# Patient Record
Sex: FEMALE | Race: WHITE | Employment: OTHER | URBAN - NONMETROPOLITAN AREA
[De-identification: names, ages, dates, MRNs, and addresses within clinical notes are randomized per-mention and may not be internally consistent; named-entity substitution may affect disease eponyms.]

---

## 2017-07-21 ENCOUNTER — TELEPHONE (OUTPATIENT)
Dept: CARDIOLOGY CLINIC | Age: 77
End: 2017-07-21

## 2017-08-03 ENCOUNTER — OFFICE VISIT (OUTPATIENT)
Dept: CARDIOLOGY CLINIC | Age: 77
End: 2017-08-03
Payer: MEDICARE

## 2017-08-03 VITALS
BODY MASS INDEX: 27.63 KG/M2 | SYSTOLIC BLOOD PRESSURE: 128 MMHG | DIASTOLIC BLOOD PRESSURE: 66 MMHG | WEIGHT: 156 LBS | HEART RATE: 64 BPM

## 2017-08-03 DIAGNOSIS — I51.89 DIASTOLIC DYSFUNCTION: ICD-10-CM

## 2017-08-03 DIAGNOSIS — I07.1 MODERATE TRICUSPID REGURGITATION: ICD-10-CM

## 2017-08-03 DIAGNOSIS — R06.09 DOE (DYSPNEA ON EXERTION): ICD-10-CM

## 2017-08-03 DIAGNOSIS — R01.1 MURMUR: ICD-10-CM

## 2017-08-03 DIAGNOSIS — I35.0 NONRHEUMATIC AORTIC VALVE STENOSIS: ICD-10-CM

## 2017-08-03 DIAGNOSIS — Z01.818 PRE-OPERATIVE CLEARANCE: Primary | ICD-10-CM

## 2017-08-03 DIAGNOSIS — I25.10 CORONARY ARTERY DISEASE INVOLVING NATIVE CORONARY ARTERY OF NATIVE HEART WITHOUT ANGINA PECTORIS: ICD-10-CM

## 2017-08-03 DIAGNOSIS — E78.5 DYSLIPIDEMIA, GOAL LDL BELOW 100: ICD-10-CM

## 2017-08-03 DIAGNOSIS — G89.29 CHRONIC PAIN OF RIGHT KNEE: ICD-10-CM

## 2017-08-03 DIAGNOSIS — M25.561 CHRONIC PAIN OF RIGHT KNEE: ICD-10-CM

## 2017-08-03 DIAGNOSIS — I10 ESSENTIAL HYPERTENSION: ICD-10-CM

## 2017-08-03 DIAGNOSIS — I38 VALVULAR REGURGITATION: ICD-10-CM

## 2017-08-03 PROCEDURE — 99214 OFFICE O/P EST MOD 30 MIN: CPT | Performed by: INTERNAL MEDICINE

## 2017-08-03 RX ORDER — LEVOTHYROXINE SODIUM 112 UG/1
112 TABLET ORAL DAILY
COMMUNITY
Start: 2017-06-29

## 2017-11-08 ENCOUNTER — OFFICE VISIT (OUTPATIENT)
Dept: CARDIOLOGY CLINIC | Age: 77
End: 2017-11-08
Payer: MEDICARE

## 2017-11-08 VITALS
HEART RATE: 84 BPM | BODY MASS INDEX: 26.93 KG/M2 | DIASTOLIC BLOOD PRESSURE: 62 MMHG | WEIGHT: 152 LBS | HEIGHT: 63 IN | SYSTOLIC BLOOD PRESSURE: 138 MMHG

## 2017-11-08 DIAGNOSIS — R01.1 MURMUR: ICD-10-CM

## 2017-11-08 DIAGNOSIS — I10 ESSENTIAL HYPERTENSION: ICD-10-CM

## 2017-11-08 DIAGNOSIS — E78.5 DYSLIPIDEMIA, GOAL LDL BELOW 100: ICD-10-CM

## 2017-11-08 DIAGNOSIS — I35.0 NONRHEUMATIC AORTIC VALVE STENOSIS: ICD-10-CM

## 2017-11-08 DIAGNOSIS — I25.10 CORONARY ARTERY DISEASE INVOLVING NATIVE CORONARY ARTERY OF NATIVE HEART WITHOUT ANGINA PECTORIS: Primary | ICD-10-CM

## 2017-11-08 DIAGNOSIS — I51.89 DIASTOLIC DYSFUNCTION: ICD-10-CM

## 2017-11-08 DIAGNOSIS — I38 VALVULAR REGURGITATION: ICD-10-CM

## 2017-11-08 DIAGNOSIS — I07.1 MODERATE TRICUSPID REGURGITATION: ICD-10-CM

## 2017-11-08 PROCEDURE — 99213 OFFICE O/P EST LOW 20 MIN: CPT | Performed by: INTERNAL MEDICINE

## 2017-11-08 NOTE — PROGRESS NOTES
This patient is followed regularly by Dr. Viktoria Stubbs DO. Chief Complaint   Patient presents with    Check-Up     CAD         Current Outpatient Prescriptions   Medication Sig Dispense Refill    Insulin Degludec (TRESIBA FLEXTOUCH SC) Inject 40 Units into the skin nightly      levothyroxine (SYNTHROID) 112 MCG tablet Take 112 mcg by mouth daily      losartan (COZAAR) 25 MG tablet Take 25 mg by mouth daily      Cyanocobalamin (VITAMIN B 12 PO) Place  under the tongue daily.  Insulin Syringe-Needle U-100 (RELI-ON INSULIN SYRINGE) by Does not apply route. 31 G/8MM MIS (SHORT)      metoprolol (TOPROL-XL) 25 MG XL tablet TAKE ONE TABLET BY MOUTH EVERY DAY 30 tablet 2    aspirin 81 MG tablet Take 81 mg by mouth daily.  amLODIPine (NORVASC) 5 MG tablet Take 5 mg by mouth daily.  simvastatin (ZOCOR) 20 MG tablet Take 20 mg by mouth nightly. No current facility-administered medications for this visit. Review of Systems - General ROS: negative  Psychological ROS: negative  Hematological and Lymphatic ROS: No history of blood clots or bleeding disorder.    Respiratory ROS: no cough  Cardiovascular ROS: no chest pain, chronic dyspnea on exertion   Gastrointestinal ROS: negative  Genito-Urinary ROS: no dysuria, trouble voiding, or hematuria  Musculoskeletal ROS: negative  Neurological ROS: no TIA or stroke symptoms  Dermatological ROS: negative      /62   Pulse 84   Ht 5' 3\" (1.6 m)   Wt 152 lb (68.9 kg)   BMI 26.93 kg/m²     Physical Examination: General appearance - alert, well appearing, and in no distress  Mental status - alert, oriented to person, place, and time  Neck - supple, no significant adenopathy, no JVD  Chest - clear to auscultation, no wheezes, rales or rhonchi, symmetric air entry  Heart - normal rate, regular rhythm, normal S1, S2, 2/6 SARANYA at the base murmur  Abdomen - soft, nontender, nondistended, no masses or organomegaly  Neurological - alert, oriented, normal speech, no focal findings   Musculoskeletal - no  joint tenderness  Extremities - peripheral pulses normal, no pedal edema  Skin - normal coloration and turgor, no rashes        EKG - NSR    Echo on 12/5/12  Mild MR/TR  Diastolic dysfunction  Moderate pulm HTN  Low-normal EF  LAE/LANE      Echo on 12/7/15  Doppler parameters were consistent with abnormal left ventricular relaxation (grade 1 diastolic dysfunction). Mitral valve: There was mild to moderate regurgitation. Aortic valve: Transaortic velocity was increased due to valvular stenosis. There was mild to moderate stenosis. The peak valve velocity was 260 cm/s. Valve peak gradient was 27 mmHg. Valve mean gradient was 17 mmHg. Tricuspid valve: There was mild to moderate regurgitation. Pulmonic valve: There was mild to moderate regurgitation. Pulmonary arteries: Systolic pressure was mildly increased. Estimated peak pressure was at least 35 mmHg. Stress test on 12/18/12  No ischemia    Left heart cath on 3/13/13  Left main artery is long and patent. It bifurcates to left anterior descending artery. Left circumflex artery is a nondominant vessel and has a mid diffuse 30-40 percent disease. OM1 branch is a small vessel and mild diffuse disease. Left anterior descending artery has a mid around 60 percent stenosis in the diagonal branch which is a medium caliber vessel. It is coming off from that diseased area. Distal LAD also tapers down to 1   mm vessel. Proximal LAD has around 20-30 percent stenosis. Right coronary artery is a dominant vessel. The distal RCA has around 30 percent stenosis. It bifurcates into PLV and PDA. LVEF is 65 percent. LVEDP was 14 mmHg. FFR to LAD was 0.92.     RIGHT HEART CATHETERIZATION FINDING on 3/13/13   RA pressure 13 mmHg  RV pressure 38/14 mmHg  PA pressure 42/10 mmHg  Pulmonary capillary pressure 18 mmHg.     Leg duplex on 4/16/13  No venous insufficiency    SALMA on 4/11/13  Normal    Exercise SALMA on 12/24/13  Normal      Echo 12/16   Normal left ventricle size and systolic function. Ejection fraction was estimated at 55%. There were no regional left ventricular wall motion   abnormalities and wall thickness was within normal limits.   Doppler parameters were consistent with abnormal left ventricular relaxation (grade 1 diastolic dysfunction).   Mildly dilated left atrium.   Mildly dilated right ventricle.   The aortic valve was trileaflet with increased thickness and reduced cuspal separation.   There was mild aortic regurgitation.   The maximum aortic valve gradient is 23 mmHg, the mean gradient is 13 mmHg, and the peak velocity is 241 cm/s.  OLGA LIDIA - 1.3 cm2   Mild-Moderate AS   There was mild mitral regurgitation.   There was mild tricuspid regurgitation.   Mild to moderate pulmonic regurgitation visualized.   Pulmonary artery systolic pressure calculated from tricuspid regurgitation jet is 30 mmhg. Lab Results   Component Value Date    WBC 9.2 03/12/2013    RBC 4.14 03/12/2013    HGB 11.7 03/12/2013    HCT 34.3 03/12/2013    MCV 82.8 03/12/2013    MCH 28.3 03/12/2013    MCHC 34.2 03/12/2013    RDW 14.0 03/12/2013     03/12/2013    MPV 9.9 03/12/2013       Lab Results   Component Value Date     03/12/2013    K 4.4 03/12/2013     03/12/2013    CO2 25 03/12/2013    BUN 13 03/12/2013    LABALBU 4.7 03/12/2013    CREATININE 0.8 03/12/2013    CALCIUM 10.3 03/12/2013    LABGLOM 71 03/12/2013    GLUCOSE 163 03/12/2013       Lab Results   Component Value Date    ALKPHOS 61 03/12/2013    ALT 19 03/12/2013    AST 18 03/12/2013    PROT 7.6 03/12/2013    BILITOT 0.4 03/12/2013    BILIDIR 0.2 03/12/2013    LABALBU 4.7 03/12/2013       Lab Results   Component Value Date    TRIG 54 03/12/2013    HDL 59 03/12/2013    LDLCALC 79 03/12/2013         Assessment/Plan:      S/P Rt TKR   No cristi op cardiovascular events.       Mild-mod AS  Mild to moderate TR/MR/WI  Serial echo    Coronary artery disease  Seems to be stable. Denies angina or change in breathing pattern. Continue ASA/BB/Statin/norvasc. Dyspnea on exertion - Could be multifactorial  LV diastolic dysfunction  Mild - Moderate TR/WA/AR/MR  Sleep apnea - On BIPAP. Normal SALMA with exercise. No Venous insufficiency. Hypertension, on medical treatment. Patient is compliant with medical treatment. Diabetes mellitus: Followed by family physician. Patient needs very tight control to minimize cardiac risk. Dyslipidemia: On statin, followed periodically. Patient need periodic lipid and liver profile. Patient is a advised to have their lipids and liver panel checked through family doctor. The therapeutic values that need to be met are also presented to the patient and need to achieve them is emphasized and patient agrees and accepts. Pulm HTN/Diastolic dysfunction - Medical management. Will schedule follow up appointment in 1 year.

## 2018-04-03 ENCOUNTER — TELEPHONE (OUTPATIENT)
Dept: CARDIOLOGY CLINIC | Age: 78
End: 2018-04-03

## 2018-04-30 ENCOUNTER — OFFICE VISIT (OUTPATIENT)
Dept: CARDIOLOGY CLINIC | Age: 78
End: 2018-04-30
Payer: MEDICARE

## 2018-04-30 VITALS
HEIGHT: 63 IN | WEIGHT: 168 LBS | DIASTOLIC BLOOD PRESSURE: 64 MMHG | BODY MASS INDEX: 29.77 KG/M2 | HEART RATE: 74 BPM | SYSTOLIC BLOOD PRESSURE: 139 MMHG

## 2018-04-30 DIAGNOSIS — I35.0 AORTIC VALVE STENOSIS, ETIOLOGY OF CARDIAC VALVE DISEASE UNSPECIFIED: ICD-10-CM

## 2018-04-30 DIAGNOSIS — R07.2 PRECORDIAL CHEST PAIN: Primary | ICD-10-CM

## 2018-04-30 DIAGNOSIS — I10 ESSENTIAL HYPERTENSION: ICD-10-CM

## 2018-04-30 DIAGNOSIS — E78.5 DYSLIPIDEMIA: ICD-10-CM

## 2018-04-30 PROCEDURE — 99213 OFFICE O/P EST LOW 20 MIN: CPT | Performed by: PHYSICIAN ASSISTANT

## 2018-05-15 ENCOUNTER — HOSPITAL ENCOUNTER (OUTPATIENT)
Dept: NON INVASIVE DIAGNOSTICS | Age: 78
Discharge: HOME | End: 2018-05-15
Payer: MEDICARE

## 2018-05-15 VITALS — WEIGHT: 168 LBS | BODY MASS INDEX: 29.77 KG/M2 | HEIGHT: 63 IN

## 2018-05-15 DIAGNOSIS — R07.2 PRECORDIAL CHEST PAIN: ICD-10-CM

## 2018-05-15 LAB
LV EF: 55 %
LVEF MODALITY: NORMAL

## 2018-05-15 PROCEDURE — A9500 TC99M SESTAMIBI: HCPCS | Performed by: PHYSICIAN ASSISTANT

## 2018-05-15 PROCEDURE — 6360000002 HC RX W HCPCS

## 2018-05-15 PROCEDURE — 78452 HT MUSCLE IMAGE SPECT MULT: CPT

## 2018-05-15 PROCEDURE — 3430000000 HC RX DIAGNOSTIC RADIOPHARMACEUTICAL: Performed by: PHYSICIAN ASSISTANT

## 2018-05-15 PROCEDURE — 93017 CV STRESS TEST TRACING ONLY: CPT

## 2018-05-15 PROCEDURE — 93306 TTE W/DOPPLER COMPLETE: CPT

## 2018-05-15 RX ADMIN — Medication 29.6 MILLICURIE: at 10:52

## 2018-05-15 RX ADMIN — Medication 8.3 MILLICURIE: at 10:05

## 2018-07-12 ENCOUNTER — OFFICE VISIT (OUTPATIENT)
Dept: CARDIOLOGY CLINIC | Age: 78
End: 2018-07-12
Payer: MEDICARE

## 2018-07-12 VITALS
BODY MASS INDEX: 28.99 KG/M2 | SYSTOLIC BLOOD PRESSURE: 130 MMHG | WEIGHT: 163.6 LBS | HEART RATE: 78 BPM | DIASTOLIC BLOOD PRESSURE: 64 MMHG | HEIGHT: 63 IN

## 2018-07-12 DIAGNOSIS — E78.5 DYSLIPIDEMIA: ICD-10-CM

## 2018-07-12 DIAGNOSIS — I35.0 MODERATE AORTIC STENOSIS: ICD-10-CM

## 2018-07-12 DIAGNOSIS — E78.5 DYSLIPIDEMIA, GOAL LDL BELOW 100: ICD-10-CM

## 2018-07-12 DIAGNOSIS — I25.10 CORONARY ARTERY DISEASE INVOLVING NATIVE CORONARY ARTERY OF NATIVE HEART WITHOUT ANGINA PECTORIS: Primary | ICD-10-CM

## 2018-07-12 DIAGNOSIS — R06.09 DOE (DYSPNEA ON EXERTION): ICD-10-CM

## 2018-07-12 DIAGNOSIS — I10 ESSENTIAL HYPERTENSION: ICD-10-CM

## 2018-07-12 DIAGNOSIS — R01.1 MURMUR: ICD-10-CM

## 2018-07-12 PROCEDURE — 93000 ELECTROCARDIOGRAM COMPLETE: CPT | Performed by: INTERNAL MEDICINE

## 2018-07-12 PROCEDURE — 99213 OFFICE O/P EST LOW 20 MIN: CPT | Performed by: INTERNAL MEDICINE

## 2018-07-12 NOTE — PROGRESS NOTES
person, place, and time  Neck - supple, no significant adenopathy, no JVD  Chest - clear to auscultation, no wheezes, rales or rhonchi, symmetric air entry  Heart - normal rate, regular rhythm, normal S1, S2, 2/6 SARANYA at the base murmur  Abdomen - soft, nontender, nondistended, no masses or organomegaly  Neurological - alert, oriented, normal speech, no focal findings   Musculoskeletal - no  joint tenderness  Extremities - peripheral pulses normal, no pedal edema  Skin - normal coloration and turgor, no rashes        EKG - NSR    Echo on 12/5/12  Mild MR/TR  Diastolic dysfunction  Moderate pulm HTN  Low-normal EF  LAE/LANE      Echo on 12/7/15  Doppler parameters were consistent with abnormal left ventricular relaxation (grade 1 diastolic dysfunction). Mitral valve: There was mild to moderate regurgitation. Aortic valve: Transaortic velocity was increased due to valvular stenosis. There was mild to moderate stenosis. The peak valve velocity was 260 cm/s. Valve peak gradient was 27 mmHg. Valve mean gradient was 17 mmHg. Tricuspid valve: There was mild to moderate regurgitation. Pulmonic valve: There was mild to moderate regurgitation. Pulmonary arteries: Systolic pressure was mildly increased. Estimated peak pressure was at least 35 mmHg. Stress test on 12/18/12  No ischemia    Left heart cath on 3/13/13  Left main artery is long and patent. It bifurcates to left anterior descending artery. Left circumflex artery is a nondominant vessel and has a mid diffuse 30-40 percent disease. OM1 branch is a small vessel and mild diffuse disease. Left anterior descending artery has a mid around 60 percent stenosis in the diagonal branch which is a medium caliber vessel. It is coming off from that diseased area. Distal LAD also tapers down to 1   mm vessel. Proximal LAD has around 20-30 percent stenosis. Right coronary artery is a dominant vessel. The distal RCA has around 30 percent stenosis.  It bifurcates into PLV and PDA. LVEF is 65 percent. LVEDP was 14 mmHg. FFR to LAD was 0.92.     RIGHT HEART CATHETERIZATION FINDING on 3/13/13   RA pressure 13 mmHg  RV pressure 38/14 mmHg  PA pressure 42/10 mmHg  Pulmonary capillary pressure 18 mmHg. Leg duplex on 4/16/13  No venous insufficiency    SALMA on 4/11/13  Normal    Exercise SALMA on 12/24/13  Normal      Echo 12/16   Normal left ventricle size and systolic function. Ejection fraction was estimated at 55%. There were no regional left ventricular wall motion   abnormalities and wall thickness was within normal limits.   Doppler parameters were consistent with abnormal left ventricular relaxation (grade 1 diastolic dysfunction).   Mildly dilated left atrium.   Mildly dilated right ventricle.   The aortic valve was trileaflet with increased thickness and reduced cuspal separation.   There was mild aortic regurgitation.   The maximum aortic valve gradient is 23 mmHg, the mean gradient is 13 mmHg, and the peak velocity is 241 cm/s.  OLGA LIDIA - 1.3 cm2   Mild-Moderate AS   There was mild mitral regurgitation.   There was mild tricuspid regurgitation.   Mild to moderate pulmonic regurgitation visualized.   Pulmonary artery systolic pressure calculated from tricuspid regurgitation jet is 30 mmhg. ECHO 05/18  Normal left ventricle size and systolic function. Ejection fraction was  estimated at 55%. There were no regional left ventricular wall motion   abnormalities and wall thickness was within normal limits.   Doppler parameters were consistent with abnormal left ventricular relaxation (grade 1 diastolic dysfunction).   Mildly dilated left atrium.   Mildly dilated right ventricle.   The aortic valve was trileaflet with increased thickness and reduced  cuspal separation.   There was trivial aortic regurgitation.   The maximum aortic valve gradient is 33 mmHg, the mean gradient is 19  mmHg, and the peak velocity is 289 cm/s.    OLGA LIDIA - 1.2 cm2   Moderate AS   There was mild mitral regurgitation.  [place] was mild tricuspid regurgitation.   Mild pulmonic regurgitation visualized.   Pulmonary artery systolic pressure calculated from tricuspid regurgitation jet is 30 mmhg. Lab Results   Component Value Date    WBC 9.2 03/12/2013    RBC 4.14 03/12/2013    HGB 11.7 03/12/2013    HCT 34.3 03/12/2013    MCV 82.8 03/12/2013    MCH 28.3 03/12/2013    MCHC 34.2 03/12/2013    RDW 14.0 03/12/2013     03/12/2013    MPV 9.9 03/12/2013       Lab Results   Component Value Date     03/12/2013    K 4.4 03/12/2013     03/12/2013    CO2 25 03/12/2013    BUN 13 03/12/2013    LABALBU 4.7 03/12/2013    CREATININE 0.8 03/12/2013    CALCIUM 10.3 03/12/2013    LABGLOM 71 03/12/2013    GLUCOSE 163 03/12/2013       Lab Results   Component Value Date    ALKPHOS 61 03/12/2013    ALT 19 03/12/2013    AST 18 03/12/2013    PROT 7.6 03/12/2013    BILITOT 0.4 03/12/2013    BILIDIR 0.2 03/12/2013    LABALBU 4.7 03/12/2013       Lab Results   Component Value Date    TRIG 54 03/12/2013    HDL 59 03/12/2013    LDLCALC 79 03/12/2013         Assessment/Plan:    Atherosclerotic Heart Disease:  Coronary artery disease  Seems to be stable. Denies angina or change in breathing pattern. · Antiplatelet Agent: Continue ASA 81 mg daily . · Beta Blocker Therapy: Continue toprol xl 25 mg daily. · Statin Therapy: Continue zocor 20 mg nightly. Moderate Aortic Stenosis  Mild TR/MR/VT  Serial echo    Dyspnea on exertion - Could be multifactorial  LV diastolic dysfunction  Mild - Moderate TR/VT/AR/MR  Sleep apnea - On BIPAP. Normal SALMA with exercise. No Venous insufficiency. Hypertension, on medical treatment. Patient is compliant with medical treatment. Diabetes mellitus: Followed by family physician. Patient needs very tight control to minimize cardiac risk. Dyslipidemia: On statin, followed periodically. Patient need periodic lipid and liver profile.   Patient is a advised to have their lipids

## 2018-07-12 NOTE — PROGRESS NOTES
Patient here for an office visit    EKG done today    Patient denies cp and palpitations     Patient complains of sob on exertion, dizziness and MARÍA ELENA

## 2019-07-17 ENCOUNTER — OFFICE VISIT (OUTPATIENT)
Dept: CARDIOLOGY CLINIC | Age: 79
End: 2019-07-17
Payer: MEDICARE

## 2019-07-17 VITALS
HEIGHT: 63 IN | SYSTOLIC BLOOD PRESSURE: 154 MMHG | DIASTOLIC BLOOD PRESSURE: 62 MMHG | HEART RATE: 72 BPM | BODY MASS INDEX: 30.12 KG/M2 | WEIGHT: 170 LBS

## 2019-07-17 DIAGNOSIS — I25.10 CORONARY ARTERY DISEASE INVOLVING NATIVE CORONARY ARTERY OF NATIVE HEART WITHOUT ANGINA PECTORIS: ICD-10-CM

## 2019-07-17 DIAGNOSIS — I35.0 NONRHEUMATIC AORTIC VALVE STENOSIS: ICD-10-CM

## 2019-07-17 DIAGNOSIS — E78.01 FAMILIAL HYPERCHOLESTEROLEMIA: ICD-10-CM

## 2019-07-17 DIAGNOSIS — I10 ESSENTIAL HYPERTENSION: Primary | ICD-10-CM

## 2019-07-17 PROCEDURE — 99214 OFFICE O/P EST MOD 30 MIN: CPT | Performed by: NUCLEAR MEDICINE

## 2019-07-17 NOTE — PROGRESS NOTES
Well developed, well nourished  Lungs:    Clear to auscultation  Heart:    Normal S1 S2, Slight murmur. no rubs, no gallops  Abdomen:   Soft, non tender, no organomegalies, positive bowel sounds  Extremities:   No edema, no cyanosis, good peripheral pulses  Neurological:   Awake, alert, oriented. No obvious focal deficits  Musculoskelatal:  No obvious deformities    Assessment:      Diagnosis Orders   1. Essential hypertension     2. Coronary artery disease involving native coronary artery of native heart without angina pectoris     3. Nonrheumatic aortic valve stenosis     4. Familial hypercholesterolemia     higher risk patient  Concerning risk   Dm for many years  Moderate CAd and AS      Plan:  No follow-ups on file. Will need periodic stress test and echo   Continue risk factor modification and medical management. Thank you for allowing me to participate in the care of your patient. Please don't hesitate to contact me regarding any further issues related to the patient care      Orders Placed:  No orders of the defined types were placed in this encounter. Medications Prescribed:  No orders of the defined types were placed in this encounter. Discussed use, benefit, and side effects of prescribed medications. All patient questions answered. Pt voicedunderstanding. Instructed to continue current medications, diet and exercise. Continue risk factor modification and medical management. Patient agreed with treatment plan. Follow up as directed.     Electronically signedby Galindo Craig MD on 7/17/2019 at 10:12 AM

## 2019-12-26 ENCOUNTER — TELEPHONE (OUTPATIENT)
Dept: CARDIOLOGY CLINIC | Age: 79
End: 2019-12-26

## 2020-02-06 ENCOUNTER — OFFICE VISIT (OUTPATIENT)
Dept: CARDIOLOGY CLINIC | Age: 80
End: 2020-02-06
Payer: MEDICARE

## 2020-02-06 VITALS
BODY MASS INDEX: 28.02 KG/M2 | WEIGHT: 158.2 LBS | SYSTOLIC BLOOD PRESSURE: 150 MMHG | DIASTOLIC BLOOD PRESSURE: 84 MMHG | HEART RATE: 97 BPM

## 2020-02-06 PROCEDURE — 99214 OFFICE O/P EST MOD 30 MIN: CPT | Performed by: NUCLEAR MEDICINE

## 2020-02-06 RX ORDER — FERROUS SULFATE 325(65) MG
325 TABLET ORAL
COMMUNITY

## 2020-02-06 RX ORDER — ATORVASTATIN CALCIUM 80 MG/1
80 TABLET, FILM COATED ORAL DAILY
Status: ON HOLD | COMMUNITY
End: 2020-04-29 | Stop reason: HOSPADM

## 2020-02-06 RX ORDER — CLOPIDOGREL BISULFATE 75 MG/1
75 TABLET ORAL DAILY
COMMUNITY

## 2020-02-06 NOTE — PROGRESS NOTES
[facility],[address]  [address]  [facility] [address]  Dept: [phone]  Dept Fax: [phone]  Loc: [phone]    Visit Date: 2/6/2020    Jonathan Lou is a 70 y.o. female who presents todayfor:  Chief Complaint   Patient presents with    Check-Up    Hypertension    Coronary Artery Disease    Hyperlipidemia    Cardiac Valve Problem     Looks like she had multiple strokes lately   Was at [address] [place] [address] like it was thought to be dye to small vessel disease  Started on DABT  High dose ASA  Recovering from that   Known AS   OLGA LIDIA 1.2  Known moderate CAD  Used to see Kristine  No chest pain   Limited patient  No changes in breathing  BP is stable  No dizziness  No syncope  Some falling issues  On statins       HPI:  HPI  Past Medical History:   Diagnosis Date    Arthritis     Bladder spasm     CAD (coronary artery disease) 3/12/2013    Cancer ([address]     breast    Hyperlipidemia     Hypertension     Hypothyroidism     Obstructive sleep apnea of adult 12/12/2013    SOB (shortness of breath)     Type II or unspecified type diabetes mellitus without mention of complication, not stated as uncontrolled       Past Surgical History:   Procedure Laterality Date    BREAST BIOPSY  1/17/2014    BREAST SURGERY  7/1974    left breast     CARPAL TUNNEL RELEASE Right 1/2013    CHOLECYSTECTOMY  7/1968    COLONOSCOPY  11/2007    FINGER TRIGGER RELEASE      HERNIA REPAIR  6-6-14    Dr. Fabrizio Hanson  11/1996    JOINT REPLACEMENT Left     KNEE ARTHROSCOPY      left    KNEE SURGERY Right 2017    LYMPH NODE DISSECTION  12/23/1998    MASTECTOMY  7/2001     Family History   Problem Relation Age of Onset    Cirrhosis Mother     Heart Disease Father     Diabetes Paternal Uncle     Diabetes Paternal Grandmother      Social History     Tobacco Use    Smoking status: Passive Smoke Exposure - Never Smoker    Smokeless tobacco: Never Used  Tobacco comment: worked in a bar for years   Substance Use Topics    Alcohol use: Yes     Comment: seldom      Current Outpatient Medications   Medication Sig Dispense Refill    atorvastatin (LIPITOR) 80 MG tablet Take 80 mg by mouth daily      clopidogrel (PLAVIX) 75 MG tablet Take 75 mg by mouth daily      ferrous sulfate 325 (65 Fe) MG tablet Take 325 mg by mouth daily (with breakfast)      Insulin Glargine (TOUJEO MAX SOLOSTAR SC) Inject into the skin      Insulin Regular Human (NOVOLIN R IJ) Inject as directed      levothyroxine (SYNTHROID) 112 MCG tablet Take 112 mcg by mouth daily      losartan (COZAAR) 25 MG tablet Take 25 mg by mouth daily      Cyanocobalamin (VITAMIN B 12 PO) Place  under the tongue daily.  Insulin Syringe-Needle U-100 (RELI-ON INSULIN SYRINGE) by Does not apply route. 31 G/8MM MIS (SHORT)      metoprolol (TOPROL-XL) 25 MG XL tablet TAKE ONE TABLET BY MOUTH EVERY DAY 30 tablet 2    aspirin 81 MG tablet Take 325 mg by mouth daily       amLODIPine (NORVASC) 5 MG tablet Take 10 mg by mouth daily        No current facility-administered medications for this visit.       Allergies   Allergen Reactions    Myrbetriq [Mirabegron]      Made patient Blood sugar go up to 300-400     Health Maintenance   Topic Date Due    TSH testing  1940    DTaP/Tdap/Td vaccine (1 - Tdap) 05/16/1953    Hepatitis B vaccine (1 of 3 - Risk 3-dose series) 05/16/1961    Shingles Vaccine (1 of 2) 05/16/1992    DEXA (modify frequency per FRAX score)  05/16/2007    Pneumococcal 65+ years Vaccine (1 of 1 - PPSV23) 05/16/2007    Lipid screen  03/12/2014    Potassium monitoring  03/12/2014    Creatinine monitoring  03/12/2014    Annual Wellness Visit (AWV)  06/23/2019    Flu vaccine (1) 09/01/2019    Hepatitis A vaccine  Aged Out    Hib vaccine  Aged Out    Meningococcal (ACWY) vaccine  Aged Out       Subjective:  Review of Systems  General:   No fever, no chills, some fatigue or weight loss  Pulmonary:    some dyspnea, no wheezing  Cardiac:    Denies recent chest pain,   GI:     No nausea or vomiting, no abdominal pain  Neuro:     some dizziness or light headedness,   Musculoskeletal:  No recent active issues  Extremities:   No edema, no obvious claudication       Objective:  Physical Exam  BP (!) 150/84   Pulse 97   Wt 158 lb 3.2 oz (71.8 kg)   BMI 28.02 kg/m²   General:   Well developed, well nourished  Lungs:    Clear to auscultation  Heart:    Normal S1 S2, Slight murmur. no rubs, no gallops  Abdomen:   Soft, non tender, no organomegalies, positive bowel sounds  Extremities:   No edema, no cyanosis, good peripheral pulses  Neurological:   Awake, alert, oriented. No obvious focal deficits  Musculoskelatal:  No obvious deformities    Assessment:      Diagnosis Orders   1. Coronary artery disease involving native coronary artery of native heart without angina pectoris     2. Nonrheumatic aortic valve stenosis     3. Essential hypertension     4. Familial hypercholesterolemia     higher risk patient  ?/ A fib   Echo done at [facility] with no major changes   Possible PVD and small vessel CAD due to long term DM     Plan:  No follow-ups on file. Discussed  Records from [facility]  Mainly the holter results  Continue risk factor modification and medical management. Thank you for allowing me to participate in the care of your patient. Please don't hesitate to contact me regarding any further issues related to the patient care      Orders Placed:  No orders of the defined types were placed in this encounter. Medications Prescribed:  No orders of the defined types were placed in this encounter. Discussed use, benefit, and side effects of prescribed medications. All patient questions answered. Pt voicedunderstanding. Instructed to continue current medications, diet and exercise. Continue risk factor modification and medical management. Patient agreed with treatment plan.  Follow up as directed.     Electronically signedby Daniel Steele MD on 2/6/2020 at 10:07 AM

## 2020-03-25 PROBLEM — E78.5 HYPERLIPIDEMIA: Status: RESOLVED | Noted: 2020-03-25 | Resolved: 2020-03-24

## 2020-04-01 ENCOUNTER — APPOINTMENT (OUTPATIENT)
Dept: GENERAL RADIOLOGY | Age: 80
DRG: 177 | End: 2020-04-01
Payer: MEDICARE

## 2020-04-01 ENCOUNTER — HOSPITAL ENCOUNTER (INPATIENT)
Age: 80
LOS: 3 days | Discharge: HOME | DRG: 177 | End: 2020-04-04
Attending: EMERGENCY MEDICINE | Admitting: INTERNAL MEDICINE
Payer: MEDICARE

## 2020-04-01 PROBLEM — R05.9 COUGH: Status: ACTIVE | Noted: 2020-04-01

## 2020-04-01 LAB
ALBUMIN SERPL-MCNC: 3.6 G/DL (ref 3.5–5.1)
ALP BLD-CCNC: 117 U/L (ref 38–126)
ALT SERPL-CCNC: 29 U/L (ref 11–66)
ANION GAP SERPL CALCULATED.3IONS-SCNC: 17 MEQ/L (ref 8–16)
AST SERPL-CCNC: 35 U/L (ref 5–40)
BACTERIA: ABNORMAL /HPF
BASOPHILS # BLD: 0.3 %
BASOPHILS ABSOLUTE: 0 THOU/MM3 (ref 0–0.1)
BILIRUB SERPL-MCNC: 0.8 MG/DL (ref 0.3–1.2)
BILIRUBIN URINE: NEGATIVE
BLOOD, URINE: NEGATIVE
BUN BLDV-MCNC: 20 MG/DL (ref 7–22)
C-REACTIVE PROTEIN: 9.94 MG/DL (ref 0–1)
CALCIUM SERPL-MCNC: 9.4 MG/DL (ref 8.5–10.5)
CASTS 2: ABNORMAL /LPF
CASTS UA: ABNORMAL /LPF
CHARACTER, URINE: CLEAR
CHLORIDE BLD-SCNC: 96 MEQ/L (ref 98–111)
CO2: 25 MEQ/L (ref 23–33)
COLOR: YELLOW
CREAT SERPL-MCNC: 0.7 MG/DL (ref 0.4–1.2)
CRYSTALS, UA: ABNORMAL
EOSINOPHIL # BLD: 0.3 %
EOSINOPHILS ABSOLUTE: 0 THOU/MM3 (ref 0–0.4)
EPITHELIAL CELLS, UA: ABNORMAL /HPF
ERYTHROCYTE [DISTWIDTH] IN BLOOD BY AUTOMATED COUNT: 13.8 % (ref 11.5–14.5)
ERYTHROCYTE [DISTWIDTH] IN BLOOD BY AUTOMATED COUNT: 42.2 FL (ref 35–45)
FERRITIN: 631 NG/ML (ref 10–291)
FLU A ANTIGEN: NEGATIVE
FLU B ANTIGEN: NEGATIVE
GFR SERPL CREATININE-BSD FRML MDRD: 81 ML/MIN/1.73M2
GLUCOSE BLD-MCNC: 111 MG/DL (ref 70–108)
GLUCOSE URINE: NEGATIVE MG/DL
HCT VFR BLD CALC: 42.3 % (ref 37–47)
HEMOGLOBIN: 14 GM/DL (ref 12–16)
IMMATURE GRANS (ABS): 0.05 THOU/MM3 (ref 0–0.07)
IMMATURE GRANULOCYTES: 0.5 %
KETONES, URINE: ABNORMAL
LACTIC ACID: 1.3 MMOL/L (ref 0.5–2.2)
LD: 247 U/L (ref 100–190)
LEUKOCYTE ESTERASE, URINE: NEGATIVE
LYMPHOCYTES # BLD: 25.9 %
LYMPHOCYTES ABSOLUTE: 2.6 THOU/MM3 (ref 1–4.8)
MCH RBC QN AUTO: 27.8 PG (ref 26–33)
MCHC RBC AUTO-ENTMCNC: 33.1 GM/DL (ref 32.2–35.5)
MCV RBC AUTO: 84.1 FL (ref 81–99)
MISCELLANEOUS 2: ABNORMAL
MONOCYTES # BLD: 6.4 %
MONOCYTES ABSOLUTE: 0.7 THOU/MM3 (ref 0.4–1.3)
NITRITE, URINE: NEGATIVE
NUCLEATED RED BLOOD CELLS: 0 /100 WBC
OSMOLALITY CALCULATION: 279 MOSMOL/KG (ref 275–300)
PH UA: 5.5 (ref 5–9)
PLATELET # BLD: 274 THOU/MM3 (ref 130–400)
PMV BLD AUTO: 12.4 FL (ref 9.4–12.4)
POTASSIUM SERPL-SCNC: 3.2 MEQ/L (ref 3.5–5.2)
PRO-BNP: 243.6 PG/ML (ref 0–1800)
PROCALCITONIN: 0.35 NG/ML (ref 0.01–0.09)
PROTEIN UA: ABNORMAL
RBC # BLD: 5.03 MILL/MM3 (ref 4.2–5.4)
RBC URINE: ABNORMAL /HPF
RENAL EPITHELIAL, UA: ABNORMAL
SEG NEUTROPHILS: 66.6 %
SEGMENTED NEUTROPHILS ABSOLUTE COUNT: 6.8 THOU/MM3 (ref 1.8–7.7)
SODIUM BLD-SCNC: 138 MEQ/L (ref 135–145)
SPECIFIC GRAVITY, URINE: 1.02 (ref 1–1.03)
TOTAL PROTEIN: 7.5 G/DL (ref 6.1–8)
TROPONIN T: < 0.01 NG/ML
UROBILINOGEN, URINE: 1 EU/DL (ref 0–1)
WBC # BLD: 10.2 THOU/MM3 (ref 4.8–10.8)
WBC UA: ABNORMAL /HPF
YEAST: ABNORMAL

## 2020-04-01 PROCEDURE — 2580000003 HC RX 258: Performed by: INTERNAL MEDICINE

## 2020-04-01 PROCEDURE — 83880 ASSAY OF NATRIURETIC PEPTIDE: CPT

## 2020-04-01 PROCEDURE — 36415 COLL VENOUS BLD VENIPUNCTURE: CPT

## 2020-04-01 PROCEDURE — 71045 X-RAY EXAM CHEST 1 VIEW: CPT

## 2020-04-01 PROCEDURE — 87040 BLOOD CULTURE FOR BACTERIA: CPT

## 2020-04-01 PROCEDURE — 87804 INFLUENZA ASSAY W/OPTIC: CPT

## 2020-04-01 PROCEDURE — 85025 COMPLETE CBC W/AUTO DIFF WBC: CPT

## 2020-04-01 PROCEDURE — 6370000000 HC RX 637 (ALT 250 FOR IP): Performed by: EMERGENCY MEDICINE

## 2020-04-01 PROCEDURE — 93005 ELECTROCARDIOGRAM TRACING: CPT | Performed by: EMERGENCY MEDICINE

## 2020-04-01 PROCEDURE — 82728 ASSAY OF FERRITIN: CPT

## 2020-04-01 PROCEDURE — 84145 PROCALCITONIN (PCT): CPT

## 2020-04-01 PROCEDURE — 86140 C-REACTIVE PROTEIN: CPT

## 2020-04-01 PROCEDURE — 96361 HYDRATE IV INFUSION ADD-ON: CPT

## 2020-04-01 PROCEDURE — 96365 THER/PROPH/DIAG IV INF INIT: CPT

## 2020-04-01 PROCEDURE — 6360000002 HC RX W HCPCS: Performed by: EMERGENCY MEDICINE

## 2020-04-01 PROCEDURE — 83605 ASSAY OF LACTIC ACID: CPT

## 2020-04-01 PROCEDURE — 2060000000 HC ICU INTERMEDIATE R&B

## 2020-04-01 PROCEDURE — 81001 URINALYSIS AUTO W/SCOPE: CPT

## 2020-04-01 PROCEDURE — 2580000003 HC RX 258: Performed by: EMERGENCY MEDICINE

## 2020-04-01 PROCEDURE — 99222 1ST HOSP IP/OBS MODERATE 55: CPT | Performed by: INTERNAL MEDICINE

## 2020-04-01 PROCEDURE — 99284 EMERGENCY DEPT VISIT MOD MDM: CPT

## 2020-04-01 PROCEDURE — 84484 ASSAY OF TROPONIN QUANT: CPT

## 2020-04-01 PROCEDURE — 80053 COMPREHEN METABOLIC PANEL: CPT

## 2020-04-01 PROCEDURE — 83615 LACTATE (LD) (LDH) ENZYME: CPT

## 2020-04-01 RX ORDER — ACETAMINOPHEN 325 MG/1
650 TABLET ORAL EVERY 6 HOURS PRN
Status: DISCONTINUED | OUTPATIENT
Start: 2020-04-01 | End: 2020-04-04 | Stop reason: HOSPADM

## 2020-04-01 RX ORDER — ONDANSETRON 2 MG/ML
4 INJECTION INTRAMUSCULAR; INTRAVENOUS EVERY 6 HOURS PRN
Status: DISCONTINUED | OUTPATIENT
Start: 2020-04-01 | End: 2020-04-04 | Stop reason: HOSPADM

## 2020-04-01 RX ORDER — LOSARTAN POTASSIUM 25 MG/1
25 TABLET ORAL DAILY
Status: DISCONTINUED | OUTPATIENT
Start: 2020-04-02 | End: 2020-04-04 | Stop reason: HOSPADM

## 2020-04-01 RX ORDER — DEXTROSE MONOHYDRATE 50 MG/ML
100 INJECTION, SOLUTION INTRAVENOUS PRN
Status: DISCONTINUED | OUTPATIENT
Start: 2020-04-01 | End: 2020-04-04 | Stop reason: HOSPADM

## 2020-04-01 RX ORDER — POTASSIUM CHLORIDE 7.45 MG/ML
10 INJECTION INTRAVENOUS PRN
Status: DISCONTINUED | OUTPATIENT
Start: 2020-04-01 | End: 2020-04-04 | Stop reason: HOSPADM

## 2020-04-01 RX ORDER — POLYETHYLENE GLYCOL 3350 17 G/17G
17 POWDER, FOR SOLUTION ORAL DAILY PRN
Status: DISCONTINUED | OUTPATIENT
Start: 2020-04-01 | End: 2020-04-04 | Stop reason: HOSPADM

## 2020-04-01 RX ORDER — 0.9 % SODIUM CHLORIDE 0.9 %
1000 INTRAVENOUS SOLUTION INTRAVENOUS ONCE
Status: COMPLETED | OUTPATIENT
Start: 2020-04-01 | End: 2020-04-01

## 2020-04-01 RX ORDER — BENZONATATE 100 MG/1
100 CAPSULE ORAL 3 TIMES DAILY PRN
Status: DISCONTINUED | OUTPATIENT
Start: 2020-04-01 | End: 2020-04-04 | Stop reason: HOSPADM

## 2020-04-01 RX ORDER — METOPROLOL SUCCINATE 25 MG/1
25 TABLET, EXTENDED RELEASE ORAL DAILY
Status: DISCONTINUED | OUTPATIENT
Start: 2020-04-02 | End: 2020-04-04 | Stop reason: HOSPADM

## 2020-04-01 RX ORDER — SODIUM CHLORIDE 0.9 % (FLUSH) 0.9 %
10 SYRINGE (ML) INJECTION EVERY 12 HOURS SCHEDULED
Status: DISCONTINUED | OUTPATIENT
Start: 2020-04-01 | End: 2020-04-04 | Stop reason: HOSPADM

## 2020-04-01 RX ORDER — SODIUM CHLORIDE 0.9 % (FLUSH) 0.9 %
10 SYRINGE (ML) INJECTION PRN
Status: DISCONTINUED | OUTPATIENT
Start: 2020-04-01 | End: 2020-04-04 | Stop reason: HOSPADM

## 2020-04-01 RX ORDER — LEVOTHYROXINE SODIUM 112 UG/1
112 TABLET ORAL
Status: DISCONTINUED | OUTPATIENT
Start: 2020-04-02 | End: 2020-04-04 | Stop reason: HOSPADM

## 2020-04-01 RX ORDER — POTASSIUM CHLORIDE 20 MEQ/1
40 TABLET, EXTENDED RELEASE ORAL ONCE
Status: COMPLETED | OUTPATIENT
Start: 2020-04-01 | End: 2020-04-01

## 2020-04-01 RX ORDER — SODIUM CHLORIDE 9 MG/ML
INJECTION, SOLUTION INTRAVENOUS CONTINUOUS
Status: ACTIVE | OUTPATIENT
Start: 2020-04-01 | End: 2020-04-02

## 2020-04-01 RX ORDER — NICOTINE POLACRILEX 4 MG
15 LOZENGE BUCCAL PRN
Status: DISCONTINUED | OUTPATIENT
Start: 2020-04-01 | End: 2020-04-04 | Stop reason: HOSPADM

## 2020-04-01 RX ORDER — AMLODIPINE BESYLATE 10 MG/1
10 TABLET ORAL DAILY
Status: DISCONTINUED | OUTPATIENT
Start: 2020-04-02 | End: 2020-04-04 | Stop reason: HOSPADM

## 2020-04-01 RX ORDER — FERROUS SULFATE 325(65) MG
325 TABLET ORAL
Status: DISCONTINUED | OUTPATIENT
Start: 2020-04-02 | End: 2020-04-04 | Stop reason: HOSPADM

## 2020-04-01 RX ORDER — ASPIRIN 325 MG
325 TABLET ORAL DAILY
Status: DISCONTINUED | OUTPATIENT
Start: 2020-04-02 | End: 2020-04-04 | Stop reason: HOSPADM

## 2020-04-01 RX ORDER — ACETAMINOPHEN 650 MG/1
650 SUPPOSITORY RECTAL EVERY 6 HOURS PRN
Status: DISCONTINUED | OUTPATIENT
Start: 2020-04-01 | End: 2020-04-04 | Stop reason: HOSPADM

## 2020-04-01 RX ORDER — PROMETHAZINE HYDROCHLORIDE 25 MG/1
12.5 TABLET ORAL EVERY 6 HOURS PRN
Status: DISCONTINUED | OUTPATIENT
Start: 2020-04-01 | End: 2020-04-04 | Stop reason: HOSPADM

## 2020-04-01 RX ORDER — DEXTROSE MONOHYDRATE 25 G/50ML
12.5 INJECTION, SOLUTION INTRAVENOUS PRN
Status: DISCONTINUED | OUTPATIENT
Start: 2020-04-01 | End: 2020-04-04 | Stop reason: HOSPADM

## 2020-04-01 RX ORDER — CLOPIDOGREL BISULFATE 75 MG/1
75 TABLET ORAL DAILY
Status: DISCONTINUED | OUTPATIENT
Start: 2020-04-02 | End: 2020-04-04 | Stop reason: HOSPADM

## 2020-04-01 RX ORDER — POTASSIUM CHLORIDE 20 MEQ/1
40 TABLET, EXTENDED RELEASE ORAL PRN
Status: DISCONTINUED | OUTPATIENT
Start: 2020-04-01 | End: 2020-04-04 | Stop reason: HOSPADM

## 2020-04-01 RX ORDER — ATORVASTATIN CALCIUM 80 MG/1
80 TABLET, FILM COATED ORAL DAILY
Status: DISCONTINUED | OUTPATIENT
Start: 2020-04-02 | End: 2020-04-04 | Stop reason: HOSPADM

## 2020-04-01 RX ORDER — CEFDINIR 300 MG/1
300 CAPSULE ORAL 2 TIMES DAILY
Qty: 20 CAPSULE | Refills: 0 | Status: SHIPPED | OUTPATIENT
Start: 2020-04-01 | End: 2020-04-01 | Stop reason: CLARIF

## 2020-04-01 RX ADMIN — POTASSIUM CHLORIDE 40 MEQ: 1500 TABLET, EXTENDED RELEASE ORAL at 19:49

## 2020-04-01 RX ADMIN — Medication 10 ML: at 23:58

## 2020-04-01 RX ADMIN — CEFTRIAXONE SODIUM 1 G: 1 INJECTION, POWDER, FOR SOLUTION INTRAMUSCULAR; INTRAVENOUS at 19:49

## 2020-04-01 RX ADMIN — SODIUM CHLORIDE: 9 INJECTION, SOLUTION INTRAVENOUS at 23:58

## 2020-04-01 RX ADMIN — SODIUM CHLORIDE 1000 ML: 9 INJECTION, SOLUTION INTRAVENOUS at 18:31

## 2020-04-01 ASSESSMENT — ENCOUNTER SYMPTOMS
STRIDOR: 0
CONSTIPATION: 0
BACK PAIN: 0
EYE PAIN: 0
WHEEZING: 0
PHOTOPHOBIA: 0
COUGH: 1
VOMITING: 0
ABDOMINAL DISTENTION: 0
ABDOMINAL PAIN: 0
EYE DISCHARGE: 0
EYE REDNESS: 0
SORE THROAT: 0
CHEST TIGHTNESS: 0
DIARRHEA: 0
RHINORRHEA: 0
NAUSEA: 0
SHORTNESS OF BREATH: 1
EYE ITCHING: 0

## 2020-04-01 ASSESSMENT — PAIN DESCRIPTION - ONSET: ONSET: ON-GOING

## 2020-04-01 ASSESSMENT — PAIN DESCRIPTION - FREQUENCY: FREQUENCY: CONTINUOUS

## 2020-04-01 ASSESSMENT — PAIN - FUNCTIONAL ASSESSMENT: PAIN_FUNCTIONAL_ASSESSMENT: PREVENTS OR INTERFERES SOME ACTIVE ACTIVITIES AND ADLS

## 2020-04-01 ASSESSMENT — PAIN DESCRIPTION - ORIENTATION: ORIENTATION: LEFT

## 2020-04-01 ASSESSMENT — PAIN DESCRIPTION - PROGRESSION: CLINICAL_PROGRESSION: NOT CHANGED

## 2020-04-01 ASSESSMENT — PAIN SCALES - GENERAL: PAINLEVEL_OUTOF10: 8

## 2020-04-01 ASSESSMENT — PAIN DESCRIPTION - PAIN TYPE: TYPE: ACUTE PAIN

## 2020-04-01 ASSESSMENT — PAIN DESCRIPTION - DESCRIPTORS: DESCRIPTORS: ACHING

## 2020-04-01 ASSESSMENT — PAIN DESCRIPTION - LOCATION: LOCATION: GROIN

## 2020-04-01 NOTE — ED TRIAGE NOTES
Started to feel bad a couple of days ago;  Weak, SOB, and unable to hold self up due to leg weakness. Has non productive cough.

## 2020-04-01 NOTE — ED PROVIDER NOTES
for tremors, syncope and headaches. Psychiatric/Behavioral: Negative for agitation, behavioral problems, confusion, self-injury, sleep disturbance and suicidal ideas. PAST MEDICAL HISTORY    has a past medical history of Arthritis, Bladder spasm, CAD (coronary artery disease), Cancer ([facility] [address] Hyperlipidemia, Hypertension, Hypothyroidism, Obstructive sleep apnea of adult, SOB (shortness of breath), and Type II or unspecified type diabetes mellitus without mention of complication, not stated as uncontrolled. SURGICAL HISTORY      has a past surgical history that includes Cholecystectomy (7/1968); Mastectomy (7/2001); Hysterectomy (11/1996); Knee arthroscopy; Breast surgery (7/1974); Carpal tunnel release (Right, 1/2013); hernia repair (6-6-14); Colonoscopy (11/2007); lymph node dissection (12/23/1998); Breast biopsy (1/17/2014); joint replacement (Left); Finger trigger release; and knee surgery (Right, 2017). CURRENT MEDICATIONS       Current Discharge Medication List      CONTINUE these medications which have NOT CHANGED    Details   atorvastatin (LIPITOR) 80 MG tablet Take 80 mg by mouth daily      clopidogrel (PLAVIX) 75 MG tablet Take 75 mg by mouth daily      ferrous sulfate 325 (65 Fe) MG tablet Take 325 mg by mouth daily (with breakfast)      Insulin Glargine (TOUJEO MAX SOLOSTAR SC) Inject into the skin      Insulin Regular Human (NOVOLIN R IJ) Inject as directed      levothyroxine (SYNTHROID) 112 MCG tablet Take 112 mcg by mouth daily      losartan (COZAAR) 25 MG tablet Take 25 mg by mouth daily      Cyanocobalamin (VITAMIN B 12 PO) Place  under the tongue daily. Insulin Syringe-Needle U-100 (RELI-ON INSULIN SYRINGE) by Does not apply route.  31 G/8MM MIS (SHORT)      metoprolol (TOPROL-XL) 25 MG XL tablet TAKE ONE TABLET BY MOUTH EVERY DAY  Qty: 30 tablet, Refills: 2      aspirin 81 MG tablet Take 325 mg by mouth daily       amLODIPine (NORVASC) 5 MG tablet Take 10 mg by mouth daily ALLERGIES     is allergic to myrbetriq [mirabegron]. FAMILY HISTORY     She indicated that her mother is . She indicated that her father is . She indicated that the status of her paternal grandmother is unknown. She indicated that the status of her paternal uncle is unknown.   family history includes Cirrhosis in her mother; Diabetes in her paternal grandmother and paternal uncle; Heart Disease in her father. SOCIAL HISTORY      reports that she is a non-smoker but has been exposed to tobacco smoke. She has never used smokeless tobacco. She reports current alcohol use. She reports that she does not use drugs. PHYSICAL EXAM     INITIAL VITALS:  height is 5' 3\" (1.6 m) and weight is 158 lb (71.7 kg). Her oral temperature is 98.4 °F (36.9 °C). Her blood pressure is 122/78 and her pulse is 99. Her respiration is 16 and oxygen saturation is 95%. Physical Exam  Vitals signs and nursing note reviewed. Constitutional:       Appearance: She is well-developed. She is not diaphoretic. HENT:      Head: Normocephalic and atraumatic. Nose: Nose normal.   Eyes:      General: No scleral icterus. Right eye: No discharge. Left eye: No discharge. Conjunctiva/sclera: Conjunctivae normal.      Pupils: Pupils are equal, round, and reactive to light. Neck:      Musculoskeletal: Normal range of motion and neck supple. Vascular: No JVD. Trachea: No tracheal deviation. Cardiovascular:      Rate and Rhythm: Normal rate and regular rhythm. Heart sounds: Normal heart sounds. No murmur. No friction rub. No gallop. Pulmonary:      Effort: Pulmonary effort is normal. No respiratory distress. Breath sounds: No stridor. Rhonchi and rales present. No wheezing. Chest:      Chest wall: No tenderness. Abdominal:      General: Bowel sounds are normal. There is no distension. Palpations: Abdomen is soft. There is no mass. Tenderness:  There is no orders placed or performed during the hospital encounter of 04/01/20   Rapid influenza A/B antigens   Result Value Ref Range    Flu A Antigen NEGATIVE NEGATIVE    Flu B Antigen NEGATIVE NEGATIVE   CBC Auto Differential   Result Value Ref Range    WBC 10.2 4.8 - 10.8 thou/mm3    RBC 5.03 4.20 - 5.40 mill/mm3    Hemoglobin 14.0 12.0 - 16.0 gm/dl    Hematocrit 42.3 37.0 - 47.0 %    MCV 84.1 81.0 - 99.0 fL    MCH 27.8 26.0 - 33.0 pg    MCHC 33.1 32.2 - 35.5 gm/dl    RDW-CV 13.8 11.5 - 14.5 %    RDW-SD 42.2 35.0 - 45.0 fL    Platelets 271 850 - 839 thou/mm3    MPV 12.4 9.4 - 12.4 fL    Seg Neutrophils 66.6 %    Lymphocytes 25.9 %    Monocytes 6.4 %    Eosinophils 0.3 %    Basophils 0.3 %    Immature Granulocytes 0.5 %    Segs Absolute 6.8 1.8 - 7.7 thou/mm3    Lymphocytes Absolute 2.6 1.0 - 4.8 thou/mm3    Monocytes Absolute 0.7 0.4 - 1.3 thou/mm3    Eosinophils Absolute 0.0 0.0 - 0.4 thou/mm3    Basophils Absolute 0.0 0.0 - 0.1 thou/mm3    Immature Grans (Abs) 0.05 0.00 - 0.07 thou/mm3    nRBC 0 /100 wbc   Comprehensive Metabolic Panel   Result Value Ref Range    Glucose 111 (H) 70 - 108 mg/dL    CREATININE 0.7 0.4 - 1.2 mg/dL    BUN 20 7 - 22 mg/dL    Sodium 138 135 - 145 meq/L    Potassium 3.2 (L) 3.5 - 5.2 meq/L    Chloride 96 (L) 98 - 111 meq/L    CO2 25 23 - 33 meq/L    Calcium 9.4 8.5 - 10.5 mg/dL    AST 35 5 - 40 U/L    Alkaline Phosphatase 117 38 - 126 U/L    Total Protein 7.5 6.1 - 8.0 g/dL    Alb 3.6 3.5 - 5.1 g/dL    Total Bilirubin 0.8 0.3 - 1.2 mg/dL    ALT 29 11 - 66 U/L   Brain Natriuretic Peptide   Result Value Ref Range    Pro-.6 0.0 - 1800.0 pg/mL   Troponin   Result Value Ref Range    Troponin T < 0.010 ng/ml   C-reactive protein   Result Value Ref Range    CRP 9.94 (H) 0.00 - 1.00 mg/dl   Ferritin   Result Value Ref Range    Ferritin 631 (H) 10 - 291 ng/mL   Procalcitonin   Result Value Ref Range    Procalcitonin 0.35 (H) 0.01 - 0.09 ng/mL   Lactic acid, plasma   Result Value Ref Range

## 2020-04-01 NOTE — ED NOTES
Pt cleaned and changed into fresh depends. Pressure sore noted on pt's left buttocks. Will monitor.      Hill Francis RN  04/01/20 1222

## 2020-04-01 NOTE — ED NOTES
Bed: 040A  Expected date:   Expected time:   Means of arrival: [facility]  Comments:     Chloe Steiner RN  04/01/20 1076

## 2020-04-02 LAB
ANION GAP SERPL CALCULATED.3IONS-SCNC: 14 MEQ/L (ref 8–16)
BUN BLDV-MCNC: 15 MG/DL (ref 7–22)
CALCIUM SERPL-MCNC: 8.3 MG/DL (ref 8.5–10.5)
CHLORIDE BLD-SCNC: 104 MEQ/L (ref 98–111)
CO2: 21 MEQ/L (ref 23–33)
CREAT SERPL-MCNC: 0.7 MG/DL (ref 0.4–1.2)
EKG ATRIAL RATE: 91 BPM
EKG ATRIAL RATE: 95 BPM
EKG P AXIS: 32 DEGREES
EKG P AXIS: 50 DEGREES
EKG P-R INTERVAL: 150 MS
EKG P-R INTERVAL: 152 MS
EKG Q-T INTERVAL: 408 MS
EKG Q-T INTERVAL: 422 MS
EKG QRS DURATION: 110 MS
EKG QRS DURATION: 118 MS
EKG QTC CALCULATION (BAZETT): 512 MS
EKG QTC CALCULATION (BAZETT): 519 MS
EKG R AXIS: -54 DEGREES
EKG R AXIS: -57 DEGREES
EKG T AXIS: 11 DEGREES
EKG T AXIS: 9 DEGREES
EKG VENTRICULAR RATE: 91 BPM
EKG VENTRICULAR RATE: 95 BPM
EMERGENT DISEASE RESULT: ABNORMAL
ERYTHROCYTE [DISTWIDTH] IN BLOOD BY AUTOMATED COUNT: 13.7 % (ref 11.5–14.5)
ERYTHROCYTE [DISTWIDTH] IN BLOOD BY AUTOMATED COUNT: 42.1 FL (ref 35–45)
GFR SERPL CREATININE-BSD FRML MDRD: 81 ML/MIN/1.73M2
GLUCOSE BLD-MCNC: 110 MG/DL (ref 70–108)
GLUCOSE BLD-MCNC: 110 MG/DL (ref 70–108)
GLUCOSE BLD-MCNC: 129 MG/DL (ref 70–108)
GLUCOSE BLD-MCNC: 139 MG/DL (ref 70–108)
GLUCOSE BLD-MCNC: 80 MG/DL (ref 70–108)
GLUCOSE BLD-MCNC: 96 MG/DL (ref 70–108)
HCT VFR BLD CALC: 36.6 % (ref 37–47)
HEMOGLOBIN: 11.8 GM/DL (ref 12–16)
MAGNESIUM: 1.8 MG/DL (ref 1.6–2.4)
MCH RBC QN AUTO: 27.3 PG (ref 26–33)
MCHC RBC AUTO-ENTMCNC: 32.2 GM/DL (ref 32.2–35.5)
MCV RBC AUTO: 84.5 FL (ref 81–99)
PHOSPHORUS: 2.7 MG/DL (ref 2.4–4.7)
PLATELET # BLD: 243 THOU/MM3 (ref 130–400)
PMV BLD AUTO: 12.3 FL (ref 9.4–12.4)
POTASSIUM REFLEX MAGNESIUM: 3.6 MEQ/L (ref 3.5–5.2)
RBC # BLD: 4.33 MILL/MM3 (ref 4.2–5.4)
SARS-COV-2: DETECTED
SODIUM BLD-SCNC: 139 MEQ/L (ref 135–145)
WBC # BLD: 8.3 THOU/MM3 (ref 4.8–10.8)

## 2020-04-02 PROCEDURE — 2580000003 HC RX 258: Performed by: INTERNAL MEDICINE

## 2020-04-02 PROCEDURE — 6370000000 HC RX 637 (ALT 250 FOR IP): Performed by: INTERNAL MEDICINE

## 2020-04-02 PROCEDURE — 84100 ASSAY OF PHOSPHORUS: CPT

## 2020-04-02 PROCEDURE — 93010 ELECTROCARDIOGRAM REPORT: CPT | Performed by: NUCLEAR MEDICINE

## 2020-04-02 PROCEDURE — 2709999900 HC NON-CHARGEABLE SUPPLY

## 2020-04-02 PROCEDURE — 83735 ASSAY OF MAGNESIUM: CPT

## 2020-04-02 PROCEDURE — 80048 BASIC METABOLIC PNL TOTAL CA: CPT

## 2020-04-02 PROCEDURE — U0002 COVID-19 LAB TEST NON-CDC: HCPCS

## 2020-04-02 PROCEDURE — 85027 COMPLETE CBC AUTOMATED: CPT

## 2020-04-02 PROCEDURE — 82948 REAGENT STRIP/BLOOD GLUCOSE: CPT

## 2020-04-02 PROCEDURE — 6370000000 HC RX 637 (ALT 250 FOR IP): Performed by: NURSE PRACTITIONER

## 2020-04-02 PROCEDURE — 6360000002 HC RX W HCPCS: Performed by: INTERNAL MEDICINE

## 2020-04-02 PROCEDURE — 6370000000 HC RX 637 (ALT 250 FOR IP): Performed by: PHYSICIAN ASSISTANT

## 2020-04-02 PROCEDURE — 36415 COLL VENOUS BLD VENIPUNCTURE: CPT

## 2020-04-02 PROCEDURE — 99232 SBSQ HOSP IP/OBS MODERATE 35: CPT | Performed by: NURSE PRACTITIONER

## 2020-04-02 PROCEDURE — 2060000000 HC ICU INTERMEDIATE R&B

## 2020-04-02 RX ORDER — HYDROXYCHLOROQUINE SULFATE 200 MG/1
400 TABLET, FILM COATED ORAL 2 TIMES DAILY
Status: COMPLETED | OUTPATIENT
Start: 2020-04-02 | End: 2020-04-03

## 2020-04-02 RX ORDER — HYDROXYCHLOROQUINE SULFATE 200 MG/1
200 TABLET, FILM COATED ORAL 2 TIMES DAILY
Status: DISCONTINUED | OUTPATIENT
Start: 2020-04-03 | End: 2020-04-04 | Stop reason: HOSPADM

## 2020-04-02 RX ORDER — MULTIVITAMIN WITH FOLIC ACID 400 MCG
1 TABLET ORAL DAILY
Status: DISCONTINUED | OUTPATIENT
Start: 2020-04-02 | End: 2020-04-04 | Stop reason: HOSPADM

## 2020-04-02 RX ADMIN — CLOPIDOGREL BISULFATE 75 MG: 75 TABLET ORAL at 08:23

## 2020-04-02 RX ADMIN — ACETAMINOPHEN 650 MG: 325 TABLET ORAL at 23:08

## 2020-04-02 RX ADMIN — ACETAMINOPHEN 650 MG: 325 TABLET ORAL at 15:15

## 2020-04-02 RX ADMIN — ATORVASTATIN CALCIUM 80 MG: 80 TABLET, FILM COATED ORAL at 08:23

## 2020-04-02 RX ADMIN — AMLODIPINE BESYLATE 10 MG: 10 TABLET ORAL at 08:23

## 2020-04-02 RX ADMIN — LEVOTHYROXINE SODIUM 112 MCG: 112 TABLET ORAL at 06:09

## 2020-04-02 RX ADMIN — ENOXAPARIN SODIUM 40 MG: 40 INJECTION SUBCUTANEOUS at 08:24

## 2020-04-02 RX ADMIN — SODIUM CHLORIDE: 9 INJECTION, SOLUTION INTRAVENOUS at 20:28

## 2020-04-02 RX ADMIN — SODIUM CHLORIDE: 9 INJECTION, SOLUTION INTRAVENOUS at 09:39

## 2020-04-02 RX ADMIN — THERA TABS 1 TABLET: TAB at 14:45

## 2020-04-02 RX ADMIN — FERROUS SULFATE TAB 325 MG (65 MG ELEMENTAL FE) 325 MG: 325 (65 FE) TAB at 08:23

## 2020-04-02 RX ADMIN — METOPROLOL SUCCINATE 25 MG: 25 TABLET, FILM COATED, EXTENDED RELEASE ORAL at 08:23

## 2020-04-02 RX ADMIN — LOSARTAN POTASSIUM 25 MG: 25 TABLET, FILM COATED ORAL at 08:23

## 2020-04-02 RX ADMIN — HYDROXYCHLOROQUINE SULFATE 400 MG: 200 TABLET ORAL at 22:09

## 2020-04-02 RX ADMIN — ASPIRIN 325 MG: 325 TABLET, FILM COATED ORAL at 08:23

## 2020-04-02 ASSESSMENT — PAIN DESCRIPTION - FREQUENCY
FREQUENCY: CONTINUOUS
FREQUENCY: CONTINUOUS

## 2020-04-02 ASSESSMENT — PAIN DESCRIPTION - PROGRESSION
CLINICAL_PROGRESSION: NOT CHANGED

## 2020-04-02 ASSESSMENT — PAIN SCALES - GENERAL
PAINLEVEL_OUTOF10: 0
PAINLEVEL_OUTOF10: 8
PAINLEVEL_OUTOF10: 7
PAINLEVEL_OUTOF10: 0
PAINLEVEL_OUTOF10: 8
PAINLEVEL_OUTOF10: 8
PAINLEVEL_OUTOF10: 0
PAINLEVEL_OUTOF10: 3

## 2020-04-02 ASSESSMENT — PAIN DESCRIPTION - PAIN TYPE
TYPE: ACUTE PAIN

## 2020-04-02 ASSESSMENT — PAIN DESCRIPTION - LOCATION
LOCATION: GROIN
LOCATION: HIP
LOCATION: HIP

## 2020-04-02 ASSESSMENT — PAIN DESCRIPTION - ORIENTATION
ORIENTATION: LEFT

## 2020-04-02 ASSESSMENT — PAIN DESCRIPTION - ONSET
ONSET: ON-GOING
ONSET: ON-GOING

## 2020-04-02 ASSESSMENT — PAIN DESCRIPTION - DESCRIPTORS
DESCRIPTORS: ACHING
DESCRIPTORS: DISCOMFORT

## 2020-04-02 ASSESSMENT — PAIN - FUNCTIONAL ASSESSMENT
PAIN_FUNCTIONAL_ASSESSMENT: PREVENTS OR INTERFERES SOME ACTIVE ACTIVITIES AND ADLS
PAIN_FUNCTIONAL_ASSESSMENT: ACTIVITIES ARE NOT PREVENTED

## 2020-04-02 NOTE — ED NOTES
Pt unable to ambulate for ambulating pulse ox at this time. Pt states that she has been unable to walk for a few weeks.       Isidoro Felipe RN  04/01/20 2037

## 2020-04-02 NOTE — ED NOTES
4K called to check on pt status. 4K nurse unable to take report at this time.      Lauren Karimi, RN  04/01/20 4138

## 2020-04-02 NOTE — PLAN OF CARE
Problem: Nutrition  Goal: Optimal nutrition therapy  Outcome: Ongoing   Nutrition Problem: Increased nutrient needs  Intervention: Food and/or Nutrient Delivery: Continue current diet, Start ONS  Nutritional Goals: Patient will consume 75% or more of meals and ONS during LOS.

## 2020-04-02 NOTE — CARE COORDINATION
20, 7:37 AM EDT  DISCHARGE PLANNING EVALUATION:    Carlean Kanner       Admitted from: ED 2020/ [facility] day: 1   Location: [address] [place] [address]- Reason for admit: Cough [R05] Status: IP  Admit order signed?: yes  PMH:  has a past medical history of Arthritis, Bladder spasm, CAD (coronary artery disease), Cancer ([address] Hyperlipidemia, Hypertension, Hypothyroidism, Obstructive sleep apnea of adult, SOB (shortness of breath), and Type II or unspecified type diabetes mellitus without mention of complication, not stated as uncontrolled. Procedure: none  Pertinent abnormal Imagin/1 CXR: Bilateral patchy opacities in the lung bases concerning for infection atypical infection can be considered. Cardiomegaly  Medications:  Scheduled Meds:   metoprolol succinate  25 mg Oral Daily    losartan  25 mg Oral Daily    levothyroxine  112 mcg Oral QAM AC    ferrous sulfate  325 mg Oral Daily with breakfast    clopidogrel  75 mg Oral Daily    atorvastatin  80 mg Oral Daily    aspirin  325 mg Oral Daily    amLODIPine  10 mg Oral Daily    sodium chloride flush  10 mL Intravenous 2 times per day    enoxaparin  40 mg Subcutaneous Daily    insulin lispro  0-12 Units Subcutaneous TID WC    insulin lispro  0-6 Units Subcutaneous Nightly     Continuous Infusions:   sodium chloride 75 mL/hr at 20 2358    dextrose        Pertinent Info/Orders/Treatment Plan: Presented with c/o nonproductive cough and generalized weakness. Has not ambulated for at least 2 weeks, but not a new problem, was unable to ambulate after 3 CVA's and worked with PT until recently.  cares for her at home, but is sick. Coccyx wound POA. Admitted to COVID unit; ruling out COVID 19, results pending. Tmax 99.1. NSR. On room air. Ox4. Left sided deficit from prior CVA. Telemetry. Up with assist. IVF, norvasc, asa, lipitor, plavix, lovenox, ferrous sulfate, SSI ACHS, synthroid, cozaar, toprol xl, Electrolyte replacement protocols.  Received

## 2020-04-02 NOTE — PROGRESS NOTES
[address], [place], about patient condition. Gave updates and questions were answered. RN informed POA to call with any questions or concerns and gave number.

## 2020-04-02 NOTE — ED NOTES
ED to inpatient nurses report    No chief complaint on file. Present to ED from home  LOC: alert and orientated to name, place, date  Vital signs   Vitals:    04/01/20 1742 04/01/20 1916 04/01/20 2037   BP: 105/83 122/78    Pulse: 90 97 99   Resp: 18 16 16   Temp: 98.4 °F (36.9 °C)     TempSrc: Oral     SpO2: 94% 94% 95%   Weight: 158 lb (71.7 kg)     Height: 5' 3\" (1.6 m)        Oxygen Baseline RA    Current needs required none Bipap/Cpap no  LDAs:   Peripheral IV 04/01/20 Right Antecubital (Active)   Site Assessment Clean;Dry; Intact 4/1/2020  7:17 PM   Line Status Infusing 4/1/2020  7:17 PM   Dressing Status Clean;Dry; Intact 4/1/2020  7:17 PM     Mobility: Fully dependent  Pending ED orders: none  Present condition: stable    Electronically signed by Liseth Chau RN on 4/1/2020 at 8:43 PM       Liseth Chau RN  04/01/20 2044

## 2020-04-02 NOTE — PROGRESS NOTES
Nutrition Assessment    Type and Reason for Visit: Initial, Positive Nutrition Screen(wound and poor oral intake)    Nutrition Recommendations:   Continue current diet. ONS initiated: Glucerna TID, continue. Consider MVI. Nutrition Assessment:   Pt. nutritionally compromised AEB reports of poor appetite. At risk for further nutrition compromise r/t increased nutrient needs to support wound healing,  underlying medical condition (covid-rule out, cough, history of CVA, HTN, DB), and need for nutrition support. Malnutrition Assessment:  · Malnutrition Status: Insufficient data  · Findings of the 6 clinical characteristics of malnutrition (Minimum of 2 out of 6 clinical characteristics is required to make the diagnosis of moderate or severe Protein Calorie Malnutrition based on AND/ASPEN Guidelines):  1. Energy Intake-Unable to assess,      2. Weight Loss-No significant weight loss(per EMR),    3. Fat Loss-Unable to assess,    4. Muscle Loss-Unable to assess,    5. Fluid Accumulation-No significant fluid accumulation,    6.  Strength-Not measured    Nutrition Risk Level: High    Nutrient Needs:  · Estimated Daily Total Kcal: 2504-1091 kcals (20-25 kcals/kg/day based on 72 kg)   · Estimated Daily Protein (g): 62-73 grams (1.2-1.4 grams/kg/day based on 52 kg per IBW) or more to promote wound healing    Nutrition Diagnosis:   · Problem: Increased nutrient needs  · Etiology: related to Increased demand for energy/nutrients, Insufficient energy/nutrient consumption     Signs and symptoms:  as evidenced by Presence of wounds, Diet history of poor intake    Objective Information:  · Nutrition-Focused Physical Findings: Per RN pt did not eat much breakfast.  Attempted to call pt on her room phone, she did not answer. Glucose 110. Humalog coverage.   Covid-19 rule out  · Wound Type: (coccyx-open wound, buttocks, bruising/abrasions)  · Current Nutrition Therapies:  · Oral Diet Orders: General   · Oral Diet

## 2020-04-02 NOTE — ED NOTES
Report called to 4K. Nurse on 4K said to hold off on report because the admission is trying to be appealed.       Octavio Stokes RN  04/01/20 6818

## 2020-04-03 LAB
ANION GAP SERPL CALCULATED.3IONS-SCNC: 15 MEQ/L (ref 8–16)
BUN BLDV-MCNC: 10 MG/DL (ref 7–22)
CALCIUM SERPL-MCNC: 8.1 MG/DL (ref 8.5–10.5)
CHLORIDE BLD-SCNC: 106 MEQ/L (ref 98–111)
CO2: 20 MEQ/L (ref 23–33)
CREAT SERPL-MCNC: 0.5 MG/DL (ref 0.4–1.2)
ERYTHROCYTE [DISTWIDTH] IN BLOOD BY AUTOMATED COUNT: 13.8 % (ref 11.5–14.5)
ERYTHROCYTE [DISTWIDTH] IN BLOOD BY AUTOMATED COUNT: 43 FL (ref 35–45)
GFR SERPL CREATININE-BSD FRML MDRD: > 90 ML/MIN/1.73M2
GLUCOSE BLD-MCNC: 137 MG/DL (ref 70–108)
GLUCOSE BLD-MCNC: 273 MG/DL (ref 70–108)
GLUCOSE BLD-MCNC: 400 MG/DL (ref 70–108)
GLUCOSE BLD-MCNC: 82 MG/DL (ref 70–108)
GLUCOSE BLD-MCNC: 92 MG/DL (ref 70–108)
HCT VFR BLD CALC: 37.3 % (ref 37–47)
HEMOGLOBIN: 12 GM/DL (ref 12–16)
MCH RBC QN AUTO: 27.5 PG (ref 26–33)
MCHC RBC AUTO-ENTMCNC: 32.2 GM/DL (ref 32.2–35.5)
MCV RBC AUTO: 85.6 FL (ref 81–99)
PLATELET # BLD: 276 THOU/MM3 (ref 130–400)
PMV BLD AUTO: 12.5 FL (ref 9.4–12.4)
POTASSIUM SERPL-SCNC: 3.4 MEQ/L (ref 3.5–5.2)
RBC # BLD: 4.36 MILL/MM3 (ref 4.2–5.4)
SODIUM BLD-SCNC: 141 MEQ/L (ref 135–145)
WBC # BLD: 9.7 THOU/MM3 (ref 4.8–10.8)

## 2020-04-03 PROCEDURE — 6370000000 HC RX 637 (ALT 250 FOR IP): Performed by: INTERNAL MEDICINE

## 2020-04-03 PROCEDURE — 6370000000 HC RX 637 (ALT 250 FOR IP): Performed by: PHYSICIAN ASSISTANT

## 2020-04-03 PROCEDURE — 2580000003 HC RX 258: Performed by: INTERNAL MEDICINE

## 2020-04-03 PROCEDURE — 6360000002 HC RX W HCPCS: Performed by: INTERNAL MEDICINE

## 2020-04-03 PROCEDURE — 85027 COMPLETE CBC AUTOMATED: CPT

## 2020-04-03 PROCEDURE — 2060000000 HC ICU INTERMEDIATE R&B

## 2020-04-03 PROCEDURE — 36415 COLL VENOUS BLD VENIPUNCTURE: CPT

## 2020-04-03 PROCEDURE — 99232 SBSQ HOSP IP/OBS MODERATE 35: CPT | Performed by: PHYSICIAN ASSISTANT

## 2020-04-03 PROCEDURE — 80048 BASIC METABOLIC PNL TOTAL CA: CPT

## 2020-04-03 PROCEDURE — 82948 REAGENT STRIP/BLOOD GLUCOSE: CPT

## 2020-04-03 PROCEDURE — 6370000000 HC RX 637 (ALT 250 FOR IP): Performed by: NURSE PRACTITIONER

## 2020-04-03 RX ORDER — INSULIN GLARGINE 100 [IU]/ML
15 INJECTION, SOLUTION SUBCUTANEOUS NIGHTLY
Status: DISCONTINUED | OUTPATIENT
Start: 2020-04-03 | End: 2020-04-04 | Stop reason: HOSPADM

## 2020-04-03 RX ORDER — LACTOBACILLUS RHAMNOSUS GG 10B CELL
1 CAPSULE ORAL
Status: DISCONTINUED | OUTPATIENT
Start: 2020-04-04 | End: 2020-04-04 | Stop reason: HOSPADM

## 2020-04-03 RX ADMIN — PROMETHAZINE HYDROCHLORIDE 12.5 MG: 25 TABLET ORAL at 15:34

## 2020-04-03 RX ADMIN — LOSARTAN POTASSIUM 25 MG: 25 TABLET, FILM COATED ORAL at 08:39

## 2020-04-03 RX ADMIN — THERA TABS 1 TABLET: TAB at 08:39

## 2020-04-03 RX ADMIN — HYDROXYCHLOROQUINE SULFATE 200 MG: 200 TABLET ORAL at 21:48

## 2020-04-03 RX ADMIN — INSULIN LISPRO 5 UNITS: 100 INJECTION, SOLUTION INTRAVENOUS; SUBCUTANEOUS at 21:04

## 2020-04-03 RX ADMIN — ACETAMINOPHEN 650 MG: 325 TABLET ORAL at 22:09

## 2020-04-03 RX ADMIN — INSULIN GLARGINE 15 UNITS: 100 INJECTION, SOLUTION SUBCUTANEOUS at 22:09

## 2020-04-03 RX ADMIN — HYDROXYCHLOROQUINE SULFATE 400 MG: 200 TABLET ORAL at 08:39

## 2020-04-03 RX ADMIN — LEVOTHYROXINE SODIUM 112 MCG: 112 TABLET ORAL at 05:30

## 2020-04-03 RX ADMIN — ENOXAPARIN SODIUM 40 MG: 40 INJECTION SUBCUTANEOUS at 08:40

## 2020-04-03 RX ADMIN — ATORVASTATIN CALCIUM 80 MG: 80 TABLET, FILM COATED ORAL at 08:40

## 2020-04-03 RX ADMIN — ASPIRIN 325 MG: 325 TABLET, FILM COATED ORAL at 08:40

## 2020-04-03 RX ADMIN — ACETAMINOPHEN 650 MG: 325 TABLET ORAL at 11:53

## 2020-04-03 RX ADMIN — ACETAMINOPHEN 650 MG: 325 TABLET ORAL at 05:02

## 2020-04-03 RX ADMIN — AMLODIPINE BESYLATE 10 MG: 10 TABLET ORAL at 08:40

## 2020-04-03 RX ADMIN — FERROUS SULFATE TAB 325 MG (65 MG ELEMENTAL FE) 325 MG: 325 (65 FE) TAB at 08:40

## 2020-04-03 RX ADMIN — METOPROLOL SUCCINATE 25 MG: 25 TABLET, FILM COATED, EXTENDED RELEASE ORAL at 08:39

## 2020-04-03 RX ADMIN — INSULIN LISPRO 6 UNITS: 100 INJECTION, SOLUTION INTRAVENOUS; SUBCUTANEOUS at 17:26

## 2020-04-03 RX ADMIN — CLOPIDOGREL BISULFATE 75 MG: 75 TABLET ORAL at 08:40

## 2020-04-03 RX ADMIN — Medication 10 ML: at 21:04

## 2020-04-03 ASSESSMENT — PAIN DESCRIPTION - DESCRIPTORS
DESCRIPTORS: ACHING
DESCRIPTORS: ACHING

## 2020-04-03 ASSESSMENT — PAIN SCALES - GENERAL
PAINLEVEL_OUTOF10: 4
PAINLEVEL_OUTOF10: 0
PAINLEVEL_OUTOF10: 4
PAINLEVEL_OUTOF10: 5
PAINLEVEL_OUTOF10: 2
PAINLEVEL_OUTOF10: 4
PAINLEVEL_OUTOF10: 8

## 2020-04-03 ASSESSMENT — PAIN DESCRIPTION - ONSET: ONSET: ON-GOING

## 2020-04-03 ASSESSMENT — PAIN DESCRIPTION - LOCATION
LOCATION: HIP
LOCATION: HIP

## 2020-04-03 ASSESSMENT — PAIN DESCRIPTION - ORIENTATION
ORIENTATION: LEFT
ORIENTATION: LEFT

## 2020-04-03 ASSESSMENT — PAIN DESCRIPTION - PROGRESSION
CLINICAL_PROGRESSION: GRADUALLY IMPROVING
CLINICAL_PROGRESSION: GRADUALLY WORSENING

## 2020-04-03 ASSESSMENT — PAIN DESCRIPTION - FREQUENCY: FREQUENCY: CONTINUOUS

## 2020-04-03 ASSESSMENT — PAIN - FUNCTIONAL ASSESSMENT: PAIN_FUNCTIONAL_ASSESSMENT: ACTIVITIES ARE NOT PREVENTED

## 2020-04-03 ASSESSMENT — PAIN DESCRIPTION - PAIN TYPE
TYPE: ACUTE PAIN
TYPE: ACUTE PAIN

## 2020-04-03 NOTE — PROGRESS NOTES
Hospitalist Progress Note      Patient:  Mariann Rockwell    Unit/Bed:4K-01/001-A  YOB: 1940  MRN: [id number]   Acct: [de-identified]   PCP: Niko Holden DO  Date of Admission: 4/1/2020    Assessment/Plan:    1. COVID-19 (+) Disease: Fatigue and non-productive cough for several days PTA. CXR on admission with BL patchy opacities in lung bases. Patient has reported several episodes of diarrhea today, encouraged PO intake. Influenza negative. COVID-19 Emergent panel swab positive 4/2/20. Plaquenil initiated 4/2/20, to complete five day course. Azithromycin initiated 4/3/20.    2. History of CVA with residual left sided deficit: Continue  ASA, Statin. Patient was previously receiving PT/OT. PT consulted but not willing to see due to COVID + status. Recommending home health as patient has difficulty with ADLs. 3. Chronic normocytic anemia: Hgb 12, stable. No signs of active bleeding. 4. History of Hypothyroidism: Continue Synthroid. 5. Benign Essential Hypertension: Stable. Continue Norvasc/Losartan/Metoprolol. 6. Insulin Dependent DM-2: Controlled. Continue Medium dose SSI. Carb control diet. Hypoglycemia protocol in place. Disposition: Patient is COVID positive and plans to go home with [facility], PT unable to see due to positive patient. HH unsure if they will see patient as she is positive. She states she needs assistance with ADLs including getting off beds/couches due to history of CVA. Significant other is currently ill as well. Patient does not want to be discharged home to her son. Denies having other options for discharge. Will need further / evaluation and recommendations. Patient was seen in PPE (PERSONAL PROTECTIVE EQUIPMENT). Patient was interviewed in Strict Airborne and Droplet Precautions. COVID-19 Testing - [x] Emergent Disease Panel positive 4/2/20.     Chief Complaint:Weakness, cough     Initial H and P:-    Dwain Hardy 325 mg Oral Daily    amLODIPine  10 mg Oral Daily    sodium chloride flush  10 mL Intravenous 2 times per day    enoxaparin  40 mg Subcutaneous Daily    insulin lispro  0-12 Units Subcutaneous TID WC    insulin lispro  0-6 Units Subcutaneous Nightly     PRN Meds: sodium chloride flush, potassium chloride **OR** potassium alternative oral replacement **OR** potassium chloride, acetaminophen **OR** acetaminophen, polyethylene glycol, promethazine **OR** ondansetron, benzonatate, glucose, dextrose, glucagon (rDNA), dextrose      Intake/Output Summary (Last 24 hours) at 4/3/2020 1540  Last data filed at 4/3/2020 5069  Gross per 24 hour   Intake 2777.23 ml   Output 400 ml   Net 2377.23 ml       Diet:  DIET GENERAL; Carb Control: 4 carb choices (60 gms)/meal; Low Sodium (2 GM)  Dietary Nutrition Supplements: Diabetic Oral Supplement    Exam:  /64   Pulse 83   Temp 97.7 °F (36.5 °C) (Oral)   Resp 18   Ht 5' 3\" (1.6 m)   Wt 158 lb (71.7 kg)   SpO2 95%   BMI 27.99 kg/m²   General appearance: No apparent distress, appears stated age and cooperative. HEENT: Pupils equal, round, and reactive to light. Conjunctivae/corneas clear. Neck: Supple, with full range of motion. No jugular venous distention. Trachea midline. Respiratory:  Normal respiratory effort. Clear to auscultation, bilaterally without Rales/Wheezes/Rhonchi. Cardiovascular: Regular rate and rhythm with normal S1/S2 without murmurs, rubs or gallops. Abdomen: Soft, non-tender, non-distended with normal bowel sounds. Musculoskeletal: Sensation intact BUE/BLE. Normal active/passive ROM of RUE. Decreased ROM in LUE/LLE. Skin: Skin color, texture, turgor normal.  No rashes or lesions. Neurologic:  Neurovascularly intact without any focal sensory deficits. Motor deficits as above.   Cranial nerves: II-XII intact, grossly non-focal.  Psychiatric: Alert and oriented, thought content appropriate, normal insight  Capillary Refill: Brisk,< 3 seconds Peripheral Pulses: +2 palpable, equal bilaterally     Labs:   Recent Labs     04/01/20  1824 04/02/20  0546 04/03/20  0715   WBC 10.2 8.3 9.7   HGB 14.0 11.8* 12.0   HCT 42.3 36.6* 37.3    243 276     Recent Labs     04/01/20  1824 04/02/20  0546 04/03/20  0715    139 141   K 3.2* 3.6 3.4*   CL 96* 104 106   CO2 25 21* 20*   BUN 20 15 10   CREATININE 0.7 0.7 0.5   CALCIUM 9.4 8.3* 8.1*   PHOS  --  2.7  --      Recent Labs     04/01/20  1824   AST 35   ALT 29   BILITOT 0.8   ALKPHOS 117     No results for input(s): INR in the last 72 hours. No results for input(s): Guille Saran in the last 72 hours. Microbiology:    Blood culture #1:   Lab Results   Component Value Date    BC No growth-preliminary  04/01/2020       Blood culture #2:No results found for: Darren Flores    Organism:No results found for: ORG    No results found for: LABGRAM    MRSA culture only:No results found for: [facility]    Urine culture: No results found for: LABURIN    Respiratory culture: No results found for: CULTRESP    Aerobic and Anaerobic :  No results found for: LABAERO  No results found for: LABANAE    Urinalysis:      Lab Results   Component Value Date    NITRU NEGATIVE 04/01/2020    WBCUA 2-4 04/01/2020    BACTERIA NONE SEEN 04/01/2020    RBCUA 0-2 04/01/2020    BLOODU NEGATIVE 04/01/2020    GLUCOSEU NEGATIVE 04/01/2020       Radiology:  XR CHEST PORTABLE   Final Result   Bilateral patchy opacities in the lung bases concerning for infection atypical infection can be considered. Cardiomegaly. **This report has been created using voice recognition software. It may contain minor errors which are inherent in voice recognition technology. **      Final report electronically signed by Dr. Snehal Cain on 4/1/2020 7:01 PM        Xr Chest Portable    Result Date: 4/1/2020  PROCEDURE: XR CHEST PORTABLE CLINICAL INFORMATION: abdifatah.  COMPARISON: August 7, 2013 TECHNIQUE: Portable FINDINGS: Bilateral patchy opacities

## 2020-04-03 NOTE — PROGRESS NOTES
Rebel Ibrahim NP in to examine and I spoke with him regarding pt's frequent stools and not drinking a lot, also some lt hip pain. Rebel Ibrahim will speak with Physician regarding this matter, still wants to convert IV to INT and push fluids. Spoke with family member and gave them an update. By 1800, pt drank 510 of fluids ( includes her ensure and clear drinks )She will drink if you put the glass up to her and then she will drink on her own.

## 2020-04-03 NOTE — PLAN OF CARE
Problem: Pain:  Goal: Pain level will decrease  Description: Pain level will decrease  4/2/2020 2342 by Jia Whittaker RN  Outcome: Met This Shift  Note: Pain is being managed with Tylenol. Patient reports adequate relief  4/2/2020 1226 by Brice Ragsdale RN  Outcome: Ongoing  Note: Patient denies pain when asked. PRN medications as needed. Pain goal 0/10. Goal: Control of acute pain  Description: Control of acute pain  4/2/2020 2342 by Jia Whittaker RN  Outcome: Met This Shift  4/2/2020 1226 by Brice Ragsdale RN  Outcome: Ongoing  Note: Patient denies acute pain when asked. Goal: Control of chronic pain  Description: Control of chronic pain  4/2/2020 2342 by Jia Whittaker RN  Outcome: Met This Shift  4/2/2020 1226 by Brice Ragsdale RN  Outcome: Ongoing  Note: Patient denies chronic pain when asked. Problem: Falls - Risk of:  Goal: Will remain free from falls  Description: Will remain free from falls  4/2/2020 2342 by Jia Whittaker RN  Outcome: Met This Shift  Note: Bed is in lowest position. Bed alarm activated. Call light in reach. Patient instructed to call for help before getting out of bed  4/2/2020 1226 by Brice Ragsdale RN  Outcome: Ongoing  Note: No falls this shift. Falling star program and alarms in use. Patient uses call light appropriately. Goal: Absence of physical injury  Description: Absence of physical injury  4/2/2020 2342 by Jia Whittaker RN  Outcome: Met This Shift  4/2/2020 1226 by Brice Ragsdale RN  Outcome: Ongoing  Note: Patient free from falls. No injuries noted. Problem: Skin Integrity:  Goal: Will show no infection signs and symptoms  Description: Will show no infection signs and symptoms  4/2/2020 2342 by Jia Whittaker RN  Outcome: Met This Shift  4/2/2020 1226 by Brice Ragsdale RN  Outcome: Ongoing  Note: Afebrile. Patient COVID-19 rule out. Patient remains in isolation.      Goal: Absence of new skin breakdown  Description: Absence of new skin hospitalization  Outcome: Met This Shift  Goal: Wound healing  Outcome: Met This Shift      Care plan reviewed with patient. Mary Ann Mcleod verbalized understanding of the plan of care and contributed to goal setting.

## 2020-04-03 NOTE — CARE COORDINATION
4/3/20, 11:07 AM EDT    DISCHARGE PLANNING EVALUATION    SW attempted to speak with Patient but no answer after attempting two times. SW attempted Patient's cell phone and her cell phone is currently off. SW spoke with [facility] and at this time they do not feel that they will be seeing positive COVID patients.  Patient has had [facility] in the past.

## 2020-04-04 VITALS
WEIGHT: 158 LBS | OXYGEN SATURATION: 94 % | DIASTOLIC BLOOD PRESSURE: 89 MMHG | BODY MASS INDEX: 28 KG/M2 | SYSTOLIC BLOOD PRESSURE: 133 MMHG | HEART RATE: 88 BPM | RESPIRATION RATE: 18 BRPM | HEIGHT: 63 IN | TEMPERATURE: 98.2 F

## 2020-04-04 LAB
GLUCOSE BLD-MCNC: 111 MG/DL (ref 70–108)
GLUCOSE BLD-MCNC: 207 MG/DL (ref 70–108)

## 2020-04-04 PROCEDURE — 82948 REAGENT STRIP/BLOOD GLUCOSE: CPT

## 2020-04-04 PROCEDURE — 6370000000 HC RX 637 (ALT 250 FOR IP): Performed by: PHYSICIAN ASSISTANT

## 2020-04-04 PROCEDURE — 6370000000 HC RX 637 (ALT 250 FOR IP): Performed by: NURSE PRACTITIONER

## 2020-04-04 PROCEDURE — 99238 HOSP IP/OBS DSCHRG MGMT 30/<: CPT | Performed by: FAMILY MEDICINE

## 2020-04-04 PROCEDURE — 2580000003 HC RX 258: Performed by: INTERNAL MEDICINE

## 2020-04-04 PROCEDURE — 6370000000 HC RX 637 (ALT 250 FOR IP): Performed by: INTERNAL MEDICINE

## 2020-04-04 PROCEDURE — 6360000002 HC RX W HCPCS: Performed by: INTERNAL MEDICINE

## 2020-04-04 RX ORDER — HYDROXYCHLOROQUINE SULFATE 200 MG/1
200 TABLET, FILM COATED ORAL 2 TIMES DAILY
Qty: 6 TABLET | Refills: 0 | Status: SHIPPED | OUTPATIENT
Start: 2020-04-04 | End: 2020-04-04

## 2020-04-04 RX ORDER — MULTIVITAMIN WITH FOLIC ACID 400 MCG
1 TABLET ORAL DAILY
Qty: 30 TABLET | Refills: 0 | Status: ON HOLD | OUTPATIENT
Start: 2020-04-05 | End: 2020-12-05

## 2020-04-04 RX ORDER — HYDROXYCHLOROQUINE SULFATE 200 MG/1
200 TABLET, FILM COATED ORAL 2 TIMES DAILY
Qty: 6 TABLET | Refills: 0 | Status: SHIPPED | OUTPATIENT
Start: 2020-04-04 | End: 2020-04-07

## 2020-04-04 RX ADMIN — ENOXAPARIN SODIUM 40 MG: 40 INJECTION SUBCUTANEOUS at 08:27

## 2020-04-04 RX ADMIN — LEVOTHYROXINE SODIUM 112 MCG: 112 TABLET ORAL at 04:14

## 2020-04-04 RX ADMIN — ATORVASTATIN CALCIUM 80 MG: 80 TABLET, FILM COATED ORAL at 08:26

## 2020-04-04 RX ADMIN — Medication 1 CAPSULE: at 08:26

## 2020-04-04 RX ADMIN — AMLODIPINE BESYLATE 10 MG: 10 TABLET ORAL at 08:26

## 2020-04-04 RX ADMIN — Medication 10 ML: at 08:35

## 2020-04-04 RX ADMIN — LOSARTAN POTASSIUM 25 MG: 25 TABLET, FILM COATED ORAL at 08:26

## 2020-04-04 RX ADMIN — FERROUS SULFATE TAB 325 MG (65 MG ELEMENTAL FE) 325 MG: 325 (65 FE) TAB at 08:26

## 2020-04-04 RX ADMIN — THERA TABS 1 TABLET: TAB at 08:26

## 2020-04-04 RX ADMIN — CLOPIDOGREL BISULFATE 75 MG: 75 TABLET ORAL at 08:28

## 2020-04-04 RX ADMIN — HYDROXYCHLOROQUINE SULFATE 200 MG: 200 TABLET ORAL at 08:26

## 2020-04-04 RX ADMIN — ASPIRIN 325 MG: 325 TABLET, FILM COATED ORAL at 08:28

## 2020-04-04 RX ADMIN — METOPROLOL SUCCINATE 25 MG: 25 TABLET, FILM COATED, EXTENDED RELEASE ORAL at 08:26

## 2020-04-04 ASSESSMENT — PAIN SCALES - GENERAL: PAINLEVEL_OUTOF10: 0

## 2020-04-04 NOTE — PLAN OF CARE
Problem: Pain:  Goal: Pain level will decrease  Description: Pain level will decrease  Outcome: Ongoing  Note: Pt continues to receive tylenol for chronic hip pain per MAR     Problem: Falls - Risk of:  Goal: Will remain free from falls  Description: Will remain free from falls  Outcome: Ongoing  Note: No falls this shift. Call light in reach. Bed alarm on. Falling star protocol in place. Pt ambulates with x2 assist/pivot to bedside commode. Problem: Skin Integrity:  Goal: Will show no infection signs and symptoms  Description: Will show no infection signs and symptoms  Outcome: Ongoing  Note: No evidence of new skin breakdown at this time. Pt being turned and repositioned q2h and prn by nursing staff. Bony prominences elevated off bed. [place] bed in use. Barrier cream applied to affected areas. Problem: Discharge Planning:  Goal: Discharged to appropriate level of care  Description: Discharged to appropriate level of care  Outcome: Ongoing  Note: SW consulted. Pt from home with  but pt's  is also currently hospitalized. Pt requiring extensive assistance from nursing for ADL's currently. Problem: Nutrition  Goal: Optimal nutrition therapy  Outcome: Ongoing  Note: Pt on a carb control diet. Will continue to encourage fluids     Problem: Respiratory  Goal: No pulmonary complications  Outcome: Ongoing  Note: Pt's O2 saturation currently >90 on RA   Care plan reviewed with patient and family. Patient and family verbalize understanding of the plan of care and contribute to goal setting.

## 2020-04-04 NOTE — DISCHARGE SUMMARY
Hospital Medicine Discharge Summary      Patient Identification:   Donald Rodrigues   : 1940  MRN: [id number]   Account: [de-identified]      Patient's PCP: Leonel Leonard DO    Admit Date: 2020     Discharge Date:   2020    Admitting Physician: Olman Biggs DO     Discharge Physician: Christopher Lopez MD     Discharge Diagnoses:  1. Viral pneumonia secondary to COVID-19  2. History of CVA with residual left sided deficit  3. Chronic normocytic anemia  4. History of Hypothyroidism  5. Benign Essential Hypertension  6. Insulin Dependent DM-2    The patient was seen and examined on day of discharge and this discharge summary is in conjunction with any daily progress note from day of discharge. Hospital Course:   Donald Rodrigues is a 70 y.o. female admitted to [address] [place] [address] on 2020 for fatigue and nonproductive cough. She is a non-smoker with hx of CAD, CVA with left sided residual deficit, DM2. Pt hasn't been ambulating for at least two weeks now, though this is not a new problem - she previously was unable to ambulate after 3 CVAs and worked with PT until recently. Pts  takes care of her at home but is also COVID positive. She cites a non-productive cough, denies fever or chills when seen. Patient is COVID positive. Chest x-ray did show Bilateral patchy opacities in the lung bases concerning for infection atypical infection can be considered. She was initially started on Rocephin, already received 1 dose. She subsequently received hydroxychloroquine 400 mg twice daily, switched to 200 mg twice daily thereafter. She has been doing well since admission, has been afebrile and was saturating well on room air. Still has decreased appetite, however improving. Discussed with patient and her , about discharge plans today.  requested that she be discharge to stay under the care of her son for now. Discharge the patient today.  If feeling unwell can non-focal.  Psychiatric:  Alert and oriented, thought content appropriate, normal insight  Capillary Refill: Brisk,< 3 seconds   Peripheral Pulses: +2 palpable, equal bilaterally       Labs: For convenience and continuity at follow-up the following most recent labs are provided:      CBC:    Lab Results   Component Value Date    WBC 9.7 04/03/2020    HGB 12.0 04/03/2020    HCT 37.3 04/03/2020     04/03/2020       Renal:    Lab Results   Component Value Date     04/03/2020    K 3.4 04/03/2020    K 3.6 04/02/2020     04/03/2020    CO2 20 04/03/2020    BUN 10 04/03/2020    CREATININE 0.5 04/03/2020    CALCIUM 8.1 04/03/2020    PHOS 2.7 04/02/2020         Significant Diagnostic Studies    Radiology:   XR CHEST PORTABLE   Final Result   Bilateral patchy opacities in the lung bases concerning for infection atypical infection can be considered. Cardiomegaly. **This report has been created using voice recognition software. It may contain minor errors which are inherent in voice recognition technology. **      Final report electronically signed by Dr. Mj Kwok on 4/1/2020 7:01 PM             Consults:     IP CONSULT TO SOCIAL WORK    Disposition:    [x] Home       [] TCU       [] Rehab       [] Psych       [] SNF       [] [place]       [] Other-    Condition at Discharge: Stable    Code Status:  Full Code     Patient Instructions:    Discharge lab work:    Activity: activity as tolerated  Diet: DIET GENERAL; Carb Control: 4 carb choices (60 gms)/meal; Low Sodium (2 GM)  Dietary Nutrition Supplements: Diabetic Oral Supplement      Follow-up visits:   DO Cierra Davila Kee [employer] [address] [place] [address] [facility] [address]  [phone]    In 3 days  ED visit follow up         Discharge Medications:      Hayley Arzate, [facility] Medication Instructions [facility]:[id number]    Printed on:04/04/20 7941   Medication Information                      amLODIPine (NORVASC) 5 MG tablet  Take 10 mg by mouth

## 2020-04-04 NOTE — PROGRESS NOTES
Discharge instructions given to patient at this time. All questions answered. All belongings packed and ready for transport. Transport called to assist to private car via wheelchair.

## 2020-04-05 ENCOUNTER — HOSPITAL ENCOUNTER (EMERGENCY)
Age: 80
Discharge: HOME | End: 2020-04-05
Payer: MEDICARE

## 2020-04-05 VITALS
TEMPERATURE: 98.1 F | DIASTOLIC BLOOD PRESSURE: 65 MMHG | SYSTOLIC BLOOD PRESSURE: 131 MMHG | RESPIRATION RATE: 17 BRPM | BODY MASS INDEX: 28.35 KG/M2 | WEIGHT: 160 LBS | HEIGHT: 63 IN | OXYGEN SATURATION: 95 % | HEART RATE: 90 BPM

## 2020-04-05 LAB
ALBUMIN SERPL-MCNC: 2.8 G/DL (ref 3.5–5.1)
ALP BLD-CCNC: 125 U/L (ref 38–126)
ALT SERPL-CCNC: 29 U/L (ref 11–66)
ANION GAP SERPL CALCULATED.3IONS-SCNC: 13 MEQ/L (ref 8–16)
AST SERPL-CCNC: 41 U/L (ref 5–40)
BASOPHILS # BLD: 0.5 %
BASOPHILS ABSOLUTE: 0.1 THOU/MM3 (ref 0–0.1)
BILIRUB SERPL-MCNC: 0.7 MG/DL (ref 0.3–1.2)
BUN BLDV-MCNC: 11 MG/DL (ref 7–22)
CALCIUM SERPL-MCNC: 8.8 MG/DL (ref 8.5–10.5)
CHLORIDE BLD-SCNC: 102 MEQ/L (ref 98–111)
CO2: 23 MEQ/L (ref 23–33)
CREAT SERPL-MCNC: 0.5 MG/DL (ref 0.4–1.2)
EOSINOPHIL # BLD: 0.6 %
EOSINOPHILS ABSOLUTE: 0.1 THOU/MM3 (ref 0–0.4)
ERYTHROCYTE [DISTWIDTH] IN BLOOD BY AUTOMATED COUNT: 13.7 % (ref 11.5–14.5)
ERYTHROCYTE [DISTWIDTH] IN BLOOD BY AUTOMATED COUNT: 41 FL (ref 35–45)
GFR SERPL CREATININE-BSD FRML MDRD: > 90 ML/MIN/1.73M2
GLUCOSE BLD-MCNC: 160 MG/DL (ref 70–108)
HCT VFR BLD CALC: 35.3 % (ref 37–47)
HEMOGLOBIN: 11.6 GM/DL (ref 12–16)
IMMATURE GRANS (ABS): 0.06 THOU/MM3 (ref 0–0.07)
IMMATURE GRANULOCYTES: 0.5 %
LYMPHOCYTES # BLD: 26.7 %
LYMPHOCYTES ABSOLUTE: 3.3 THOU/MM3 (ref 1–4.8)
MCH RBC QN AUTO: 27.4 PG (ref 26–33)
MCHC RBC AUTO-ENTMCNC: 32.9 GM/DL (ref 32.2–35.5)
MCV RBC AUTO: 83.3 FL (ref 81–99)
MONOCYTES # BLD: 7.2 %
MONOCYTES ABSOLUTE: 0.9 THOU/MM3 (ref 0.4–1.3)
NUCLEATED RED BLOOD CELLS: 0 /100 WBC
OSMOLALITY CALCULATION: 278.5 MOSMOL/KG (ref 275–300)
PLATELET # BLD: 353 THOU/MM3 (ref 130–400)
PMV BLD AUTO: 12 FL (ref 9.4–12.4)
POTASSIUM SERPL-SCNC: 3.7 MEQ/L (ref 3.5–5.2)
RBC # BLD: 4.24 MILL/MM3 (ref 4.2–5.4)
SEG NEUTROPHILS: 64.5 %
SEGMENTED NEUTROPHILS ABSOLUTE COUNT: 7.9 THOU/MM3 (ref 1.8–7.7)
SODIUM BLD-SCNC: 138 MEQ/L (ref 135–145)
TOTAL CK: 194 U/L (ref 30–135)
TOTAL PROTEIN: 6.2 G/DL (ref 6.1–8)
WBC # BLD: 12.2 THOU/MM3 (ref 4.8–10.8)

## 2020-04-05 PROCEDURE — 85025 COMPLETE CBC W/AUTO DIFF WBC: CPT

## 2020-04-05 PROCEDURE — 80053 COMPREHEN METABOLIC PANEL: CPT

## 2020-04-05 PROCEDURE — 96360 HYDRATION IV INFUSION INIT: CPT

## 2020-04-05 PROCEDURE — 82550 ASSAY OF CK (CPK): CPT

## 2020-04-05 PROCEDURE — 99285 EMERGENCY DEPT VISIT HI MDM: CPT

## 2020-04-05 PROCEDURE — 2580000003 HC RX 258: Performed by: PHYSICIAN ASSISTANT

## 2020-04-05 PROCEDURE — 96361 HYDRATE IV INFUSION ADD-ON: CPT

## 2020-04-05 PROCEDURE — 36415 COLL VENOUS BLD VENIPUNCTURE: CPT

## 2020-04-05 PROCEDURE — 6370000000 HC RX 637 (ALT 250 FOR IP): Performed by: PHYSICIAN ASSISTANT

## 2020-04-05 RX ORDER — ACETAMINOPHEN 325 MG/1
650 TABLET ORAL ONCE
Status: COMPLETED | OUTPATIENT
Start: 2020-04-05 | End: 2020-04-05

## 2020-04-05 RX ORDER — 0.9 % SODIUM CHLORIDE 0.9 %
500 INTRAVENOUS SOLUTION INTRAVENOUS ONCE
Status: COMPLETED | OUTPATIENT
Start: 2020-04-05 | End: 2020-04-05

## 2020-04-05 RX ADMIN — ACETAMINOPHEN 650 MG: 325 TABLET ORAL at 14:11

## 2020-04-05 RX ADMIN — SODIUM CHLORIDE 500 ML: 9 INJECTION, SOLUTION INTRAVENOUS at 14:10

## 2020-04-05 ASSESSMENT — ENCOUNTER SYMPTOMS
SHORTNESS OF BREATH: 1
DIARRHEA: 0
NAUSEA: 0
ABDOMINAL PAIN: 0
TROUBLE SWALLOWING: 0
COLOR CHANGE: 0
VOMITING: 0

## 2020-04-05 ASSESSMENT — PAIN SCALES - GENERAL: PAINLEVEL_OUTOF10: 8

## 2020-04-05 NOTE — ED NOTES
Bed: 042A  Expected date: 4/5/20  Expected time:   Means of arrival:   Comments:      Allyssa Morillo RN  04/05/20 7181

## 2020-04-05 NOTE — ED NOTES
Pt resting on cot.  Pt given blankets and lights turned off per request.      Janessa Bass RN  04/05/20 9128

## 2020-04-05 NOTE — ED PROVIDER NOTES
Palpations: Abdomen is soft. Tenderness: There is no abdominal tenderness. There is no guarding or rebound. Musculoskeletal:      Left hip: She exhibits tenderness (Mild tenderness anterior of left hip). Skin:     General: Skin is warm and dry. Neurological:      General: No focal deficit present. Mental Status: She is alert and oriented to person, place, and time. Psychiatric:         Mood and Affect: Mood normal.         Behavior: Behavior normal.          DIFFERENTIAL DIAGNOSIS:   Differential diagnoses discussed    DIAGNOSTIC RESULTS     EKG: All EKG's are interpreted by the Emergency Department Physician who either signs or Co-signs this chart in the absence of a cardiologist.    None    RADIOLOGY: non-plainfilm images(s) such as CT, Ultrasound and MRI are read by the radiologist.    No orders to display       LABS:     Labs Reviewed   CBC WITH AUTO DIFFERENTIAL - Abnormal; Notable for the following components:       Result Value    WBC 12.2 (*)     Hemoglobin 11.6 (*)     Hematocrit 35.3 (*)     Segs Absolute 7.9 (*)     All other components within normal limits   COMPREHENSIVE METABOLIC PANEL - Abnormal; Notable for the following components:    Glucose 160 (*)     AST 41 (*)     Alb 2.8 (*)     All other components within normal limits   CK - Abnormal; Notable for the following components: Total  (*)     All other components within normal limits   ANION GAP   GLOMERULAR FILTRATION RATE, ESTIMATED   OSMOLALITY   URINE RT REFLEX TO CULTURE       EMERGENCY DEPARTMENT COURSE:   Vitals:    Vitals:    04/05/20 1341 04/05/20 1603 04/05/20 1725   BP: 123/64 (!) 118/55 131/65   Pulse: 101 92 90   Resp: 18 18 17   Temp: 98.1 °F (36.7 °C)     TempSrc: Oral     SpO2: 97% 93% 95%   Weight: 160 lb (72.6 kg)     Height: 5' 3\" (1.6 m)         2:02 PM EDT: The patient was seen and evaluated. Patient presents for evaluation after fall that occurred at home.   She states that she was attempting to

## 2020-04-06 ENCOUNTER — CARE COORDINATION (OUTPATIENT)
Dept: CASE MANAGEMENT | Age: 80
End: 2020-04-06

## 2020-04-06 NOTE — CARE COORDINATION
Updated [facility] that pt was discharged home on Saturday 4/4/2020.      Electronically signed by Vaibhav Chapa RN on 4/6/2020 at 9:00 AM

## 2020-04-07 ENCOUNTER — CARE COORDINATION (OUTPATIENT)
Dept: CASE MANAGEMENT | Age: 80
End: 2020-04-07

## 2020-04-07 LAB
BLOOD CULTURE, ROUTINE: NORMAL
BLOOD CULTURE, ROUTINE: NORMAL

## 2020-04-09 ENCOUNTER — HOSPITAL ENCOUNTER (INPATIENT)
Age: 80
LOS: 21 days | Discharge: HOME HEALTH | DRG: 179 | End: 2020-04-30
Attending: FAMILY MEDICINE | Admitting: FAMILY MEDICINE
Payer: MEDICARE

## 2020-04-09 PROBLEM — U07.1 COVID-19: Status: ACTIVE | Noted: 2020-04-09

## 2020-04-09 LAB
GLUCOSE BLD-MCNC: 176 MG/DL (ref 70–108)
GLUCOSE BLD-MCNC: 373 MG/DL (ref 70–108)

## 2020-04-09 PROCEDURE — 97110 THERAPEUTIC EXERCISES: CPT

## 2020-04-09 PROCEDURE — 97116 GAIT TRAINING THERAPY: CPT

## 2020-04-09 PROCEDURE — 6370000000 HC RX 637 (ALT 250 FOR IP): Performed by: FAMILY MEDICINE

## 2020-04-09 PROCEDURE — 97530 THERAPEUTIC ACTIVITIES: CPT

## 2020-04-09 PROCEDURE — 97162 PT EVAL MOD COMPLEX 30 MIN: CPT

## 2020-04-09 PROCEDURE — 0220000000 HC SKILLED NURSING FACILITY

## 2020-04-09 PROCEDURE — 82948 REAGENT STRIP/BLOOD GLUCOSE: CPT

## 2020-04-09 PROCEDURE — 1290000000 HC SEMI PRIVATE OTHER R&B

## 2020-04-09 RX ORDER — LANOLIN ALCOHOL/MO/W.PET/CERES
1000 CREAM (GRAM) TOPICAL DAILY
Status: DISCONTINUED | OUTPATIENT
Start: 2020-04-10 | End: 2020-04-30 | Stop reason: HOSPADM

## 2020-04-09 RX ORDER — LOSARTAN POTASSIUM 25 MG/1
25 TABLET ORAL DAILY
Status: DISCONTINUED | OUTPATIENT
Start: 2020-04-10 | End: 2020-04-30 | Stop reason: HOSPADM

## 2020-04-09 RX ORDER — MULTIVITAMIN WITH FOLIC ACID 400 MCG
1 TABLET ORAL DAILY
Status: DISCONTINUED | OUTPATIENT
Start: 2020-04-10 | End: 2020-04-30 | Stop reason: HOSPADM

## 2020-04-09 RX ORDER — FAMOTIDINE 20 MG/1
20 TABLET, FILM COATED ORAL DAILY
Status: DISCONTINUED | OUTPATIENT
Start: 2020-04-09 | End: 2020-04-30 | Stop reason: HOSPADM

## 2020-04-09 RX ORDER — ATORVASTATIN CALCIUM 40 MG/1
40 TABLET, FILM COATED ORAL NIGHTLY
Status: DISCONTINUED | OUTPATIENT
Start: 2020-04-09 | End: 2020-04-30 | Stop reason: HOSPADM

## 2020-04-09 RX ORDER — METOPROLOL SUCCINATE 25 MG/1
25 TABLET, EXTENDED RELEASE ORAL DAILY
Status: DISCONTINUED | OUTPATIENT
Start: 2020-04-10 | End: 2020-04-30 | Stop reason: HOSPADM

## 2020-04-09 RX ORDER — SENNA PLUS 8.6 MG/1
1 TABLET ORAL NIGHTLY PRN
Status: DISCONTINUED | OUTPATIENT
Start: 2020-04-09 | End: 2020-04-30 | Stop reason: HOSPADM

## 2020-04-09 RX ORDER — DEXTROSE MONOHYDRATE 25 G/50ML
12.5 INJECTION, SOLUTION INTRAVENOUS PRN
Status: DISCONTINUED | OUTPATIENT
Start: 2020-04-09 | End: 2020-04-30 | Stop reason: HOSPADM

## 2020-04-09 RX ORDER — ACETAMINOPHEN 500 MG
500 TABLET ORAL EVERY 6 HOURS PRN
Status: DISCONTINUED | OUTPATIENT
Start: 2020-04-09 | End: 2020-04-30 | Stop reason: HOSPADM

## 2020-04-09 RX ORDER — LEVOTHYROXINE SODIUM 112 UG/1
112 TABLET ORAL DAILY
Status: DISCONTINUED | OUTPATIENT
Start: 2020-04-10 | End: 2020-04-30 | Stop reason: HOSPADM

## 2020-04-09 RX ORDER — ACETAMINOPHEN 500 MG
1000 TABLET ORAL EVERY 6 HOURS PRN
Status: DISCONTINUED | OUTPATIENT
Start: 2020-04-09 | End: 2020-04-30 | Stop reason: HOSPADM

## 2020-04-09 RX ORDER — FERROUS SULFATE 325(65) MG
325 TABLET ORAL
Status: DISCONTINUED | OUTPATIENT
Start: 2020-04-10 | End: 2020-04-30 | Stop reason: HOSPADM

## 2020-04-09 RX ORDER — POLYETHYLENE GLYCOL 3350 17 G/17G
17 POWDER, FOR SOLUTION ORAL DAILY PRN
Status: DISCONTINUED | OUTPATIENT
Start: 2020-04-09 | End: 2020-04-30 | Stop reason: HOSPADM

## 2020-04-09 RX ORDER — CLOPIDOGREL BISULFATE 75 MG/1
75 TABLET ORAL DAILY
Status: DISCONTINUED | OUTPATIENT
Start: 2020-04-10 | End: 2020-04-30 | Stop reason: HOSPADM

## 2020-04-09 RX ORDER — NICOTINE POLACRILEX 4 MG
15 LOZENGE BUCCAL PRN
Status: DISCONTINUED | OUTPATIENT
Start: 2020-04-09 | End: 2020-04-30 | Stop reason: HOSPADM

## 2020-04-09 RX ORDER — AMLODIPINE BESYLATE 5 MG/1
5 TABLET ORAL DAILY
Status: DISCONTINUED | OUTPATIENT
Start: 2020-04-10 | End: 2020-04-30 | Stop reason: HOSPADM

## 2020-04-09 RX ORDER — DEXTROSE MONOHYDRATE 50 MG/ML
100 INJECTION, SOLUTION INTRAVENOUS PRN
Status: DISCONTINUED | OUTPATIENT
Start: 2020-04-09 | End: 2020-04-30 | Stop reason: HOSPADM

## 2020-04-09 RX ADMIN — ACETAMINOPHEN 1000 MG: 500 TABLET ORAL at 21:42

## 2020-04-09 RX ADMIN — ATORVASTATIN CALCIUM 40 MG: 40 TABLET, FILM COATED ORAL at 20:08

## 2020-04-09 RX ADMIN — FAMOTIDINE 20 MG: 20 TABLET ORAL at 20:08

## 2020-04-09 ASSESSMENT — PAIN DESCRIPTION - LOCATION
LOCATION: HAND;GROIN
LOCATION: HIP

## 2020-04-09 ASSESSMENT — PAIN DESCRIPTION - PROGRESSION
CLINICAL_PROGRESSION: GRADUALLY WORSENING

## 2020-04-09 ASSESSMENT — PAIN DESCRIPTION - ORIENTATION
ORIENTATION: LEFT
ORIENTATION: LEFT

## 2020-04-09 ASSESSMENT — PAIN DESCRIPTION - DESCRIPTORS
DESCRIPTORS: ACHING
DESCRIPTORS: ACHING

## 2020-04-09 ASSESSMENT — PAIN DESCRIPTION - FREQUENCY: FREQUENCY: CONTINUOUS

## 2020-04-09 ASSESSMENT — PAIN SCALES - GENERAL
PAINLEVEL_OUTOF10: 7
PAINLEVEL_OUTOF10: 5
PAINLEVEL_OUTOF10: 7
PAINLEVEL_OUTOF10: 0

## 2020-04-09 ASSESSMENT — PAIN DESCRIPTION - ONSET: ONSET: ON-GOING

## 2020-04-09 ASSESSMENT — PAIN - FUNCTIONAL ASSESSMENT: PAIN_FUNCTIONAL_ASSESSMENT: ACTIVITIES ARE NOT PREVENTED

## 2020-04-09 ASSESSMENT — PAIN DESCRIPTION - PAIN TYPE: TYPE: ACUTE PAIN

## 2020-04-09 NOTE — PROGRESS NOTES
Shower/Tub: Walk-in shower  Bathroom Toilet: Bedside commode(has raised toilet seat and bed side commode)  Bathroom Equipment: Grab bars in shower, Shower chair(grab bar suction)       ADL Assistance: Needs assistance     Ambulation Assistance: Independent(with [id number], notes long distances are challenging)    Active : Yes          OBJECTIVE:  Range of Motion:  Right Lower Extremity: WFL  Left Lower Extremity: Impaired - AROM limited by weakness, history of CVA with L sided weakness    Strength:  Right Lower Extremity: Impaired - hip flexion 4-/5, knee 4-/5, ankle 4-/5  Left Lower Extremity: Impaired- hip flexion 3-/5, knee 3-/5, ankle dorsiflexion 2/5, plantarflexion 3-/5    Balance:  Static Sitting Balance:  Contact Guard Assistance  Static Standing Balance: Contact Guard Assistance, Minimal Assistance  Dynamic Standing Balance: Minimal Assistance, Moderate Assistance    Bed Mobility:  Rolling to Left: Stand By Assistance   Rolling to Right: Stand By Assistance   Supine to Sit: Moderate Assistance, to assist lower extremities and trunk  Sit to Supine: to assist lower extremities and trunk     Transfers:  Sit to Stand: Moderate Assistance, Moderate Assistance plus CGA of another  Stand to Sit:Moderate Assistance, verbal cues for hand placement to reach back for chair, verbal cues for retro step to chair, posterior lean with stand to sit. Stand Pivot: Moderate Assistance plus CGA of another    Ambulation:  Moderate Assistance plus CGA of another/ min assist of 2 plus wheelchair follow  Distance: 2', 10', 9'  Surface: Level Tile  Device:Rolling Walker  Gait Deviations:   Forward Flexed Posture, Slow Namita, Decreased Step Length on Right, Decreased Heel Strike Bilaterally, Narrow Base of Support, Mild Path Deviations and Decreased Terminal Knee Extension Left LE  Verbal cues to avoid lifting RW and to just push RW.  Verbal cues to increase EFREM  Pt with mild shortness of breath after ambulation, O2 95% on room air, training    Goals:  Patient goals : go home, get stronger  Short term goals  Time Frame for Short term goals: 2 weeks  Short term goal 1: Patient will complete supine  <> sit with min assist to transfer in/out of bed with decreased difficulty. Short term goal 2: Patient will complete sit < > stand with min assist to stand to ambulate with decreased difficulty. Short term goal 3: Patient will complete stand pivot transfer with min assist to transfer surface to surface with decreased difficulty. Short term goal 4: Patient will ambulate 48' with a RW and min assist to progress towards ambulating household distances safely. Short term goal 5: Patient will propel wheelchair 48' with CGA to navigate living environment and increase mobility. Short term goal 6: Patient will ascend/descend 1, 4\" step in parallel bars with min assist to progress towards safe home entry  Long term goals  Time Frame for Long term goals : 4 weeks  Long term goal 1: Patient will complete supine < > sit with modified independence to transfer in/out of bed safely. Long term goal 2: Patient will complete sit  <> stand and stand pivot transfers with SBA to transfer surface to surface safely. Long term goal 3: Patient will ambulate 76' with a RW and CGA to navigate home environment. Long term goal 4: Patient will propel wheelchair 100' with SBA to navigate living environment and to/from appointments. Long term goal 5: Patient will improve tinetti score to greater than or equal to 17/28 to reduce her risk for falls. Long term goal 6: Patient will ascend/descend 4 steps with 1 hand rail with CGA for home entry. Following session, patient left in safe position with all fall risk precautions in place.

## 2020-04-09 NOTE — PROGRESS NOTES
[address] [address]  Transitional Care Unit Preadmission Assessment    Patient Name: Bree Baum        MRN: [id number]    [place]: [de-identified]    : 1940  (81 y.o.)  Gender: female     Admitted From:  [x][facility] []Outside Admission - Location:  Date of Admission to the hospital:2020  Date patient eligible for admission: 20   Primary Diagnosis COVID 19    Did patient have surgery? [] Yes- Explain:   [x] No    Physicians: Dr. Kira Jett for clinical complications/co-morbidities:    Past Medical History:   Diagnosis Date    Arthritis     Bladder spasm     CAD (coronary artery disease) 3/12/2013    Cancer ([facility] [address]     breast    Hyperlipidemia     Hypertension     Hypothyroidism     Obstructive sleep apnea of adult 2013    SOB (shortness of breath)     Type II or unspecified type diabetes mellitus without mention of complication, not stated as uncontrolled      Financial Information  Primary insurance:  []Medicare [x] Medicare HMO  []Commercial insurance    []Medicaid   []Workers Compensation      Secondary Insurance:  []Medicare [] Medicare HMO  []Commercial insurance    []Medicaid   []Workers Compensation [x]None    Precautions:   []Cardiac Precautions  []Total hip precautions    []Weight Bearing status:  [x]Safety Precautions/Concerns fall precautions[]Visually impaired   []Hard of Hearing     Isolation Precautions:         []Yes  [x]No  If Yes:  [] Droplet  []Contact []Airborne                   []VRE          []MRSA               []C-diff         [] TB  [] Other:       Skills:   [x]Physical therapy     [x]Occupational Therapy   []IV Antibiotics   [] IV meds   [] TPN     []PEG tube Feedings      []New Colostomy   [] Ileostomy care/teaching    [] Speech Therapy   []Wound Vac   []Complex Dressing Changes    []Terminal care    []Other       Has patient had Prior Skilled care in the Last 60 days:  []Yes  [x]NO   If Yes:   Skilled Facility:    How many skilled days were used?

## 2020-04-09 NOTE — H&P
TCU History and Physical      Chief Complaint and Reason for TCU Admission:   The need to have more concentrated time with therapies following her falls at home    History of Present Illness:  Rodney Pratt  is a 70 y.o. female admitted to the transitional care unit on 4/9/2020. She was admitted in the acute area of [facility] earlier this month for pneumonia and was found to be covid positive. She became afebrile and with the improving of her symptoms she was discharged home to the care of her family. She was unable to have the degree of supervision needed to be safe at home. She states she fell the 2 past nights and was on the floor for several hours each time. She now presents to the TCU for the time with therapies prior to the anticipated return home.     Past Medical History:      Diagnosis Date    Arthritis     Bladder spasm     CAD (coronary artery disease) 3/12/2013    Cancer ([facility] [address]     breast    Hyperlipidemia     Hypertension     Hypothyroidism     Obstructive sleep apnea of adult 12/12/2013    SOB (shortness of breath)     Type II or unspecified type diabetes mellitus without mention of complication, not stated as uncontrolled        Primary care provider: Ally Mccullough DO     Past Surgical History:      Procedure Laterality Date    BREAST BIOPSY  1/17/2014    BREAST SURGERY  7/1974    left breast     CARPAL TUNNEL RELEASE Right 1/2013    CHOLECYSTECTOMY  7/1968    COLONOSCOPY  11/2007    FINGER TRIGGER RELEASE      HERNIA REPAIR  6-6-14    Dr. Stan Lopez  11/1996    JOINT REPLACEMENT Left     KNEE ARTHROSCOPY      left    KNEE SURGERY Right 2017    LYMPH NODE DISSECTION  12/23/1998    MASTECTOMY  7/2001       Allergies:    Myrbetriq [mirabegron]    Current Medications:    Current Facility-Administered Medications: [START ON 4/10/2020] tuberculin injection 5 Units, 5 Units, Intradermal, Weekly  [START ON 4/12/2020] ppd (tuberculin skin test) read, , Does not apply, Weekly  [START ON 4/10/2020] multivitamin 1 tablet, 1 tablet, Oral, Daily  [START ON 4/10/2020] amLODIPine (NORVASC) tablet 5 mg, 5 mg, Oral, Daily  atorvastatin (LIPITOR) tablet 40 mg, 40 mg, Oral, Nightly  [START ON 4/10/2020] clopidogrel (PLAVIX) tablet 75 mg, 75 mg, Oral, Daily  [START ON 4/10/2020] losartan (COZAAR) tablet 25 mg, 25 mg, Oral, Daily  [START ON 4/10/2020] ferrous sulfate (IRON 325) tablet 325 mg, 325 mg, Oral, Daily with breakfast  [START ON 4/10/2020] levothyroxine (SYNTHROID) tablet 112 mcg, 112 mcg, Oral, Daily  [START ON 4/10/2020] metoprolol succinate (TOPROL XL) extended release tablet 25 mg, 25 mg, Oral, Daily  [START ON 4/10/2020] vitamin B-12 (CYANOCOBALAMIN) tablet 1,000 mcg, 1,000 mcg, Oral, Daily  [START ON 4/10/2020] aspirin EC tablet 325 mg, 325 mg, Oral, Daily  [START ON 4/10/2020] enoxaparin (LOVENOX) injection 40 mg, 40 mg, Subcutaneous, Daily  famotidine (PEPCID) tablet 20 mg, 20 mg, Oral, Daily  polyethylene glycol (GLYCOLAX) packet 17 g, 17 g, Oral, Daily PRN  senna (SENOKOT) tablet 8.6 mg, 1 tablet, Oral, Nightly PRN  glucose (GLUTOSE) 40 % oral gel 15 g, 15 g, Oral, PRN  dextrose 50 % IV solution, 12.5 g, Intravenous, PRN  glucagon (rDNA) injection 1 mg, 1 mg, Intramuscular, PRN  dextrose 5 % solution, 100 mL/hr, Intravenous, PRN     Resident is taking an antipsychotic medication? No     If yes, was Gradual Dose Reduction (GDR) attempted? NA     No- GDR is clinically contraindicated due to the fact that tapering the medication  would not achieve the desired therapeutic effects and the current dose is  necessary to maintain or improve the resident's function, well-being, safety, and  quality of life. Social History:  Social History     Socioeconomic History    Marital status:       Spouse name: Not on file    Number of children: Not on file    Years of education: Not on file    Highest education level: Not on file   Occupational History    Not on file   Social Needs    Financial resource strain: Not on file    Food insecurity     Worry: Not on file     Inability: Not on file    Transportation needs     Medical: Not on file     Non-medical: Not on file   Tobacco Use    Smoking status: Passive Smoke Exposure - Never Smoker    Smokeless tobacco: Never Used    Tobacco comment: worked in a bar for years   Substance and Sexual Activity    Alcohol use: Yes     Comment: seldom    Drug use: No    Sexual activity: Never   Lifestyle    Physical activity     Days per week: Not on file     Minutes per session: Not on file    Stress: Not on file   Relationships    Social connections     Talks on phone: Not on file     Gets together: Not on file     Attends Restorationism service: Not on file     Active member of club or organization: Not on file     Attends meetings of clubs or organizations: Not on file     Relationship status: Not on file    Intimate partner violence     Fear of current or ex partner: Not on file     Emotionally abused: Not on file     Physically abused: Not on file     Forced sexual activity: Not on file   Other Topics Concern    Not on file   Social History Narrative    Not on file           Family History:       Problem Relation Age of Onset    Cirrhosis Mother     Heart Disease Father     Diabetes Paternal Uncle     Diabetes Paternal Grandmother        Review of Systems:  CONSTITUTIONAL:  positive for  fatigue  EYES:  positive for  glasses  HEENT:  negative for  sore throat  RESPIRATORY:  positive for  dry cough  CARDIOVASCULAR:  negative for  chest pain  GASTROINTESTINAL:  negative for constipation  GENITOURINARY:  positive for urinary incontinence  SKIN:  negative  HEMATOLOGIC/LYMPHATIC:  negative for easy bruising  MUSCULOSKELETAL:  positive for  myalgias, arthralgias and muscle weakness  NEUROLOGICAL:  positive for gait problems  BEHAVIOR/PSYCH:  negative for depressed mood  10 point system review otherwise negative    Physical Exam:  /60

## 2020-04-10 PROBLEM — S93.602A SPRAIN OF LEFT FOOT: Status: ACTIVE | Noted: 2020-04-10

## 2020-04-10 PROBLEM — S39.013A STRAIN OF LEFT INGUINAL MUSCLE: Status: ACTIVE | Noted: 2020-04-10

## 2020-04-10 PROBLEM — S66.912A SPRAIN AND STRAIN OF LEFT WRIST: Status: ACTIVE | Noted: 2020-04-10

## 2020-04-10 PROBLEM — S63.502A SPRAIN AND STRAIN OF LEFT WRIST: Status: ACTIVE | Noted: 2020-04-10

## 2020-04-10 LAB
GLUCOSE BLD-MCNC: 246 MG/DL (ref 70–108)
GLUCOSE BLD-MCNC: 288 MG/DL (ref 70–108)
GLUCOSE BLD-MCNC: 371 MG/DL (ref 70–108)
GLUCOSE BLD-MCNC: 395 MG/DL (ref 70–108)

## 2020-04-10 PROCEDURE — 2500000003 HC RX 250 WO HCPCS: Performed by: FAMILY MEDICINE

## 2020-04-10 PROCEDURE — 97530 THERAPEUTIC ACTIVITIES: CPT

## 2020-04-10 PROCEDURE — 97167 OT EVAL HIGH COMPLEX 60 MIN: CPT

## 2020-04-10 PROCEDURE — 97116 GAIT TRAINING THERAPY: CPT

## 2020-04-10 PROCEDURE — 97535 SELF CARE MNGMENT TRAINING: CPT

## 2020-04-10 PROCEDURE — 6370000000 HC RX 637 (ALT 250 FOR IP): Performed by: FAMILY MEDICINE

## 2020-04-10 PROCEDURE — 97110 THERAPEUTIC EXERCISES: CPT

## 2020-04-10 PROCEDURE — 82948 REAGENT STRIP/BLOOD GLUCOSE: CPT

## 2020-04-10 PROCEDURE — 1290000000 HC SEMI PRIVATE OTHER R&B

## 2020-04-10 PROCEDURE — 6360000002 HC RX W HCPCS: Performed by: FAMILY MEDICINE

## 2020-04-10 RX ORDER — INSULIN GLARGINE 100 [IU]/ML
18 INJECTION, SOLUTION SUBCUTANEOUS NIGHTLY
Status: DISCONTINUED | OUTPATIENT
Start: 2020-04-10 | End: 2020-04-12

## 2020-04-10 RX ADMIN — LEVOTHYROXINE SODIUM 112 MCG: 112 TABLET ORAL at 05:56

## 2020-04-10 RX ADMIN — Medication 5 UNITS: at 07:59

## 2020-04-10 RX ADMIN — INSULIN GLARGINE 18 UNITS: 100 INJECTION, SOLUTION SUBCUTANEOUS at 20:40

## 2020-04-10 RX ADMIN — ACETAMINOPHEN 1000 MG: 500 TABLET ORAL at 08:04

## 2020-04-10 RX ADMIN — ENOXAPARIN SODIUM 40 MG: 40 INJECTION SUBCUTANEOUS at 08:04

## 2020-04-10 RX ADMIN — Medication 325 MG: at 08:03

## 2020-04-10 RX ADMIN — Medication 1000 MCG: at 07:56

## 2020-04-10 RX ADMIN — CLOPIDOGREL BISULFATE 75 MG: 75 TABLET ORAL at 08:04

## 2020-04-10 RX ADMIN — INSULIN LISPRO 5 UNITS: 100 INJECTION, SOLUTION INTRAVENOUS; SUBCUTANEOUS at 17:08

## 2020-04-10 RX ADMIN — FERROUS SULFATE TAB 325 MG (65 MG ELEMENTAL FE) 325 MG: 325 (65 FE) TAB at 07:57

## 2020-04-10 RX ADMIN — ATORVASTATIN CALCIUM 40 MG: 40 TABLET, FILM COATED ORAL at 20:33

## 2020-04-10 RX ADMIN — LOSARTAN POTASSIUM 25 MG: 25 TABLET, FILM COATED ORAL at 07:57

## 2020-04-10 RX ADMIN — INSULIN LISPRO 5 UNITS: 100 INJECTION, SOLUTION INTRAVENOUS; SUBCUTANEOUS at 12:43

## 2020-04-10 RX ADMIN — ACETAMINOPHEN 1000 MG: 500 TABLET ORAL at 17:27

## 2020-04-10 RX ADMIN — ACETAMINOPHEN 1000 MG: 500 TABLET ORAL at 23:25

## 2020-04-10 RX ADMIN — AMLODIPINE BESYLATE 5 MG: 5 TABLET ORAL at 07:57

## 2020-04-10 RX ADMIN — METOPROLOL SUCCINATE 25 MG: 25 TABLET, FILM COATED, EXTENDED RELEASE ORAL at 07:56

## 2020-04-10 RX ADMIN — FAMOTIDINE 20 MG: 20 TABLET ORAL at 08:04

## 2020-04-10 RX ADMIN — ACETAMINOPHEN 1000 MG: 500 TABLET ORAL at 03:49

## 2020-04-10 RX ADMIN — THERA TABS 1 TABLET: TAB at 07:56

## 2020-04-10 ASSESSMENT — PAIN SCALES - GENERAL
PAINLEVEL_OUTOF10: 10
PAINLEVEL_OUTOF10: 6
PAINLEVEL_OUTOF10: 4
PAINLEVEL_OUTOF10: 0
PAINLEVEL_OUTOF10: 10
PAINLEVEL_OUTOF10: 0
PAINLEVEL_OUTOF10: 6

## 2020-04-10 ASSESSMENT — PAIN DESCRIPTION - ORIENTATION: ORIENTATION: LEFT

## 2020-04-10 NOTE — PROGRESS NOTES
Oral Daily    enoxaparin  40 mg Subcutaneous Daily    famotidine  20 mg Oral Daily     Continuous Infusions:   dextrose       PRN Meds:polyethylene glycol, senna, glucose, dextrose, glucagon (rDNA), dextrose, acetaminophen **OR** acetaminophen    Review of Systems  Pertinent items are noted in HPI. Objective:     No data found. I/O last 3 completed shifts: In: 855 [P.O.:855]  Out: -   No intake/output data recorded. /78   Pulse 80   Temp 98.8 °F (37.1 °C) (Axillary)   Resp 20   Ht 5' 3\" (1.6 m)   Wt 146 lb 14.4 oz (66.6 kg)   SpO2 94%   BMI 26.02 kg/m²     /78   Pulse 80   Temp 98.8 °F (37.1 °C) (Axillary)   Resp 20   Ht 5' 3\" (1.6 m)   Wt 146 lb 14.4 oz (66.6 kg)   SpO2 94%   BMI 26.02 kg/m²   General appearance: alert, appears stated age and cooperative  Head: Normocephalic, without obvious abnormality, atraumatic  Lungs: normal work of breathing  Abdomen: soft, no obvious hernia buldge for skin changes, sore to the middle third of the left inguinal ligament  Extremities: the left hand has some slight swelling and increased warmth to the dorsum  area no evidence of trauma, the left foot has some swelling and increased warmth to the superior mid area, no soreness to the tuberosity of the 5th metatarsal  Skin: Skin color, texture, turgor normal. No rashes or lesions    Data Review:   Results for Consuelo Perez (MRN [id number]) as of 4/10/2020 19:17   Ref.  Range 4/9/2020 15:44 4/9/2020 20:10 4/10/2020 07:50 4/10/2020 11:25 4/10/2020 16:53   POC Glucose Latest Ref Range: 70 - 108 mg/dl 176 (H) 373 (H) 288 (H) 395 (H) 371 (H)       Electronically signed by Andra Decker MD on 4/10/2020 at 6:58 PM

## 2020-04-10 NOTE — PROGRESS NOTES
[facility]  INPATIENT PHYSICAL THERAPY  DAILY NOTE  [facility] SKILLED NURSING UNIT - [id number]/068-A  Time In: 9299  Time Out: 1510  Timed Code Treatment Minutes: 61 Minutes  Minutes: 61          Date: 4/10/2020  Patient Name: Jan Mendieta,  Gender:  female        MRN: [id number]  : 1940  (70 y.o.)  Referral Date : 20  Referring Practitioner: Jordan Castellon MD  Diagnosis: COVID 19  Additional Pertinent Hx:   Orlena Hashimoto a 70 y. o. female admitted to the transitional care unit on 2020. She was admitted in the acute area of [facility] earlier this month for pneumonia and was found to be covid positive. She became afebrile and with the improving of her symptoms she was discharged home to the care of her family. Pt unable to safely care for self at home, needing additional assistance, experiencing falls and ED visit. She now presents to the TCU due to decline in mobility     Prior Level of Function:  Lives With: Significant other  Type of Home: Mobile home  Home Layout: One level  Home Access: Stairs to enter with rails  Entrance Stairs - Number of Steps: 4  Entrance Stairs - Rails: Left  Home Equipment: 4 wheeled walker, Quad cane, Wheelchair-manual   Bathroom Shower/Tub: Walk-in shower  Bathroom Toilet: Bedside commode(has raised toilet seat and bed side commode)  Bathroom Equipment: Grab bars in shower, Shower chair(grab bar suction)    Receives Help From: Family  ADL Assistance: Needs assistance(pt reports daughter-in-law assists with distal BLE bathing, sig. other assists with threading depends, and assists with toileting)  Homemaking Assistance: (sig other had been completing however he is now hospitalized)  Ambulation Assistance: Independent(pt reports within home without AD)  Transfer Assistance: Independent  Active :  Yes  Additional Comments: Pt reports history of CVA with L sided deficts in  in which she then received [facility] services complete supine  <> sit with min assist to transfer in/out of bed with decreased difficulty. Short term goal 2: Patient will complete sit < > stand with min assist to stand to ambulate with decreased difficulty. Short term goal 3: Patient will complete stand pivot transfer with min assist to transfer surface to surface with decreased difficulty. Short term goal 4: Patient will ambulate 48' with a RW and min assist to progress towards ambulating household distances safely. Short term goal 5: Patient will propel wheelchair 48' with CGA to navigate living environment and increase mobility. Short term goal 6: Patient will ascend/descend 1, 4\" step in parallel bars with min assist to progress towards safe home entry  Long term goals  Time Frame for Long term goals : 4 weeks  Long term goal 1: Patient will complete supine < > sit with modified independence to transfer in/out of bed safely. Long term goal 2: Patient will complete sit  <> stand and stand pivot transfers with SBA to transfer surface to surface safely. Long term goal 3: Patient will ambulate 76' with a RW and CGA to navigate home environment. Long term goal 4: Patient will propel wheelchair 100' with SBA to navigate living environment and to/from appointments. Long term goal 5: Patient will improve tinetti score to greater than or equal to 17/28 to reduce her risk for falls. Long term goal 6: Patient will ascend/descend 4 steps with 1 hand rail with CGA for home entry. Following session, patient left in safe position with all fall risk precautions in place.

## 2020-04-10 NOTE — PLAN OF CARE
Problem: Pain:  Goal: Control of chronic pain  Description: Control of chronic pain  Outcome: Ongoing  Note: Patient needs assistance with repositioning in chair and requests extra strength tylenol for left shoulder pain with stated intermittent relief. Patient sometimes needs reminded not to let left arm hang down as she often does with transfers. Does not pick arm up and move it prior to transfers and arm seems to \"flop\". Problem: Falls - Risk of:  Goal: Will remain free from falls  Description: Will remain free from falls  Outcome: Ongoing  Note: Patient remains on :stay with me\" program and trnasfers with extensive assist of one to bedside commode as she fatigues easily and left foot steps on right and causes her to lose footing and trip over her feet when she transfers. States that after her strokes she had mastered this technique but   Since her most recent illness has become weakened. Problem: Bleeding:  Goal: Will show no signs and symptoms of excessive bleeding  Description: Will show no signs and symptoms of excessive bleeding  Outcome: Ongoing  Note: Patient remains on Lovenox, aspirin, and plavix for DVT prophylaxis. No s/s of active bleeding at present. Problem: Mobility - Impaired:  Goal: Mobility will improve  Description: Mobility will improve  Outcome: Ongoing  Note: Patient requires to assist of staff and walker, gait belt for ambulation. Left foot steps on right and feet become entangled with ambulation. Patient needs reminded to raise head when walking and not look at feet. Left arm often \"hangs\" as patient does not physically lift it prior to starting motion. Requires staff assist with repositioning in chair as left side is weakened in strength and endurance.        Problem: Metabolic:  Goal: Ability to maintain appropriate glucose levels will improve  Description: Ability to maintain appropriate glucose levels will improve  Outcome: Ongoing  Note: Patients blood sugars

## 2020-04-10 NOTE — PROGRESS NOTES
Equipment: 4 wheeled walker, Quad cane, [employer]   Bathroom Shower/Tub: Walk-in shower  Bathroom Toilet: Bedside commode(has raised toilet seat and bed side commode)  Bathroom Equipment: Grab bars in shower, Shower chair(grab bar suction)    Receives Help From: Family  ADL Assistance: Needs assistance(pt reports daughter-in-law assists with distal BLE bathing, sig. 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 Pt reports that she had transitioned to Ind mobility within home without person or AD (noted much of her AD does not fit through doorways of mobile home per pt) however did continue to require some assist with ADLs at times. Since her stroke, pt's significant other had been completing all IADLs    VISION:WFL    HEARING:  [place]    COGNITION: Decreased Insight, Decreased Problem Solving and Decreased Safety Awareness    RANGE OF MOTION:  Right Upper Extremity: WFL  Left Upper Extremity:  gross impairments from previous CVA exacerbated by recent illness, limited in all joints    STRENGTH:  R  4-/5  L  3+/5    ADL:   Feeding: with set-up. opening items  Grooming: Stand By Assistance and with set-up. in sitting d/t gross weakness and unable to perfrom in standing for oral care  Bathing: Maximum Assistance and Dependent. A of two for standing to wash bottom/cristi-area;  A for distal BLE, A for thoroughnes of UB and A

## 2020-04-10 NOTE — FLOWSHEET NOTE
04/10/20 1500   Provider Notification   Reason for Communication Review case   Provider Name Dr. Bar Spearing   Provider Notification Physician   Method of Communication Secure Message   Response Waiting for response   Notification Time [phone]   Shift Event   (returned call and will be in to see patient)   Patient has been c/o left groin pain. Now offers c/o right groin pain. Had 2 falls this week at home from bed. Blood clot?

## 2020-04-10 NOTE — DISCHARGE INSTR - PHARMACY
· Be safe with medicines. If your doctor prescribed medicine, take it exactly as prescribed. Call your doctor if you think you are having a problem with your medicine. You will get more details on the specific medicines your doctor prescribes. Unused medications, especially pain medications, should be disposed to ensure medications do not end up being misused in the future, as well as helping to ensure drugs are not impacting the environment. [facility] and many local police agencies have medication take-back bins to properly destroy these medications. Please see the site https://apps. deadiversion. [employer].gov/pubdispsearch/spring/main?execution=e2s1 for additional take-back bins located in your area.

## 2020-04-10 NOTE — PLAN OF CARE
[facility]  Physical Medicine Case Management Assessment    [] Inpatient Rehabilitation Unit  [x] Transitional Care Unit    Patient Name: Ja Tidwell        MRN: [id number]    : 1940  (70 y.o.)  Gender: female   Date of Admission: 2020  1:02 PM    Family/Social/Home Environment: Social/Functional History: Patient is a 70year old  female who was admitted to [address] [place] [address] on 20. Patient had been on in the acute area of [facility] earlier this month for pneumonia and was found to be covid positive. Per medical record, she became afebrile and with the improving of her symptoms she was discharged home to her private residence with the support of her son and daughter in law. She was unable to have the degree of supervision and assistance needed to be safe at home, patient ended up falling out of bed the last 2 nights that she was home, she was not able to get up due to extreme weakness and laid on the floor both times for several hours until her son came to check on her. Son decided to call the EMS the second time he found her on the floor from falling out of bed. Patient lives with her significant other in her private residence. Patient has two adult children and son and a daughter who lives two hours away ( [facility]). Patient has a strong support system in place with family and extended family. Patients goal is to return home with her significant other once they both have recovered from their illnesses. Patient will continue to have support and assistance from son and daughter in law.   Lives With: Significant other  Type of Home: House  Home Layout: One level  Home Access: Stairs to enter with rails  Entrance Stairs - Number of Steps: 4  Entrance Stairs - Rails: Left  Bathroom Shower/Tub: Walk-in shower  Bathroom Toilet: Bedside commode(has raised toilet seat and bed side commode)  Bathroom Equipment: Grab bars in shower, Shower chair(grab bar suction)  Home Equipment: 4 wheeled

## 2020-04-11 LAB
GLUCOSE BLD-MCNC: 182 MG/DL (ref 70–108)
GLUCOSE BLD-MCNC: 204 MG/DL (ref 70–108)
GLUCOSE BLD-MCNC: 290 MG/DL (ref 70–108)
GLUCOSE BLD-MCNC: 333 MG/DL (ref 70–108)

## 2020-04-11 PROCEDURE — 97110 THERAPEUTIC EXERCISES: CPT

## 2020-04-11 PROCEDURE — 82948 REAGENT STRIP/BLOOD GLUCOSE: CPT

## 2020-04-11 PROCEDURE — 6360000002 HC RX W HCPCS: Performed by: FAMILY MEDICINE

## 2020-04-11 PROCEDURE — 6370000000 HC RX 637 (ALT 250 FOR IP): Performed by: FAMILY MEDICINE

## 2020-04-11 PROCEDURE — 97535 SELF CARE MNGMENT TRAINING: CPT

## 2020-04-11 PROCEDURE — 1290000000 HC SEMI PRIVATE OTHER R&B

## 2020-04-11 PROCEDURE — 97116 GAIT TRAINING THERAPY: CPT

## 2020-04-11 RX ADMIN — INSULIN LISPRO 3 UNITS: 100 INJECTION, SOLUTION INTRAVENOUS; SUBCUTANEOUS at 12:12

## 2020-04-11 RX ADMIN — CLOPIDOGREL BISULFATE 75 MG: 75 TABLET ORAL at 08:35

## 2020-04-11 RX ADMIN — METOPROLOL SUCCINATE 25 MG: 25 TABLET, FILM COATED, EXTENDED RELEASE ORAL at 08:35

## 2020-04-11 RX ADMIN — THERA TABS 1 TABLET: TAB at 08:35

## 2020-04-11 RX ADMIN — INSULIN LISPRO 1 UNITS: 100 INJECTION, SOLUTION INTRAVENOUS; SUBCUTANEOUS at 08:37

## 2020-04-11 RX ADMIN — AMLODIPINE BESYLATE 5 MG: 5 TABLET ORAL at 08:35

## 2020-04-11 RX ADMIN — LOSARTAN POTASSIUM 25 MG: 25 TABLET, FILM COATED ORAL at 08:35

## 2020-04-11 RX ADMIN — ATORVASTATIN CALCIUM 40 MG: 40 TABLET, FILM COATED ORAL at 19:57

## 2020-04-11 RX ADMIN — ACETAMINOPHEN 1000 MG: 500 TABLET ORAL at 06:18

## 2020-04-11 RX ADMIN — INSULIN LISPRO 2 UNITS: 100 INJECTION, SOLUTION INTRAVENOUS; SUBCUTANEOUS at 17:11

## 2020-04-11 RX ADMIN — INSULIN GLARGINE 18 UNITS: 100 INJECTION, SOLUTION SUBCUTANEOUS at 19:53

## 2020-04-11 RX ADMIN — FERROUS SULFATE TAB 325 MG (65 MG ELEMENTAL FE) 325 MG: 325 (65 FE) TAB at 08:35

## 2020-04-11 RX ADMIN — ENOXAPARIN SODIUM 40 MG: 40 INJECTION SUBCUTANEOUS at 08:37

## 2020-04-11 RX ADMIN — Medication 1000 MCG: at 08:35

## 2020-04-11 RX ADMIN — Medication 325 MG: at 08:35

## 2020-04-11 RX ADMIN — LEVOTHYROXINE SODIUM 112 MCG: 112 TABLET ORAL at 05:45

## 2020-04-11 RX ADMIN — ACETAMINOPHEN 1000 MG: 500 TABLET ORAL at 19:55

## 2020-04-11 RX ADMIN — FAMOTIDINE 20 MG: 20 TABLET ORAL at 08:35

## 2020-04-11 ASSESSMENT — PAIN SCALES - GENERAL
PAINLEVEL_OUTOF10: 1
PAINLEVEL_OUTOF10: 8
PAINLEVEL_OUTOF10: 1
PAINLEVEL_OUTOF10: 6
PAINLEVEL_OUTOF10: 5

## 2020-04-11 ASSESSMENT — PAIN DESCRIPTION - ONSET: ONSET: ON-GOING

## 2020-04-11 ASSESSMENT — PAIN DESCRIPTION - ORIENTATION
ORIENTATION: LEFT
ORIENTATION: LEFT

## 2020-04-11 ASSESSMENT — PAIN DESCRIPTION - PAIN TYPE: TYPE: ACUTE PAIN

## 2020-04-11 ASSESSMENT — PAIN DESCRIPTION - LOCATION: LOCATION: GROIN

## 2020-04-11 ASSESSMENT — PAIN DESCRIPTION - PROGRESSION: CLINICAL_PROGRESSION: GRADUALLY WORSENING

## 2020-04-11 ASSESSMENT — PAIN DESCRIPTION - DESCRIPTORS
DESCRIPTORS: ACHING
DESCRIPTORS: ACHING

## 2020-04-11 NOTE — PROGRESS NOTES
[address] [address]  [address] NUR[facility] UN[facility]  Occupational Therapy  Daily Note  Time:   Time In: 9073  Time Out: 8721  Timed Code Treatment Minutes: 47 Minutes  Minutes: 54          Date: 2020  Patient Name: Ernie Avina,   Gender: female      Room: [facility]/068-A  MRN: [id number]  : 1940  (70 y.o.)  Referring Practitioner: Tarik Justice OT  Diagnosis: COVID-19  Additional Pertinent Hx: 70 y.o. female admitted to the transitional care unit on 2020. She was admitted in the acute area of [facility] earlier this month for pneumonia and was found to be covid positive. She became afebrile and with the improving of her symptoms she was discharged home to the care of her family. She was unable to have the degree of supervision needed to be safe at home. She states she fell the 2 past nights and was on the floor for several hours each time. She now presents to the TCU for the time with therapies prior to the anticipated return home. Restrictions/Precautions:  Restrictions/Precautions: Isolation, Contact Precautions, General Precautions, Fall Risk  Position Activity Restriction  Other position/activity restrictions: COVID 19, contact and droplet precautions    SUBJECTIVE: Received in chair needing to go to the bathroom, cooperative with some encouragement required    PAIN: Groin pain, not rated    COGNITION: Slow Processing, Decreased Insight, Inattention, Decreased Problem Solving and Decreased Safety Awareness    ADL:   Lower Extremity Dressing: Minimal Assistance, with set-up, with verbal cues  and with increased time for completion. donning socks and shoes  Toileting: Maximum Assistance. total A for clothing down, completed hygiene in sitting, total A for clothing up as well as standing balance  Toilet Transfer: Maximum Assistance, with verbal cues  and with increased time for completion.  max cues for hand placement, safety and technique; mod A to control

## 2020-04-11 NOTE — PROGRESS NOTES
Up with one assist stand pivot to transfer. Requires two staff if ambulating. Complains of pain in right side groin. Ice applied to this area. Up in chair at this hour with call light within reach and voiced understanding to call if needing assistance.

## 2020-04-11 NOTE — PLAN OF CARE
Problem: Pain:  Goal: Pain level will decrease  Description: Pain level will decrease  4/11/2020 0937 by Curlee Opitz, LPN  Outcome: Ongoing  Note: Tylenol given as needed  4/10/2020 2130 by Lorena Worthy RN  Outcome: Ongoing     Problem: Falls - Risk of:  Goal: Will remain free from falls  Description: Will remain free from falls  4/11/2020 0937 by Curlee Opitz, LPN  Outcome: Ongoing  Note: Bed and chair alarm on .  4/10/2020 2130 by Lorena Worthy RN  Outcome: Ongoing     Problem: Falls - Risk of:  Goal: Absence of physical injury  Description: Absence of physical injury  Outcome: Ongoing  Note: Call light within reach      Problem: Bleeding:  Goal: Will show no signs and symptoms of excessive bleeding  Description: Will show no signs and symptoms of excessive bleeding  Outcome: Ongoing  Note: Monitor labs      Problem: Mobility - Impaired:  Goal: Mobility will improve  Description: Mobility will improve  Outcome: Ongoing  Note: Ambulate to restroom      Problem: Metabolic:  Goal: Ability to maintain appropriate glucose levels will improve  Description: Ability to maintain appropriate glucose levels will improve  4/11/2020 0937 by Curlee Opitz, LPN  Outcome: Ongoing  Note: Accu checks before each meal and at bedtime.    4/10/2020 2130 by Lorena Worthy RN  Outcome: Ongoing     Problem: Respiratory  Goal: No pulmonary complications  Outcome: Ongoing  Note: Encouraged to cough and deep breath every 2 hours      Problem: Urinary Elimination:  Goal: Ability to recognize the urge to urinate will improve  Description: Ability to recognize the urge to urinate will improve  Outcome: Ongoing  Note: Time void every 2 hours      Problem: Discharge Planning:  Goal: Patients continuum of care needs are met  Description: Patients continuum of care needs are met  Outcome: Ongoing  Note: Monitor until discharge      Problem: Nutrition  Goal: Optimal nutrition therapy  Outcome: Ongoing  Note: Encouraged to eat at least 50%

## 2020-04-12 LAB
GLUCOSE BLD-MCNC: 196 MG/DL (ref 70–108)
GLUCOSE BLD-MCNC: 254 MG/DL (ref 70–108)
GLUCOSE BLD-MCNC: 297 MG/DL (ref 70–108)
GLUCOSE BLD-MCNC: 304 MG/DL (ref 70–108)

## 2020-04-12 PROCEDURE — 82948 REAGENT STRIP/BLOOD GLUCOSE: CPT

## 2020-04-12 PROCEDURE — 1290000000 HC SEMI PRIVATE OTHER R&B

## 2020-04-12 PROCEDURE — 6370000000 HC RX 637 (ALT 250 FOR IP): Performed by: FAMILY MEDICINE

## 2020-04-12 PROCEDURE — 6360000002 HC RX W HCPCS: Performed by: FAMILY MEDICINE

## 2020-04-12 PROCEDURE — 2500000003 HC RX 250 WO HCPCS: Performed by: FAMILY MEDICINE

## 2020-04-12 RX ORDER — INSULIN GLARGINE 100 [IU]/ML
20 INJECTION, SOLUTION SUBCUTANEOUS NIGHTLY
Status: DISCONTINUED | OUTPATIENT
Start: 2020-04-12 | End: 2020-04-14

## 2020-04-12 RX ADMIN — LOSARTAN POTASSIUM 25 MG: 25 TABLET, FILM COATED ORAL at 08:21

## 2020-04-12 RX ADMIN — Medication 325 MG: at 08:20

## 2020-04-12 RX ADMIN — INSULIN LISPRO 1 UNITS: 100 INJECTION, SOLUTION INTRAVENOUS; SUBCUTANEOUS at 08:22

## 2020-04-12 RX ADMIN — METOPROLOL SUCCINATE 25 MG: 25 TABLET, FILM COATED, EXTENDED RELEASE ORAL at 08:21

## 2020-04-12 RX ADMIN — ACETAMINOPHEN 1000 MG: 500 TABLET ORAL at 10:27

## 2020-04-12 RX ADMIN — FERROUS SULFATE TAB 325 MG (65 MG ELEMENTAL FE) 325 MG: 325 (65 FE) TAB at 08:20

## 2020-04-12 RX ADMIN — ACETAMINOPHEN 1000 MG: 500 TABLET ORAL at 03:56

## 2020-04-12 RX ADMIN — CLOPIDOGREL BISULFATE 75 MG: 75 TABLET ORAL at 08:20

## 2020-04-12 RX ADMIN — ACETAMINOPHEN 1000 MG: 500 TABLET ORAL at 21:01

## 2020-04-12 RX ADMIN — INSULIN GLARGINE 20 UNITS: 100 INJECTION, SOLUTION SUBCUTANEOUS at 20:46

## 2020-04-12 RX ADMIN — INSULIN LISPRO 3 UNITS: 100 INJECTION, SOLUTION INTRAVENOUS; SUBCUTANEOUS at 17:08

## 2020-04-12 RX ADMIN — FAMOTIDINE 20 MG: 20 TABLET ORAL at 08:20

## 2020-04-12 RX ADMIN — INSULIN LISPRO 3 UNITS: 100 INJECTION, SOLUTION INTRAVENOUS; SUBCUTANEOUS at 11:56

## 2020-04-12 RX ADMIN — Medication 1000 MCG: at 08:20

## 2020-04-12 RX ADMIN — THERA TABS 1 TABLET: TAB at 08:20

## 2020-04-12 RX ADMIN — Medication: at 08:25

## 2020-04-12 RX ADMIN — ATORVASTATIN CALCIUM 40 MG: 40 TABLET, FILM COATED ORAL at 20:46

## 2020-04-12 RX ADMIN — ENOXAPARIN SODIUM 40 MG: 40 INJECTION SUBCUTANEOUS at 08:21

## 2020-04-12 RX ADMIN — MICONAZOLE NITRATE: 20 POWDER TOPICAL at 08:21

## 2020-04-12 RX ADMIN — AMLODIPINE BESYLATE 5 MG: 5 TABLET ORAL at 08:21

## 2020-04-12 RX ADMIN — LEVOTHYROXINE SODIUM 112 MCG: 112 TABLET ORAL at 04:58

## 2020-04-12 RX ADMIN — MICONAZOLE NITRATE: 20 POWDER TOPICAL at 21:02

## 2020-04-12 ASSESSMENT — PAIN SCALES - GENERAL
PAINLEVEL_OUTOF10: 9
PAINLEVEL_OUTOF10: 8
PAINLEVEL_OUTOF10: 4
PAINLEVEL_OUTOF10: 3
PAINLEVEL_OUTOF10: 8

## 2020-04-12 NOTE — PLAN OF CARE
care needs are met  4/12/2020 1736 by Luma Schumacher RN  Outcome: Ongoing  Note: Team conference planned for Tuesday to discuss patients goal setting and continued treatment. Problem: Nutrition  Goal: Optimal nutrition therapy  4/12/2020 1736 by Luma Schumacher RN  Outcome: Ongoing  Note: Patient on glucerna with breakfast and supper. Patient encouraged to drink supplement as she states that the meals they have been serving doesn't appeal to her very much. Patient encouraged to ask if theres anything alternative that we can get her to help promote her to eat more at meal times. Problem: Role Relationship:  Goal: Ability to verbalize awareness of feelings that lead to decreased social interaction will improve  Description: Ability to verbalize awareness of feelings that lead to decreased social interaction will improve  4/12/2020 1736 by Luma Schumacher RN  Outcome: Ongoing  Note: Calm and cooperative with staff. Patients spirit seems to be improving as her significant other was taken off the vent yesterday and has been able to call and talk to her. Patient utilizes her cell phone for communication to family/friends. Problem: Safety:  Goal: Ability to chew and swallow food without choking will improve  Description: Ability to chew and swallow food without choking will improve  Outcome: Ongoing  Note: Patient stated that when she eats, her food tends to get stuck on the left side of her mouth. Patient educated on \"pocketing\" and to use her tongue to sweep the food over. SLP eval order placed.

## 2020-04-12 NOTE — PLAN OF CARE
Problem: Pain:  Goal: Pain level will decrease  Description: Pain level will decrease  4/11/2020 2245 by Nicolas Hammer RN  Outcome: Ongoing   Patient comments pain in lt hand is getting better. Lt groin pain still present. PRN tylenol given   Problem: Respiratory  Goal: O2 Sat > 90%  4/11/2020 2245 by Nicolas Hammer RN  Outcome: Ongoing   02 Sat  97% dyspnea with exertion.   Problem: Respiratory  Goal: No pulmonary complications  8/21/4803 2245 by Nicolas Hammer RN  Outcome: Ongoing  Lung sounds clear and dimimshed

## 2020-04-13 LAB
GLUCOSE BLD-MCNC: 183 MG/DL (ref 70–108)
GLUCOSE BLD-MCNC: 253 MG/DL (ref 70–108)
GLUCOSE BLD-MCNC: 287 MG/DL (ref 70–108)
GLUCOSE BLD-MCNC: 290 MG/DL (ref 70–108)
GLUCOSE BLD-MCNC: 367 MG/DL (ref 70–108)

## 2020-04-13 PROCEDURE — 6370000000 HC RX 637 (ALT 250 FOR IP): Performed by: FAMILY MEDICINE

## 2020-04-13 PROCEDURE — 92526 ORAL FUNCTION THERAPY: CPT

## 2020-04-13 PROCEDURE — 97535 SELF CARE MNGMENT TRAINING: CPT

## 2020-04-13 PROCEDURE — 97530 THERAPEUTIC ACTIVITIES: CPT

## 2020-04-13 PROCEDURE — 1290000000 HC SEMI PRIVATE OTHER R&B

## 2020-04-13 PROCEDURE — 97110 THERAPEUTIC EXERCISES: CPT

## 2020-04-13 PROCEDURE — 82948 REAGENT STRIP/BLOOD GLUCOSE: CPT

## 2020-04-13 PROCEDURE — 97116 GAIT TRAINING THERAPY: CPT

## 2020-04-13 PROCEDURE — 92610 EVALUATE SWALLOWING FUNCTION: CPT

## 2020-04-13 PROCEDURE — 6360000002 HC RX W HCPCS: Performed by: FAMILY MEDICINE

## 2020-04-13 RX ADMIN — ACETAMINOPHEN 1000 MG: 500 TABLET ORAL at 06:11

## 2020-04-13 RX ADMIN — ENOXAPARIN SODIUM 40 MG: 40 INJECTION SUBCUTANEOUS at 09:56

## 2020-04-13 RX ADMIN — FERROUS SULFATE TAB 325 MG (65 MG ELEMENTAL FE) 325 MG: 325 (65 FE) TAB at 09:56

## 2020-04-13 RX ADMIN — ACETAMINOPHEN 1000 MG: 500 TABLET ORAL at 12:17

## 2020-04-13 RX ADMIN — INSULIN GLARGINE 20 UNITS: 100 INJECTION, SOLUTION SUBCUTANEOUS at 21:08

## 2020-04-13 RX ADMIN — INSULIN LISPRO 1 UNITS: 100 INJECTION, SOLUTION INTRAVENOUS; SUBCUTANEOUS at 09:52

## 2020-04-13 RX ADMIN — CLOPIDOGREL BISULFATE 75 MG: 75 TABLET ORAL at 09:56

## 2020-04-13 RX ADMIN — THERA TABS 1 TABLET: TAB at 09:56

## 2020-04-13 RX ADMIN — METOPROLOL SUCCINATE 25 MG: 25 TABLET, FILM COATED, EXTENDED RELEASE ORAL at 09:56

## 2020-04-13 RX ADMIN — INSULIN LISPRO 3 UNITS: 100 INJECTION, SOLUTION INTRAVENOUS; SUBCUTANEOUS at 12:08

## 2020-04-13 RX ADMIN — MICONAZOLE NITRATE: 20 POWDER TOPICAL at 20:28

## 2020-04-13 RX ADMIN — Medication 325 MG: at 09:56

## 2020-04-13 RX ADMIN — LOSARTAN POTASSIUM 25 MG: 25 TABLET, FILM COATED ORAL at 09:56

## 2020-04-13 RX ADMIN — ATORVASTATIN CALCIUM 40 MG: 40 TABLET, FILM COATED ORAL at 20:27

## 2020-04-13 RX ADMIN — LEVOTHYROXINE SODIUM 112 MCG: 112 TABLET ORAL at 06:11

## 2020-04-13 RX ADMIN — Medication: at 10:02

## 2020-04-13 RX ADMIN — Medication 1000 MCG: at 09:56

## 2020-04-13 RX ADMIN — MICONAZOLE NITRATE: 20 POWDER TOPICAL at 10:02

## 2020-04-13 RX ADMIN — ACETAMINOPHEN 500 MG: 500 TABLET ORAL at 20:28

## 2020-04-13 RX ADMIN — INSULIN LISPRO 3 UNITS: 100 INJECTION, SOLUTION INTRAVENOUS; SUBCUTANEOUS at 17:29

## 2020-04-13 RX ADMIN — FAMOTIDINE 20 MG: 20 TABLET ORAL at 09:56

## 2020-04-13 RX ADMIN — AMLODIPINE BESYLATE 5 MG: 5 TABLET ORAL at 09:56

## 2020-04-13 ASSESSMENT — PAIN SCALES - GENERAL
PAINLEVEL_OUTOF10: 0
PAINLEVEL_OUTOF10: 8
PAINLEVEL_OUTOF10: 0
PAINLEVEL_OUTOF10: 4
PAINLEVEL_OUTOF10: 3

## 2020-04-13 NOTE — PROGRESS NOTES
[address] [address]  SPEECH THERAPY  [facility] SKILLED NURSING UNIT  Bedside Swallowing Evaluation & Dysphagia tx       SLP Individual Minutes  Time In: 0900  Time Out: 0945  Minutes: 45  Timed Code Treatment Minutes: 0 Minutes   Bedside swallow evaluation: 25 minutes    Dysphagia tx: 20 minutes       Date: 2020  Patient Name: Caitlin Alonzo      CSN: [id number]   : 1940  (70 y.o.)  Gender: female   Referring Physician:  Dr. Dc Hodges   Diagnosis: COVID-19   Secondary Diagnosis: Dysphagia   History of Present Illness/Injury: Patient admitted to [facility] with above dx. Per chart review, \"73 y.o. female admitted to the transitional care unit on 2020. She was admitted in the acute area of [facility] earlier this month for pneumonia and was found to be covid positive. She became afebrile and with the improving of her symptoms she was discharged home to the care of her family. She was unable to have the degree of supervision needed to be safe at home. She states she fell the 2 past nights and was on the floor for several hours each time. \"  Per RN Colette Siddiqui and patient report, \"Patient with hx of recent CVA in 2019 resulting in left side weakness. \" Patient reports, \"I went to [facility] for my stroke care and was discharged home with [facility]. I had PT, OT and ST; I worked with [employer] and we worked on my speech then I got discharged from speech therapy. \"   ST consulted this hospital admission d/t \"pocketing of food. \"  ST to complete BSE and determine safety of PO diet and further POC.    Past Medical History:   Diagnosis Date    Arthritis     Bladder spasm     CAD (coronary artery disease) 3/12/2013    Cancer ([facility] [address]     breast    Hyperlipidemia     Hypertension     Hypothyroidism     Obstructive sleep apnea of adult 2013    SOB (shortness of breath)     Type II or unspecified type diabetes mellitus without mention of speech was a little slurred after the stroke in December but since then all speech issues have resolved and she is speaking just fine now. She is back to normal in that regard. It is the physical therapy she needs the most is for her left side to get her stronger to walk. \"   ST reviewed potential areas that could be affected following a CVA such as \"thinking, memory, swallowing, voice and speech. \"  Patient's significant other Courtney Has continued to state \"No none of that has changed and like I said her speech is back to normal now. \"  ST expressed gratitude to phone conversation and stated \"Speech therapy will sign off at this time but if anything changes, please say something to the physician/nursing and we will certainly come back. \"  Patient and significant other Courtney Has verbalized agreement and understanding. Patient denies further need for speech therapy treatment d/t \"speech returning to normal and really wanting to focus on the physical part of therapy to get home. \"  ST provided very brief written HEP including labial HEP + compensatory speech strategies (S-speak up, O-open mouth, S-slow down) should speech be compromise within additional phone conversations. Patient verbalized agreement and understating and continued to deny need for further speech therapy services. Speech therapy to sign off at this time. CERTAINLY re-consult if change in POC. RN Lianna verbalized agreement and understanding. RECOMMENDATIONS/ASSESSMENT:   Modified Barium Swallow:  MBS is not indicated at this time.   Will recommend as appropriate  Diet Recommendations:  Regular diet with thin liquids   Strategies:  Full Upright Position, Small Bite/Sip and Tongue/lingual sweep + approval of straw use   Rehabilitation Potential: excellent    EDUCATION:  Learner: Patient + Significant other via phone   Education:  Reviewed results and recommendations of this evaluation, Reviewed diet and strategies, Reviewed signs, symptoms and risks of aspiration, Reviewed ST goals and Plan of Care and Reviewed recommendations for follow-up  Evaluation of Education: Verbalizes understanding and Demonstrates with assistance    PLAN:  No further speech therapy services indicated. PATIENT GOAL:    \"Get my left leg going where I want it to go, so I can go home. \"         LENNIE Romano 25

## 2020-04-13 NOTE — PROGRESS NOTES
[address] [address]  [address]  Occupational Therapy  Daily Note  Time:   Time In: 07  Time Out: 7752  Timed Code Treatment Minutes: 47 Minutes  Minutes: 54          Date: 2020  Patient Name: Sabina Ochoa,   Gender: female      Room: [facility]68/068-A  MRN: [id number]  : 1940  (70 y.o.)  Referring Practitioner: Uzair Yu OT  Diagnosis: COVID-19  Additional Pertinent Hx: 70 y.o. female admitted to the transitional care unit on 2020. She was admitted in the acute area of [facility] earlier this month for pneumonia and was found to be covid positive. She became afebrile and with the improving of her symptoms she was discharged home to the care of her family. She was unable to have the degree of supervision needed to be safe at home. She states she fell the 2 past nights and was on the floor for several hours each time. She now presents to the TCU for the time with therapies prior to the anticipated return home. Restrictions/Precautions:  Restrictions/Precautions: Isolation, Contact Precautions, General Precautions, Fall Risk  Position Activity Restriction  Other position/activity restrictions: PWVTZ 27, contact and droplet precautions    SUBJECTIVE: Received in chair, motivated and cooperative, reporting completing BUE ROM activities on     PAIN: C/o generalized pain to groing    COGNITION: Decreased Problem Solving and Decreased Safety Awareness    ADL:   Grooming: Moderate Assistance, with set-up, with verbal cues  and with increased time for completion. mod A standing balance at mirror for combing hair, VCs for hand placement, safety VCs for L sided awareness, pt also completed oral care, seated in chair with max Inc time and VCs for technique to include B hands for denture management, max difficulty noted  Lower Extremity Dressing: Minimal Assistance, with set-up and with increased time for completion. for socks and shoes.     BALANCE:  Sitting

## 2020-04-13 NOTE — PROGRESS NOTES
reps right with 5\" hold, 8 reps left; seated march x 10 alternating, ankle pumps x 10, hip adduction 5\" x 10, ankle eversion with towel x 10, isometric ankle eversion 3\" x 10. Standing: side step tap left LE x 6 to increase hip strength and base of support. Exercises were completed for increased independence with functional mobility. Functional Outcome Measures: Not Completed       ASSESSMENT:  Assessment: Patient progressing toward established goals. Pt demonstrating improvement with transfers this date, completing sit to stand throughout treatment with CGA consistently but occasional min assist with stand to sit for safe decent. Pt continues to demonstrate narrow EFREM with decreased strength and coordination of left LE impacting gait, resulting in the need for CGA to min assist with a wheelchair follow. Pt also continues to require frequent rest breaks due to fatigue. Patient is motivated and cooperative. Patient would benefit from continued skilled PT services to improve her ability to complete functional mobility, reduce her risk for falls and allow patient to return to [facility]. Activity Tolerance:  Patient tolerance of  treatment: good.       Equipment Recommendations:Equipment Needed: No  Discharge Recommendations:  Continue to assess pending progress, Home with Home health PT    Plan: Times per week: 6x/wk  Times per day: Daily  Plan weeks: 4  Current Treatment Recommendations: Strengthening, Balance Training, Transfer Training, Endurance Training, Gait Training, Neuromuscular Re-education, Home Exercise Program, Patient/Caregiver Education & Training, Functional Mobility Training, Wheelchair Mobility Training, Safety Education & Training, Stair training    Patient Education  Patient Education: Transfers, Gait, Verbal Exercise Instruction,  - Patient Verbalized Understanding, - Patient Requires Continued Education    Goals:  Patient goals : go home, get stronger  Short term goals  Time Frame for Short

## 2020-04-14 LAB
GLUCOSE BLD-MCNC: 194 MG/DL (ref 70–108)
GLUCOSE BLD-MCNC: 299 MG/DL (ref 70–108)
GLUCOSE BLD-MCNC: 312 MG/DL (ref 70–108)
GLUCOSE BLD-MCNC: 330 MG/DL (ref 70–108)

## 2020-04-14 PROCEDURE — 6360000002 HC RX W HCPCS: Performed by: FAMILY MEDICINE

## 2020-04-14 PROCEDURE — 2500000003 HC RX 250 WO HCPCS: Performed by: FAMILY MEDICINE

## 2020-04-14 PROCEDURE — 82948 REAGENT STRIP/BLOOD GLUCOSE: CPT

## 2020-04-14 PROCEDURE — 97110 THERAPEUTIC EXERCISES: CPT

## 2020-04-14 PROCEDURE — 97535 SELF CARE MNGMENT TRAINING: CPT

## 2020-04-14 PROCEDURE — 1290000000 HC SEMI PRIVATE OTHER R&B

## 2020-04-14 PROCEDURE — 97530 THERAPEUTIC ACTIVITIES: CPT

## 2020-04-14 PROCEDURE — 97116 GAIT TRAINING THERAPY: CPT

## 2020-04-14 PROCEDURE — 6370000000 HC RX 637 (ALT 250 FOR IP): Performed by: FAMILY MEDICINE

## 2020-04-14 RX ORDER — INSULIN GLARGINE 100 [IU]/ML
24 INJECTION, SOLUTION SUBCUTANEOUS NIGHTLY
Status: DISCONTINUED | OUTPATIENT
Start: 2020-04-14 | End: 2020-04-15

## 2020-04-14 RX ADMIN — AMLODIPINE BESYLATE 5 MG: 5 TABLET ORAL at 08:19

## 2020-04-14 RX ADMIN — MICONAZOLE NITRATE: 20 POWDER TOPICAL at 12:47

## 2020-04-14 RX ADMIN — INSULIN LISPRO 4 UNITS: 100 INJECTION, SOLUTION INTRAVENOUS; SUBCUTANEOUS at 17:05

## 2020-04-14 RX ADMIN — FAMOTIDINE 20 MG: 20 TABLET ORAL at 08:18

## 2020-04-14 RX ADMIN — Medication 325 MG: at 08:18

## 2020-04-14 RX ADMIN — FERROUS SULFATE TAB 325 MG (65 MG ELEMENTAL FE) 325 MG: 325 (65 FE) TAB at 08:18

## 2020-04-14 RX ADMIN — CLOPIDOGREL BISULFATE 75 MG: 75 TABLET ORAL at 08:18

## 2020-04-14 RX ADMIN — ATORVASTATIN CALCIUM 40 MG: 40 TABLET, FILM COATED ORAL at 20:50

## 2020-04-14 RX ADMIN — Medication 1000 MCG: at 08:19

## 2020-04-14 RX ADMIN — LEVOTHYROXINE SODIUM 112 MCG: 112 TABLET ORAL at 05:58

## 2020-04-14 RX ADMIN — METOPROLOL SUCCINATE 25 MG: 25 TABLET, FILM COATED, EXTENDED RELEASE ORAL at 08:19

## 2020-04-14 RX ADMIN — LOSARTAN POTASSIUM 25 MG: 25 TABLET, FILM COATED ORAL at 08:19

## 2020-04-14 RX ADMIN — ENOXAPARIN SODIUM 40 MG: 40 INJECTION SUBCUTANEOUS at 08:27

## 2020-04-14 RX ADMIN — INSULIN LISPRO 1 UNITS: 100 INJECTION, SOLUTION INTRAVENOUS; SUBCUTANEOUS at 08:16

## 2020-04-14 RX ADMIN — INSULIN LISPRO 3 UNITS: 100 INJECTION, SOLUTION INTRAVENOUS; SUBCUTANEOUS at 12:36

## 2020-04-14 RX ADMIN — THERA TABS 1 TABLET: TAB at 08:19

## 2020-04-14 RX ADMIN — INSULIN GLARGINE 24 UNITS: 100 INJECTION, SOLUTION SUBCUTANEOUS at 20:39

## 2020-04-14 RX ADMIN — MICONAZOLE NITRATE: 20 POWDER TOPICAL at 20:50

## 2020-04-14 ASSESSMENT — PAIN SCALES - GENERAL
PAINLEVEL_OUTOF10: 0

## 2020-04-14 NOTE — PROGRESS NOTES
Patient Name: Carmelo Phan        MRN: [id number]    : 1940  (70 y.o.)  Gender: female   Principal Problem: TVLND-74    Section D - Mood     Should Resident Mood Interview be Conducted? - Attempt to conduct interview with all residents   0. No (resident is rarely/never understood) à Skip to and complete -, Staff Assessment of Mood (PHQ 9-OV)  1. Yes à Continue to , Resident Mood Interview (PHQ-9) Enter Code    1   . Resident Mood Interview (PHQ-9)   Say to resident: Rickey Aguero the last 2 weeks, have you been bothered by any of the following problems?    If symptom is present, enter 1(yes) in column 1, Symptom Presence. Then move to column 2, Symptom Frequency, and indicate symptom frequency. 1. Symptom Presence                   2. Symptom                                                                  Frequency  0. No (enter 0 in column 2)          0. Never or 1 day          1. Yes (enter 0-3 in column 2)      1. 2-6 days           9. No Response                               2.  7-11 days                                                 3.  12-14 days   1. Symptom Presence 2. Symptom  Frequency        Enter Scores in boxes below   A. Little interest or pleasure in doing things 0 0   B. Feeling down, depressed, or hopeless 1 1   C. Trouble falling or staying asleep, or sleeping too much 1 2   D. Feeling tired or having little energy 1 2   E. Poor appetite or overeating 1 2   F. Feeling bad about yourself - or that you are a failure, or have let your family down 1 1   G. Trouble concentrating on things, such as reading the newspaper or watching television 0 0   H. Moving or speaking so slowly that other people have noticed. Or the opposite-being so fidgety or restless that s/he has been moving around a lot more than usual   0   0   I. Thoughts that you would be better off dead, or of hurting yourself in some way.  0 0              Patient Name: Carmelo Phan

## 2020-04-14 NOTE — PROGRESS NOTES
Patient states that the end of her great left toe is sensitive when bumped or touched. The toe is not bruised at the end. She has an abrasion on the front part of her left great toe.

## 2020-04-14 NOTE — PLAN OF CARE
Problem: Nutrition  Goal: Optimal nutrition therapy  4/14/2020 1600 by Guille Andre RD, LD  Outcome: Ongoing   Nutrition Problem: Unintended weight loss  Intervention: Food and/or Nutrient Delivery: Continue current ONS(Diet per MD)  Nutritional Goals: Pt. will consume 75% or more of meals and experience no significant weight loss during LOS.

## 2020-04-14 NOTE — PLAN OF CARE
RN  Outcome: Ongoing  Note: Patient drinking Glucerna TID as ordered. Problem: Metabolic:  Goal: Ability to maintain appropriate glucose levels will improve  Description: Ability to maintain appropriate glucose levels will improve  4/13/2020 2217 by Jaycee Henderson RN  Outcome: Ongoing  Note: Chems ACHS. Humalog sliding scale and Lantus 20 units administered as ordered. Reviewed care plan with patient. Patient states understanding of care plan at this time.

## 2020-04-14 NOTE — PROGRESS NOTES
Nutrition Assessment    Type and Reason for Visit: Reassess    Nutrition Recommendations:   MD to advise if sodium restriction could be liberalized per pt. request.   Continue Glucerna TID. MD to advise re: elevated blood sugars. Consider MVI. Consider Vitamin D level. Nutrition Assessment:    Pt. with no improvement from a nutritional standpoint AEB intake varied 1-100%. Remains at risk for further nutritional compromise r/t COVID 19 +, unplanned wt. loss, elevated blood sugars and underlying medical condition (Hx breast cancer, DM). Nutrition recommendations/interventions as per above. Malnutrition Assessment:  · Malnutrition Status: Insufficient data  · Context: Acute illness or injury  · Findings of the 6 clinical characteristics of malnutrition (Minimum of 2 out of 6 clinical characteristics is required to make the diagnosis of moderate or severe Protein Calorie Malnutrition based on AND/ASPEN Guidelines):  1. Energy Intake-(varied  1-100%),      2. Weight Loss-10% loss or greater, (9 months)  3. Fat Loss-Unable to assess,    4. Muscle Loss-Unable to assess,    5. Fluid Accumulation-Unable to assess,    6.  Strength-Not measured    Nutrition Risk Level: High    Nutrient Needs:  · Estimated Daily Total Kcal: 8783-9350 kcals (20-25 kcals/kgm wt of 67 kgm)  · Estimated Daily Protein (g): 68+ grams/day (1.3+ grams/kgm IBW)    Nutrition Diagnosis:   · Problem: Unintended weight loss  · Etiology: related to Catabolic illness     Signs and symptoms:  as evidenced by Weight loss    Objective Information:  · Nutrition-Focused Physical Findings: Pt COVID 19 positive. Called pt & she reports poor appetite & unhappy with standard trays being sent. Left manager a message re: pt concern. Pt. mentions she likes Glucerna & wants it 3x/day especially d/t poor po overall. Labs: (4/5) Hemoglobin 11.6. Pt requests Na restriction be liberalized- MD to advise.  Pt. mentions had a BM today & denies constipation/diarrhea. · Wound Type: (traumatic wound toe, buttocks wound, rt breast wound)  · Current Nutrition Therapies:  · Oral Diet Orders: Carb Control 4 Carbs/Meal, 2gm Sodium   · Oral Diet intake: (1-100%)  · Oral Nutrition Supplement (ONS) Orders: Diabetic Oral Supplement(TID)  · ONS intake: %(per pt)  · Anthropometric Measures:  · Ht: 5' 3\" (160 cm)   · Current Body Wt: 146 lb 2.6 oz (66.3 kg)(no edema)  · Admission Body Wt: 146 lb 14.4 oz (66.6 kg)(4/9/20, no edema)  · Usual Body Wt: (per EMR: 7/19: 170#, 1/27/20: 164# 1.6 oz, 2/6/20: 158# 3.2 oz)  · % Weight Change:  ,  14.1% (24#)wt. loss in ~ 9 months per EMR( COVID 19 +, poor appetite)  · Ideal Body Wt: 115 lb (52.2 kg), BMI Classification: BMI 25.0 - 29.9 Overweight    Nutrition Interventions:   Continue current ONS(Diet per MD)  Continued Inpatient Monitoring, Education not appropriate at this time, Coordination of Care    Nutrition Evaluation:   · Evaluation: No progress toward goals   · Goals: Pt. will consume 75% or more of meals and experience no significant weight loss during LOS.     · Monitoring: Meal Intake, Supplement Intake, Skin Integrity, Wound Healing, Weight, Pertinent Labs, Monitor Bowel Function, Patient/Family Education      Electronically signed by Brittany Guevara RD, LD on 4/14/20 at 4:01 PM EDT    Contact Number: [phone]

## 2020-04-14 NOTE — PLAN OF CARE
Problem: Discharge Planning:  Goal: Patients continuum of care needs are met    Note: Team Conference held this afternoon. Recommendations of the team were explained to the patient by all team members. Team is recommending that patient continue on TCU for several more days, with discharge date undetermined at this time. Patient will be a RE team conference on 4/21, providing insurance approval of additional days. SW sent update to insurance this date requesting additional time on TCU. Following discharge team is recommending home with home health, PT,OT,RN and aide services. Patient had been active with [facility] prior to hospital stay for CVA rehabilitation. SW will continue to monitor progress and maintain contact with patient and patient's family regarding length of stay and recommendations.  SW will continue to assist with ongoing discharge planning

## 2020-04-14 NOTE — PROGRESS NOTES
Patient tolerance of  treatment: fair. Pt with increased fatigue this date, requiring increased rest breaks. Equipment Recommendations:Equipment Needed: No  Discharge Recommendations:  Continue to assess pending progress, Home with Home health PT    Plan: Times per week: 6x/wk  Times per day: Daily  Plan weeks: 4  Current Treatment Recommendations: Strengthening, Balance Training, Transfer Training, Endurance Training, Gait Training, Neuromuscular Re-education, Home Exercise Program, Patient/Caregiver Education & Training, Functional Mobility Training, Wheelchair Mobility Training, Safety Education & Training, Stair training    Patient Education  Patient Education: Transfers, Gait, Verbal Exercise Instruction,  - Patient Verbalized Understanding, - Patient Requires Continued Education   Education on increasing mobility with staff outside of therapy. Pt has been ambulating to/from bathroom with staff, however PT encouraged pt to attempt to ambulate again later this date with staff beyond restroom use to increase mobility and activity tolerance. Goals:  Patient goals : go home, get stronger  Short term goals  Time Frame for Short term goals: 2 weeks  Short term goal 1: Patient will complete supine  <> sit with min assist to transfer in/out of bed with decreased difficulty. Short term goal 2: Patient will complete sit < > stand with min assist to stand to ambulate with decreased difficulty. Short term goal 3: Patient will complete stand pivot transfer with min assist to transfer surface to surface with decreased difficulty. Short term goal 4: Patient will ambulate 48' with a RW and min assist to progress towards ambulating household distances safely. Short term goal 5: Patient will propel wheelchair 48' with CGA to navigate living environment and increase mobility.   Short term goal 6: Patient will ascend/descend 1, 4\" step in parallel bars with min assist to progress towards safe home entry  Long term goals  Time Frame for Long term goals : 4 weeks  Long term goal 1: Patient will complete supine < > sit with modified independence to transfer in/out of bed safely. Long term goal 2: Patient will complete sit  <> stand and stand pivot transfers with SBA to transfer surface to surface safely. Long term goal 3: Patient will ambulate 76' with a RW and CGA to navigate home environment. Long term goal 4: Patient will propel wheelchair 100' with SBA to navigate living environment and to/from appointments. Long term goal 5: Patient will improve tinetti score to greater than or equal to 17/28 to reduce her risk for falls. Long term goal 6: Patient will ascend/descend 4 steps with 1 hand rail with CGA for home entry. Following session, patient left in safe position with all fall risk precautions in place.

## 2020-04-14 NOTE — PROGRESS NOTES
[place]  [address]  Occupational Therapy  Daily Note  Time:   Time In: 1400  Time Out: 9429  Timed Code Treatment Minutes: 57 Minutes  Minutes: 57          Date: 2020  Patient Name: Bisi Lopez,   Gender: female      Room: [facility]/068-A  MRN: [id number]  : 1940  (70 y.o.)  Referring Practitioner: Argenis Hightower OT  Diagnosis: COVID-19  Additional Pertinent Hx: 70 y.o. female admitted to the transitional care unit on 2020. She was admitted in the acute area of [facility] this month for pneumonia and was found to be covid positive. She became afebrile and with the improving of her symptoms she was discharged home to the care of her family. She was unable to have the degree of supervision needed to be safe at home. She states she fell the 2 past nights and was on the floor for several hours each time. She now presents to the TCU for the time with therapies prior to the anticipated return home. Restrictions/Precautions:  Restrictions/Precautions: Isolation, Contact Precautions, General Precautions, Fall Risk  Position Activity Restriction  Other position/activity restrictions: COVID 19, contact and droplet precautions    SUBJECTIVE: Pt received in chair, cooperative, endorsing fatigue, requiring encouragement to use bathroom vs. BSC    PAIN: Not rated c/o L foot pain    COGNITION: Decreased Problem Solving and Decreased Safety Awareness    ADL:   Grooming: Moderate Assistance. for donning deoderant for use of LUE  Upper Extremity Dressing: Moderate Assistance. donning shirt, required heavy assist for threading BUE and over head, pulling shirt down, extended time required with cues for LUE awareness  Lower Extremity Dressing: Moderate Assistance. assist for successful threading of BLE, 3 attempts for pulling up over hips, on third attempt, pt requiring OT assust for over hips  Toileting: Minimal Assistance.   A for clothing up; CGA for occasional 1UE release with min A for increased participation with sinkside grooming  Short term goal 3: Pt will participate in [facility] and  strength assessment for increased ease with clothing management and grooming tasks  Short term goal 4: Pt will complete UB ADLs with min A and cues for adaptive techniques for increased independence with self care  Long term goals  Time Frame for Long term goals : 4 weeks  Long term goal 1: Pt will complete toileting tasks with SBA for increased independence with self care  Long term goal 2: Pt will complete dressing tasks, using any DME necessary, and SBA for increased independence with BADL tasks  Long term goal 3: Pt will safely navigate around environmental barriers with no cues and SBA for increased independence accessing environment    Following session, patient left in safe position with all fall risk precautions in place. Dilation and curettage  06/30/2017    Active  [id number]

## 2020-04-14 NOTE — PROGRESS NOTES
Patient was with physical therapy. She said that we she had sat down after walking, that her left foot felt numb. She didn't have any pain. The therapist had her stand and she could feel it when she stood on it. Took her vital signs and they were 121/57 78 .

## 2020-04-15 LAB
GLUCOSE BLD-MCNC: 207 MG/DL (ref 70–108)
GLUCOSE BLD-MCNC: 278 MG/DL (ref 70–108)
GLUCOSE BLD-MCNC: 312 MG/DL (ref 70–108)
GLUCOSE BLD-MCNC: 395 MG/DL (ref 70–108)

## 2020-04-15 PROCEDURE — 97110 THERAPEUTIC EXERCISES: CPT

## 2020-04-15 PROCEDURE — 6360000002 HC RX W HCPCS: Performed by: FAMILY MEDICINE

## 2020-04-15 PROCEDURE — 97535 SELF CARE MNGMENT TRAINING: CPT

## 2020-04-15 PROCEDURE — 6370000000 HC RX 637 (ALT 250 FOR IP): Performed by: FAMILY MEDICINE

## 2020-04-15 PROCEDURE — 82948 REAGENT STRIP/BLOOD GLUCOSE: CPT

## 2020-04-15 PROCEDURE — 97530 THERAPEUTIC ACTIVITIES: CPT

## 2020-04-15 PROCEDURE — 97116 GAIT TRAINING THERAPY: CPT

## 2020-04-15 PROCEDURE — 1290000000 HC SEMI PRIVATE OTHER R&B

## 2020-04-15 RX ORDER — INSULIN GLARGINE 100 [IU]/ML
4 INJECTION, SOLUTION SUBCUTANEOUS ONCE
Status: COMPLETED | OUTPATIENT
Start: 2020-04-15 | End: 2020-04-15

## 2020-04-15 RX ORDER — INSULIN GLARGINE 100 [IU]/ML
28 INJECTION, SOLUTION SUBCUTANEOUS NIGHTLY
Status: DISCONTINUED | OUTPATIENT
Start: 2020-04-15 | End: 2020-04-16

## 2020-04-15 RX ADMIN — INSULIN GLARGINE 4 UNITS: 100 INJECTION, SOLUTION SUBCUTANEOUS at 23:22

## 2020-04-15 RX ADMIN — THERA TABS 1 TABLET: TAB at 08:48

## 2020-04-15 RX ADMIN — METOPROLOL SUCCINATE 25 MG: 25 TABLET, FILM COATED, EXTENDED RELEASE ORAL at 08:48

## 2020-04-15 RX ADMIN — ATORVASTATIN CALCIUM 40 MG: 40 TABLET, FILM COATED ORAL at 20:51

## 2020-04-15 RX ADMIN — Medication 325 MG: at 08:48

## 2020-04-15 RX ADMIN — POLYETHYLENE GLYCOL (3350) 17 G: 17 POWDER, FOR SOLUTION ORAL at 08:52

## 2020-04-15 RX ADMIN — Medication 1000 MCG: at 08:48

## 2020-04-15 RX ADMIN — FERROUS SULFATE TAB 325 MG (65 MG ELEMENTAL FE) 325 MG: 325 (65 FE) TAB at 08:48

## 2020-04-15 RX ADMIN — ENOXAPARIN SODIUM 40 MG: 40 INJECTION SUBCUTANEOUS at 08:48

## 2020-04-15 RX ADMIN — FAMOTIDINE 20 MG: 20 TABLET ORAL at 08:48

## 2020-04-15 RX ADMIN — CLOPIDOGREL BISULFATE 75 MG: 75 TABLET ORAL at 08:48

## 2020-04-15 RX ADMIN — INSULIN LISPRO 10 UNITS: 100 INJECTION, SOLUTION INTRAVENOUS; SUBCUTANEOUS at 12:07

## 2020-04-15 RX ADMIN — MICONAZOLE NITRATE: 20 POWDER TOPICAL at 21:11

## 2020-04-15 RX ADMIN — INSULIN GLARGINE 24 UNITS: 100 INJECTION, SOLUTION SUBCUTANEOUS at 20:51

## 2020-04-15 RX ADMIN — MICONAZOLE NITRATE: 20 POWDER TOPICAL at 08:49

## 2020-04-15 RX ADMIN — INSULIN LISPRO 6 UNITS: 100 INJECTION, SOLUTION INTRAVENOUS; SUBCUTANEOUS at 16:49

## 2020-04-15 RX ADMIN — INSULIN LISPRO 4 UNITS: 100 INJECTION, SOLUTION INTRAVENOUS; SUBCUTANEOUS at 08:50

## 2020-04-15 RX ADMIN — LOSARTAN POTASSIUM 25 MG: 25 TABLET, FILM COATED ORAL at 08:48

## 2020-04-15 RX ADMIN — AMLODIPINE BESYLATE 5 MG: 5 TABLET ORAL at 08:48

## 2020-04-15 RX ADMIN — LEVOTHYROXINE SODIUM 112 MCG: 112 TABLET ORAL at 06:26

## 2020-04-15 ASSESSMENT — PAIN SCALES - GENERAL: PAINLEVEL_OUTOF10: 0

## 2020-04-15 ASSESSMENT — PAIN - FUNCTIONAL ASSESSMENT: PAIN_FUNCTIONAL_ASSESSMENT: ACTIVITIES ARE NOT PREVENTED

## 2020-04-15 NOTE — PROGRESS NOTES
and transitioned to Outpt services for PT. Pt reports that she had transitioned to Ind mobility within home without person or AD (noted much of her AD does not fit through doorways of mobile home per pt) however did continue to require some assist with ADLs at times. Since her stroke, pt's significant other had been completing all IADLs    Restrictions/Precautions:  Restrictions/Precautions: Isolation, Contact Precautions, General Precautions, Fall Risk  Position Activity Restriction  Other position/activity restrictions: COVID 19, contact and droplet precautions    SUBJECTIVE: Patient in recliner upon arrival and agreeable to therapy. Patient reported feeling very fatigued today and easily fatigued with any activity. Patient requested to use restroom. PAIN: denies    OBJECTIVE:  Bed Mobility:  Not Tested    Transfers:  Sit to Stand: Contact Guard Assistance, Minimal Assistance, X 2, with increased time for completion, cues for hand placement, with verbal cues  Stand to Sit:Contact Guard Assistance, Minimal Assistance, X 1, Min to low toilet seat, CGA to Min to chair  **Min x1-2 from chair, cues for proper foot placement with LLE, hand over hand to place L hand    Ambulation:  Contact Guard Assistance, Minimal Assistance, X 2, with cues for safety, with verbal cues , with increased time for completion  Distance: 8ft x2  Surface: Level Tile  Device:Rolling Walker  Gait Deviations:   Forward Flexed Posture, Slow Namita, Decreased Step Length Bilaterally, Decreased Weight Shift Left, Decreased Gait Speed, Decreased Heel Strike Bilaterally, Narrow Base of Support, Moderate Path Deviations, Unsteady Gait and inversion of L foot with advancement, cues for proper foot placement, occasional narrow to scissoring with LLE noted, assit to hold L hand in place on walker, fatigues easily    Balance:  Dynamic Standing Balance:     - Contact Guard Assistance, Minimal Assistance, X 1, pericare and clothing management     - CGA to Min A x1 with dynamic standing with weight shifting, BUE support on RW, patient to step onto varied spots on floor using LLE and weight shift forward, ~2min, patient having difficulty understanding commands, difficulty controlling LLE, fatigues easily    Exercise:  Patient was guided in 1 set(s) 10 reps of exercise to both lower extremities. Ankle pumps, Glut sets, Quad sets, Heelslides, Long arc quads, Hip abduction/adduction, Straight leg raises and Seated hip flexion. Exercises were completed for increased independence with functional mobility. Functional Outcome Measures: Not completed       ASSESSMENT:  Assessment: Limited by fatigue  Activity Tolerance:  Patient tolerance of  treatment: fair. Equipment Recommendations:Equipment Needed: No  Discharge Recommendations:  Continue to assess pending progress, Home with Home health PT    Plan: Times per week: 6x/wk  Times per day: Daily  Plan weeks: 4  Current Treatment Recommendations: Strengthening, Balance Training, Transfer Training, Endurance Training, Gait Training, Neuromuscular Re-education, Home Exercise Program, Patient/Caregiver Education & Training, Functional Mobility Training, Wheelchair Mobility Training, Safety Education & Training, Stair training    Patient Education  Patient Education: Plan of Care, Transfers, Gait, Verbal Exercise Instruction    Goals:  Patient goals : go home, get stronger  Short term goals  Time Frame for Short term goals: 2 weeks  Short term goal 1: Patient will complete supine  <> sit with min assist to transfer in/out of bed with decreased difficulty. Short term goal 2: Patient will complete sit < > stand with min assist to stand to ambulate with decreased difficulty. Short term goal 3: Patient will complete stand pivot transfer with min assist to transfer surface to surface with decreased difficulty.   Short term goal 4: Patient will ambulate 48' with a RW and min assist to progress towards ambulating household distances safely. Short term goal 5: Patient will propel wheelchair 48' with CGA to navigate living environment and increase mobility. Short term goal 6: Patient will ascend/descend 1, 4\" step in parallel bars with min assist to progress towards safe home entry  Long term goals  Time Frame for Long term goals : 4 weeks  Long term goal 1: Patient will complete supine < > sit with modified independence to transfer in/out of bed safely. Long term goal 2: Patient will complete sit  <> stand and stand pivot transfers with SBA to transfer surface to surface safely. Long term goal 3: Patient will ambulate 76' with a RW and CGA to navigate home environment. Long term goal 4: Patient will propel wheelchair 100' with SBA to navigate living environment and to/from appointments. Long term goal 5: Patient will improve tinetti score to greater than or equal to 17/28 to reduce her risk for falls. Long term goal 6: Patient will ascend/descend 4 steps with 1 hand rail with CGA for home entry. Following session, patient left in safe position with all fall risk precautions in place.

## 2020-04-15 NOTE — PROGRESS NOTES
completed     ASSESSMENT:  Assessment: Limited by fatigue  Activity Tolerance:  Patient tolerance of  treatment: fair. Equipment Recommendations:Equipment Needed: No  Discharge Recommendations:  Continue to assess pending progress, Home with Home health PT     Plan: Times per week: 6x/wk  Times per day: Daily  Plan weeks: 4  Current Treatment Recommendations: Strengthening, Balance Training, Transfer Training, Endurance Training, Gait Training, Neuromuscular Re-education, Home Exercise Program, Patient/Caregiver Education & Training, Functional Mobility Training, Wheelchair Mobility Training, Safety Education & Training, Stair training     Patient Education  Patient Education: Plan of Care, Transfers, Gait, Verbal Exercise Instruction     Goals:  Patient goals : go home, get stronger  Short term goals  Time Frame for Short term goals: 2 weeks  Short term goal 1: Patient will complete supine  <> sit with min assist to transfer in/out of bed with decreased difficulty. Short term goal 2: Patient will complete sit < > stand with min assist to stand to ambulate with decreased difficulty. Short term goal 3: Patient will complete stand pivot transfer with min assist to transfer surface to surface with decreased difficulty. Short term goal 4: Patient will ambulate 48' with a RW and min assist to progress towards ambulating household distances safely. Short term goal 5: Patient will propel wheelchair 48' with CGA to navigate living environment and increase mobility. Short term goal 6: Patient will ascend/descend 1, 4\" step in parallel bars with min assist to progress towards safe home entry  Long term goals  Time Frame for Long term goals : 4 weeks  Long term goal 1: Patient will complete supine < > sit with modified independence to transfer in/out of bed safely. Long term goal 2: Patient will complete sit  <> stand and stand pivot transfers with SBA to transfer surface to surface safely.   Long term goal 3: Patient will ambulate 76' with a RW and CGA to navigate home environment. Long term goal 4: Patient will propel wheelchair 100' with SBA to navigate living environment and to/from appointments. Long term goal 5: Patient will improve tinetti score to greater than or equal to 17/28 to reduce her risk for falls.    Long term goal 6: Patient will ascend/descend 4 steps with 1 hand rail with CGA for home entry.     Following session, patient left in safe position with all fall risk precautions in place.

## 2020-04-15 NOTE — PROGRESS NOTES
[address] [facility] [address]  [facility] SKILLED NURSING UNIT  Occupational Therapy  Daily Note  Time:  Time In: 730  Time Out: 7  Timed Code Treatment Minutes: 48 Minutes  Minutes: 53    Date: 4/15/2020  Patient Name: Ernie Avina,   Gender: female      Room: [facility]68/068-A  MRN: [id number]  : 1940  (70 y.o.)  Referring Practitioner: Tarik Justice OT  Diagnosis: COVID-19  Additional Pertinent Hx: 70 y.o. female admitted to the transitional care unit on 2020. She was admitted in the acute area of [facility]'s earlier this month for pneumonia and was found to be covid positive. She became afebrile and with the improving of her symptoms she was discharged home to the care of her family. She was unable to have the degree of supervision needed to be safe at home. She states she fell the 2 past nights and was on the floor for several hours each time. She now presents to the TCU for the time with therapies prior to the anticipated return home. Restrictions/Precautions:  Restrictions/Precautions: Isolation, Contact Precautions, General Precautions, Fall Risk  Position Activity Restriction  Other position/activity restrictions: COVID 19, contact and droplet precautions    SUBJECTIVE: Upon arrival patient was sleeping in bed. Pt was agreeable to OT session. PAIN: Pt did not c/o pain throughout session. COGNITION: Slow Processing, Decreased Problem Solving and Decreased Safety Awareness    ADL:   Grooming: Minimal Assistance. Pt fluctuates from CGA to Min A while standing at sink to complete hand hygiene. Lower Extremity Dressing: Maximum Assistance, with verbal cues  and with increased time for completion. Assist to thread B feet into pants, over B knees and over B hips. Pt required mod A to don B socks while sitting EOB. Increased difficulty to don L sock d/t weakness. Toileting: Minimal Assistance. Assist to complete clothing management. Toilet Transfer: Contact Guard Assistance.  From toilet with mod vcs for LUE awareness and safety. Sohan Gómezr BALANCE:  Sitting Balance:  Contact Guard Assistance. Sitting EOB and on BSC with 1 UE supported. Standing Balance: Minimal Assistance. Standing at sinkside x ~1 minute with 2UE release. BED MOBILITY:  Supine to Sit: Minimal Assistance HOB flat and bedrail use. Scooting: Contact Guard Assistance, with increased time for completion To scoot hips forward to EOB. TRANSFERS:  Sit to Stand:  Minimal Assistance. From EOB, toilet, and w/c-mod vcs for left UE awareness and safe technique. Stand to Sit: Minimal Assistance, with increased time for completion, cues for hand placement. Onto toilet, w/c, and recliner. FUNCTIONAL MOBILITY:  Assistive Device: Rolling Walker  Assist Level:  Contact Guard Assistance and with increased time for completion. Distance: To bathroom  Slow pace, no L knee flexion, unsteady. ADDITIONAL ACTIVITIES:  Pt complete LUE AROM/AAROM exercises, shoulder flexion, horizontal abduction, elbow flex/ext, wrist flex/ext, digit opposition x 10 reps x 1 set with min vcs for technique while sitting in recliner, increased time to complete d/t fatigue and pt requiring 3 rest breaks. Exercises completed to increase LUE awareness and indep with self cares routine. Pt complete RUE AROM exercises, shoulder flexion, horizontal abduction x 10 reps x 1 set with min vcs for technique while sitting in recliner. Exercises completed to increase indep and safety with ADL routine. ASSESSMENT:   Activity Tolerance:  Patient tolerance of  treatment: fair. Pt is limited by endurance and h/o CVA. Discharge Recommendations: Home with Home health OT  Equipment Recommendations:  Other: monitor for walker splint and LHAE  Plan: Times per week: 6x  Current Treatment Recommendations: Strengthening, ROM, Balance Training, Endurance Training, Functional Mobility Training, Neuromuscular Re-education, Self-Care / ADL, Safety Education & Training, Equipment Evaluation, Education, & procurement, Patient/Caregiver Education & Training, Home Management Training    Patient Education  Patient Education: Role of OT, Plan of Care, ADL's, Home Exercise Program and Importance of Increasing Activity, pursed lip breathing. Goals  Short term goals  Time Frame for Short term goals: 2 weeks  Short term goal 1: Pt will perform various functional t/fs with no > min A x1 for increased ease and independence with toileting tasks  Short term goal 2: Pt will demonstrate standing endurance >2 mins with occasional 1UE release with min A for increased participation with sinkside grooming  Short term goal 3: Pt will participate in [facility] and  strength assessment for increased ease with clothing management and grooming tasks  Short term goal 4: Pt will complete UB ADLs with min A and cues for adaptive techniques for increased independence with self care  Long term goals  Time Frame for Long term goals : 4 weeks  Long term goal 1: Pt will complete toileting tasks with SBA for increased independence with self care  Long term goal 2: Pt will complete dressing tasks, using any DME necessary, and SBA for increased independence with BADL tasks  Long term goal 3: Pt will safely navigate around environmental barriers with no cues and SBA for increased independence accessing environment    Following session, patient left in safe position with all fall risk precautions in place.

## 2020-04-16 LAB
GLUCOSE BLD-MCNC: 198 MG/DL (ref 70–108)
GLUCOSE BLD-MCNC: 278 MG/DL (ref 70–108)
GLUCOSE BLD-MCNC: 325 MG/DL (ref 70–108)
GLUCOSE BLD-MCNC: 340 MG/DL (ref 70–108)

## 2020-04-16 PROCEDURE — 82948 REAGENT STRIP/BLOOD GLUCOSE: CPT

## 2020-04-16 PROCEDURE — 97535 SELF CARE MNGMENT TRAINING: CPT

## 2020-04-16 PROCEDURE — 6370000000 HC RX 637 (ALT 250 FOR IP): Performed by: FAMILY MEDICINE

## 2020-04-16 PROCEDURE — 6360000002 HC RX W HCPCS: Performed by: FAMILY MEDICINE

## 2020-04-16 PROCEDURE — 97116 GAIT TRAINING THERAPY: CPT

## 2020-04-16 PROCEDURE — 97110 THERAPEUTIC EXERCISES: CPT

## 2020-04-16 PROCEDURE — 0220000000 HC SKILLED NURSING FACILITY

## 2020-04-16 PROCEDURE — 97112 NEUROMUSCULAR REEDUCATION: CPT

## 2020-04-16 PROCEDURE — 97530 THERAPEUTIC ACTIVITIES: CPT

## 2020-04-16 PROCEDURE — 1290000000 HC SEMI PRIVATE OTHER R&B

## 2020-04-16 RX ORDER — INSULIN GLARGINE 100 [IU]/ML
32 INJECTION, SOLUTION SUBCUTANEOUS NIGHTLY
Status: DISCONTINUED | OUTPATIENT
Start: 2020-04-16 | End: 2020-04-19

## 2020-04-16 RX ADMIN — METOPROLOL SUCCINATE 25 MG: 25 TABLET, FILM COATED, EXTENDED RELEASE ORAL at 08:23

## 2020-04-16 RX ADMIN — INSULIN LISPRO 2 UNITS: 100 INJECTION, SOLUTION INTRAVENOUS; SUBCUTANEOUS at 08:22

## 2020-04-16 RX ADMIN — AMLODIPINE BESYLATE 5 MG: 5 TABLET ORAL at 08:24

## 2020-04-16 RX ADMIN — LEVOTHYROXINE SODIUM 112 MCG: 112 TABLET ORAL at 05:42

## 2020-04-16 RX ADMIN — MICONAZOLE NITRATE: 20 POWDER TOPICAL at 21:02

## 2020-04-16 RX ADMIN — FERROUS SULFATE TAB 325 MG (65 MG ELEMENTAL FE) 325 MG: 325 (65 FE) TAB at 08:24

## 2020-04-16 RX ADMIN — LOSARTAN POTASSIUM 25 MG: 25 TABLET, FILM COATED ORAL at 08:24

## 2020-04-16 RX ADMIN — ENOXAPARIN SODIUM 40 MG: 40 INJECTION SUBCUTANEOUS at 08:24

## 2020-04-16 RX ADMIN — CLOPIDOGREL BISULFATE 75 MG: 75 TABLET ORAL at 08:23

## 2020-04-16 RX ADMIN — MICONAZOLE NITRATE: 20 POWDER TOPICAL at 08:23

## 2020-04-16 RX ADMIN — INSULIN LISPRO 8 UNITS: 100 INJECTION, SOLUTION INTRAVENOUS; SUBCUTANEOUS at 12:17

## 2020-04-16 RX ADMIN — ATORVASTATIN CALCIUM 40 MG: 40 TABLET, FILM COATED ORAL at 21:00

## 2020-04-16 RX ADMIN — Medication 1000 MCG: at 08:23

## 2020-04-16 RX ADMIN — THERA TABS 1 TABLET: TAB at 08:23

## 2020-04-16 RX ADMIN — INSULIN LISPRO 7 UNITS: 100 INJECTION, SOLUTION INTRAVENOUS; SUBCUTANEOUS at 17:06

## 2020-04-16 RX ADMIN — FAMOTIDINE 20 MG: 20 TABLET ORAL at 08:23

## 2020-04-16 RX ADMIN — INSULIN GLARGINE 32 UNITS: 100 INJECTION, SOLUTION SUBCUTANEOUS at 20:56

## 2020-04-16 RX ADMIN — Medication 325 MG: at 08:23

## 2020-04-16 ASSESSMENT — PAIN SCALES - GENERAL
PAINLEVEL_OUTOF10: 0
PAINLEVEL_OUTOF10: 0

## 2020-04-16 NOTE — PLAN OF CARE
Camilo Riojas  [id number]    This document includes baseline careplan information as well as current active orders for this admission to Transitional Care Unit (8E). A copy was given to the patient and/or patient representative today, 4/16/2020. Current Active Orders:  Orders Placed This Encounter   Procedures    DIET CARB CONTROL; Carb Control: 4 carb choices (60 gms)/meal; Low Sodium (2 GM)    Place PPD    Vital signs    Up with assistance    Elevate heels off of bed at all times if patient is not able to move lower extremities    Turn or assist with turn every 2 hours if patient is unable to turn self. Remind patient to turn if necessary.     Inspect skin per unit guidelines    Maintain HOB at the lowest elevation consistent with medical plan of care    Use cushion while in chair    Limit chair activity to 2 hour time intervals if patient unable to reposition self    Cleanse skin at time of soiling using a mild cleansing agent    Apply moisture barrier ointment to clean, dry skin    HYPOGLYCEMIA TREATMENT: blood glucose less than 50 mg/dL and patient  ALERT and TOLERATING PO    HYPOGLYCEMIA TREATMENT: blood glucose less than 70 mg/dL and patient ALERT and TOLERATING PO    HYPOGLYCEMIA TREATMENT: blood glucose less than 70 mg/dL and patient NOT ALERT or NPO    Apply ice to affected area    Weigh patient weekly    Full code    Inpatient consult to Dietitian    Consult to Recreation Therapy    Consult to Social Work    Contact and droplet isolation    Dietary Nutrition Supplements: Diabetic Oral Supplement    OT eval and treat    PT eval and treat    SLP eval and treat    POCT glucose       Current Medications:  Current Facility-Administered Medications   Medication Dose Route Frequency Provider Last Rate Last Dose    insulin glargine (LANTUS) injection vial 28 Units  28 Units Subcutaneous Nightly Daniel Bro MD        insulin lispro (HUMALOG) injection vial 0-6 Units  0-6

## 2020-04-16 NOTE — PLAN OF CARE
Problem: Pain:  Goal: Pain level will decrease  Description: Pain level will decrease  4/15/2020 2159 by Iliana Hartman RN  Outcome: Ongoing  Note: Patient denies pain. PRN pain medication available     Problem: Pain:  Goal: Control of acute pain  Description: Control of acute pain  4/15/2020 2159 by Iliana Hartman RN  Outcome: Ongoing     Problem: Pain:  Goal: Control of chronic pain  Description: Control of chronic pain  4/15/2020 2159 by Iliana Hartman RN  Outcome: Ongoing     Problem: Falls - Risk of:  Goal: Will remain free from falls  Description: Will remain free from falls  4/15/2020 2159 by Iliana Hartman RN  Outcome: Ongoing  Note: No falls noted. Patient uses call light appropriately to alert staff to needs. Patient wears non slip slippers and gait belt when ambulating. Verbalizes understanding of safety precautions. Problem: Falls - Risk of:  Goal: Absence of physical injury  Description: Absence of physical injury  4/15/2020 2159 by Iliana Hartman RN  Outcome: Ongoing     Problem: Bleeding:  Goal: Will show no signs and symptoms of excessive bleeding  Description: Will show no signs and symptoms of excessive bleeding  4/15/2020 2159 by Iliana Hartman RN  Outcome: Ongoing  Note: No s/s of bleeding. Patient on aspirin and plavix for DVT prophylaxis      Problem: Mobility - Impaired:  Goal: Mobility will improve  Description: Mobility will improve  4/15/2020 2159 by Iliana Hartman RN  Outcome: Ongoing  Note: Patient needs cues to move left leg. Uses walker and 2 assist.       Problem: Metabolic:  Goal: Ability to maintain appropriate glucose levels will improve  Description: Ability to maintain appropriate glucose levels will improve  4/15/2020 2159 by Iliana Hartman RN  Outcome: Ongoing  Note: Patient HS blood sugar 312. MD called to notify of new orders for lantus. Increased from 24 to 28 units.       Problem: Respiratory  Goal: No

## 2020-04-16 NOTE — PLAN OF CARE
Problem: Pain:  Goal: Pain level will decrease  Description: Pain level will decrease  4/16/2020 1055 by Dereje Hebert RN  Outcome: Ongoing  Note: Pt denies having any pain     Problem: Falls - Risk of:  Goal: Will remain free from falls  Description: Will remain free from falls  4/16/2020 1055 by Dereje Hebert RN  Outcome: Ongoing  Note: Pt fell at home after being DC from the hospital. She has remains free from falls while here at the hospital. She is 2 assist with the walker. She has a weakened L side due to a hx of CVA. Unsteady on her L side     Problem: Bleeding:  Goal: Will show no signs and symptoms of excessive bleeding  Description: Will show no signs and symptoms of excessive bleeding  4/16/2020 1055 by Dereje Hebert RN  Outcome: Ongoing  Note: Pt is on Lovenox. No active bleeding or bruising noted. Problem: Mobility - Impaired:  Goal: Mobility will improve  Description: Mobility will improve  4/16/2020 1055 by Dereje Hebert RN  Outcome: Ongoing  Note: She is 2 assist with the walker. She has a weakened L side due to a hx of CVA. Unsteady on her L side     Problem: Metabolic:  Goal: Ability to maintain appropriate glucose levels will improve  Description: Ability to maintain appropriate glucose levels will improve  4/16/2020 1055 by Dereje Hebert RN  Outcome: Ongoing  Note: Pt is ACHS chems. Insulin per order     Problem: Nutrition  Goal: Optimal nutrition therapy  4/16/2020 1055 by Dereje Hebert RN  Outcome: Ongoing  Note: Pt does not like the hospital food. She eats very little of her tray. Offering glucerna throughout the day     Problem: Urinary Elimination:  Goal: Ability to recognize the urge to urinate will improve  Description: Ability to recognize the urge to urinate will improve  4/16/2020 1055 by Dereje Hebert RN  Outcome: Ongoing  Note: Pt tends to be incontinent of urine. Scheduled voids offered.       Problem: Skin

## 2020-04-16 NOTE — PROGRESS NOTES
Nutrition Assessment    Type and Reason for Visit: Reassess(follow-up)    Nutrition Recommendations:   Continue carb restriction (POC: 340)  Consider liberalize sodium restriction (patient with poor po and had requested change due to dislike of hospital food)  Will only send chocolate glucerna (TID)    Nutrition Assessment: Pt. declining from a nutritional standpoint AEB poor intake of meals due to dislike of standard trays/hospital food. Remains at risk for further nutritional compromise r/t COVID+, unintentional weight loss, elevated blood sugars, and underlying medical condition (CAD, DM). Nutrition recommendations/interventions as per above. Malnutrition Assessment:  · Malnutrition Status: Insufficient data(COVID+)  · Context: Acute illness or injury  · Findings of the 6 clinical characteristics of malnutrition (Minimum of 2 out of 6 clinical characteristics is required to make the diagnosis of moderate or severe Protein Calorie Malnutrition based on AND/ASPEN Guidelines):  1. Energy Intake- ~25%,      2. Weight Loss-10% loss or greater, (9 months)  3. Fat Loss-Unable to assess,    4. Muscle Loss-Unable to assess,    5. Fluid Accumulation-Unable to assess,    6.  Strength-Not measured    Nutrition Risk Level: High    Nutrient Needs:  · Estimated Daily Total Kcal: 2619-1114 kcals (20-25 kcals/kgm wt of 67 kgm)  · Estimated Daily Protein (g): 68+ grams/day (1.3+ grams/kgm IBW)  · Estimated Daily Total Fluid (ml/day): per physician    Nutrition Diagnosis:   · Problem: Unintended weight loss  · Etiology: related to Catabolic illness, Insufficient energy/nutrient consumption     Signs and symptoms:  as evidenced by Intake 0-25%, Intake 25-50%, Weight loss    Objective Information:  · Nutrition-Focused Physical Findings: COVID+. Spoke to [facility] who reports that patient is not eating much due to not liking the hospital food, unfortunately patient is non-select due to being on COVID unit.   [facility] does [facility] that patient like chocolate glucnera (dislikes vanilla and strawberry). POC: 340. Meds: Lantus, Humalog, Multiviamin, B-12, Iron. · Wound Type: (traumatic wound toe, buttocks wound, rt breast wound)  · Current Nutrition Therapies:  · Oral Diet Orders: Carb Control 4 Carbs/Meal, 2gm Sodium   · Oral Diet intake: 1-25%, 26-50%(25% for breakfast and lunch today per Karime, RN)  · Oral Nutrition Supplement (ONS) Orders: (chocolate glucerna TID)  · ONS intake: %(likes the chocolate glucerna)  · Anthropometric Measures:  · Ht: 5' 3\" (160 cm)   · Current Body Wt: 146 lb (66.2 kg)(4/14/20, bedscale, no edema)  · Admission Body Wt: 146 lb 14.4 oz (66.6 kg)(4/9/20, no edema)  · Usual Body Wt: 158 lb (71.7 kg)(EMR on 4/1/20 and 2/6/20.  170# on 7/7/19.  )  · % Weight Change:  ,  14.1% (24#)wt. loss in ~ 9 months per EMR( COVID 19 +, poor appetite)  · Ideal Body Wt: 115 lb (52.2 kg)  · BMI Classification: BMI 25.0 - 29.9 Overweight(25.9)    Nutrition Interventions:   Continue current diet, Modify current ONS, Vitamin Supplement  Continued Inpatient Monitoring, Education not appropriate at this time, Coordination of Care    Nutrition Evaluation:   · Evaluation: No progress toward goals   · Goals: Pt. will consume 75% or more of meals and experience no significant weight loss during LOS.     · Monitoring: Nutrition Progression, Meal Intake, Supplement Intake, Diet Tolerance, Skin Integrity, Wound Healing, I&O, Weight, Pertinent Labs, Monitor Bowel Function      Electronically signed by Lachelle Subramanian RD, LD on 4/16/20 at 2:48 PM EDT    Contact Number: [phone]

## 2020-04-16 NOTE — PROGRESS NOTES
week: 6x  Current Treatment Recommendations: Strengthening, ROM, Balance Training, Endurance Training, Functional Mobility Training, Neuromuscular Re-education, Self-Care / ADL, Safety Education & Training, Equipment Evaluation, Education, & procurement, Patient/Caregiver Education & Training, Home Management Training    Patient Education  Patient Education: ADL's, Home Exercise Program, Importance of Increasing Activity, Assistive Device Safety and L sided awareness    Goals  Short term goals  Time Frame for Short term goals: 2 weeks  Short term goal 1: Pt will perform various functional t/fs with no > min A x1 for increased ease and independence with toileting tasks  Short term goal 2: Pt will demonstrate standing endurance >2 mins with occasional 1UE release with min A for increased participation with sinkside grooming  Short term goal 3: Pt will participate in [facility] and  strength assessment for increased ease with clothing management and grooming tasks  Short term goal 4: Pt will complete UB ADLs with min A and cues for adaptive techniques for increased independence with self care  Long term goals  Time Frame for Long term goals : 4 weeks  Long term goal 1: Pt will complete toileting tasks with SBA for increased independence with self care  Long term goal 2: Pt will complete dressing tasks, using any DME necessary, and SBA for increased independence with BADL tasks  Long term goal 3: Pt will safely navigate around environmental barriers with no cues and SBA for increased independence accessing environment    Following session, patient left in safe position with all fall risk precautions in place.

## 2020-04-17 LAB
GLUCOSE BLD-MCNC: 226 MG/DL (ref 70–108)
GLUCOSE BLD-MCNC: 257 MG/DL (ref 70–108)
GLUCOSE BLD-MCNC: 303 MG/DL (ref 70–108)
GLUCOSE BLD-MCNC: 375 MG/DL (ref 70–108)

## 2020-04-17 PROCEDURE — 97116 GAIT TRAINING THERAPY: CPT

## 2020-04-17 PROCEDURE — 1290000000 HC SEMI PRIVATE OTHER R&B

## 2020-04-17 PROCEDURE — 2500000003 HC RX 250 WO HCPCS: Performed by: FAMILY MEDICINE

## 2020-04-17 PROCEDURE — 6370000000 HC RX 637 (ALT 250 FOR IP): Performed by: FAMILY MEDICINE

## 2020-04-17 PROCEDURE — 97535 SELF CARE MNGMENT TRAINING: CPT

## 2020-04-17 PROCEDURE — 97110 THERAPEUTIC EXERCISES: CPT

## 2020-04-17 PROCEDURE — 82948 REAGENT STRIP/BLOOD GLUCOSE: CPT

## 2020-04-17 PROCEDURE — 6360000002 HC RX W HCPCS: Performed by: FAMILY MEDICINE

## 2020-04-17 PROCEDURE — 97530 THERAPEUTIC ACTIVITIES: CPT

## 2020-04-17 RX ADMIN — ENOXAPARIN SODIUM 40 MG: 40 INJECTION SUBCUTANEOUS at 08:54

## 2020-04-17 RX ADMIN — LOSARTAN POTASSIUM 25 MG: 25 TABLET, FILM COATED ORAL at 08:55

## 2020-04-17 RX ADMIN — INSULIN LISPRO 6 UNITS: 100 INJECTION, SOLUTION INTRAVENOUS; SUBCUTANEOUS at 08:41

## 2020-04-17 RX ADMIN — FAMOTIDINE 20 MG: 20 TABLET ORAL at 08:54

## 2020-04-17 RX ADMIN — THERA TABS 1 TABLET: TAB at 08:54

## 2020-04-17 RX ADMIN — METOPROLOL SUCCINATE 25 MG: 25 TABLET, FILM COATED, EXTENDED RELEASE ORAL at 08:55

## 2020-04-17 RX ADMIN — INSULIN GLARGINE 32 UNITS: 100 INJECTION, SOLUTION SUBCUTANEOUS at 20:38

## 2020-04-17 RX ADMIN — Medication 325 MG: at 08:54

## 2020-04-17 RX ADMIN — MICONAZOLE NITRATE: 20 POWDER TOPICAL at 09:04

## 2020-04-17 RX ADMIN — INSULIN LISPRO 8 UNITS: 100 INJECTION, SOLUTION INTRAVENOUS; SUBCUTANEOUS at 11:49

## 2020-04-17 RX ADMIN — FERROUS SULFATE TAB 325 MG (65 MG ELEMENTAL FE) 325 MG: 325 (65 FE) TAB at 08:55

## 2020-04-17 RX ADMIN — MICONAZOLE NITRATE: 20 POWDER TOPICAL at 20:35

## 2020-04-17 RX ADMIN — Medication 5 UNITS: at 08:55

## 2020-04-17 RX ADMIN — LEVOTHYROXINE SODIUM 112 MCG: 112 TABLET ORAL at 05:15

## 2020-04-17 RX ADMIN — INSULIN LISPRO 4 UNITS: 100 INJECTION, SOLUTION INTRAVENOUS; SUBCUTANEOUS at 17:13

## 2020-04-17 RX ADMIN — AMLODIPINE BESYLATE 5 MG: 5 TABLET ORAL at 08:55

## 2020-04-17 RX ADMIN — ATORVASTATIN CALCIUM 40 MG: 40 TABLET, FILM COATED ORAL at 20:35

## 2020-04-17 RX ADMIN — CLOPIDOGREL BISULFATE 75 MG: 75 TABLET ORAL at 08:54

## 2020-04-17 RX ADMIN — Medication 1000 MCG: at 08:54

## 2020-04-17 ASSESSMENT — PAIN SCALES - GENERAL: PAINLEVEL_OUTOF10: 0

## 2020-04-17 NOTE — PLAN OF CARE
Problem: Pain:  Goal: Pain level will decrease  Description: Pain level will decrease  Outcome: Met This Shift  Note: Pt denies having any pain     Problem: Falls - Risk of:  Goal: Will remain free from falls  Description: Will remain free from falls  Outcome: Ongoing  Note: Pt fell at home. She has remained free from falls here at the hospital. She is 1 assist with the rolling walker. L sided weakness from a Hx of a CVA. Pt is unsteady when on her feet. Looses her balance easily. Problem: Bleeding:  Goal: Will show no signs and symptoms of excessive bleeding  Description: Will show no signs and symptoms of excessive bleeding  Outcome: Ongoing  Note: Pt is on lovenox. No Excessive bleeding or bruising noted     Problem: Mobility - Impaired:  Goal: Mobility will improve  Description: Mobility will improve  Outcome: Ongoing  Note: Pt continues to work with Pt and OT. She is 1 assist with the rolling walker. L sided weakness from a Hx of a CVA. Pt is unsteady when on her feet. Looses her balance easily. Problem: Metabolic:  Goal: Ability to maintain appropriate glucose levels will improve  Description: Ability to maintain appropriate glucose levels will improve  Outcome: Ongoing  Note: Pt is ACHS chems. Dr Katarzyna Gonzalez adjusting insulin. Insulin given per order     Problem: Urinary Elimination:  Goal: Ability to recognize the urge to urinate will improve  Description: Ability to recognize the urge to urinate will improve  Outcome: Ongoing  Note: Pt is incontinent throughout the night. She is occasionally incontinent throughout the day. Timed voids offered every 2 hrs. Problem: Skin Integrity:  Goal: Will show no infection signs and symptoms  Description: Will show no infection signs and symptoms  Outcome: Ongoing  Note: Pt buttocks has redness and some excoriation from moisture.  She has 1 small purple non blanchable dot on her inner R buttock     Problem: IP MOBILITY  Goal: LTG - patient will ambulate

## 2020-04-17 NOTE — PROGRESS NOTES
Patient Requires Continued Education    Goals:  Patient goals : go home, get stronger  Short term goals  Time Frame for Short term goals: 2 weeks  Short term goal 1: Patient will complete supine  <> sit with min assist to transfer in/out of bed with decreased difficulty. GOAL MET, DISCONTINUE, SEE LTG  Short term goal 2: Patient will complete sit < > stand with min assist to stand to ambulate with decreased difficulty. GOAL MET, DISCONTINUE, SEE LTG  Short term goal 3: Patient will complete stand pivot transfer with min assist to transfer surface to surface with decreased difficulty. GOAL MET, DISCONTINUE, SEE LTG  Short term goal 4: Patient will ambulate 48' with a RW and min assist to progress towards ambulating household distances safely. GOAL MET, DISCONTINUE, SEE LTG  Short term goal 5: Patient will propel wheelchair 48' with CGA to navigate living environment and increase mobility. GOAL NOT MET, CONTINUE  Short term goal 6: Patient will ascend/descend 1, 4\" step in parallel bars with min assist to progress towards safe home entry GOAL NOT MET, CONTINUE  Long term goals  Time Frame for Long term goals : 4 weeks  Long term goal 1: Patient will complete supine < > sit with modified independence to transfer in/out of bed safely. GOAL NOT MET, CONTINUE  Long term goal 2: Patient will complete sit  <> stand and stand pivot transfers with SBA to transfer surface to surface safely. GOAL NOT MET, CONTINUE  Long term goal 3: Patient will ambulate 76' with a RW and CGA to navigate home environment. GOAL NOT MET, CONTINUE  Long term goal 4: Patient will propel wheelchair 100' with SBA to navigate living environment and to/from appointments. GOAL NOT MET, CONTINUE  Long term goal 5: Patient will improve tinetti score to greater than or equal to 17/28 to reduce her risk for falls. GOAL NOT MET, CONTINUE  Long term goal 6: Patient will ascend/descend 4 steps with 1 hand rail with CGA for home entry.  GOAL NOT MET, CONTINUE    Pt in room in chair at end of treatment, call light in reach and needs met.

## 2020-04-18 LAB
GLUCOSE BLD-MCNC: 191 MG/DL (ref 70–108)
GLUCOSE BLD-MCNC: 308 MG/DL (ref 70–108)
GLUCOSE BLD-MCNC: 326 MG/DL (ref 70–108)
GLUCOSE BLD-MCNC: 338 MG/DL (ref 70–108)

## 2020-04-18 PROCEDURE — 82948 REAGENT STRIP/BLOOD GLUCOSE: CPT

## 2020-04-18 PROCEDURE — 6360000002 HC RX W HCPCS: Performed by: FAMILY MEDICINE

## 2020-04-18 PROCEDURE — 6370000000 HC RX 637 (ALT 250 FOR IP): Performed by: FAMILY MEDICINE

## 2020-04-18 PROCEDURE — 1290000000 HC SEMI PRIVATE OTHER R&B

## 2020-04-18 RX ADMIN — MICONAZOLE NITRATE: 20 POWDER TOPICAL at 10:05

## 2020-04-18 RX ADMIN — LOSARTAN POTASSIUM 25 MG: 25 TABLET, FILM COATED ORAL at 08:56

## 2020-04-18 RX ADMIN — Medication 1000 MCG: at 08:56

## 2020-04-18 RX ADMIN — LEVOTHYROXINE SODIUM 112 MCG: 112 TABLET ORAL at 06:00

## 2020-04-18 RX ADMIN — MICONAZOLE NITRATE: 20 POWDER TOPICAL at 20:11

## 2020-04-18 RX ADMIN — INSULIN LISPRO 8 UNITS: 100 INJECTION, SOLUTION INTRAVENOUS; SUBCUTANEOUS at 17:06

## 2020-04-18 RX ADMIN — CLOPIDOGREL BISULFATE 75 MG: 75 TABLET ORAL at 08:56

## 2020-04-18 RX ADMIN — ATORVASTATIN CALCIUM 40 MG: 40 TABLET, FILM COATED ORAL at 20:11

## 2020-04-18 RX ADMIN — Medication 325 MG: at 08:56

## 2020-04-18 RX ADMIN — AMLODIPINE BESYLATE 5 MG: 5 TABLET ORAL at 08:56

## 2020-04-18 RX ADMIN — FERROUS SULFATE TAB 325 MG (65 MG ELEMENTAL FE) 325 MG: 325 (65 FE) TAB at 08:56

## 2020-04-18 RX ADMIN — FAMOTIDINE 20 MG: 20 TABLET ORAL at 08:56

## 2020-04-18 RX ADMIN — ENOXAPARIN SODIUM 40 MG: 40 INJECTION SUBCUTANEOUS at 08:55

## 2020-04-18 RX ADMIN — METOPROLOL SUCCINATE 25 MG: 25 TABLET, FILM COATED, EXTENDED RELEASE ORAL at 08:56

## 2020-04-18 RX ADMIN — INSULIN LISPRO 8 UNITS: 100 INJECTION, SOLUTION INTRAVENOUS; SUBCUTANEOUS at 12:38

## 2020-04-18 RX ADMIN — THERA TABS 1 TABLET: TAB at 08:56

## 2020-04-18 RX ADMIN — INSULIN GLARGINE 32 UNITS: 100 INJECTION, SOLUTION SUBCUTANEOUS at 20:13

## 2020-04-18 RX ADMIN — INSULIN LISPRO 2 UNITS: 100 INJECTION, SOLUTION INTRAVENOUS; SUBCUTANEOUS at 08:55

## 2020-04-19 LAB
GLUCOSE BLD-MCNC: 171 MG/DL (ref 70–108)
GLUCOSE BLD-MCNC: 302 MG/DL (ref 70–108)
GLUCOSE BLD-MCNC: 303 MG/DL (ref 70–108)
GLUCOSE BLD-MCNC: 321 MG/DL (ref 70–108)

## 2020-04-19 PROCEDURE — 97535 SELF CARE MNGMENT TRAINING: CPT

## 2020-04-19 PROCEDURE — 6370000000 HC RX 637 (ALT 250 FOR IP): Performed by: FAMILY MEDICINE

## 2020-04-19 PROCEDURE — 1290000000 HC SEMI PRIVATE OTHER R&B

## 2020-04-19 PROCEDURE — 97112 NEUROMUSCULAR REEDUCATION: CPT

## 2020-04-19 PROCEDURE — 97110 THERAPEUTIC EXERCISES: CPT

## 2020-04-19 PROCEDURE — 82948 REAGENT STRIP/BLOOD GLUCOSE: CPT

## 2020-04-19 PROCEDURE — 6360000002 HC RX W HCPCS: Performed by: FAMILY MEDICINE

## 2020-04-19 PROCEDURE — 97530 THERAPEUTIC ACTIVITIES: CPT

## 2020-04-19 RX ORDER — INSULIN GLARGINE 100 [IU]/ML
38 INJECTION, SOLUTION SUBCUTANEOUS NIGHTLY
Status: DISCONTINUED | OUTPATIENT
Start: 2020-04-19 | End: 2020-04-21

## 2020-04-19 RX ADMIN — FAMOTIDINE 20 MG: 20 TABLET ORAL at 08:20

## 2020-04-19 RX ADMIN — INSULIN LISPRO 8 UNITS: 100 INJECTION, SOLUTION INTRAVENOUS; SUBCUTANEOUS at 12:16

## 2020-04-19 RX ADMIN — LOSARTAN POTASSIUM 25 MG: 25 TABLET, FILM COATED ORAL at 08:08

## 2020-04-19 RX ADMIN — LEVOTHYROXINE SODIUM 112 MCG: 112 TABLET ORAL at 03:56

## 2020-04-19 RX ADMIN — SENNOSIDES 8.6 MG: 8.6 TABLET, FILM COATED ORAL at 08:08

## 2020-04-19 RX ADMIN — THERA TABS 1 TABLET: TAB at 08:08

## 2020-04-19 RX ADMIN — ACETAMINOPHEN 1000 MG: 500 TABLET ORAL at 21:30

## 2020-04-19 RX ADMIN — INSULIN LISPRO 8 UNITS: 100 INJECTION, SOLUTION INTRAVENOUS; SUBCUTANEOUS at 17:04

## 2020-04-19 RX ADMIN — MICONAZOLE NITRATE: 20 POWDER TOPICAL at 20:01

## 2020-04-19 RX ADMIN — METOPROLOL SUCCINATE 25 MG: 25 TABLET, FILM COATED, EXTENDED RELEASE ORAL at 08:08

## 2020-04-19 RX ADMIN — INSULIN LISPRO 2 UNITS: 100 INJECTION, SOLUTION INTRAVENOUS; SUBCUTANEOUS at 08:10

## 2020-04-19 RX ADMIN — AMLODIPINE BESYLATE 5 MG: 5 TABLET ORAL at 08:08

## 2020-04-19 RX ADMIN — Medication 325 MG: at 08:08

## 2020-04-19 RX ADMIN — ATORVASTATIN CALCIUM 40 MG: 40 TABLET, FILM COATED ORAL at 20:01

## 2020-04-19 RX ADMIN — ENOXAPARIN SODIUM 40 MG: 40 INJECTION SUBCUTANEOUS at 08:07

## 2020-04-19 RX ADMIN — ACETAMINOPHEN 1000 MG: 500 TABLET ORAL at 06:12

## 2020-04-19 RX ADMIN — CLOPIDOGREL BISULFATE 75 MG: 75 TABLET ORAL at 08:20

## 2020-04-19 RX ADMIN — Medication 1000 MCG: at 08:08

## 2020-04-19 RX ADMIN — MICONAZOLE NITRATE: 20 POWDER TOPICAL at 08:21

## 2020-04-19 RX ADMIN — FERROUS SULFATE TAB 325 MG (65 MG ELEMENTAL FE) 325 MG: 325 (65 FE) TAB at 08:08

## 2020-04-19 RX ADMIN — INSULIN GLARGINE 38 UNITS: 100 INJECTION, SOLUTION SUBCUTANEOUS at 20:23

## 2020-04-19 ASSESSMENT — PAIN DESCRIPTION - LOCATION: LOCATION: HAND

## 2020-04-19 ASSESSMENT — PAIN DESCRIPTION - PROGRESSION: CLINICAL_PROGRESSION: GRADUALLY WORSENING

## 2020-04-19 ASSESSMENT — PAIN - FUNCTIONAL ASSESSMENT: PAIN_FUNCTIONAL_ASSESSMENT: ACTIVITIES ARE NOT PREVENTED

## 2020-04-19 ASSESSMENT — PAIN SCALES - GENERAL
PAINLEVEL_OUTOF10: 9
PAINLEVEL_OUTOF10: 0
PAINLEVEL_OUTOF10: 8

## 2020-04-19 ASSESSMENT — PAIN DESCRIPTION - PAIN TYPE: TYPE: CHRONIC PAIN

## 2020-04-19 ASSESSMENT — PAIN DESCRIPTION - ONSET: ONSET: GRADUAL

## 2020-04-19 ASSESSMENT — PAIN DESCRIPTION - DESCRIPTORS: DESCRIPTORS: ACHING

## 2020-04-19 ASSESSMENT — PAIN DESCRIPTION - ORIENTATION: ORIENTATION: LEFT

## 2020-04-19 ASSESSMENT — PAIN DESCRIPTION - FREQUENCY: FREQUENCY: INTERMITTENT

## 2020-04-19 NOTE — PROGRESS NOTES
Patient made comment to significant other about being in a low mood. His response was \"you can't feel like that, this will soon be over\".

## 2020-04-19 NOTE — PROGRESS NOTES
[facility]  INPATIENT PHYSICAL THERAPY  DAILY NOTE  [facility] SKILLED NURSING UNIT - 8E-68/068-A    Time In: 1329  Time Out: 1400  Timed Code Treatment Minutes: 32 Minutes  Minutes: 31          Date: 2020  Patient Name: Tracie Acuña,  Gender:  female        MRN: [id number]  : 1940  (70 y.o.)  Referral Date : 20  Referring Practitioner: Jeramie Ley MD  Diagnosis: COVID 19  Additional Pertinent Hx:  Tracie Aucña  is a 70 y.o. female admitted to the transitional care unit on 2020. She was admitted in the acute area of [facility] earlier this month for pneumonia and was found to be covid positive. She became afebrile and with the improving of her symptoms she was discharged home to the care of her family. Pt unable to safely care for self at home, needing additional assistance, experiencing falls and ED visit. She now presents to the TCU due to decline in mobility     Prior Level of Function:  Lives With: Significant other  Type of Home: Mobile home  Home Layout: One level  Home Access: Stairs to enter with rails  Entrance Stairs - Number of Steps: 4  Entrance Stairs - Rails: Left  Home Equipment: 4 wheeled walker, Quad cane, Wheelchair-manual   Bathroom Shower/Tub: Walk-in shower  Bathroom Toilet: Bedside commode(has raised toilet seat and bed side commode)  Bathroom Equipment: Grab bars in shower, Shower chair(grab bar suction)    Receives Help From: Family  ADL Assistance: Needs assistance(pt reports daughter-in-law assists with distal BLE bathing, sig. other assists with threading depends, and assists with toileting)  Homemaking Assistance: (sig other had been completing however he is now hospitalized)  Ambulation Assistance: Independent(pt reports within home without AD)  Transfer Assistance: Independent  Active :  Yes  Additional Comments: Pt reports history of CVA with L sided deficts in  in which [facility] with R foot on step to increase L LE WB'ing and quad control, pt unable to stand for more than 5 seconds due to significant quad weakness/fatigue. Exercises were completed for increased independence with functional mobility. Functional Outcome Measures: Not completed       ASSESSMENT:  Assessment: Patient progressing toward established goals. Activity Tolerance:  Patient tolerance of  treatment: good. Pt fatigues quickly, significant L hemiparesis. Equipment Recommendations:Equipment Needed: No  Discharge Recommendations:  Continue to assess pending progress, Home with Home health PT    Plan: Times per week: 6x/wk  Times per day: Daily  Plan weeks: 4  Current Treatment Recommendations: Strengthening, Balance Training, Transfer Training, Endurance Training, Gait Training, Neuromuscular Re-education, Home Exercise Program, Patient/Caregiver Education & Training, Functional Mobility Training, Wheelchair Mobility Training, Safety Education & Training, Stair training    Patient Education  Patient Education: Gait, Stairs    Goals:  Patient goals : go home, get stronger  Short term goals  Time Frame for Short term goals: 2 weeks  Short term goal 1: Patient will complete supine  <> sit with min assist to transfer in/out of bed with decreased difficulty. Short term goal 2: Patient will complete sit < > stand with min assist to stand to ambulate with decreased difficulty. Short term goal 3: Patient will complete stand pivot transfer with min assist to transfer surface to surface with decreased difficulty. Short term goal 4: Patient will ambulate 48' with a RW and min assist to progress towards ambulating household distances safely. Short term goal 5: Patient will propel wheelchair 48' with CGA to navigate living environment and increase mobility.   Short term goal 6: Patient will ascend/descend 1, 4\" step in parallel bars with min assist to progress towards safe home entry  Long term goals  Time Frame for Long

## 2020-04-19 NOTE — PROGRESS NOTES
Pulse Resp SpO2   04/19/20 0800 128/66 97 °F (36.1 °C) Oral 82 16 92 %     I/O last 3 completed shifts: In: 780 [P.O.:780]  Out: -   No intake/output data recorded. /66   Pulse 82   Temp 97 °F (36.1 °C) (Oral)   Resp 16   Ht 5' 3\" (1.6 m)   Wt 146 lb 1.6 oz (66.3 kg)   SpO2 92%   BMI 25.88 kg/m²     /66   Pulse 82   Temp 97 °F (36.1 °C) (Oral)   Resp 16   Ht 5' 3\" (1.6 m)   Wt 146 lb 1.6 oz (66.3 kg)   SpO2 92%   BMI 25.88 kg/m²   General appearance: alert, appears stated age and cooperative  Head: Normocephalic, without obvious abnormality, atraumatic  Lungs: clear to auscultation bilaterally  Heart: regular rate and rhythm, S1, S2 normal, no murmur, click, rub or gallop  Abdomen: soft, non-tender; bowel sounds normal; no masses,  no organomegaly  Extremities: extremities normal, atraumatic, no cyanosis or edema  Skin: Skin color, texture, turgor normal. No rashes or lesions  Neurologic: Grossly normal      Data Review:   Results for Kassandra Guillen (MRN [id number]) as of 4/19/2020 15:36   Ref.  Range 4/18/2020 12:00 4/18/2020 16:12 4/18/2020 20:07 4/19/2020 08:06 4/19/2020 12:12   POC Glucose Latest Ref Range: 70 - 108 mg/dl 308 (H) 338 (H) 326 (H) 171 (H) 321 (H)       Electronically signed by Chandu Aaron MD on 4/19/2020 at 3:32 PM

## 2020-04-19 NOTE — PLAN OF CARE
Problem: Role Relationship:  Goal: Ability to verbalize awareness of feelings that lead to decreased social interaction will improve  Description: Ability to verbalize awareness of feelings that lead to decreased social interaction will improve  Outcome: Ongoing    Patient is actively in touch with family and friends in the community via cell phone. Patient is engaged in socialization with significant other regularly with meals. Problem: Mood - Altered:  Goal: Mood stable  Description: Mood stable  Outcome: Met This Shift    Patient has not verbalized any feelings of low mood this shift. Patient expresses self well in talking to staff and significant other about concerns related to being in the hospital at this time.

## 2020-04-19 NOTE — PROGRESS NOTES
Patient made no comments this shift related to low mood. Was in good spirits all day, many phone calls to her and from her. Her family did retrieve dirty closes and delivered clean ones. Patient acknowledged her appreciation of this.

## 2020-04-19 NOTE — PROGRESS NOTES
[facility]  [address]  Occupational Therapy  Daily Note  Time:   Time In: 0  Time Out: 0800  Timed Code Treatment Minutes: 40 Minutes  Minutes: 40    Date: 2020  Patient Name: Ronnie Childress,   Gender: female      Room: [facility]/068-A  MRN: [id number]  : 1940  (70 y.o.)  Referring Practitioner: Grover Woo MD  Diagnosis: COVID-19  Additional Pertinent Hx: 70 y.o. female admitted to the transitional care unit on 2020. She was admitted in the acute area of [facility] earlier this month for pneumonia and was found to be covid positive. She became afebrile and with the improving of her symptoms she was discharged home to the care of her family. She was unable to have the degree of supervision needed to be safe at home. She states she fell the 2 past nights and was on the floor for several hours each time. She now presents to the TCU for the time with therapies prior to the anticipated return home. Restrictions/Precautions:  Restrictions/Precautions: Isolation, Contact Precautions, General Precautions, Fall Risk  Position Activity Restriction  Other position/activity restrictions: COVID 19, contact and droplet precautions    SUBJECTIVE: Upon arrival patient was lying in bed. Pt was agreeable to OT session. PAIN: Pt reports pain in left hand. COGNITION: Slow Processing, Decreased Insight, Decreased Problem Solving and Decreased Safety Awareness    ADL:   Feeding: with set-up and with increased time for completion. Needs assist to open containers. Grooming: Moderate Assistance. to wash/dry face while sitting EOB. Upper Extremity Dressing: Moderate Assistance. To don/doff shirt while sitting EOB. Pt required assist to thread shirt over LUE. lift over head, and pull down in back. Pt required mod vcs for LUE awareness. Lower Extremity Dressing: Moderate Assistance.   To don pants Assist thie thread over B feet and pull up over hips on left side. Aleida Mcdaniel BALANCE:  Sitting Balance:  Stand By Assistance. Sitting EOB,   Standing Balance: Contact Guard Assistance. with 1 UE release from walker. BED MOBILITY:  Rolling to Right: Contact Guard Assistance, with increased time for completion    Supine to Sit: Minimal Assistance    Scooting: Contact Guard Assistance, with increased time for completion      TRANSFERS:  Sit to Stand:  [address], with increased time for completion, cues for hand placement. from EOB. Stand to Sit: [address], with increased time for completion, cues for hand placement. recliner    FUNCTIONAL MOBILITY:  Assistive Device: 4 Wheeled Walker  Assist Level:  Contact Guard Assistance. Distance: in hallway  Slow pace, unsteady, decreased endurance noted, min vcs for walker safety. ADDITIONAL ACTIVITIES:  Pt completed B UE AAROM/AROM exs to increase strength required to complete ADL routine, x 1 set, x 10-15 reps, no resistance: shoulder flexion, chest press, bicep curls, horizontal AB/ADduction, wrist flex/ext, digit flex/ext, digit opposition slow pace, exhibits mod fatigue, requires 3 RBs during exs. Exercises completed to increase indep and safety with all self cares and transfers. ASSESSMENT:   Activity Tolerance:  Patient tolerance of  treatment: fair. Discharge Recommendations: Home with Home health OT  Equipment Recommendations:  Other: monitor for walker splint and LHAE  Plan: Times per week: 6x  Current Treatment Recommendations: Strengthening, ROM, Balance Training, Endurance Training, Functional Mobility Training, Neuromuscular Re-education, Self-Care / ADL, Safety Education & Training, Equipment Evaluation, Education, & procurement, Patient/Caregiver Education & Training, Home Management Training    Patient Education  Patient Education: Role of OT, Plan of Care, ADL's, Home Exercise Program, Importance of Increasing Activity and Assistive Device Safety    Goals  Short term

## 2020-04-20 LAB
GLUCOSE BLD-MCNC: 117 MG/DL (ref 70–108)
GLUCOSE BLD-MCNC: 260 MG/DL (ref 70–108)
GLUCOSE BLD-MCNC: 293 MG/DL (ref 70–108)
GLUCOSE BLD-MCNC: 294 MG/DL (ref 70–108)

## 2020-04-20 PROCEDURE — 97110 THERAPEUTIC EXERCISES: CPT

## 2020-04-20 PROCEDURE — 1290000000 HC SEMI PRIVATE OTHER R&B

## 2020-04-20 PROCEDURE — 97530 THERAPEUTIC ACTIVITIES: CPT

## 2020-04-20 PROCEDURE — 6370000000 HC RX 637 (ALT 250 FOR IP): Performed by: FAMILY MEDICINE

## 2020-04-20 PROCEDURE — 97116 GAIT TRAINING THERAPY: CPT

## 2020-04-20 PROCEDURE — 6360000002 HC RX W HCPCS: Performed by: FAMILY MEDICINE

## 2020-04-20 PROCEDURE — 97535 SELF CARE MNGMENT TRAINING: CPT

## 2020-04-20 PROCEDURE — 82948 REAGENT STRIP/BLOOD GLUCOSE: CPT

## 2020-04-20 RX ORDER — ALPRAZOLAM 0.25 MG/1
0.25 TABLET ORAL 3 TIMES DAILY PRN
Status: DISCONTINUED | OUTPATIENT
Start: 2020-04-20 | End: 2020-04-30 | Stop reason: HOSPADM

## 2020-04-20 RX ADMIN — ACETAMINOPHEN 1000 MG: 500 TABLET ORAL at 08:15

## 2020-04-20 RX ADMIN — LEVOTHYROXINE SODIUM 112 MCG: 112 TABLET ORAL at 06:14

## 2020-04-20 RX ADMIN — LOSARTAN POTASSIUM 25 MG: 25 TABLET, FILM COATED ORAL at 08:15

## 2020-04-20 RX ADMIN — INSULIN LISPRO 6 UNITS: 100 INJECTION, SOLUTION INTRAVENOUS; SUBCUTANEOUS at 12:16

## 2020-04-20 RX ADMIN — AMLODIPINE BESYLATE 5 MG: 5 TABLET ORAL at 08:15

## 2020-04-20 RX ADMIN — METOPROLOL SUCCINATE 25 MG: 25 TABLET, FILM COATED, EXTENDED RELEASE ORAL at 08:15

## 2020-04-20 RX ADMIN — ALPRAZOLAM 0.25 MG: 0.25 TABLET ORAL at 22:14

## 2020-04-20 RX ADMIN — Medication 325 MG: at 08:16

## 2020-04-20 RX ADMIN — Medication 1000 MCG: at 08:15

## 2020-04-20 RX ADMIN — INSULIN GLARGINE 38 UNITS: 100 INJECTION, SOLUTION SUBCUTANEOUS at 21:23

## 2020-04-20 RX ADMIN — MICONAZOLE NITRATE: 20 POWDER TOPICAL at 08:16

## 2020-04-20 RX ADMIN — CLOPIDOGREL BISULFATE 75 MG: 75 TABLET ORAL at 08:15

## 2020-04-20 RX ADMIN — FAMOTIDINE 20 MG: 20 TABLET ORAL at 08:15

## 2020-04-20 RX ADMIN — ATORVASTATIN CALCIUM 40 MG: 40 TABLET, FILM COATED ORAL at 21:23

## 2020-04-20 RX ADMIN — THERA TABS 1 TABLET: TAB at 08:15

## 2020-04-20 RX ADMIN — MICONAZOLE NITRATE: 20 POWDER TOPICAL at 21:50

## 2020-04-20 RX ADMIN — ENOXAPARIN SODIUM 40 MG: 40 INJECTION SUBCUTANEOUS at 08:16

## 2020-04-20 RX ADMIN — FERROUS SULFATE TAB 325 MG (65 MG ELEMENTAL FE) 325 MG: 325 (65 FE) TAB at 08:15

## 2020-04-20 RX ADMIN — INSULIN LISPRO 6 UNITS: 100 INJECTION, SOLUTION INTRAVENOUS; SUBCUTANEOUS at 18:08

## 2020-04-20 ASSESSMENT — PAIN SCALES - GENERAL
PAINLEVEL_OUTOF10: 4
PAINLEVEL_OUTOF10: 0
PAINLEVEL_OUTOF10: 0

## 2020-04-20 NOTE — PATIENT CARE CONFERENCE
continent  Bladder:incontinent     Pain: Patient's pain is currently controlled with tylenol    Education Provided:  Diabetic:  Yes insulin  Peg Tube:No    Farley Care:  Education:NA  Removal Necessary:  NA  Supplies Needed: NA     Anticoagulant Use:  Patient on lovenox for anticoagulation, which is being managed by Primary Care Physician    Antibiotic Use:  Patient not on antibiotics    Psychotropic Medication Use:  Patient on xaxax     Oxygen Use: No    Home Medications Reconciled: N/A    Physical Therapy:   Ms. Adore Barragan continues to make good progress towards goals set for her. Pt demonstrating good improvement with mobility this date, requiring less rest breaks than last week. Patient continues to require min assist at times with ambulation, however progressing towards CGA more consistently. Pt also progressing with stair training, progressing to 6 inch step this date, but CGA/min assist to ascend and minimal/moderate assistance to descend. Improvement overall with bilateral LE strength as evidenced by improvement in gait, stairs and standing exercises, however weakness continues to be noted bilaterally, greater on the left. Pt would benefit from continued skilled PT services to improve her ability to complete functional mobility, reduce her risk for falls and allow patient to return to [facility]. PT Equipment Recommendations  Equipment Needed: No    Occupational  Therapy:  Pt is making good progress towards goals. Factors facilitating goal achievement include: motivated, excellent family support, pleasant and cooperative, engaged and compliant. Barriers to goal achievement include: decreased endurance, weakness and PMH. Family education has been addressed on the following topics: NA. Pt continues to require skilled Occupational Therapy to address the following performance deficits dynamic standing and sitting balance, ADLS, LUE weakness, functional endurance, safety awareness, and activity tolerance.  Without skilled OT intervention pt is at risk for functional decline, increased falls, and readmission to hospital.     Equipment: none         Nutrition:    Weight: 142 lb 8 oz (64.6 kg) / Body mass index is 25.24 kg/m². Please see nutrition note for details. Family Education: No family available for education  Fall Risk:  Falling star program initiated    Goal Achievement:   Patient Continues to progress toward goal achievement    Discharge Plan   Estimated Length of Stay: pending insurance  Destination: nursing home placement  Services at Discharge: [facility], Occupational Therapy, Nursing and aide 3x week  Equipment at Discharge: No equipment needs  Factors facilitating achievement of predicted outcomes: Family support, Motivated, Cooperative and Pleasant  Barriers to the achievement of predicted outcomes: Decreased endurance and Upper extremity weakness    Readmission Risk              Risk of Unplanned Readmission:        15         High (20-31)     Moderate (10-19)     Low (0-9)    Pt's team conference attended (see above.)  Pt seen on rounds with LSW, to review the results with patient and family. Discussed length of stay as above.     Physical Exam:  General:  Alert and oriented  Vitals:   Vitals:    04/20/20 1931   BP: 125/67   Pulse: 76   Resp: 18   Temp: 97.9 °F (36.6 °C)   SpO2: 98%     Heart:  Regular rate and rhythm  Lungs:  Clear to auscultation  Abdomen:  soft with positive bowel sounds     Assessment:  Progressing in full rehabilitation program (see above)    Plan:  Continue Rehab            Continue current medications            Spent 37 minutes today in patient management and care    Electronically signed by Katerine Damon MD on 4/21/2020 at 8:10 AM    Team Members Present at Conference:  Physician: Dr. Jyoti Mayorga MD  Nurse: Anibal Pacheco RN, Galindo Yin RN  :  Toni Hurst  Occupational Therapist:   Kayla Spencer OT  Physical Therapist: Mariana Sutton, PT  Speech Therapist: Speech Therapist: N/A. All team members have reviewed and updated as necessary and appropriate the established interdisciplinary plan of care within the medical record of Caitlinmarleni Alonzo. Pt's team conference attended (see above). Pt seen on rounds with LSW, to review the results with patient and family. Discussed length of stay as above.

## 2020-04-20 NOTE — PLAN OF CARE
Problem: Nutrition  Goal: Optimal nutrition therapy  Outcome: Ongoing   Nutrition Problem: Unintended weight loss  Intervention: Food and/or Nutrient Delivery: Continue current diet, Vitamin Supplement, Continue current ONS  Nutritional Goals: Pt. will consume 75% or more of meals and experience no significant weight loss during LOS.

## 2020-04-20 NOTE — PLAN OF CARE
Problem: Pain:  Goal: Control of chronic pain  Description: Control of chronic pain  Outcome: Ongoing   Pt requested PRN tylenol for muscle spasms. Problem: Falls - Risk of:  Goal: Will remain free from falls  Description: Will remain free from falls  Outcome: Ongoing   Walks to bathroom 1Assist gait belt and walker. Lt foot drags and needs reminded to concentrate on walking. Uses call light appropriately  Problem: Metabolic:  Goal: Ability to maintain appropriate glucose levels will improve  Description: Ability to maintain appropriate glucose levels will improve  Outcome: Ongoing   Bld sug 303. lantus and sliding scale given.  Pt refused bedtime snack and reports no s/s of hypoglycemia

## 2020-04-20 NOTE — PROGRESS NOTES
26-50%, % however only allowed 2 carb choices/meal per MD orders. Meds: Ferrous Sulfate, Lantus, Humalog, MVI, Vitamin B12. Glycolax, Senokot. Bm x 1 (4/18), chemsticks 117-338 last 24 hrs. Pt has been receiving house diet entire admit & have tried before to get nutrition manager to approve having ambassador from nutrition to call for pt's meal selections d/t pt in [facility].  isolation. Wound Type: (traumatic wound toe, buttocks wound, rt breast wound)  · Current Nutrition Therapies:  · Oral Diet Orders: (carb controlled ( 2 carb choices/meal per orders))   · Oral Diet intake: 1-25%, 26-50%, %  · Oral Nutrition Supplement (ONS) Orders: Diabetic Oral Supplement  · ONS intake: %  · Anthropometric Measures:  · Ht: 5' 3\" (160 cm)   · Current Body Wt: 142 lb 6.7 oz (64.6 kg)  · Admission Body Wt: 146 lb 14.4 oz (66.6 kg)(4/9/20, no edema)  · Usual Body Wt: 158 lb (71.7 kg)(EMR on 4/1/20 and 2/6/20.  170# on 7/7/19.  )  · % Weight Change:  ,  28# wt loss (16.5%) in 9 months per EMR (COVID 19, poor po intake)  · Ideal Body Wt: 115 lb (52.2 kg),   · BMI Classification: BMI 25.0 - 29.9 Overweight(25.9)    Nutrition Interventions:   Continue current diet, Vitamin Supplement, Continue current ONS  Continued Inpatient Monitoring, Education not appropriate at this time, Coordination of Care    Nutrition Evaluation:   · Evaluation: No progress toward goals   · Goals: Pt. will consume 75% or more of meals and experience no significant weight loss during LOS.     · Monitoring: Nutrition Progression, Meal Intake, Supplement Intake, Diet Tolerance, Skin Integrity, Wound Healing, I&O, Weight, Pertinent Labs, Monitor Bowel Function      Electronically signed by Liv Larios RD, LD on 4/20/20 at 4:27 PM EDT    Contact Number: [phone]

## 2020-04-20 NOTE — PROGRESS NOTES
functional mobility, reduce her risk for falls and allow patient to return to PLOF. Activity Tolerance:  Patient tolerance of  treatment: good. Equipment Recommendations:Equipment Needed: No  Discharge Recommendations:  Continue to assess pending progress, Home with Home health PT    Plan: Times per week: 6x/wk  Times per day: Daily  Plan weeks: 4  Current Treatment Recommendations: Strengthening, Balance Training, Transfer Training, Endurance Training, Gait Training, Neuromuscular Re-education, Home Exercise Program, Patient/Caregiver Education & Training, Functional Mobility Training, Wheelchair Mobility Training, Safety Education & Training, Stair training    Patient Education  Patient Education: Transfers, Gait, Stairs, Verbal Exercise Instruction,  - Patient Verbalized Understanding, - Patient Requires Continued Education    Goals:  Patient goals : go home, get stronger  Short term goals  Time Frame for Short term goals: 2 weeks  Short term goal 1: Patient will complete supine  <> sit with min assist to transfer in/out of bed with decreased difficulty. Short term goal 2: Patient will complete sit < > stand with min assist to stand to ambulate with decreased difficulty. Short term goal 3: Patient will complete stand pivot transfer with min assist to transfer surface to surface with decreased difficulty. Short term goal 4: Patient will ambulate 48' with a RW and min assist to progress towards ambulating household distances safely. Short term goal 5: Patient will propel wheelchair 48' with CGA to navigate living environment and increase mobility. Short term goal 6: Patient will ascend/descend 1, 4\" step in parallel bars with min assist to progress towards safe home entry  Long term goals  Time Frame for Long term goals : 4 weeks  Long term goal 1: Patient will complete supine < > sit with modified independence to transfer in/out of bed safely.   Long term goal 2: Patient will complete sit  <> stand and stand pivot transfers with SBA to transfer surface to surface safely. Long term goal 3: Patient will ambulate 76' with a RW and CGA to navigate home environment. Long term goal 4: Patient will propel wheelchair 100' with SBA to navigate living environment and to/from appointments. Long term goal 5: Patient will improve tinetti score to greater than or equal to 17/28 to reduce her risk for falls. Long term goal 6: Patient will ascend/descend 4 steps with 1 hand rail with CGA for home entry. Following session, patient left in safe position with all fall risk precautions in place.

## 2020-04-20 NOTE — PROGRESS NOTES
[address] [address]  [address]  Occupational Therapy  Daily Note  Time:   Time In: 0848  Time Out: 6271  Timed Code Treatment Minutes: 62 Minutes  Minutes: 62          Date: 2020  Patient Name: Tracie Acuña,   Gender: female      Room: [facility]68/068-A  MRN: [id number]  : 1940  (70 y.o.)  Referring Practitioner: Jeramie Ley MD  Diagnosis: COVID-19  Additional Pertinent Hx: 70 y.o. female admitted to the transitional care unit on 2020. She was admitted in the acute area of [facility] earlier this month for pneumonia and was found to be covid positive. She became afebrile and with the improving of her symptoms she was discharged home to the care of her family. She was unable to have the degree of supervision needed to be safe at home. She states she fell the 2 past nights and was on the floor for several hours each time. She now presents to the TCU for the time with therapies prior to the anticipated return home. Restrictions/Precautions:  Restrictions/Precautions: Isolation, Contact Precautions, General Precautions, Fall Risk  Position Activity Restriction  Other position/activity restrictions: COVID 19, contact and droplet precautions    SUBJECTIVE: Received in chair, cooperative and motivated    PAIN: Pt did not complain of pain during OT session    COGNITION: Decreased Problem Solving and Decreased Safety Awareness    ADL:   Feeding: Modified Independent. breakfast tray  Grooming: Minimal Assistance, with set-up, with verbal cues  and with increased time for completion. Min A for standing balance with VCs for safety standing sinkside for combing hair  Bathing: Minimal Assistance. min A for thoroughness to RUE and bilat feet, otherwise completed with CGA and inc time with setup, seated  Upper Extremity Dressing: Contact Guard Assistance, with set-up and with increased time for completion.   max inc time, cues for adaptive techniques  Lower Extremity Dressing: Minimal Assistance, with set-up and with increased time for completion. A for pulling pants up over hips on L side, otherwise completed socks, shoes and threading of BLE into pants with CGA and inc time with  min cues. BALANCE:  Sitting Balance:  Contact Guard Assistance. for dynamic reaching   Standing Balance: Minimal Assistance. moments of CGA however demo R posterior lean this date    BED MOBILITY:  Not Tested    TRANSFERS:  Sit to Stand:  Minimal Assistance, with increased time for completion, with verbal cues. pt trialed x3, requiring min A on 3rd trial for successful stand; VCs for 4WW safety  Stand to Sit: [address] VCs for approach and engaging 4WW breaks    FUNCTIONAL MOBILITY:  Assistive Device: 4 Wheeled Walker  Assist Level:  Minimal Assistance. Distance: To and from bathroom and moderately long [facility] distances up and down hallway of nursing unit with slow pace  Cues for LLE, pt with decreased foot clearance, min scissoring noted, VCs for 4WW safety and requiring constant hands on assist this date with longer distances, moments of CGA within bathroom     ADDITIONAL ACTIVITIES:  Pt completed 1 set of 15 reps of scapular stabilization/BUE ROM exercises for increasing posture and functional reach for decreasing pain and increasing ease with UB ADLs. Pt tolerated well with slow pace, max fatigue demonstrated, mod cues for technique and mod rest breaks. Pt completed scap retraction, posterior shoulder rolls with tactile cues, scapular elevation/depression with visual feedback and tactile cues and scap punches LUE AAROM. Pt completed B AROM/AAROM UE exs to increase strength required to complete ADL routine, x 1 set, x 10 reps: shoulder flexion, bicep curls. slow pace, exhibits max fatigue, requires mod RBs during exs. ASSESSMENT:    Pt is making good progress towards goals.  Factors facilitating goal achievement include: motivated, excellent family support, pleasant and

## 2020-04-20 NOTE — DISCHARGE INSTR - COC
Continuity of Care Form    Patient Name: Dickson Montague   :  1940  MRN:  [id number]    Admit date:  2020  Discharge date:  2020    Code Status Order: Full Code   Advance Directives:   Advance Care Flowsheet Documentation     Date/Time Healthcare Directive Type of Healthcare Directive Copy in [address] Agent's Name Healthcare Agent's Phone Number    [phone]  Yes, patient has an advance directive for healthcare treatment  Durable power of  for health care  Yes, copy in chart  Healthcare power of   [facility] Omar  [phone]          Admitting Physician:  Jocelyn Souaz MD  PCP: Darrel Rogers DO    Discharging Nurse: [id number] Unit/Room#: 8E-68/068-A  Discharging Unit Phone Number: [phone]    Emergency Contact:   Extended Emergency Contact Information  Primary Emergency Contact: [address] [address] [address]  Phone: [phone]  Relation: Child    Past Surgical History:  Past Surgical History:   Procedure Laterality Date    BREAST BIOPSY  2014    BREAST SURGERY  1974    left breast     CARPAL TUNNEL RELEASE Right 2013    CHOLECYSTECTOMY  1968    COLONOSCOPY  2007    FINGER TRIGGER RELEASE      HERNIA REPAIR  14    Dr. Agata Velásquez  1996    JOINT REPLACEMENT Left     KNEE ARTHROSCOPY      left    KNEE SURGERY Right 2017    LYMPH NODE DISSECTION  1998    MASTECTOMY  2001       Immunization History:   Immunization History   Administered Date(s) Administered    Influenza Virus Vaccine 2013    PPD Test 04/10/2020, 2020       Active Problems:  Patient Active Problem List   Diagnosis Code    Type 2 diabetes mellitus with circulatory disorder, with long-term current use of insulin (HCC) E11.59, Z79.4    Hypertension I10    Hypothyroidism E03.9    Bladder spasm N32.89    SOB (shortness of breath) Y12.91    Diastolic dysfunction U25.54   

## 2020-04-21 PROBLEM — F41.9 ANXIETY: Status: ACTIVE | Noted: 2020-04-21

## 2020-04-21 LAB
GLUCOSE BLD-MCNC: 113 MG/DL (ref 70–108)
GLUCOSE BLD-MCNC: 219 MG/DL (ref 70–108)
GLUCOSE BLD-MCNC: 320 MG/DL (ref 70–108)
GLUCOSE BLD-MCNC: 393 MG/DL (ref 70–108)

## 2020-04-21 PROCEDURE — 82948 REAGENT STRIP/BLOOD GLUCOSE: CPT

## 2020-04-21 PROCEDURE — 6360000002 HC RX W HCPCS: Performed by: FAMILY MEDICINE

## 2020-04-21 PROCEDURE — 1290000000 HC SEMI PRIVATE OTHER R&B

## 2020-04-21 PROCEDURE — 97116 GAIT TRAINING THERAPY: CPT

## 2020-04-21 PROCEDURE — 97530 THERAPEUTIC ACTIVITIES: CPT

## 2020-04-21 PROCEDURE — 97110 THERAPEUTIC EXERCISES: CPT

## 2020-04-21 PROCEDURE — 6370000000 HC RX 637 (ALT 250 FOR IP): Performed by: FAMILY MEDICINE

## 2020-04-21 RX ORDER — INSULIN GLARGINE 100 [IU]/ML
42 INJECTION, SOLUTION SUBCUTANEOUS NIGHTLY
Status: DISCONTINUED | OUTPATIENT
Start: 2020-04-21 | End: 2020-04-21

## 2020-04-21 RX ORDER — INSULIN GLARGINE 100 [IU]/ML
44 INJECTION, SOLUTION SUBCUTANEOUS NIGHTLY
Status: DISCONTINUED | OUTPATIENT
Start: 2020-04-21 | End: 2020-04-23

## 2020-04-21 RX ADMIN — MICONAZOLE NITRATE: 20 POWDER TOPICAL at 08:27

## 2020-04-21 RX ADMIN — ATORVASTATIN CALCIUM 40 MG: 40 TABLET, FILM COATED ORAL at 21:18

## 2020-04-21 RX ADMIN — THERA TABS 1 TABLET: TAB at 08:20

## 2020-04-21 RX ADMIN — FERROUS SULFATE TAB 325 MG (65 MG ELEMENTAL FE) 325 MG: 325 (65 FE) TAB at 08:20

## 2020-04-21 RX ADMIN — INSULIN GLARGINE 44 UNITS: 100 INJECTION, SOLUTION SUBCUTANEOUS at 20:53

## 2020-04-21 RX ADMIN — MICONAZOLE NITRATE: 20 POWDER TOPICAL at 21:19

## 2020-04-21 RX ADMIN — ALPRAZOLAM 0.25 MG: 0.25 TABLET ORAL at 21:17

## 2020-04-21 RX ADMIN — CLOPIDOGREL BISULFATE 75 MG: 75 TABLET ORAL at 08:21

## 2020-04-21 RX ADMIN — LOSARTAN POTASSIUM 25 MG: 25 TABLET, FILM COATED ORAL at 08:20

## 2020-04-21 RX ADMIN — Medication 325 MG: at 08:21

## 2020-04-21 RX ADMIN — AMLODIPINE BESYLATE 5 MG: 5 TABLET ORAL at 08:20

## 2020-04-21 RX ADMIN — INSULIN LISPRO 4 UNITS: 100 INJECTION, SOLUTION INTRAVENOUS; SUBCUTANEOUS at 17:12

## 2020-04-21 RX ADMIN — METOPROLOL SUCCINATE 25 MG: 25 TABLET, FILM COATED, EXTENDED RELEASE ORAL at 08:20

## 2020-04-21 RX ADMIN — INSULIN LISPRO 8 UNITS: 100 INJECTION, SOLUTION INTRAVENOUS; SUBCUTANEOUS at 12:05

## 2020-04-21 RX ADMIN — Medication 1000 MCG: at 08:20

## 2020-04-21 RX ADMIN — ENOXAPARIN SODIUM 40 MG: 40 INJECTION SUBCUTANEOUS at 08:20

## 2020-04-21 RX ADMIN — FAMOTIDINE 20 MG: 20 TABLET ORAL at 08:21

## 2020-04-21 RX ADMIN — LEVOTHYROXINE SODIUM 112 MCG: 112 TABLET ORAL at 05:41

## 2020-04-21 ASSESSMENT — PAIN SCALES - GENERAL
PAINLEVEL_OUTOF10: 0
PAINLEVEL_OUTOF10: 0

## 2020-04-21 ASSESSMENT — PAIN - FUNCTIONAL ASSESSMENT: PAIN_FUNCTIONAL_ASSESSMENT: ACTIVITIES ARE NOT PREVENTED

## 2020-04-21 NOTE — PLAN OF CARE
Problem: Pain:  Goal: Pain level will decrease  Description: Pain level will decrease  4/21/2020 0929 by Tim Ferguson RN  Outcome: Ongoing  Note: Pt denies having any pain     Problem: Falls - Risk of:  Goal: Will remain free from falls  Description: Will remain free from falls  4/21/2020 0929 by Tim Ferguson RN  Outcome: Ongoing  Note: Pt fell at home. She has remained free from falls here at the hospital. She is a 1 assist with the walker. She needs reminders to remember her left hand. Gets tired easily     Problem: Bleeding:  Goal: Will show no signs and symptoms of excessive bleeding  Description: Will show no signs and symptoms of excessive bleeding  4/21/2020 3310 by Tim Ferguson RN  Outcome: Ongoing  Note: Pt is on lovenox. No excessive bleeding or bruising noted     Problem: Mobility - Impaired:  Goal: Mobility will improve  Description: Mobility will improve  4/21/2020 0929 by Tim Ferguson RN  Outcome: Ongoing  Note: Pt continues to work with PT and OT. Her endurance and strength still need improved on. She is 1 assist with the walker. She becomes tired easily     Problem: Metabolic:  Goal: Ability to maintain appropriate glucose levels will improve  Description: Ability to maintain appropriate glucose levels will improve  4/21/2020 0929 by Tim Ferguson RN  Outcome: Ongoing  Note: Blood sugars are ACHS. Insulin given per order. Still adjusting her Lantus     Problem: Respiratory  Goal: No pulmonary complications  8/[id number]/3746 2406 by Tim Ferguson RN  Outcome: Ongoing  Note: Pt is stating 96% on RA     Problem: Nutrition  Goal: Optimal nutrition therapy  4/21/2020 0929 by Tim Ferguson RN  Outcome: Ongoing  Note: Pt continues to have a poor appetite. She states she hates the hospital food. She has been drinking all of her glucerna.      Problem: Urinary Elimination:  Goal: Ability to recognize the urge to urinate will

## 2020-04-21 NOTE — PLAN OF CARE
Problem: Pain:  Goal: Pain level will decrease  Description: Pain level will decrease  Outcome: Ongoing  Note: Patient denies pain this shift. PRN tylenol available. Problem: Pain:  Goal: Control of acute pain  Description: Control of acute pain  Outcome: Ongoing     Problem: Pain:  Goal: Control of chronic pain  Description: Control of chronic pain  Outcome: Ongoing     Problem: Falls - Risk of:  Goal: Will remain free from falls  Description: Will remain free from falls  Outcome: Ongoing  Note: Patient uses call light appropriately most of the time. No falls noted. Wears non slip slippers and gait belt when ambulating. Problem: Falls - Risk of:  Goal: Absence of physical injury  Description: Absence of physical injury  Outcome: Ongoing     Problem: Bleeding:  Goal: Will show no signs and symptoms of excessive bleeding  Description: Will show no signs and symptoms of excessive bleeding  Outcome: Ongoing  Note: No s/s of bleeding. Patient on lovenox for DVT prophylaxis. Problem: Mobility - Impaired:  Goal: Mobility will improve  Description: Mobility will improve  Outcome: Ongoing  Note: Patient has hx of CVA and left sided weakness. Is unsteady with gait. Uses walker and 1 assist.  Needs reminders to use left foot. Looses balance easily. Problem: Metabolic:  Goal: Ability to maintain appropriate glucose levels will improve  Description: Ability to maintain appropriate glucose levels will improve  Outcome: Ongoing  Note: Patient is ACHS chem. Lantus has been increased to 38 units HS. BS at hs was 260. Problem: Metabolic:  Goal: Ability to maintain appropriate glucose levels will improve  Description: Ability to maintain appropriate glucose levels will improve  Outcome: Ongoing  Note: Patient is ACHS chem. Lantus has been increased to 38 units HS. BS at hs was 260. Problem: Respiratory  Goal: No pulmonary complications  Outcome: Ongoing  Note: Patient on room air.   Lungs awareness of feelings that lead to decreased social interaction will improve  Description: Ability to verbalize awareness of feelings that lead to decreased social interaction will improve  Outcome: Ongoing     Problem: Safety:  Goal: Ability to chew and swallow food without choking will improve  Description: Ability to chew and swallow food without choking will improve  Outcome: Ongoing     Problem: Mood - Altered:  Goal: Mood stable  Description: Mood stable  Outcome: Ongoing  Note: Patient verbalized feelings of extreme anxiety and stated that she had panic attack last night. PRN xanex ordered. Patient was able to sleep and said that she did not feel any anxiety tonight. .     Reviewed care plan with patient.   Patient verbalized understanding of care plan and treatment goals

## 2020-04-21 NOTE — PROGRESS NOTES
wheelchair 100' with SBA to navigate living environment and to/from appointments. Long term goal 5: Patient will improve tinetti score to greater than or equal to 17/28 to reduce her risk for falls. Long term goal 6: Patient will ascend/descend 4 steps with 1 hand rail with CGA for home entry. Following session, patient left in safe position with all fall risk precautions in place.

## 2020-04-22 LAB
GLUCOSE BLD-MCNC: 174 MG/DL (ref 70–108)
GLUCOSE BLD-MCNC: 288 MG/DL (ref 70–108)
GLUCOSE BLD-MCNC: 321 MG/DL (ref 70–108)
GLUCOSE BLD-MCNC: 324 MG/DL (ref 70–108)

## 2020-04-22 PROCEDURE — 97530 THERAPEUTIC ACTIVITIES: CPT

## 2020-04-22 PROCEDURE — 97535 SELF CARE MNGMENT TRAINING: CPT

## 2020-04-22 PROCEDURE — 97110 THERAPEUTIC EXERCISES: CPT

## 2020-04-22 PROCEDURE — 1290000000 HC SEMI PRIVATE OTHER R&B

## 2020-04-22 PROCEDURE — 6360000002 HC RX W HCPCS: Performed by: FAMILY MEDICINE

## 2020-04-22 PROCEDURE — 97116 GAIT TRAINING THERAPY: CPT

## 2020-04-22 PROCEDURE — 82948 REAGENT STRIP/BLOOD GLUCOSE: CPT

## 2020-04-22 PROCEDURE — 6370000000 HC RX 637 (ALT 250 FOR IP): Performed by: FAMILY MEDICINE

## 2020-04-22 RX ORDER — DOCUSATE SODIUM 100 MG/1
100 CAPSULE, LIQUID FILLED ORAL 2 TIMES DAILY
Status: DISCONTINUED | OUTPATIENT
Start: 2020-04-22 | End: 2020-04-30 | Stop reason: HOSPADM

## 2020-04-22 RX ADMIN — MICONAZOLE NITRATE: 20 POWDER TOPICAL at 08:43

## 2020-04-22 RX ADMIN — LOSARTAN POTASSIUM 25 MG: 25 TABLET, FILM COATED ORAL at 08:42

## 2020-04-22 RX ADMIN — INSULIN LISPRO 8 UNITS: 100 INJECTION, SOLUTION INTRAVENOUS; SUBCUTANEOUS at 12:22

## 2020-04-22 RX ADMIN — AMLODIPINE BESYLATE 5 MG: 5 TABLET ORAL at 08:41

## 2020-04-22 RX ADMIN — FERROUS SULFATE TAB 325 MG (65 MG ELEMENTAL FE) 325 MG: 325 (65 FE) TAB at 08:42

## 2020-04-22 RX ADMIN — Medication 1000 MCG: at 08:42

## 2020-04-22 RX ADMIN — DOCUSATE SODIUM 100 MG: 100 CAPSULE, LIQUID FILLED ORAL at 08:37

## 2020-04-22 RX ADMIN — ATORVASTATIN CALCIUM 40 MG: 40 TABLET, FILM COATED ORAL at 20:19

## 2020-04-22 RX ADMIN — MICONAZOLE NITRATE: 20 POWDER TOPICAL at 20:20

## 2020-04-22 RX ADMIN — FAMOTIDINE 20 MG: 20 TABLET ORAL at 08:37

## 2020-04-22 RX ADMIN — ALPRAZOLAM 0.25 MG: 0.25 TABLET ORAL at 21:41

## 2020-04-22 RX ADMIN — INSULIN LISPRO 8 UNITS: 100 INJECTION, SOLUTION INTRAVENOUS; SUBCUTANEOUS at 17:58

## 2020-04-22 RX ADMIN — INSULIN LISPRO 2 UNITS: 100 INJECTION, SOLUTION INTRAVENOUS; SUBCUTANEOUS at 08:32

## 2020-04-22 RX ADMIN — Medication 325 MG: at 08:37

## 2020-04-22 RX ADMIN — METOPROLOL SUCCINATE 25 MG: 25 TABLET, FILM COATED, EXTENDED RELEASE ORAL at 08:41

## 2020-04-22 RX ADMIN — CLOPIDOGREL BISULFATE 75 MG: 75 TABLET ORAL at 08:37

## 2020-04-22 RX ADMIN — ENOXAPARIN SODIUM 40 MG: 40 INJECTION SUBCUTANEOUS at 08:37

## 2020-04-22 RX ADMIN — THERA TABS 1 TABLET: TAB at 08:41

## 2020-04-22 RX ADMIN — DOCUSATE SODIUM 100 MG: 100 CAPSULE, LIQUID FILLED ORAL at 20:19

## 2020-04-22 RX ADMIN — POLYETHYLENE GLYCOL (3350) 17 G: 17 POWDER, FOR SOLUTION ORAL at 08:41

## 2020-04-22 RX ADMIN — ACETAMINOPHEN 1000 MG: 500 TABLET ORAL at 20:19

## 2020-04-22 RX ADMIN — INSULIN GLARGINE 44 UNITS: 100 INJECTION, SOLUTION SUBCUTANEOUS at 21:23

## 2020-04-22 RX ADMIN — LEVOTHYROXINE SODIUM 112 MCG: 112 TABLET ORAL at 05:11

## 2020-04-22 ASSESSMENT — PAIN SCALES - GENERAL
PAINLEVEL_OUTOF10: 0
PAINLEVEL_OUTOF10: 0
PAINLEVEL_OUTOF10: 1

## 2020-04-22 NOTE — PROGRESS NOTES
and transitioned to Outpt services for PT. Pt reports that she had transitioned to Ind mobility within home without person or AD (noted much of her AD does not fit through doorways of mobile home per pt) however did continue to require some assist with ADLs at times. Since her stroke, pt's significant other had been completing all IADLs    Restrictions/Precautions:  Restrictions/Precautions: Isolation, Contact Precautions, General Precautions, Fall Risk  Position Activity Restriction  Other position/activity restrictions: COVID 19, contact and droplet precautions    SUBJECTIVE: Patient in bedside chair upon arrival. Patient agreeable to therapy. Patient requested to use restroom during session. PAIN: denies    OBJECTIVE:  Bed Mobility:  Not Tested    Transfers:  Sit to Stand: Contact Guard Assistance, Minimal Assistance, X 1, with increased time for completion, cues for hand placement, with verbal cues, cues for even weight shift for transfer, cues for proper foot placement  Stand to [address] [address], cues for proper foot placement before sitting, patient tends to drag or leave LLE in place and not advancing it to a safe placement before sitting  Stand Pivot:Contact Guard Assistance, same cues to sit as above  **performed x5 reps of sit<>stands from bedside chair, focus on midline weight shift, did not use UEs to stand    Ambulation:  Contact Guard Assistance, w/c follow  Distance: ~80ft x1  Surface: Level Tile  Device:4 Wheeled Walker  Gait Deviations:   Forward Flexed Posture, Slow Namita, Decreased Step Length on Right, Decreased Step Length Bilaterally, Decreased Weight Shift Left, Decreased Gait Speed, Decreased Heel Strike Bilaterally, Narrow Base of Support, Mild Path Deviations and cues for proper/safe foot placement of LLE especially with turns    Stairs:  Contact Guard Assistance, Minimal Assistance, X 1, with increased time for completion  Number of Steps: 4  Height: 6\" step with One Handrail , right handrail, non-reciprocal, cues for proper LLE foot placement for safety  Stairs:  Contact Guard Assistance  Number of Steps: 1 (forward step ups x2 reps, lateral step ups x 5 reps, leading L only    Height: 6\" step with One Handrail    Exercise:  Patient was guided in 1 set(s) 10-12 reps of exercise to both lower extremities. Ankle pumps, Heelslides, Long arc quads, Hip abduction/adduction, Seated hip flexion, Step ups and L ankle only: dorsi flexion with light manual resistance, eversion x12 reps each. Exercises were completed for increased independence with functional mobility. Functional Outcome Measures: Not completed       ASSESSMENT:  Assessment: Patient progressing toward established goals. and Patient demoed improved ankle strength this date. Activity Tolerance:  Patient tolerance of  treatment: good. Equipment Recommendations:Equipment Needed: No  Discharge Recommendations:  Continue to assess pending progress, Home with Home health PT    Plan: Times per week: 6x/wk  Times per day: Daily  Plan weeks: 4  Current Treatment Recommendations: Strengthening, Balance Training, Transfer Training, Endurance Training, Gait Training, Neuromuscular Re-education, Home Exercise Program, Patient/Caregiver Education & Training, Functional Mobility Training, Wheelchair Mobility Training, Safety Education & Training, Stair training    Patient Education  Patient Education: Plan of Care, Transfers, Gait, Stairs, Verbal Exercise Instruction    Goals:  Patient goals : go home, get stronger  Short term goals  Time Frame for Short term goals: 2 weeks  Short term goal 1: Patient will complete supine  <> sit with min assist to transfer in/out of bed with decreased difficulty. Short term goal 2: Patient will complete sit < > stand with min assist to stand to ambulate with decreased difficulty.   Short term goal 3: Patient will complete stand pivot transfer with min assist to transfer surface to surface with decreased difficulty. Short term goal 4: Patient will ambulate 48' with a RW and min assist to progress towards ambulating household distances safely. Short term goal 5: Patient will propel wheelchair 48' with CGA to navigate living environment and increase mobility. Short term goal 6: Patient will ascend/descend 1, 4\" step in parallel bars with min assist to progress towards safe home entry  Long term goals  Time Frame for Long term goals : 4 weeks  Long term goal 1: Patient will complete supine < > sit with modified independence to transfer in/out of bed safely. Long term goal 2: Patient will complete sit  <> stand and stand pivot transfers with SBA to transfer surface to surface safely. Long term goal 3: Patient will ambulate 76' with a RW and CGA to navigate home environment. Long term goal 4: Patient will propel wheelchair 100' with SBA to navigate living environment and to/from appointments. Long term goal 5: Patient will improve tinetti score to greater than or equal to 17/28 to reduce her risk for falls. Long term goal 6: Patient will ascend/descend 4 steps with 1 hand rail with CGA for home entry. Following session, patient left in safe position with all fall risk precautions in place.

## 2020-04-22 NOTE — PLAN OF CARE
infection signs and symptoms  Outcome: Ongoing  Note: Patient bottom excoriated. EPC applied. Miconozole applied under right breast.  Heels floated on pillow when in bed. Problem: Skin Integrity:  Goal: Absence of new skin breakdown  Description: Absence of new skin breakdown  Outcome: Ongoing     Problem: IP BALANCE  Goal: LTG - Patient will maintain balance to allow for safe/functional mobility  Outcome: Ongoing     Problem: IP MOBILITY  Goal: LTG - patient will ambulate household distance  Outcome: Ongoing     Problem: IP MOBILITY  Goal: LTG - Patient will navigate 4-6 steps with rails/device  Outcome: Ongoing     Problem: Discharge Planning:  Goal: Patients continuum of care needs are met  Description: Patients continuum of care needs are met  4/22/2020 0315 by Maribel Sweeney RN  Outcome: Ongoing  Note: No p lans for discharge at this time.   Team conference on 4/28     Problem: IP DRESSINGS LOWER EXTREMITIES  Goal: LTG - patient will dress lower body with or without assistive device  Outcome: Ongoing     Problem: SAFETY  Goal: LTG - patient will utilize safety techniques  Outcome: Ongoing     Problem: Role Relationship:  Goal: Ability to verbalize awareness of feelings that lead to decreased social interaction will improve  Description: Ability to verbalize awareness of feelings that lead to decreased social interaction will improve  Outcome: Ongoing     Problem: Safety:  Goal: Ability to chew and swallow food without choking will improve  Description: Ability to chew and swallow food without choking will improve  Outcome: Ongoing     Problem: Mood - Altered:  Goal: Mood stable  Description: Mood stable  Outcome: Ongoing     Problem: Physical Regulation:  Goal: Prevent transmission of infection  Outcome: Ongoing     Problem: Urinary Elimination:  Goal: Skin integrity will improve  Description: Skin integrity will improve  Outcome: Ongoing     Problem: Urinary Elimination:  Goal: Ability to recognize the need to void and respond appropriately will improve  Description: Ability to recognize the need to void and respond appropriately will improve  Outcome: Ongoing  Note: Patient was had one episode of bladder incontinence last night. Is improving in alerting staff in time to make it to the bathroom. Problem: Urinary Elimination:  Goal: Will remain free from infection  Description: Will remain free from infection  Outcome: Ongoing     Problem: Urinary Elimination:  Goal: Incidence of incontinence will decrease  Description: Incidence of incontinence will decrease  Outcome: Ongoing     Problem: Anxiety:  Goal: Level of anxiety will decrease  Description: Level of anxiety will decrease  Outcome: Ongoing  Note: Patient was able to sleep with administration of PRN xanex. Reports decreased anxiety and ability to sleep      Problem: Pressure Ulcer  Goal: Promote pressure ulcer healing  Outcome: Ongoing     Problem: Pressure Ulcer  Goal: Absence of pressure ulcer  Outcome: Ongoing   Reviewed care plan with patient. Patient needs reinforcement on understanding care plan and treatment goals.

## 2020-04-23 LAB
GLUCOSE BLD-MCNC: 158 MG/DL (ref 70–108)
GLUCOSE BLD-MCNC: 217 MG/DL (ref 70–108)
GLUCOSE BLD-MCNC: 302 MG/DL (ref 70–108)
GLUCOSE BLD-MCNC: 349 MG/DL (ref 70–108)

## 2020-04-23 PROCEDURE — 97110 THERAPEUTIC EXERCISES: CPT

## 2020-04-23 PROCEDURE — 6370000000 HC RX 637 (ALT 250 FOR IP): Performed by: FAMILY MEDICINE

## 2020-04-23 PROCEDURE — 97535 SELF CARE MNGMENT TRAINING: CPT

## 2020-04-23 PROCEDURE — 82948 REAGENT STRIP/BLOOD GLUCOSE: CPT

## 2020-04-23 PROCEDURE — 6360000002 HC RX W HCPCS: Performed by: FAMILY MEDICINE

## 2020-04-23 PROCEDURE — 97116 GAIT TRAINING THERAPY: CPT

## 2020-04-23 PROCEDURE — 1290000000 HC SEMI PRIVATE OTHER R&B

## 2020-04-23 PROCEDURE — 97530 THERAPEUTIC ACTIVITIES: CPT

## 2020-04-23 RX ORDER — INSULIN GLARGINE 100 [IU]/ML
50 INJECTION, SOLUTION SUBCUTANEOUS NIGHTLY
Status: DISCONTINUED | OUTPATIENT
Start: 2020-04-23 | End: 2020-04-25

## 2020-04-23 RX ADMIN — LEVOTHYROXINE SODIUM 112 MCG: 112 TABLET ORAL at 05:53

## 2020-04-23 RX ADMIN — MICONAZOLE NITRATE: 20 POWDER TOPICAL at 21:45

## 2020-04-23 RX ADMIN — AMLODIPINE BESYLATE 5 MG: 5 TABLET ORAL at 08:32

## 2020-04-23 RX ADMIN — CLOPIDOGREL BISULFATE 75 MG: 75 TABLET ORAL at 08:32

## 2020-04-23 RX ADMIN — Medication 325 MG: at 08:32

## 2020-04-23 RX ADMIN — DOCUSATE SODIUM 100 MG: 100 CAPSULE, LIQUID FILLED ORAL at 20:33

## 2020-04-23 RX ADMIN — LOSARTAN POTASSIUM 25 MG: 25 TABLET, FILM COATED ORAL at 08:32

## 2020-04-23 RX ADMIN — FAMOTIDINE 20 MG: 20 TABLET ORAL at 08:32

## 2020-04-23 RX ADMIN — THERA TABS 1 TABLET: TAB at 08:32

## 2020-04-23 RX ADMIN — INSULIN GLARGINE 50 UNITS: 100 INJECTION, SOLUTION SUBCUTANEOUS at 21:44

## 2020-04-23 RX ADMIN — INSULIN LISPRO 8 UNITS: 100 INJECTION, SOLUTION INTRAVENOUS; SUBCUTANEOUS at 14:06

## 2020-04-23 RX ADMIN — FERROUS SULFATE TAB 325 MG (65 MG ELEMENTAL FE) 325 MG: 325 (65 FE) TAB at 08:32

## 2020-04-23 RX ADMIN — MICONAZOLE NITRATE: 20 POWDER TOPICAL at 08:34

## 2020-04-23 RX ADMIN — METOPROLOL SUCCINATE 25 MG: 25 TABLET, FILM COATED, EXTENDED RELEASE ORAL at 08:33

## 2020-04-23 RX ADMIN — INSULIN LISPRO 8 UNITS: 100 INJECTION, SOLUTION INTRAVENOUS; SUBCUTANEOUS at 17:42

## 2020-04-23 RX ADMIN — ACETAMINOPHEN 1000 MG: 500 TABLET ORAL at 20:34

## 2020-04-23 RX ADMIN — Medication 1000 MCG: at 08:33

## 2020-04-23 RX ADMIN — ALPRAZOLAM 0.25 MG: 0.25 TABLET ORAL at 20:35

## 2020-04-23 RX ADMIN — ENOXAPARIN SODIUM 40 MG: 40 INJECTION SUBCUTANEOUS at 08:33

## 2020-04-23 RX ADMIN — INSULIN LISPRO 2 UNITS: 100 INJECTION, SOLUTION INTRAVENOUS; SUBCUTANEOUS at 08:36

## 2020-04-23 RX ADMIN — ATORVASTATIN CALCIUM 40 MG: 40 TABLET, FILM COATED ORAL at 20:33

## 2020-04-23 ASSESSMENT — PAIN SCALES - GENERAL
PAINLEVEL_OUTOF10: 0
PAINLEVEL_OUTOF10: 3
PAINLEVEL_OUTOF10: 0

## 2020-04-23 ASSESSMENT — ENCOUNTER SYMPTOMS
COUGH: 0
SHORTNESS OF BREATH: 1
ABDOMINAL PAIN: 0

## 2020-04-23 NOTE — PROGRESS NOTES
Activity    Goals  Short term goals  Time Frame for Short term goals: 2 weeks  Short term goal 1: Pt will perform various functional t/fs with no > CGA for increased ease and independence with toileting tasks  Short term goal 2: Pt will demonstrate standing endurance >2 mins with occasional 1UE release with min A for increased participation with sinkside grooming  Short term goal 3: Pt will participate in [address] and  strength assessment for increased ease with clothing management and grooming tasks  Short term goal 4: Pt will complete UB ADLs with min A and cues for adaptive techniques for increased independence with self care  Long term goals  Time Frame for Long term goals : 4 weeks  Long term goal 1: Pt will complete toileting tasks with SBA for increased independence with self care  Long term goal 2: Pt will complete dressing tasks, using any DME necessary, and SBA for increased independence with BADL tasks  Long term goal 3: Pt will safely navigate around environmental barriers with no cues and SBA for increased independence accessing environment    Following session, patient left in safe position with all fall risk precautions in place.

## 2020-04-23 NOTE — PROGRESS NOTES
[facility]  INPATIENT PHYSICAL THERAPY  DAILY NOTE  [address] [place] SKILLED NURSING UNIT - 8E-68/068-A  Time In: 0188  Time Out: 3518  Timed Code Treatment Minutes: 54 Minutes  Minutes: 55          Date: 2020  Patient Name: Tracie Acuña,  Gender:  female        MRN: [id number]  : 1940  (70 y.o.)  Referral Date : 20  Referring Practitioner: Jeramie Ley MD  Diagnosis: COVID 19  Additional Pertinent Hx:  Tracie Acuña  is a 70 y.o. female admitted to the transitional care unit on 2020. She was admitted in the acute area of [facility]'s earlier this month for pneumonia and was found to be covid positive. She became afebrile and with the improving of her symptoms she was discharged home to the care of her family. Pt unable to safely care for self at home, needing additional assistance, experiencing falls and ED visit. She now presents to the TCU due to decline in mobility     Prior Level of Function:  Lives With: Significant other  Type of Home: Mobile home  Home Layout: One level  Home Access: Stairs to enter with rails  Entrance Stairs - Number of Steps: 4  Entrance Stairs - Rails: Left  Home Equipment: 4 wheeled walker, Quad cane, Wheelchair-manual   Bathroom Shower/Tub: Walk-in shower  Bathroom Toilet: Bedside commode(has raised toilet seat and bed side commode)  Bathroom Equipment: Grab bars in shower, Shower chair(grab bar suction)    Receives Help From: Family  ADL Assistance: Needs assistance(pt reports daughter-in-law assists with distal BLE bathing, sig. other assists with threading depends, and assists with toileting)  Homemaking Assistance: (sig other had been completing however he is now hospitalized)  Ambulation Assistance: Independent(pt reports within home without AD)  Transfer Assistance: Independent  Active :  Yes  Additional Comments: Pt reports history of CVA with L sided deficts in  in which [facility] and transitioned to Outpt services for PT. Pt reports that she had transitioned to Ind mobility within home without person or AD (noted much of her AD does not fit through doorways of mobile home per pt) however did continue to require some assist with ADLs at times. Since her stroke, pt's significant other had been completing all IADLs    Restrictions/Precautions:  Restrictions/Precautions: Isolation, Contact Precautions, General Precautions, Fall Risk  Position Activity Restriction  Other position/activity restrictions: COVID 19, contact and droplet precautions    SUBJECTIVE: Patient in room, agreeable to PT. RN present. Pt reports eating a little better. PAIN: left shoulder with flexion to reach for hand rail, not rated    OBJECTIVE:  Bed Mobility:  Not Tested    Transfers:  Sit to Stand: [employer], verbal cues for left hand placement  Stand to Sit:Contact Guard Assistance, Minimal Assistance, verbal cues to reach back with left UE, verbal cues to retro step closer to chair prior to attempting to sit down. Stand Pivot:Contact Guard Assistance, Minimal Assistance, difficulty advancing left LE with change in direction this date  -Pt ambulating between 2 chairs x 4 times with rollator this date and CGA to stand, CGA/min assist for safe descent, only requiring min assist one time for controlled descent. Completed to increase safety with transfers. Verbal cues for left hand placement. Ambulation:  Contact Guard Assistance  Distance: 120', 100'  Surface: Level Tile  Device:4 Wheeled Walker  Gait Deviations: Forward Flexed Posture, Slow Namita, Decreased Step Length Bilaterally, Decreased Weight Shift Left and Decreased Heel Strike on Left  Verbal cues for left foot clearance and heel strike. Pt demonstrating difficulty clearing floor with left foot initially, but improved throughout treatment.     Stairs:  Contact Guard Assistance to ascend, Minimal Assistance to descend  Number of Steps: 4  Height: 6\" step with One Handrail    Balance:  Static Sitting Balance:  Modified Independent  Static Standing Balance: Contact Guard Assistance  Dynamic Standing Balance: Contact Guard Assistance, Minimal Assistance    Exercise:  Patient was guided in 1 set(s) 10 reps of exercise to both lower extremities. Standing: anterior step taps x 8 alternating rest break after, march x10, hip Flexon x 10, heel raises x 10, hip abduction x 10, hamstring curls x 10. Seated rest breaks every 2 exercises due to fatigue. Exercises were completed for increased independence with functional mobility. Functional Outcome Measures: Not completed       ASSESSMENT:  Assessment: Patient progressing toward established goals. Pt demonstrating gradual improvements with ambulation and stairs, progressing to CGA/min assist on stairs. Patient continues to require verbal cues for hand placement with transfers, although left UE awareness is improving. Patient would benefit from continued skilled PT services to improve her ability to complete functional mobility, reduce her risk for falls and allow patient to return to OF. Activity Tolerance:  Patient tolerance of  treatment: good.      Equipment Recommendations:Equipment Needed: No  Discharge Recommendations:  Continue to assess pending progress, Home with Home health PT    Plan: Times per week: 6x/wk  Times per day: Daily  Plan weeks: 4  Current Treatment Recommendations: Strengthening, Balance Training, Transfer Training, Endurance Training, Gait Training, Neuromuscular Re-education, Home Exercise Program, Patient/Caregiver Education & Training, Functional Mobility Training, Wheelchair Mobility Training, Safety Education & Training, Stair training    Patient Education  Patient Education: Transfers, Gait, Stairs, Verbal Exercise Instruction,  - Patient Verbalized Understanding, - Patient Requires Continued Education    Goals:  Patient goals : go home, get stronger  Short term

## 2020-04-23 NOTE — FLOWSHEET NOTE
Pt was reached at via phone conversation. She said that she was getting better and did not discussed her worries. She wanted prayer to heal and I prayed asking God to heal her.      04/22/20 2300   Encounter Summary   Services provided to: Patient   Referral/Consult From: [place]   Continue Visiting Yes  (4/22 P)   Complexity of Encounter Low   Length of Encounter 15 minutes   Routine   Type Follow up   Assessment Approachable;Calm   Intervention [place];Prayer;Nurtured hope;Empowerment;Sustaining presence/ Ministry of presence   Outcome Acceptance;Expressed gratitude;Encouraged; Hopeful

## 2020-04-23 NOTE — PLAN OF CARE
of incontinence will decrease  Description: Incidence of incontinence will decrease  Outcome: Ongoing  Remains incontinent of bladder at times    Problem: Anxiety:  Goal: Level of anxiety will decrease  Description: Level of anxiety will decrease  Outcome: Ongoing  Level of anxiety decreases with relaxation, providing quiet, calm environment, and prn medication as ordered. Problem: Pressure Ulcer  Goal: Promote pressure ulcer healing  Outcome: Ongoing  No pressure ulcers noted with skin check. SPR overlay mattress in use. Float heels when in bed. Repositioning every 2 hours.

## 2020-04-23 NOTE — PLAN OF CARE
Team Conference held on 4/14. Recommendations of the team were explained to the patient by MELINA and Dr. Yevette Harada. Team is recommending that patient continue on TCU for several more days, with discharge date undetermined at this time. Patient has Managed Medicare through [address] [place] [address], an update was sent on Monday 4/13. Insurance approved through 4/25 with the next update due on 4/24. Discharge date will depend on insurance approval of additional days. Patient will be a RE team conference on 4/28 pending insurance approval.. Following discharge team is recommending home with home health, PT,OT,RN and aide services. SW will continue to monitor progress and maintain contact with patient and patient's family regarding length of stay and recommendations.  SW will continue to assist with ongoing discharge planning

## 2020-04-24 LAB
GLUCOSE BLD-MCNC: 118 MG/DL (ref 70–108)
GLUCOSE BLD-MCNC: 252 MG/DL (ref 70–108)
GLUCOSE BLD-MCNC: 272 MG/DL (ref 70–108)
GLUCOSE BLD-MCNC: 310 MG/DL (ref 70–108)

## 2020-04-24 PROCEDURE — 1290000000 HC SEMI PRIVATE OTHER R&B

## 2020-04-24 PROCEDURE — 6370000000 HC RX 637 (ALT 250 FOR IP): Performed by: FAMILY MEDICINE

## 2020-04-24 PROCEDURE — 97530 THERAPEUTIC ACTIVITIES: CPT

## 2020-04-24 PROCEDURE — 97116 GAIT TRAINING THERAPY: CPT

## 2020-04-24 PROCEDURE — 97110 THERAPEUTIC EXERCISES: CPT

## 2020-04-24 PROCEDURE — 82948 REAGENT STRIP/BLOOD GLUCOSE: CPT

## 2020-04-24 PROCEDURE — 97535 SELF CARE MNGMENT TRAINING: CPT

## 2020-04-24 PROCEDURE — 6360000002 HC RX W HCPCS: Performed by: FAMILY MEDICINE

## 2020-04-24 RX ADMIN — AMLODIPINE BESYLATE 5 MG: 5 TABLET ORAL at 08:12

## 2020-04-24 RX ADMIN — CLOPIDOGREL BISULFATE 75 MG: 75 TABLET ORAL at 08:12

## 2020-04-24 RX ADMIN — LEVOTHYROXINE SODIUM 112 MCG: 112 TABLET ORAL at 05:15

## 2020-04-24 RX ADMIN — MICONAZOLE NITRATE: 20 POWDER TOPICAL at 08:13

## 2020-04-24 RX ADMIN — SENNOSIDES 8.6 MG: 8.6 TABLET, FILM COATED ORAL at 05:15

## 2020-04-24 RX ADMIN — INSULIN LISPRO 6 UNITS: 100 INJECTION, SOLUTION INTRAVENOUS; SUBCUTANEOUS at 17:47

## 2020-04-24 RX ADMIN — LOSARTAN POTASSIUM 25 MG: 25 TABLET, FILM COATED ORAL at 08:12

## 2020-04-24 RX ADMIN — DOCUSATE SODIUM 100 MG: 100 CAPSULE, LIQUID FILLED ORAL at 08:12

## 2020-04-24 RX ADMIN — METOPROLOL SUCCINATE 25 MG: 25 TABLET, FILM COATED, EXTENDED RELEASE ORAL at 08:12

## 2020-04-24 RX ADMIN — FERROUS SULFATE TAB 325 MG (65 MG ELEMENTAL FE) 325 MG: 325 (65 FE) TAB at 08:12

## 2020-04-24 RX ADMIN — Medication 1000 MCG: at 08:12

## 2020-04-24 RX ADMIN — INSULIN GLARGINE 50 UNITS: 100 INJECTION, SOLUTION SUBCUTANEOUS at 21:46

## 2020-04-24 RX ADMIN — ACETAMINOPHEN 1000 MG: 500 TABLET ORAL at 21:48

## 2020-04-24 RX ADMIN — ALPRAZOLAM 0.25 MG: 0.25 TABLET ORAL at 21:48

## 2020-04-24 RX ADMIN — Medication 325 MG: at 08:12

## 2020-04-24 RX ADMIN — INSULIN LISPRO 8 UNITS: 100 INJECTION, SOLUTION INTRAVENOUS; SUBCUTANEOUS at 12:13

## 2020-04-24 RX ADMIN — FAMOTIDINE 20 MG: 20 TABLET ORAL at 08:12

## 2020-04-24 RX ADMIN — ATORVASTATIN CALCIUM 40 MG: 40 TABLET, FILM COATED ORAL at 21:48

## 2020-04-24 RX ADMIN — ENOXAPARIN SODIUM 40 MG: 40 INJECTION SUBCUTANEOUS at 08:12

## 2020-04-24 RX ADMIN — THERA TABS 1 TABLET: TAB at 08:12

## 2020-04-24 ASSESSMENT — PAIN SCALES - GENERAL
PAINLEVEL_OUTOF10: 0
PAINLEVEL_OUTOF10: 0

## 2020-04-24 NOTE — PLAN OF CARE
Problem: Falls - Risk of:  Goal: Will remain free from falls  Description: Will remain free from falls  Outcome: Ongoing  Falling star prevention in place. Bed and chair alarms in use. Call light in reach. Purposeful hourly rounding. Problem: Bleeding:  Goal: Will show no signs and symptoms of excessive bleeding  Description: Will show no signs and symptoms of excessive bleeding  Outcome: Ongoing  No signs of excessive bleeding noted this shift. Problem: Mobility - Impaired:  Goal: Mobility will improve  Description: Mobility will improve  Outcome: Ongoing  Patient continues with therapies and is working toward discharge goals. Problem: Respiratory  Goal: No pulmonary complications  Outcome: Ongoing  None noted    Problem: Respiratory  Goal: O2 Sat > 90%  Outcome: Ongoing  O2 Sat > 90% this shift    Problem: GI  Goal: Bowel movement at least every other day  Outcome: Ongoing  Bowels moved today    Problem: Mood - Altered:  Goal: Mood stable  Description: Mood stable  Outcome: Ongoing  Patient pleasant, calm, and cooperative with staff this shift. Problem: Physical Regulation:  Goal: Prevent transmission of infection  Outcome: Ongoing  Patient remains in contact isolation for Covid 19 . Educated on Covid 19 and ways to prevent transmission to others. Educated on contact isolation procedures for visitors and staff. Problem: Urinary Elimination:  Goal: Incidence of incontinence will decrease  Description: Incidence of incontinence will decrease  Outcome: Ongoing  Incontinent and frequency at times     Problem: Pressure Ulcer  Goal: Absence of pressure ulcer  Outcome: Ongoing  No pressure ulcers noted with skin check. Float heels when in bed. Repositioning every 2 hours.

## 2020-04-24 NOTE — PROGRESS NOTES
such as [address] [place] [address] on regular basis. Mentions she did not eat any lunch \" I don't like turkey and  your potato soup\" I obtained some food preferences/dislikes. pt mentions she did drink all of the Glucerna. It appears she drinks % Glucerna ONS (26 grams CHO) with meal intake varied 1-25%, 26-50%, % however allowed 2 carb choices/meal per MD orders. ( She receives additional 26 grams CHO/meal from Glucerna) Meds: Ferrous Sulfate, lantus, humalog, MVI, Vitmain B12.  chemsticks 118-349 last 24 hrs. · Wound Type: (traumatic wound toe, buttocks wound, rt breast wound)  · Current Nutrition Therapies:  · Oral Diet Orders: (carb controlled ( 2 carb choices/meal per orders))   · Oral Diet intake: 0%, 26-50%, %  · Oral Nutrition Supplement (ONS) Orders: Diabetic Oral Supplement  · ONS intake: %  · Anthropometric Measures:  · Ht: 5' 3\" (160 cm)   · Current Body Wt: 142 lb 6.7 oz (64.6 kg)  · Admission Body Wt: 146 lb 14.4 oz (66.6 kg)(4/9/20, no edema)  · Usual Body Wt: 158 lb (71.7 kg)(EMR on 4/1/20 and 2/6/20.  170# on 7/7/19.  )  · % Weight Change:  ,  28# wt loss (16.5%) in 9 months per EMR (COVID 19, poor po intake)  · Ideal Body Wt: 115 lb (52.2 kg),   · BMI Classification: BMI 25.0 - 29.9 Overweight(25.9)    Nutrition Interventions:   (MD to advise if higher dose insulin algorithm/ med adjustment as appropriate to aid with blood sugar control. Pt COVID 19+.   )  Continued Inpatient Monitoring, Education not appropriate at this time, Coordination of Care    Nutrition Evaluation:   · Evaluation: No progress toward goals   · Goals: Pt. will consume 75% or more of meals and experience no significant weight loss during LOS.     · Monitoring: Nutrition Progression, Meal Intake, Supplement Intake, Diet Tolerance, Skin Integrity, Wound Healing, I&O, Weight, Pertinent Labs, Monitor Bowel Function      Electronically signed by Quaina Stark RD, LD on 4/24/20 at 3:02 PM EDT    Contact Number: [phone]

## 2020-04-24 NOTE — PROGRESS NOTES
placement.  -Pt completed multiple sit < > stand transfers between various surfaces ~20 sit < > stand transfers. Focusing on controlled retro step and descent. Ambulation:  Contact Guard Assistance, min assist one time at end of session due to left foot catching with fatigue  Distance: 150', 80', 15' x 6  Surface: Level Tile  Device:4 Wheeled Walker  Gait Deviations: Forward Flexed Posture and Slow Gustavo, Decreased gustavo, decreased heel strike, decreased step height left lower extremity, mild gait path deviations  Verbal cues for increased left step height and heel strike. Trialed AFO however difficult fitting AFO in shoe and no improvement noted ~20'    Stairs:  [employer] first trial to ascend and CGA to min assist to descend, Minimal Assistance to CGA to ascend due to pt ascending with weaker leg one time, CGA to min assist to descend  Number of Steps: 4 x2  Height: 6\" step with One Handrail   Pt able to recall LE sequencing this date, however ascended with incorrect foot one time    Exercise: not completed    Functional Outcome Measures: Completed  Balance Score: 6  Gait Score: 7  Tinetti Total Score: 13/28  Risk Indicators:  Less than/equal to 18 = high risk  19-23 Moderate risk  Greater than/equal to 24 = low risk    ASSESSMENT:  Assessment:   . Ms. Dulce Maria Frye has met 6/6 STG's and 0/6 LTG's. Patient met new STG for stairs, progressing stairs from min/moderate assistance to CGA/min assist and met STG for wheelchair mobility, completing 48' with CGA. Pt continues to make progress towards goals set for her, progressing towards CGA with ambulation, only requiring min assist occasionally with fatigue due to decreased left step height. Patient progressing sit to stand to SBA, however requiring CGA to min assist for stand to sit.   Pt showing much improvement with stairs this week, progressing from 2 hand rails to 1 hand rail on 4 steps, however continues to requiring min assist at times due to household distances safely. GOAL MET, DISCONTINUE, SEE LTG  Short term goal 5: Patient will propel wheelchair 48' with CGA to navigate living environment and increase mobility. GOAL MET, DISCONTINUE, SEE LTG  Short term goal 6: Patient will ascend/descend 1, 4\" step in parallel bars with min assist to progress towards safe home entry GOAL MET, DISCONTINUE, SEE LTG  Long term goals  Time Frame for Long term goals : 4 weeks  Long term goal 1: Patient will complete supine < > sit with modified independence to transfer in/out of bed safely. GOAL NOT MET, CONTINUE  Long term goal 2: Patient will complete sit  <> stand and stand pivot transfers with SBA to transfer surface to surface safely. GOAL NOT MET, CONTINUE  Long term goal 3: Patient will ambulate 76' with a RW and CGA to navigate home environment. GOAL NOT MET, CONTINUE  Long term goal 4: Patient will propel wheelchair 100' with SBA to navigate living environment and to/from appointments. GOAL NOT MET, CONTINUE  Long term goal 5: Patient will improve tinetti score to greater than or equal to 17/28 to reduce her risk for falls. GOAL NOT MET, CONTINUE  Long term goal 6: Patient will ascend/descend 4 steps with 1 hand rail with CGA for home entry.  GOAL NOT MET, CONTINUE

## 2020-04-25 LAB
GLUCOSE BLD-MCNC: 145 MG/DL (ref 70–108)
GLUCOSE BLD-MCNC: 252 MG/DL (ref 70–108)
GLUCOSE BLD-MCNC: 273 MG/DL (ref 70–108)
GLUCOSE BLD-MCNC: 349 MG/DL (ref 70–108)

## 2020-04-25 PROCEDURE — 6360000002 HC RX W HCPCS: Performed by: FAMILY MEDICINE

## 2020-04-25 PROCEDURE — 82948 REAGENT STRIP/BLOOD GLUCOSE: CPT

## 2020-04-25 PROCEDURE — 6370000000 HC RX 637 (ALT 250 FOR IP): Performed by: FAMILY MEDICINE

## 2020-04-25 PROCEDURE — 1290000000 HC SEMI PRIVATE OTHER R&B

## 2020-04-25 RX ORDER — INSULIN GLARGINE 100 [IU]/ML
56 INJECTION, SOLUTION SUBCUTANEOUS NIGHTLY
Status: DISCONTINUED | OUTPATIENT
Start: 2020-04-25 | End: 2020-04-30 | Stop reason: HOSPADM

## 2020-04-25 RX ADMIN — INSULIN GLARGINE 56 UNITS: 100 INJECTION, SOLUTION SUBCUTANEOUS at 21:19

## 2020-04-25 RX ADMIN — METOPROLOL SUCCINATE 25 MG: 25 TABLET, FILM COATED, EXTENDED RELEASE ORAL at 08:18

## 2020-04-25 RX ADMIN — MICONAZOLE NITRATE: 20 POWDER TOPICAL at 21:20

## 2020-04-25 RX ADMIN — Medication 1000 MCG: at 08:18

## 2020-04-25 RX ADMIN — LOSARTAN POTASSIUM 25 MG: 25 TABLET, FILM COATED ORAL at 08:18

## 2020-04-25 RX ADMIN — THERA TABS 1 TABLET: TAB at 08:18

## 2020-04-25 RX ADMIN — INSULIN LISPRO 2 UNITS: 100 INJECTION, SOLUTION INTRAVENOUS; SUBCUTANEOUS at 08:26

## 2020-04-25 RX ADMIN — ALPRAZOLAM 0.25 MG: 0.25 TABLET ORAL at 21:20

## 2020-04-25 RX ADMIN — Medication 325 MG: at 08:19

## 2020-04-25 RX ADMIN — ENOXAPARIN SODIUM 40 MG: 40 INJECTION SUBCUTANEOUS at 08:19

## 2020-04-25 RX ADMIN — DOCUSATE SODIUM 100 MG: 100 CAPSULE, LIQUID FILLED ORAL at 08:19

## 2020-04-25 RX ADMIN — FERROUS SULFATE TAB 325 MG (65 MG ELEMENTAL FE) 325 MG: 325 (65 FE) TAB at 08:18

## 2020-04-25 RX ADMIN — INSULIN LISPRO 8 UNITS: 100 INJECTION, SOLUTION INTRAVENOUS; SUBCUTANEOUS at 12:06

## 2020-04-25 RX ADMIN — INSULIN LISPRO 6 UNITS: 100 INJECTION, SOLUTION INTRAVENOUS; SUBCUTANEOUS at 17:11

## 2020-04-25 RX ADMIN — ATORVASTATIN CALCIUM 40 MG: 40 TABLET, FILM COATED ORAL at 21:20

## 2020-04-25 RX ADMIN — LEVOTHYROXINE SODIUM 112 MCG: 112 TABLET ORAL at 06:30

## 2020-04-25 RX ADMIN — AMLODIPINE BESYLATE 5 MG: 5 TABLET ORAL at 08:18

## 2020-04-25 RX ADMIN — MICONAZOLE NITRATE: 20 POWDER TOPICAL at 08:20

## 2020-04-25 RX ADMIN — CLOPIDOGREL BISULFATE 75 MG: 75 TABLET ORAL at 08:19

## 2020-04-25 RX ADMIN — FAMOTIDINE 20 MG: 20 TABLET ORAL at 08:19

## 2020-04-25 ASSESSMENT — ENCOUNTER SYMPTOMS
CONSTIPATION: 0
COUGH: 1
SHORTNESS OF BREATH: 1
SINUS PRESSURE: 0

## 2020-04-25 NOTE — PROGRESS NOTES
Dominick Antonio MD   aspirin 81 MG tablet Take 325 mg by mouth daily  Yes Historical Provider, MD   amLODIPine (NORVASC) 5 MG tablet Take 10 mg by mouth daily  Yes Historical Provider, MD   Insulin Syringe-Needle U-100 (RELI-ON INSULIN SYRINGE) by Does not apply route. 31 G/8MM MIS (SHORT)  Historical Provider, MD       Social History     Tobacco Use    Smoking status: Passive Smoke Exposure - Never Smoker    Smokeless tobacco: Never Used    Tobacco comment: worked in a bar for years   Substance Use Topics    Alcohol use: Yes     Comment: seldom    Drug use: No        PHYSICAL EXAMINATION:  [ INSTRUCTIONS:  \"[x]\" Indicates a positive item  \"[]\" Indicates a negative item  -- DELETE ALL ITEMS NOT EXAMINED]  Vital Signs: (As obtained by patient/caregiver or practitioner observation)    Blood pressure 135/66, pulse 75, temperature 96.8 °F (36 °C), temperature source Oral, resp. rate 16, height 5' 3\" (1.6 m), weight 142 lb 8 oz (64.6 kg), SpO2 99 %. Constitutional: [x] Appears well-developed and well-nourished [x] No apparent distress      [] Abnormal-   Mental status  [x] Alert and awake  [] Oriented to person/place/time []Able to follow commands      Eyes:  EOM    []  Normal  [] Abnormal-  Sclera  [x]  Normal  [] Abnormal -         Discharge []  None visible  [] Abnormal -    HENT:   [x] Normocephalic, atraumatic.   [] Abnormal   [] Mouth/Throat: Mucous membranes are moist.     External Ears [x] Normal  [] Abnormal-     Neck: [x] No visualized mass     Pulmonary/Chest: [x] Respiratory effort normal.  [x] No visualized signs of difficulty breathing or respiratory distress        [] Abnormal-      Musculoskeletal:   [] Normal gait with no signs of ataxia         [x] Normal range of motion of neck        [] Abnormal-       Neurological:        [x] No Facial Asymmetry (Cranial nerve 7 motor function) (limited exam to video visit)          [] No gaze palsy        [] Abnormal-         Skin:        [x] No significant exanthematous lesions or discoloration noted on facial skin         [] Abnormal-            Psychiatric:       [x] Normal Affect [] No Hallucinations        [] Abnormal-     Other pertinent observable physical exam findings-     ASSESSMENT/PLAN:  Active Hospital Problems    Diagnosis Date Noted    Obstructive sleep apnea on CPAP [G47.33, Z99.89] 03/26/2014     Priority: High    Anxiety [F41.9] 04/21/2020    Sprain and strain of left wrist [S63.502A, S66.912A] 04/10/2020    Sprain of left foot [S93.602A] 04/10/2020    Strain of left inguinal muscle [S39.013A] 04/10/2020    COVID-19 [U07.1, J98.8] 04/09/2020    Essential hypertension [I10] 12/13/2015    Coronary artery disease involving native coronary artery of native heart without angina pectoris [I25.10] 12/13/2015    Type 2 diabetes mellitus with circulatory disorder, with long-term current use of insulin (HCC) [E11.59, Z79.4]     Hypothyroidism [E03.9]      Chidi Clifton is a 70 y.o. female being evaluated by a Virtual Visit (video visit) encounter to address concerns as mentioned above. A caregiver was present when appropriate. Due to this being a TeleHealth encounter (During [place] [address] public health emergency), evaluation of the following organ systems was limited: Vitals/Constitutional/EENT/Resp/CV/GI//MS/Neuro/Skin/Heme-Lymph-Imm. Pursuant to the emergency declaration under the [address] [place] [address] and the [employer] and [employer] Act, this Virtual Visit was conducted with patient's (and/or legal guardian's) consent, to reduce the patient's risk of exposure to COVID-19 and provide necessary medical care. Patient identification was verified at the start of the visit: Yes    Total time spent on this encounter: 11 minutes    Services were provided through a video synchronous discussion virtually to substitute for in-person clinic visit.  --Rosio Prince

## 2020-04-26 LAB
GLUCOSE BLD-MCNC: 104 MG/DL (ref 70–108)
GLUCOSE BLD-MCNC: 197 MG/DL (ref 70–108)
GLUCOSE BLD-MCNC: 257 MG/DL (ref 70–108)
GLUCOSE BLD-MCNC: 308 MG/DL (ref 70–108)

## 2020-04-26 PROCEDURE — 97535 SELF CARE MNGMENT TRAINING: CPT

## 2020-04-26 PROCEDURE — 97116 GAIT TRAINING THERAPY: CPT

## 2020-04-26 PROCEDURE — 97110 THERAPEUTIC EXERCISES: CPT

## 2020-04-26 PROCEDURE — 6370000000 HC RX 637 (ALT 250 FOR IP): Performed by: FAMILY MEDICINE

## 2020-04-26 PROCEDURE — 82948 REAGENT STRIP/BLOOD GLUCOSE: CPT

## 2020-04-26 PROCEDURE — 6360000002 HC RX W HCPCS: Performed by: FAMILY MEDICINE

## 2020-04-26 PROCEDURE — 1290000000 HC SEMI PRIVATE OTHER R&B

## 2020-04-26 RX ADMIN — Medication 1000 MCG: at 08:28

## 2020-04-26 RX ADMIN — DOCUSATE SODIUM 100 MG: 100 CAPSULE, LIQUID FILLED ORAL at 08:29

## 2020-04-26 RX ADMIN — THERA TABS 1 TABLET: TAB at 08:28

## 2020-04-26 RX ADMIN — ENOXAPARIN SODIUM 40 MG: 40 INJECTION SUBCUTANEOUS at 08:29

## 2020-04-26 RX ADMIN — ALPRAZOLAM 0.25 MG: 0.25 TABLET ORAL at 21:03

## 2020-04-26 RX ADMIN — LEVOTHYROXINE SODIUM 112 MCG: 112 TABLET ORAL at 04:27

## 2020-04-26 RX ADMIN — LOSARTAN POTASSIUM 25 MG: 25 TABLET, FILM COATED ORAL at 08:28

## 2020-04-26 RX ADMIN — DOCUSATE SODIUM 100 MG: 100 CAPSULE, LIQUID FILLED ORAL at 21:06

## 2020-04-26 RX ADMIN — FERROUS SULFATE TAB 325 MG (65 MG ELEMENTAL FE) 325 MG: 325 (65 FE) TAB at 08:28

## 2020-04-26 RX ADMIN — METOPROLOL SUCCINATE 25 MG: 25 TABLET, FILM COATED, EXTENDED RELEASE ORAL at 08:28

## 2020-04-26 RX ADMIN — ACETAMINOPHEN 1000 MG: 500 TABLET ORAL at 08:36

## 2020-04-26 RX ADMIN — INSULIN LISPRO 2 UNITS: 100 INJECTION, SOLUTION INTRAVENOUS; SUBCUTANEOUS at 17:22

## 2020-04-26 RX ADMIN — MICONAZOLE NITRATE: 20 POWDER TOPICAL at 21:06

## 2020-04-26 RX ADMIN — ACETAMINOPHEN 1000 MG: 500 TABLET ORAL at 21:03

## 2020-04-26 RX ADMIN — MICONAZOLE NITRATE: 20 POWDER TOPICAL at 08:38

## 2020-04-26 RX ADMIN — CLOPIDOGREL BISULFATE 75 MG: 75 TABLET ORAL at 08:28

## 2020-04-26 RX ADMIN — AMLODIPINE BESYLATE 5 MG: 5 TABLET ORAL at 08:28

## 2020-04-26 RX ADMIN — FAMOTIDINE 20 MG: 20 TABLET ORAL at 08:29

## 2020-04-26 RX ADMIN — Medication 325 MG: at 08:28

## 2020-04-26 RX ADMIN — INSULIN LISPRO 8 UNITS: 100 INJECTION, SOLUTION INTRAVENOUS; SUBCUTANEOUS at 11:59

## 2020-04-26 RX ADMIN — INSULIN GLARGINE 56 UNITS: 100 INJECTION, SOLUTION SUBCUTANEOUS at 21:00

## 2020-04-26 RX ADMIN — ATORVASTATIN CALCIUM 40 MG: 40 TABLET, FILM COATED ORAL at 21:03

## 2020-04-26 ASSESSMENT — PAIN SCALES - GENERAL
PAINLEVEL_OUTOF10: 4
PAINLEVEL_OUTOF10: 8
PAINLEVEL_OUTOF10: 0

## 2020-04-26 NOTE — PROGRESS NOTES
[address] [address]  [address]  Occupational Therapy  Daily Note  Time:    Time In: 0730  Time Out: 0820  Timed Code Treatment Minutes: 48 Minutes  Minutes: 50          Date: 2020  Patient Name: Cornelia Albert,   Gender: female      Room: [facility]/068-A  MRN: [id number]  : 1940  (70 y.o.)  Referring Practitioner: Coy Rosen MD  Diagnosis: COVID-19  Additional Pertinent Hx: 70 y.o. female admitted to the transitional care unit on 2020. She was admitted in the acute area of [facility] earlier this month for pneumonia and was found to be covid positive. She became afebrile and with the improving of her symptoms she was discharged home to the care of her family. She was unable to have the degree of supervision needed to be safe at home. She states she fell the 2 past nights and was on the floor for several hours each time. She now presents to the TCU for the time with therapies prior to the anticipated return home. Restrictions/Precautions:  Restrictions/Precautions: Isolation, Contact Precautions, General Precautions, Fall Risk  Position Activity Restriction  Other position/activity restrictions: COVID 19, contact and droplet precautions    SUBJECTIVE: Pt up in chair upon arrival of therapist. Pt frustrated & often talking about wanting to go home \"17 days is too long! \"     PAIN: c/o \"arthritis\" pain in LUE/10:  No # given    COGNITION: Decreased Insight, Decreased Problem Solving and Decreased Safety Awareness. Impulsive & distracted on this date    ADL:   Feeding: with set-up. A for opening containers  Grooming: Minimal Assistance. for completeness of combing hair while seated: therapist assisted with using shower cap   Bathing: Minimal Assistance. A for thoroughness of washing L foot, CGA-min in standing while washing periarea  Upper Extremity Dressing: Minimal Assistance. vcs for dk strategies  Lower Extremity Dressing: Moderate Assistance.   A for ease with clothing management and grooming tasks  Short term goal 4: Pt will complete UB ADLs with min A and cues for adaptive techniques for increased independence with self care  Long term goals  Time Frame for Long term goals : 4 weeks  Long term goal 1: Pt will complete toileting tasks with SBA for increased independence with self care  Long term goal 2: Pt will complete dressing tasks, using any DME necessary, and SBA for increased independence with BADL tasks  Long term goal 3: Pt will safely navigate around environmental barriers with no cues and SBA for increased independence accessing environment    Following session, patient left in safe position with all fall risk precautions in place.

## 2020-04-27 LAB
GLUCOSE BLD-MCNC: 109 MG/DL (ref 70–108)
GLUCOSE BLD-MCNC: 218 MG/DL (ref 70–108)
GLUCOSE BLD-MCNC: 287 MG/DL (ref 70–108)
GLUCOSE BLD-MCNC: 313 MG/DL (ref 70–108)

## 2020-04-27 PROCEDURE — 97110 THERAPEUTIC EXERCISES: CPT

## 2020-04-27 PROCEDURE — 97116 GAIT TRAINING THERAPY: CPT

## 2020-04-27 PROCEDURE — 6370000000 HC RX 637 (ALT 250 FOR IP): Performed by: FAMILY MEDICINE

## 2020-04-27 PROCEDURE — 6360000002 HC RX W HCPCS: Performed by: FAMILY MEDICINE

## 2020-04-27 PROCEDURE — 82948 REAGENT STRIP/BLOOD GLUCOSE: CPT

## 2020-04-27 PROCEDURE — 97530 THERAPEUTIC ACTIVITIES: CPT

## 2020-04-27 PROCEDURE — 97535 SELF CARE MNGMENT TRAINING: CPT

## 2020-04-27 PROCEDURE — 1290000000 HC SEMI PRIVATE OTHER R&B

## 2020-04-27 RX ADMIN — INSULIN LISPRO 8 UNITS: 100 INJECTION, SOLUTION INTRAVENOUS; SUBCUTANEOUS at 12:36

## 2020-04-27 RX ADMIN — METOPROLOL SUCCINATE 25 MG: 25 TABLET, FILM COATED, EXTENDED RELEASE ORAL at 08:32

## 2020-04-27 RX ADMIN — ALPRAZOLAM 0.25 MG: 0.25 TABLET ORAL at 20:38

## 2020-04-27 RX ADMIN — Medication 1000 MCG: at 08:33

## 2020-04-27 RX ADMIN — MICONAZOLE NITRATE: 20 POWDER TOPICAL at 08:35

## 2020-04-27 RX ADMIN — DOCUSATE SODIUM 100 MG: 100 CAPSULE, LIQUID FILLED ORAL at 20:38

## 2020-04-27 RX ADMIN — Medication 325 MG: at 08:33

## 2020-04-27 RX ADMIN — THERA TABS 1 TABLET: TAB at 08:32

## 2020-04-27 RX ADMIN — FERROUS SULFATE TAB 325 MG (65 MG ELEMENTAL FE) 325 MG: 325 (65 FE) TAB at 08:33

## 2020-04-27 RX ADMIN — INSULIN LISPRO 4 UNITS: 100 INJECTION, SOLUTION INTRAVENOUS; SUBCUTANEOUS at 16:57

## 2020-04-27 RX ADMIN — DOCUSATE SODIUM 100 MG: 100 CAPSULE, LIQUID FILLED ORAL at 08:33

## 2020-04-27 RX ADMIN — ENOXAPARIN SODIUM 40 MG: 40 INJECTION SUBCUTANEOUS at 08:33

## 2020-04-27 RX ADMIN — ATORVASTATIN CALCIUM 40 MG: 40 TABLET, FILM COATED ORAL at 20:38

## 2020-04-27 RX ADMIN — AMLODIPINE BESYLATE 5 MG: 5 TABLET ORAL at 08:32

## 2020-04-27 RX ADMIN — LEVOTHYROXINE SODIUM 112 MCG: 112 TABLET ORAL at 05:49

## 2020-04-27 RX ADMIN — CLOPIDOGREL BISULFATE 75 MG: 75 TABLET ORAL at 08:33

## 2020-04-27 RX ADMIN — INSULIN GLARGINE 56 UNITS: 100 INJECTION, SOLUTION SUBCUTANEOUS at 20:41

## 2020-04-27 RX ADMIN — MICONAZOLE NITRATE: 20 POWDER TOPICAL at 20:38

## 2020-04-27 RX ADMIN — LOSARTAN POTASSIUM 25 MG: 25 TABLET, FILM COATED ORAL at 08:33

## 2020-04-27 RX ADMIN — FAMOTIDINE 20 MG: 20 TABLET ORAL at 08:33

## 2020-04-27 ASSESSMENT — PAIN SCALES - GENERAL
PAINLEVEL_OUTOF10: 0
PAINLEVEL_OUTOF10: 0

## 2020-04-27 NOTE — PROGRESS NOTES
Pt denies wanting to have any family called and updated on her progress.  She states that she has talked to them all day and they know what is going on

## 2020-04-27 NOTE — PATIENT CARE CONFERENCE
[address] [address]  Transitional Care Unit  Team Conference Note    Patient Name: Garrett Hinton   MRN: [id number]    : 1940  (70 y.o.)  Gender: female   Referring Practitioner: Andra Decker MD  Diagnosis: COVID 19    Team Conference Date: 2020   Admission Date:     3:54 PM  Re-Certification Date: 2390    :  Tentative Discharge Plan and current psychosocial issues: Patient plans to return home with her significant other once she is able to discharge from [address] [place] [address]. Patient and her significant other were managing their care independently piror to hospitalization. Both patient and her significant other became ill around the same time, both had to be admitted to the hospital due to symptoms and medical status. Patient has a son and daughter in law that live close and provide assist and support daily. 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 SW anticipates that patient will benefit from home health services and ongoing skilled therapies once she returns back to her private residence. Patient may need additional medical equipment, therapy staff to assess and evaluate need for all medical equipment.     Nursing:     Weight:  Weight: has been stable     Wounds/Incisions/Ulcers:  No skin/wound issues identified  Bowel: continent  Bladder:incontinent     Pain: Patient's pain is currently controlled with tylenol    Education Provided:  Diabetic:  Yes on insulin  Peg Tube:No    Farley Care:  Education:NA  Removal Necessary:  NA  Supplies Needed: NA     Anticoagulant Use:  Patient on lovenox for anticoagulation, which is being managed by Primary Care Physician    Antibiotic Use:  Patient not on antibiotics    Psychotropic Medication Use:  Patient on xanax     Oxygen Use: No    Home Medications Reconciled: N/A    Physical Therapy:   Ms Jodi Kaminski continues to make gradual progress towards goals set for Risk              Risk of Unplanned Readmission:        17         High (20-31)     Moderate (10-19)     Low (0-9)    Team Members Present at Conference:  Physician: Dr. Jenna Cifuentes MD  Nurse: Kaiden Packer RN, Ari Ross RN  :  Eva Bermudez, [facility] [place]  Occupational Therapist:  Gordy Crews, OT  Physical Therapist:   Sofía Polanco, PT  Speech Therapist: Speech Therapist: N/A. All team members have reviewed and updated as necessary and appropriate the established interdisciplinary plan of care within the medical record of Dominick Chisholm. Pt's team conference attended (see above.)  Pt seen on rounds with LSW, to review the results with patient and family. Discussed length of stay as above. Physical Exam:  General:  Alert and oriented  Vitals:   Vitals:    04/28/20 0850   BP: 131/60   Pulse: 88   Resp: 18   Temp: 97.3 °F (36.3 °C)   SpO2: 98%     Heart:  Regular rate and rhythm  Lungs:  Clear to auscultation  Abdomen:  soft with positive bowel sounds   No peripheral edema    Assessment:  Progressing in full rehabilitation program (see above)    Plan:  Continue Rehab            Continue current medications            Spent 37 minutes today in patient management and care    Electronically signed by Susie Acevedo MD on 4/28/2020 at 9:41 AM    Pt's team conference attended (see above). Pt seen on rounds with LSW, to review the results with patient and family. Discussed length of stay as above.

## 2020-04-27 NOTE — PROGRESS NOTES
[facility]  INPATIENT PHYSICAL THERAPY  DAILY NOTE  - [place] SKILLED NURSING UNIT - 8E-68/068-A    Time In: 9973  Time Out: 1445  Timed Code Treatment Minutes: 60 Minutes  Minutes: 60          Date: 2020  Patient Name: Chidi Clifton,  Gender:  female        MRN: [id number]  : 1940  (70 y.o.)  Referral Date : 20  Referring Practitioner: Rosio Prince MD  Diagnosis: COVID 19  Additional Pertinent Hx:  Chidi Clifton  is a 70 y.o. female admitted to the transitional care unit on 2020. She was admitted in the acute area of [facility]'s earlier this month for pneumonia and was found to be covid positive. She became afebrile and with the improving of her symptoms she was discharged home to the care of her family. Pt unable to safely care for self at home, needing additional assistance, experiencing falls and ED visit. She now presents to the TCU due to decline in mobility     Prior Level of Function:  Lives With: Significant other  Type of Home: Mobile home  Home Layout: One level  Home Access: Stairs to enter with rails  Entrance Stairs - Number of Steps: 4  Entrance Stairs - Rails: Left  Home Equipment: 4 wheeled walker, Quad cane, Wheelchair-manual   Bathroom Shower/Tub: Walk-in shower  Bathroom Toilet: Bedside commode(has raised toilet seat and bed side commode)  Bathroom Equipment: Grab bars in shower, Shower chair(grab bar suction)    Receives Help From: Family  ADL Assistance: Needs assistance(pt reports daughter-in-law assists with distal BLE bathing, sig. 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 GOAL MET, DISCONTINUE, SEE LTG  Short term goal 4: Patient will ambulate 48' with a RW and min assist to progress towards ambulating household distances safely. GOAL MET, DISCONTINUE, SEE LTG  Short term goal 5: Patient will propel wheelchair 48' with CGA to navigate living environment and increase mobility. GOAL MET, DISCONTINUE, SEE LTG  Short term goal 6: Patient will ascend/descend 1, 4\" step in parallel bars with min assist to progress towards safe home entry. GOAL MET, DISCONTINUE, SEE LTG  Long term goals  Time Frame for Long term goals : 4 weeks  Long term goal 1: Patient will complete supine < > sit with modified independence to transfer in/out of bed safely. Long term goal 2: Patient will complete sit  <> stand and stand pivot transfers with SBA to transfer surface to surface safely. Long term goal 3: Patient will ambulate 76' with a RW and CGA to navigate home environment. Long term goal 4: Patient will propel wheelchair 100' with SBA to navigate living environment and to/from appointments. Long term goal 5: Patient will improve tinetti score to greater than or equal to 17/28 to reduce her risk for falls. Long term goal 6: Patient will ascend/descend 4 steps with 1 hand rail with CGA for home entry. Following session, patient left in safe position with all fall risk precautions in place.

## 2020-04-27 NOTE — PROGRESS NOTES
[address] [facility]  [facility] SKILLED NURSING UNIT  Occupational Therapy  Progress Note  Time:   Time In: 3396  Time Out: 1000  Timed Code Treatment Minutes: 54 Minutes  Minutes: 55    Date: 2020  Patient Name: Donald Rodrigues,   Gender: female      Room: [facility]68/068-A  MRN: [id number]  : 1940  (70 y.o.)  Referring Practitioner: Vani Montana MD  Diagnosis: COVID-19  Additional Pertinent Hx: 70 y.o. female admitted to the transitional care unit on 2020. She was admitted in the acute area of [facility] earlier this month for pneumonia and was found to be covid positive. She became afebrile and with the improving of her symptoms she was discharged home to the care of her family. She was unable to have the degree of supervision needed to be safe at home. She states she fell the 2 past nights and was on the floor for several hours each time. She now presents to the TCU for the time with therapies prior to the anticipated return home. Restrictions/Precautions:  Restrictions/Precautions: Isolation, Contact Precautions, General Precautions, Fall Risk  Position Activity Restriction  Other position/activity restrictions: COVID 19, contact and droplet precautions    SUBJECTIVE: Upon arrival patient was sitting up in recliner. Pt was agreeable to OT session. Pt is fixated on wanting to go home. PAIN: 10: Left hand pain     COGNITION: Decreased Insight, Decreased Problem Solving, Decreased Safety Awareness and Impulsive    ADL:   Grooming: Minimal Assistance. For thoroughness to comb hair while standing at sink. Bathing: Minimal Assistance. Assist for thoroughness of washing RUE and cristi area  Upper Extremity Dressing: Minimal Assistance and with verbal cues . vcs to technique. to don shirt and sweater. Lower Extremity Dressing: Moderate Assistance, with verbal cues  and with increased time for completion. Assist to thread B feet and pull up over L hip.  .    *Increased time to

## 2020-04-28 LAB
GLUCOSE BLD-MCNC: 191 MG/DL (ref 70–108)
GLUCOSE BLD-MCNC: 247 MG/DL (ref 70–108)
GLUCOSE BLD-MCNC: 259 MG/DL (ref 70–108)
GLUCOSE BLD-MCNC: 76 MG/DL (ref 70–108)

## 2020-04-28 PROCEDURE — 82948 REAGENT STRIP/BLOOD GLUCOSE: CPT

## 2020-04-28 PROCEDURE — 97110 THERAPEUTIC EXERCISES: CPT

## 2020-04-28 PROCEDURE — 6360000002 HC RX W HCPCS: Performed by: FAMILY MEDICINE

## 2020-04-28 PROCEDURE — 97530 THERAPEUTIC ACTIVITIES: CPT

## 2020-04-28 PROCEDURE — 1290000000 HC SEMI PRIVATE OTHER R&B

## 2020-04-28 PROCEDURE — 6370000000 HC RX 637 (ALT 250 FOR IP): Performed by: FAMILY MEDICINE

## 2020-04-28 PROCEDURE — 97116 GAIT TRAINING THERAPY: CPT

## 2020-04-28 PROCEDURE — 97112 NEUROMUSCULAR REEDUCATION: CPT

## 2020-04-28 RX ADMIN — AMLODIPINE BESYLATE 5 MG: 5 TABLET ORAL at 08:58

## 2020-04-28 RX ADMIN — METOPROLOL SUCCINATE 25 MG: 25 TABLET, FILM COATED, EXTENDED RELEASE ORAL at 08:58

## 2020-04-28 RX ADMIN — MICONAZOLE NITRATE: 20 POWDER TOPICAL at 09:00

## 2020-04-28 RX ADMIN — INSULIN GLARGINE 56 UNITS: 100 INJECTION, SOLUTION SUBCUTANEOUS at 20:50

## 2020-04-28 RX ADMIN — FERROUS SULFATE TAB 325 MG (65 MG ELEMENTAL FE) 325 MG: 325 (65 FE) TAB at 08:57

## 2020-04-28 RX ADMIN — Medication 325 MG: at 08:58

## 2020-04-28 RX ADMIN — LEVOTHYROXINE SODIUM 112 MCG: 112 TABLET ORAL at 05:58

## 2020-04-28 RX ADMIN — THERA TABS 1 TABLET: TAB at 08:59

## 2020-04-28 RX ADMIN — LOSARTAN POTASSIUM 25 MG: 25 TABLET, FILM COATED ORAL at 08:58

## 2020-04-28 RX ADMIN — MICONAZOLE NITRATE: 20 POWDER TOPICAL at 20:12

## 2020-04-28 RX ADMIN — INSULIN LISPRO 2 UNITS: 100 INJECTION, SOLUTION INTRAVENOUS; SUBCUTANEOUS at 17:33

## 2020-04-28 RX ADMIN — Medication 1000 MCG: at 08:58

## 2020-04-28 RX ADMIN — ATORVASTATIN CALCIUM 40 MG: 40 TABLET, FILM COATED ORAL at 20:10

## 2020-04-28 RX ADMIN — DOCUSATE SODIUM 100 MG: 100 CAPSULE, LIQUID FILLED ORAL at 20:10

## 2020-04-28 RX ADMIN — FAMOTIDINE 20 MG: 20 TABLET ORAL at 08:58

## 2020-04-28 RX ADMIN — ALPRAZOLAM 0.25 MG: 0.25 TABLET ORAL at 20:10

## 2020-04-28 RX ADMIN — INSULIN LISPRO 6 UNITS: 100 INJECTION, SOLUTION INTRAVENOUS; SUBCUTANEOUS at 13:53

## 2020-04-28 RX ADMIN — ENOXAPARIN SODIUM 40 MG: 40 INJECTION SUBCUTANEOUS at 08:59

## 2020-04-28 RX ADMIN — CLOPIDOGREL BISULFATE 75 MG: 75 TABLET ORAL at 08:58

## 2020-04-28 RX ADMIN — ACETAMINOPHEN 1000 MG: 500 TABLET ORAL at 22:18

## 2020-04-28 ASSESSMENT — 9 HOLE PEG TEST
TESTTIME_SECONDS: 183
TESTTIME_SECONDS: 23.5

## 2020-04-28 ASSESSMENT — PAIN SCALES - GENERAL
PAINLEVEL_OUTOF10: 8
PAINLEVEL_OUTOF10: 0
PAINLEVEL_OUTOF10: 0

## 2020-04-28 NOTE — PLAN OF CARE
Problem: Nutrition  Goal: Optimal nutrition therapy  4/28/2020 1443 by Daron Hawthorne RD, LD  Outcome: Ongoing   Nutrition Problem: Unintended weight loss  Intervention: Food and/or Nutrient Delivery: Continue current diet, Continue current ONS  Nutritional Goals: Pt. will consume 75% or more of meals and experience no significant weight loss during LOS.

## 2020-04-28 NOTE — PROGRESS NOTES
[address] [address]  INPATIENT PHYSICAL THERAPY  DAILY NOTE  - [place] SKILLED NURSING UNIT - 8E-68/068-A  Time In: 5499  Time Out: 1049  Timed Code Treatment Minutes: 60 Minutes  Minutes: 60          Date: 2020  Patient Name: Ja Tidwell,  Gender:  female        MRN: [id number]  : 1940  (70 y.o.)  Referral Date : 20  Referring Practitioner: Kyle Cantu MD  Diagnosis: COVID 19  Additional Pertinent Hx:  Ja Tidwell  is a 70 y.o. female admitted to the transitional care unit on 2020. She was admitted in the acute area of [facility] earlier this month for pneumonia and was found to be covid positive. She became afebrile and with the improving of her symptoms she was discharged home to the care of her family. Pt unable to safely care for self at home, needing additional assistance, experiencing falls and ED visit. She now presents to the TCU due to decline in mobility     Prior Level of Function:  Lives With: Significant other  Type of Home: Mobile home  Home Layout: One level  Home Access: Stairs to enter with rails  Entrance Stairs - Number of Steps: 4  Entrance Stairs - Rails: Left  Home Equipment: 4 wheeled walker, Quad cane, Wheelchair-manual   Bathroom Shower/Tub: Walk-in shower  Bathroom Toilet: Bedside commode(has raised toilet seat and bed side commode)  Bathroom Equipment: Grab bars in shower, Shower chair(grab bar suction)    Receives Help From: Family  ADL Assistance: Needs assistance(pt reports daughter-in-law assists with distal BLE bathing, sig. other assists with threading depends, and assists with toileting)  Homemaking Assistance: (sig other had been completing however he is now hospitalized)  Ambulation Assistance: Independent(pt reports within home without AD)  Transfer Assistance: Independent  Active :  Yes  Additional Comments: Pt reports history of CVA with L sided deficts in  in which she then received [facility] services requiring rest breaks between activities due to fatigue of lower extremities. Pt presenting with minimal shortness of breath at times. Equipment Recommendations:Equipment Needed: No  Discharge Recommendations:  Continue to assess pending progress, Home with Home health PT    Plan: Times per week: 6x/wk  Times per day: Daily  Plan weeks: 4  Current Treatment Recommendations: Strengthening, Balance Training, Transfer Training, Endurance Training, Gait Training, Neuromuscular Re-education, Home Exercise Program, Patient/Caregiver Education & Training, Functional Mobility Training, Wheelchair Mobility Training, Safety Education & Training, Stair training    Patient Education  Patient Education: Transfers, Gait, Verbal Exercise Instruction,  - Patient Verbalized Understanding, - Patient Requires Continued Education   Education that PT recommending assistance with all mobility at this time when she returns to home. Goals:  Patient goals : go home, get stronger  Short term goals  Time Frame for Short term goals: 2 weeks  Short term goal 1: Patient will complete supine  <> sit with min assist to transfer in/out of bed with decreased difficulty. GOAL MET, DISCONTINUE, SEE LTG  Short term goal 2: Patient will complete sit < > stand with min assist to stand to ambulate with decreased difficulty. GOAL MET, DISCONTINUE, SEE LTG  Short term goal 3: Patient will complete stand pivot transfer with min assist to transfer surface to surface with decreased difficulty. GOAL MET, DISCONTINUE, SEE LTG  Short term goal 4: Patient will ambulate 48' with a RW and min assist to progress towards ambulating household distances safely. GOAL MET, DISCONTINUE, SEE LTG  Short term goal 5: Patient will propel wheelchair 48' with CGA to navigate living environment and increase mobility.  GOAL MET, DISCONTINUE, SEE LTG  Short term goal 6: Patient will ascend/descend 1, 4\" step in parallel bars with min assist to progress towards safe home

## 2020-04-28 NOTE — PROGRESS NOTES
therapy gym  No LOB, pt continues to drag LLE     ADDITIONAL ACTIVITIES:  Left Hand Strength -  (lbs)  Handle Setting 2: 4, 5, 3 (avg. 4)  Right Hand Strength -  (lbs)  Handle Setting 2: 39, 39, 39 (avg. 39)  Fine Motor Skills  Left 9 Hole Peg Test Time (secs): 183  Right 9 Hole Peg Test Time (secs): 23.5    Pt also completed [facility] tasks of pinch and targeting for item palcement and retrieval and various planes. Pt demonstrated improved tolerance to task with increased ease and decreased compensatory strategies. Pt also completed in hand manipulation, pouring and stacking of small items for increased ease with opening packages in prep for denture care and self feeding. Pt completed place and holds for composite fist and digit extension for increased ease with clothing managemetn. Pt completed 2 sets of 5 reps with fatigue noted. Pt completed isolated digit extension with hand supported on table top and digit opposition to all digits +slide to MCP joint with good tolerance to task with significant fatiguing and difficulty noted. Pt completed for increased ease of in hand manipulation and  strength required for necessary ADL tasks. ASSESSMENT:     Activity Tolerance:  Patient tolerance of  treatment: good. Discharge Recommendations: Home with Home health OT  Equipment Recommendations:  Other: none  Plan: Times per week: 6x  Current Treatment Recommendations: Strengthening, ROM, Balance Training, Endurance Training, Functional Mobility Training, Neuromuscular Re-education, Self-Care / ADL, Safety Education & Training, Equipment Evaluation, Education, & procurement, Patient/Caregiver Education & Training, Home Management Training    Patient Education  Patient Education: Role of OT, Plan of Care, ADL's, Home Safety, Importance of Increasing Activity and Assistive Device Safety    Goals  Short term goals  Time Frame for Short term goals: 2 weeks  Short term goal 1: Pt will perform various functional t/fs with no > CGA for increased ease and independence with toileting tasks  Short term goal 2: Pt will demonstrate standing endurance >3 mins with occasional 1UE release with min A for increased participation with sinkside grooming  Short term goal 3: Pt will participate in [facility] and  strength assessment for increased ease with clothing management and grooming tasks  Short term goal 4: Pt will complete UB ADLs with min A and min cues for adaptive techniques for increased independence with self care  Long term goals  Time Frame for Long term goals : 4 weeks  Long term goal 1: Pt will complete toileting tasks with SBA for increased independence with self care  Long term goal 2: Pt will complete dressing tasks, using any DME necessary, and SBA for increased independence with BADL tasks  Long term goal 3: Pt will safely navigate around environmental barriers with no cues and SBA for increased independence accessing environment    Following session, patient left in safe position with all fall risk precautions in place.

## 2020-04-28 NOTE — PLAN OF CARE
prevent transmission to others. Educated on contact isolation procedures for visitors and staff. Problem: Urinary Elimination:  Goal: Incidence of incontinence will decrease  Description: Incidence of incontinence will decrease  Outcome: Ongoing  Incontinence has decreased since admission    Problem: Pressure Ulcer  Goal: Absence of pressure ulcer  Outcome: Ongoing  No pressure ulcers noted with skin check. SPR overlay mattress in use. Float heels when in bed. Repositioning every 2 hours.

## 2020-04-29 LAB
GLUCOSE BLD-MCNC: 196 MG/DL (ref 70–108)
GLUCOSE BLD-MCNC: 241 MG/DL (ref 70–108)
GLUCOSE BLD-MCNC: 367 MG/DL (ref 70–108)
GLUCOSE BLD-MCNC: 71 MG/DL (ref 70–108)

## 2020-04-29 PROCEDURE — 1290000000 HC SEMI PRIVATE OTHER R&B

## 2020-04-29 PROCEDURE — 82948 REAGENT STRIP/BLOOD GLUCOSE: CPT

## 2020-04-29 PROCEDURE — 97110 THERAPEUTIC EXERCISES: CPT

## 2020-04-29 PROCEDURE — 6370000000 HC RX 637 (ALT 250 FOR IP): Performed by: FAMILY MEDICINE

## 2020-04-29 PROCEDURE — 97530 THERAPEUTIC ACTIVITIES: CPT

## 2020-04-29 PROCEDURE — 97116 GAIT TRAINING THERAPY: CPT

## 2020-04-29 PROCEDURE — 97535 SELF CARE MNGMENT TRAINING: CPT

## 2020-04-29 PROCEDURE — 6360000002 HC RX W HCPCS: Performed by: FAMILY MEDICINE

## 2020-04-29 RX ORDER — ATORVASTATIN CALCIUM 80 MG/1
40 TABLET, FILM COATED ORAL NIGHTLY
COMMUNITY
Start: 2020-04-29

## 2020-04-29 RX ADMIN — Medication 325 MG: at 09:08

## 2020-04-29 RX ADMIN — ENOXAPARIN SODIUM 40 MG: 40 INJECTION SUBCUTANEOUS at 09:09

## 2020-04-29 RX ADMIN — ALPRAZOLAM 0.25 MG: 0.25 TABLET ORAL at 20:52

## 2020-04-29 RX ADMIN — CLOPIDOGREL BISULFATE 75 MG: 75 TABLET ORAL at 09:08

## 2020-04-29 RX ADMIN — ATORVASTATIN CALCIUM 40 MG: 40 TABLET, FILM COATED ORAL at 20:52

## 2020-04-29 RX ADMIN — INSULIN LISPRO 4 UNITS: 100 INJECTION, SOLUTION INTRAVENOUS; SUBCUTANEOUS at 12:33

## 2020-04-29 RX ADMIN — Medication 1000 MCG: at 09:09

## 2020-04-29 RX ADMIN — THERA TABS 1 TABLET: TAB at 09:09

## 2020-04-29 RX ADMIN — INSULIN LISPRO 2 UNITS: 100 INJECTION, SOLUTION INTRAVENOUS; SUBCUTANEOUS at 17:42

## 2020-04-29 RX ADMIN — LEVOTHYROXINE SODIUM 112 MCG: 112 TABLET ORAL at 06:04

## 2020-04-29 RX ADMIN — ACETAMINOPHEN 1000 MG: 500 TABLET ORAL at 06:04

## 2020-04-29 RX ADMIN — FAMOTIDINE 20 MG: 20 TABLET ORAL at 09:08

## 2020-04-29 RX ADMIN — AMLODIPINE BESYLATE 5 MG: 5 TABLET ORAL at 09:08

## 2020-04-29 RX ADMIN — LOSARTAN POTASSIUM 25 MG: 25 TABLET, FILM COATED ORAL at 09:08

## 2020-04-29 RX ADMIN — MICONAZOLE NITRATE: 20 POWDER TOPICAL at 20:52

## 2020-04-29 RX ADMIN — METOPROLOL SUCCINATE 25 MG: 25 TABLET, FILM COATED, EXTENDED RELEASE ORAL at 09:08

## 2020-04-29 RX ADMIN — INSULIN GLARGINE 56 UNITS: 100 INJECTION, SOLUTION SUBCUTANEOUS at 20:48

## 2020-04-29 RX ADMIN — FERROUS SULFATE TAB 325 MG (65 MG ELEMENTAL FE) 325 MG: 325 (65 FE) TAB at 09:08

## 2020-04-29 RX ADMIN — DOCUSATE SODIUM 100 MG: 100 CAPSULE, LIQUID FILLED ORAL at 20:52

## 2020-04-29 ASSESSMENT — PAIN DESCRIPTION - PROGRESSION

## 2020-04-29 ASSESSMENT — PAIN SCALES - GENERAL
PAINLEVEL_OUTOF10: 0
PAINLEVEL_OUTOF10: 7
PAINLEVEL_OUTOF10: 10

## 2020-04-29 ASSESSMENT — PAIN DESCRIPTION - FREQUENCY: FREQUENCY: INTERMITTENT

## 2020-04-29 ASSESSMENT — PAIN DESCRIPTION - DESCRIPTORS: DESCRIPTORS: ACHING

## 2020-04-29 ASSESSMENT — PAIN DESCRIPTION - ORIENTATION: ORIENTATION: LEFT

## 2020-04-29 ASSESSMENT — PAIN DESCRIPTION - ONSET: ONSET: GRADUAL

## 2020-04-29 ASSESSMENT — PAIN - FUNCTIONAL ASSESSMENT: PAIN_FUNCTIONAL_ASSESSMENT: ACTIVITIES ARE NOT PREVENTED

## 2020-04-29 ASSESSMENT — PAIN DESCRIPTION - LOCATION: LOCATION: HAND

## 2020-04-29 ASSESSMENT — PAIN DESCRIPTION - PAIN TYPE: TYPE: CHRONIC PAIN

## 2020-04-29 NOTE — PROGRESS NOTES
term goal 4: Pt will complete UB ADLs with min A and min cues for adaptive techniques for increased independence with self care MET  Long term goals  Time Frame for Long term goals : 4 weeks  Long term goal 1: Pt will complete toileting tasks with SBA for increased independence with self care NOT MET  Long term goal 2: Pt will complete dressing tasks, using any DME necessary, and SBA for increased independence with BADL tasks NOT MET  Long term goal 3: Pt will safely navigate around environmental barriers with no cues and SBA for increased independence accessing environment NOT MET    Following session, patient left in safe position with all fall risk precautions in place.

## 2020-04-29 NOTE — DISCHARGE SUMMARY
TCU  Discharge Summary     Patient Identification:  Mariann Rockwell  : 1940  Admit date: 2020  Discharge date: 2020   Attending provider: Tania Alcocer MD        Primary care provider: Niko Holden DO     Discharge Diagnoses: Active Hospital Problems    Diagnosis Date Noted    Obstructive sleep apnea on CPAP [G47.33, Z99.89] 2014     Priority: High    Anxiety [F41.9] 2020    Sprain and strain of left wrist [S63.502A, S66.912A] 04/10/2020    Sprain of left foot [S93.602A] 04/10/2020    Strain of left inguinal muscle [S39.013A] 04/10/2020    COVID-19 [U07.1, J98.8] 2020    Essential hypertension [I10] 2015    Coronary artery disease involving native coronary artery of native heart without angina pectoris [I25.10] 2015    Type 2 diabetes mellitus with circulatory disorder, with long-term current use of insulin (HCC) [E11.59, Z79.4]     Hypothyroidism [E03.9]        TCU Course:   Mariann Rockwell is a 70 y.o. female admitted to the transitional care unit on 2020 for the continued work with therapies following the acute hospital stay. She had her blood sugars followed and there were multiple dose adjustments required. She stated that the glucose has always been difficult for her endocrinologist to control as an outpatient. She worked well with therapies and slowly was able to show progress with strength and independence. She will need the continued assistance of family and [address] [place] [address] services following discharge. She experienced episodes of anxiety especially in the evening and those times responded well to xanax. She remained medically stable while on the unit and will be discharged in stable condition. Consults:   none    Significant Diagnostics:        Recent Labs     20  0740 20  1118   POCGLU [id number]* [phone]*       Patient Instructions:       Follow-up visits: See after visit summary from hospitalization    Discharge

## 2020-04-29 NOTE — PROGRESS NOTES
completion, cues for hand placement, to/from chair with arms  Stand to Sit:Stand By Assistance, Danial Mehul Assistance, with increased time for completion, cues for hand placement, to/from chair with arms  Pt requires repeated cueing for hand placement, sequencing and locking brakes on 4WW. Pt reports that she knows what she needs to do, but doesn't alwayss do it. Education provided regarding safety measures to complete transfer with limited carry over. Ambulation:  Stand By Assistance, [address], with cues for safety, with verbal cues   Distance: 175, 165, 10, 15  Surface: Level Tile  Device:4 Wheeled Walker  Gait Deviations: Forward Flexed Posture, Slow Namita, Narrow Base of Support and foot drop on the left  Pt reports not liking walking in her shoes stating that they catch on the ground and don't let her progress. Pt attempted to sit EOB and don  socks, pt required assist to don left sock secondary to balance and UE weakness. Cueing for improved hip and knee flexion for better left foot clearance. Stairs:  with cues for safety, with verbal cues , with increased time for completion, on first attempt, pt demonstrates LOB with modA for recovery secondary to narrow EFREM and usnteadiness, pt completed another 4 steps with contact guard assist - increased cueign for step sequencing, safety and improved EFREM  Number of Steps: 4 + 4 with 1 UE support, seated rest break in between attempts   Height: 6\" step, 1 with Beryl for mgmt of 4WW, 6\" step (4, 4 with rest break inbetween attempts)    Exercise:  Patient was guided in 1 set(s) 15 reps of exercise to both lower extremities. Ankle pumps, Glut sets, Quad sets, Heelslides, Long arc quads, Hip abduction/adduction, Straight leg raises, Seated hip flexion and Standing marches. Exercises were completed for increased independence with functional mobility.     Functional Outcome Measures: Not completed       ASSESSMENT:  Assessment:   Pt has met

## 2020-04-29 NOTE — FLOWSHEET NOTE
had a follow-up encounter with Cheryle Forbes who was sitting in her chair with the TV on, as  engaged her from the doorway per RN directive due to Covid-19. She was welcoming as she freely engaged in conversation. She expressed \"doing well,\" and added she will be returning home on Thursday. She shared that her Significant Other was also hospitalized with Covid-19, and even \"in worse shape than me. \" She stated he is home and awaiting her arrival. She shared her experience was lighter, not experiencing a fever, and coping well. She stated a need to get her hand stronger, in order to ride her scooter once home. She is sustained through support from her Significant Other, Shanika Timmons, as well as other family members. She is a woman of [employer] in [facility], and a member of [address] [place] [address]. She shared speaking with Fr. Kaur Gee several times while here, and appreciating his ministry to her. She expressed no needs for additional spiritual care at this time. She was grateful for the encounter and ministry provided to her. She is anticipating discharge on Friday. Chaplains remain available for further emotional and spiritual support as needed during the continuum of care.      04/28/20 2030   Encounter Summary   Services provided to: Patient   Referral/Consult From: [facility] Significant other   Place of [address] [place] [address]   Continue Visiting Yes  (4/28 - P)   Complexity of Encounter Moderate   Length of Encounter 15 minutes   Routine   Type Follow up   Spiritual/Restoration   Type Spiritual support

## 2020-04-30 VITALS
OXYGEN SATURATION: 97 % | DIASTOLIC BLOOD PRESSURE: 79 MMHG | BODY MASS INDEX: 25.02 KG/M2 | TEMPERATURE: 97.7 F | HEIGHT: 63 IN | SYSTOLIC BLOOD PRESSURE: 110 MMHG | HEART RATE: 96 BPM | WEIGHT: 141.19 LBS | RESPIRATION RATE: 18 BRPM

## 2020-04-30 LAB — GLUCOSE BLD-MCNC: 105 MG/DL (ref 70–108)

## 2020-04-30 PROCEDURE — 82948 REAGENT STRIP/BLOOD GLUCOSE: CPT

## 2020-04-30 PROCEDURE — 6370000000 HC RX 637 (ALT 250 FOR IP): Performed by: FAMILY MEDICINE

## 2020-04-30 RX ADMIN — CLOPIDOGREL BISULFATE 75 MG: 75 TABLET ORAL at 09:11

## 2020-04-30 RX ADMIN — LOSARTAN POTASSIUM 25 MG: 25 TABLET, FILM COATED ORAL at 09:11

## 2020-04-30 RX ADMIN — Medication 325 MG: at 09:10

## 2020-04-30 RX ADMIN — THERA TABS 1 TABLET: TAB at 09:15

## 2020-04-30 RX ADMIN — AMLODIPINE BESYLATE 5 MG: 5 TABLET ORAL at 09:11

## 2020-04-30 RX ADMIN — LEVOTHYROXINE SODIUM 112 MCG: 112 TABLET ORAL at 05:31

## 2020-04-30 RX ADMIN — METOPROLOL SUCCINATE 25 MG: 25 TABLET, FILM COATED, EXTENDED RELEASE ORAL at 09:10

## 2020-04-30 RX ADMIN — DOCUSATE SODIUM 100 MG: 100 CAPSULE, LIQUID FILLED ORAL at 09:12

## 2020-04-30 RX ADMIN — FERROUS SULFATE TAB 325 MG (65 MG ELEMENTAL FE) 325 MG: 325 (65 FE) TAB at 09:10

## 2020-04-30 RX ADMIN — Medication 1000 MCG: at 09:10

## 2020-04-30 RX ADMIN — FAMOTIDINE 20 MG: 20 TABLET ORAL at 09:11

## 2020-04-30 RX ADMIN — MICONAZOLE NITRATE: 20 POWDER TOPICAL at 09:14

## 2020-04-30 ASSESSMENT — PAIN DESCRIPTION - PROGRESSION
CLINICAL_PROGRESSION: GRADUALLY IMPROVING

## 2020-04-30 ASSESSMENT — PAIN SCALES - GENERAL: PAINLEVEL_OUTOF10: 0

## 2020-05-01 ENCOUNTER — CARE COORDINATION (OUTPATIENT)
Dept: CASE MANAGEMENT | Age: 80
End: 2020-05-01

## 2020-05-01 PROBLEM — R05.9 COUGH: Status: RESOLVED | Noted: 2020-04-01 | Resolved: 2020-05-01

## 2020-08-10 ENCOUNTER — OFFICE VISIT (OUTPATIENT)
Dept: CARDIOLOGY CLINIC | Age: 80
End: 2020-08-10
Payer: MEDICARE

## 2020-08-10 VITALS
HEART RATE: 74 BPM | WEIGHT: 157 LBS | BODY MASS INDEX: 27.82 KG/M2 | HEIGHT: 63 IN | SYSTOLIC BLOOD PRESSURE: 146 MMHG | DIASTOLIC BLOOD PRESSURE: 70 MMHG

## 2020-08-10 PROCEDURE — 99213 OFFICE O/P EST LOW 20 MIN: CPT | Performed by: NUCLEAR MEDICINE

## 2020-08-10 NOTE — PROGRESS NOTES
[facility],[address] [address]  Dept: [phone]  Dept Fax: [phone]  Loc: [phone]    Visit Date: 8/10/2020    Nel Arcos is a 68 y.o. female who presents todayfor:  Chief Complaint   Patient presents with    Follow-up    Coronary Artery Disease    Hypertension    Hyperlipidemia   she got infected with COVID  Did fair with it   Known multiple strokes  Known CAD  Does have dyspnea  On exertion   No changes   Weakness  BP is stable        HPI:  HPI  Past Medical History:   Diagnosis Date    Anxiety 4/21/2020    Arthritis     Bladder spasm     CAD (coronary artery disease) 3/12/2013    Cancer ([facility] [address]     breast    Hyperlipidemia     Hypertension     Hypothyroidism     Obstructive sleep apnea of adult 12/12/2013    SOB (shortness of breath)     Type II or unspecified type diabetes mellitus without mention of complication, not stated as uncontrolled       Past Surgical History:   Procedure Laterality Date    BREAST BIOPSY  1/17/2014    BREAST SURGERY  7/1974    left breast     CARPAL TUNNEL RELEASE Right 1/2013    CHOLECYSTECTOMY  7/1968    COLONOSCOPY  11/2007    FINGER TRIGGER RELEASE      HERNIA REPAIR  6-6-14    Dr. Carolyn Sanchez  11/1996    JOINT REPLACEMENT Left     KNEE ARTHROSCOPY      left    KNEE SURGERY Right 2017    LYMPH NODE DISSECTION  12/23/1998    MASTECTOMY  7/2001     Family History   Problem Relation Age of Onset    Cirrhosis Mother     Heart Disease Father     Diabetes Paternal Uncle     Diabetes Paternal Grandmother      Social History     Tobacco Use    Smoking status: Passive Smoke Exposure - Never Smoker    Smokeless tobacco: Never Used    Tobacco comment: worked in a bar for years   Substance Use Topics    Alcohol use: Yes     Comment: seldom      Current Outpatient Medications   Medication Sig Dispense Refill    aspirin 325 MG EC tablet Take 1 tablet by mouth daily      atorvastatin (LIPITOR) 80 MG tablet Take 0.5 tablets by mouth nightly      Multiple Vitamin (MULTIVITAMIN) tablet Take 1 tablet by mouth daily 30 tablet 0    clopidogrel (PLAVIX) 75 MG tablet Take 75 mg by mouth daily      ferrous sulfate 325 (65 Fe) MG tablet Take 325 mg by mouth daily (with breakfast)      Insulin Glargine (TOUJEO MAX SOLOSTAR SC) Inject into the skin      Insulin Regular Human (NOVOLIN R IJ) Inject as directed      levothyroxine (SYNTHROID) 112 MCG tablet Take 112 mcg by mouth daily      losartan (COZAAR) 25 MG tablet Take 25 mg by mouth daily      Cyanocobalamin (VITAMIN B 12 PO) Place  under the tongue daily.  Insulin Syringe-Needle U-100 (RELI-ON INSULIN SYRINGE) by Does not apply route. 31 G/8MM MIS (SHORT)      metoprolol (TOPROL-XL) 25 MG XL tablet TAKE ONE TABLET BY MOUTH EVERY DAY 30 tablet 2    amLODIPine (NORVASC) 5 MG tablet Take 10 mg by mouth daily        No current facility-administered medications for this visit.       Allergies   Allergen Reactions    Myrbetriq [Mirabegron]      Made patient Blood sugar go up to 300-400     Health Maintenance   Topic Date Due    TSH testing  1940    DTaP/Tdap/Td vaccine (1 - Tdap) 05/16/1961    Shingles Vaccine (1 of 2) 05/16/1992    DEXA (modify frequency per FRAX score)  05/16/1997    Lipid screen  03/12/2014    Annual Wellness Visit (AWV)  06/23/2019    Flu vaccine (1) 09/01/2020    Potassium monitoring  04/05/2021    Creatinine monitoring  04/05/2021    Pneumococcal 65+ years Vaccine  Completed    Hepatitis A vaccine  Aged Out    Hib vaccine  Aged Out    Meningococcal (ACWY) vaccine  Aged Out       Subjective:  Review of Systems  General:   No fever, no chills, No fatigue or weight loss  Pulmonary:    some dyspnea, no wheezing  Cardiac:    Denies recent chest pain,   GI:     No nausea or vomiting, no abdominal pain  Neuro:    No dizziness or light headedness,   Musculoskeletal:  No recent active issues  Extremities:   No edema, no obvious claudication       Objective:  Physical Exam  BP (!) 146/70   Pulse 74   Ht 5' 3\" (1.6 m)   Wt 157 lb (71.2 kg)   BMI 27.81 kg/m²   General:   Well developed, well nourished  Lungs:   Clear to auscultation  Heart:    Normal S1 S2, Slight murmur. no rubs, no gallops  Abdomen:   Soft, non tender, no organomegalies, positive bowel sounds  Extremities:   No edema, no cyanosis, good peripheral pulses  Neurological:   Awake, alert, oriented. No obvious focal deficits  Musculoskelatal:  No obvious deformities    Assessment:      Diagnosis Orders   1. Coronary artery disease involving native coronary artery of native heart without angina pectoris     2. Essential hypertension     cardiac fair for now   Generalized weakness     Plan:  No follow-ups on file. As above  Continue risk factor modification and medical management  Thank you for allowing me to participate in the care of your patient. Please don't hesitate to contact me regarding any further issues related to the patient care    Orders Placed:  No orders of the defined types were placed in this encounter. Medications Prescribed:  No orders of the defined types were placed in this encounter. Discussed use, benefit, and side effects of prescribed medications. All patient questions answered. Pt voicedunderstanding. Instructed to continue current medications, diet and exercise. Continue risk factor modification and medical management. Patient agreed with treatment plan. Follow up as directed.     Electronically signedby Abel Farah MD on 8/10/2020 at 11:13 AM

## 2020-11-03 PROBLEM — I10 HYPERTENSION: Status: RESOLVED | Noted: 2020-11-03 | Resolved: 2020-11-03

## 2020-12-05 ENCOUNTER — APPOINTMENT (OUTPATIENT)
Dept: GENERAL RADIOLOGY | Age: 80
DRG: 682 | End: 2020-12-05
Payer: MEDICARE

## 2020-12-05 ENCOUNTER — APPOINTMENT (OUTPATIENT)
Dept: CT IMAGING | Age: 80
DRG: 682 | End: 2020-12-05
Payer: MEDICARE

## 2020-12-05 ENCOUNTER — HOSPITAL ENCOUNTER (INPATIENT)
Age: 80
LOS: 3 days | Discharge: HOME | DRG: 682 | End: 2020-12-08
Attending: EMERGENCY MEDICINE | Admitting: STUDENT IN AN ORGANIZED HEALTH CARE EDUCATION/TRAINING PROGRAM
Payer: MEDICARE

## 2020-12-05 PROBLEM — R73.9 HYPERGLYCEMIA: Status: ACTIVE | Noted: 2020-12-05

## 2020-12-05 LAB
ALBUMIN SERPL-MCNC: 4.3 G/DL (ref 3.5–5.1)
ALP BLD-CCNC: 124 U/L (ref 38–126)
ALT SERPL-CCNC: 28 U/L (ref 11–66)
ANION GAP SERPL CALCULATED.3IONS-SCNC: 23 MEQ/L (ref 8–16)
AST SERPL-CCNC: 23 U/L (ref 5–40)
AVERAGE GLUCOSE: 327 MG/DL (ref 70–126)
BASE EXCESS MIXED: -2.1 MMOL/L (ref -2–3)
BASOPHILS # BLD: 0.4 %
BASOPHILS ABSOLUTE: 0.1 THOU/MM3 (ref 0–0.1)
BETA-HYDROXYBUTYRATE: 9.35 MG/DL (ref 0.2–2.81)
BILIRUB SERPL-MCNC: 0.6 MG/DL (ref 0.3–1.2)
BUN BLDV-MCNC: 40 MG/DL (ref 7–22)
CALCIUM SERPL-MCNC: 11 MG/DL (ref 8.5–10.5)
CHLORIDE BLD-SCNC: 93 MEQ/L (ref 98–111)
CO2: 23 MEQ/L (ref 23–33)
COLLECTED BY:: ABNORMAL
CREAT SERPL-MCNC: 1.8 MG/DL (ref 0.4–1.2)
DEVICE: ABNORMAL
EKG ATRIAL RATE: 71 BPM
EKG P AXIS: 38 DEGREES
EKG P-R INTERVAL: 142 MS
EKG Q-T INTERVAL: 468 MS
EKG QRS DURATION: 116 MS
EKG QTC CALCULATION (BAZETT): 508 MS
EKG R AXIS: -44 DEGREES
EKG T AXIS: 54 DEGREES
EKG VENTRICULAR RATE: 71 BPM
EOSINOPHIL # BLD: 0.1 %
EOSINOPHILS ABSOLUTE: 0 THOU/MM3 (ref 0–0.4)
ERYTHROCYTE [DISTWIDTH] IN BLOOD BY AUTOMATED COUNT: 13.1 % (ref 11.5–14.5)
ERYTHROCYTE [DISTWIDTH] IN BLOOD BY AUTOMATED COUNT: 42.4 FL (ref 35–45)
GFR SERPL CREATININE-BSD FRML MDRD: 27 ML/MIN/1.73M2
GLUCOSE BLD-MCNC: 366 MG/DL (ref 70–108)
GLUCOSE BLD-MCNC: 433 MG/DL (ref 70–108)
GLUCOSE BLD-MCNC: 474 MG/DL (ref 70–108)
HBA1C MFR BLD: 12.9 % (ref 4.4–6.4)
HCO3, MIXED: 23 MMOL/L (ref 23–28)
HCT VFR BLD CALC: 44.9 % (ref 37–47)
HEMOGLOBIN: 14.7 GM/DL (ref 12–16)
IMMATURE GRANS (ABS): 0.13 THOU/MM3 (ref 0–0.07)
IMMATURE GRANULOCYTES: 0.7 %
LYMPHOCYTES # BLD: 18.3 %
LYMPHOCYTES ABSOLUTE: 3.6 THOU/MM3 (ref 1–4.8)
MCH RBC QN AUTO: 28.9 PG (ref 26–33)
MCHC RBC AUTO-ENTMCNC: 32.7 GM/DL (ref 32.2–35.5)
MCV RBC AUTO: 88.4 FL (ref 81–99)
MONOCYTES # BLD: 6.1 %
MONOCYTES ABSOLUTE: 1.2 THOU/MM3 (ref 0.4–1.3)
NUCLEATED RED BLOOD CELLS: 0 /100 WBC
O2 SAT, MIXED: 69 %
OSMOLALITY CALCULATION: 308.2 MOSMOL/KG (ref 275–300)
PCO2, MIXED VENOUS: 40 MMHG (ref 41–51)
PH, MIXED: 7.37 (ref 7.31–7.41)
PLATELET # BLD: 215 THOU/MM3 (ref 130–400)
PMV BLD AUTO: 13.6 FL (ref 9.4–12.4)
PO2 MIXED: 37 MMHG (ref 25–40)
POTASSIUM REFLEX MAGNESIUM: 4.3 MEQ/L (ref 3.5–5.2)
RBC # BLD: 5.08 MILL/MM3 (ref 4.2–5.4)
SEG NEUTROPHILS: 74.4 %
SEGMENTED NEUTROPHILS ABSOLUTE COUNT: 14.7 THOU/MM3 (ref 1.8–7.7)
SODIUM BLD-SCNC: 139 MEQ/L (ref 135–145)
TOTAL PROTEIN: 7.6 G/DL (ref 6.1–8)
WBC # BLD: 19.8 THOU/MM3 (ref 4.8–10.8)

## 2020-12-05 PROCEDURE — 2580000003 HC RX 258: Performed by: EMERGENCY MEDICINE

## 2020-12-05 PROCEDURE — 82010 KETONE BODYS QUAN: CPT

## 2020-12-05 PROCEDURE — 6370000000 HC RX 637 (ALT 250 FOR IP): Performed by: EMERGENCY MEDICINE

## 2020-12-05 PROCEDURE — G0378 HOSPITAL OBSERVATION PER HR: HCPCS

## 2020-12-05 PROCEDURE — 2140000000 HC CCU INTERMEDIATE R&B

## 2020-12-05 PROCEDURE — 82803 BLOOD GASES ANY COMBINATION: CPT

## 2020-12-05 PROCEDURE — 70450 CT HEAD/BRAIN W/O DYE: CPT

## 2020-12-05 PROCEDURE — 82948 REAGENT STRIP/BLOOD GLUCOSE: CPT

## 2020-12-05 PROCEDURE — 71046 X-RAY EXAM CHEST 2 VIEWS: CPT

## 2020-12-05 PROCEDURE — 96360 HYDRATION IV INFUSION INIT: CPT

## 2020-12-05 PROCEDURE — 2580000003 HC RX 258: Performed by: STUDENT IN AN ORGANIZED HEALTH CARE EDUCATION/TRAINING PROGRAM

## 2020-12-05 PROCEDURE — 99285 EMERGENCY DEPT VISIT HI MDM: CPT

## 2020-12-05 PROCEDURE — 80053 COMPREHEN METABOLIC PANEL: CPT

## 2020-12-05 PROCEDURE — 83036 HEMOGLOBIN GLYCOSYLATED A1C: CPT

## 2020-12-05 PROCEDURE — 93005 ELECTROCARDIOGRAM TRACING: CPT | Performed by: STUDENT IN AN ORGANIZED HEALTH CARE EDUCATION/TRAINING PROGRAM

## 2020-12-05 PROCEDURE — 85025 COMPLETE CBC W/AUTO DIFF WBC: CPT

## 2020-12-05 PROCEDURE — 36415 COLL VENOUS BLD VENIPUNCTURE: CPT

## 2020-12-05 PROCEDURE — 96361 HYDRATE IV INFUSION ADD-ON: CPT

## 2020-12-05 RX ORDER — DEXTROSE MONOHYDRATE 25 G/50ML
12.5 INJECTION, SOLUTION INTRAVENOUS PRN
Status: DISCONTINUED | OUTPATIENT
Start: 2020-12-05 | End: 2020-12-06 | Stop reason: SDUPTHER

## 2020-12-05 RX ORDER — POTASSIUM CHLORIDE 7.45 MG/ML
10 INJECTION INTRAVENOUS PRN
Status: DISCONTINUED | OUTPATIENT
Start: 2020-12-05 | End: 2020-12-09 | Stop reason: HOSPADM

## 2020-12-05 RX ORDER — SODIUM CHLORIDE 9 MG/ML
INJECTION, SOLUTION INTRAVENOUS CONTINUOUS
Status: DISCONTINUED | OUTPATIENT
Start: 2020-12-05 | End: 2020-12-07

## 2020-12-05 RX ORDER — LEVOTHYROXINE SODIUM 112 UG/1
112 TABLET ORAL
Status: DISCONTINUED | OUTPATIENT
Start: 2020-12-06 | End: 2020-12-09 | Stop reason: HOSPADM

## 2020-12-05 RX ORDER — METOPROLOL SUCCINATE 25 MG/1
25 TABLET, EXTENDED RELEASE ORAL DAILY
Status: DISCONTINUED | OUTPATIENT
Start: 2020-12-06 | End: 2020-12-09 | Stop reason: HOSPADM

## 2020-12-05 RX ORDER — ATORVASTATIN CALCIUM 40 MG/1
40 TABLET, FILM COATED ORAL NIGHTLY
Status: DISCONTINUED | OUTPATIENT
Start: 2020-12-06 | End: 2020-12-09 | Stop reason: HOSPADM

## 2020-12-05 RX ORDER — AMLODIPINE BESYLATE 10 MG/1
10 TABLET ORAL DAILY
Status: DISCONTINUED | OUTPATIENT
Start: 2020-12-06 | End: 2020-12-09 | Stop reason: HOSPADM

## 2020-12-05 RX ORDER — CLOPIDOGREL BISULFATE 75 MG/1
75 TABLET ORAL DAILY
Status: DISCONTINUED | OUTPATIENT
Start: 2020-12-06 | End: 2020-12-09 | Stop reason: HOSPADM

## 2020-12-05 RX ORDER — DEXTROSE AND SODIUM CHLORIDE 5; .45 G/100ML; G/100ML
INJECTION, SOLUTION INTRAVENOUS CONTINUOUS PRN
Status: DISCONTINUED | OUTPATIENT
Start: 2020-12-05 | End: 2020-12-07

## 2020-12-05 RX ORDER — 0.9 % SODIUM CHLORIDE 0.9 %
1000 INTRAVENOUS SOLUTION INTRAVENOUS ONCE
Status: COMPLETED | OUTPATIENT
Start: 2020-12-05 | End: 2020-12-05

## 2020-12-05 RX ADMIN — SODIUM CHLORIDE: 9 INJECTION, SOLUTION INTRAVENOUS at 22:21

## 2020-12-05 RX ADMIN — SODIUM CHLORIDE 1000 ML: 9 INJECTION, SOLUTION INTRAVENOUS at 16:20

## 2020-12-05 RX ADMIN — SODIUM CHLORIDE: 9 INJECTION, SOLUTION INTRAVENOUS at 18:36

## 2020-12-05 RX ADMIN — SODIUM CHLORIDE 7.5 UNITS/HR: 9 INJECTION, SOLUTION INTRAVENOUS at 22:17

## 2020-12-05 ASSESSMENT — PAIN DESCRIPTION - FREQUENCY: FREQUENCY: INTERMITTENT

## 2020-12-05 ASSESSMENT — ENCOUNTER SYMPTOMS
DIARRHEA: 0
SINUS PRESSURE: 0
BACK PAIN: 0
NAUSEA: 0
SORE THROAT: 0
COUGH: 0
VOICE CHANGE: 0
VOMITING: 0
CHEST TIGHTNESS: 0
ABDOMINAL PAIN: 0
RHINORRHEA: 0
CONSTIPATION: 0
TROUBLE SWALLOWING: 0
WHEEZING: 0
SHORTNESS OF BREATH: 0

## 2020-12-05 ASSESSMENT — PAIN DESCRIPTION - LOCATION: LOCATION: SHOULDER

## 2020-12-05 ASSESSMENT — PAIN DESCRIPTION - DESCRIPTORS: DESCRIPTORS: ACHING

## 2020-12-05 ASSESSMENT — PAIN DESCRIPTION - PAIN TYPE: TYPE: ACUTE PAIN

## 2020-12-05 ASSESSMENT — PAIN DESCRIPTION - ORIENTATION: ORIENTATION: LEFT

## 2020-12-05 ASSESSMENT — PAIN SCALES - GENERAL
PAINLEVEL_OUTOF10: 0
PAINLEVEL_OUTOF10: 0
PAINLEVEL_OUTOF10: 3

## 2020-12-05 NOTE — ED TRIAGE NOTES
Pt to ER for evaluation of balance problem. Per report, pt has had balance problems for last 2 weeks. Pt describes it as feeling like she is going to fall backward. Pt states is cancer survivor and has residual weakness in left arm from previous stroke. Pt alert, oriented, reports poor appetite, denies loss of sense of smell and taste. Orthostatic vital signs complete.

## 2020-12-05 NOTE — ED PROVIDER NOTES
[address] [facility] COMPLAINT    Chief Complaint   Patient presents with    Fall    Other     balance problem        Nurses Notes reviewed and I agree except as noted in the HPI. HPI    Maykel Bonner is a 68 y.o. female who presents for evaluation of dizziness, off balance for 1 week. The patient states that 1 week ago she has been having of balance specially when she stands. Patient yesterday had fall after getting dizzy when she stands up and fell in their bedroom. Patient luckily the  has been helpful attend to her. The last time that she had dizziness was before coming in here when she tried to stand up. Patient admits that she have a CVA last December 2019 and presented as with dizziness. Patient had a follow-up with her family doctor who did a telemedicine. Patient also admits that her sugar has been high with 304 100 for quite some time. Denies any headache no chest pain no shortness of breath no nausea no vomiting. REVIEW OF SYSTEMS    Review of Systems   Constitutional: Negative for appetite change, chills, diaphoresis, fatigue and fever. HENT: Negative for congestion, ear pain, postnasal drip, rhinorrhea, sinus pressure, sneezing, sore throat, trouble swallowing and voice change. Respiratory: Negative for cough, chest tightness, shortness of breath and wheezing. Cardiovascular: Negative for chest pain, palpitations and leg swelling. Gastrointestinal: Negative for abdominal pain, constipation, diarrhea, nausea and vomiting. Musculoskeletal: Negative for arthralgias, back pain, joint swelling, myalgias, neck pain and neck stiffness. Neurological: Positive for dizziness and light-headedness. Negative for syncope, weakness, numbness and headaches. Psychiatric/Behavioral: The patient is nervous/anxious.         PAST MEDICAL HISTORY     has a past medical history of Anxiety, Arthritis, Bladder spasm, CAD (coronary artery disease), Cancer ([facility] [address] Hyperlipidemia, Hypertension, Hypothyroidism, Obstructive sleep apnea of adult, SOB (shortness of breath), and Type II or unspecified type diabetes mellitus without mention of complication, not stated as uncontrolled. SURGICAL HISTORY   has a past surgical history that includes Cholecystectomy (1968); Mastectomy (2001); Hysterectomy (1996); Knee arthroscopy; Breast surgery (1974); Carpal tunnel release (Right, 2013); hernia repair (14); Colonoscopy (2007); lymph node dissection (1998); Breast biopsy (2014); joint replacement (Left); Finger trigger release; and knee surgery (Right, 2017). CURRENT MEDICATIONS    Previous Medications    AMLODIPINE (NORVASC) 5 MG TABLET    Take 10 mg by mouth daily     ASPIRIN 325 MG EC TABLET    Take 1 tablet by mouth daily    ATORVASTATIN (LIPITOR) 80 MG TABLET    Take 0.5 tablets by mouth nightly    CLOPIDOGREL (PLAVIX) 75 MG TABLET    Take 75 mg by mouth daily    CYANOCOBALAMIN (VITAMIN B 12 PO)    Place  under the tongue daily. FERROUS SULFATE 325 (65 FE) MG TABLET    Take 325 mg by mouth daily (with breakfast)    INSULIN GLARGINE (TOUJEO MAX SOLOSTAR SC)    Inject into the skin    INSULIN REGULAR HUMAN (NOVOLIN R IJ)    Inject as directed    INSULIN SYRINGE-NEEDLE U-100 (RELI-ON INSULIN SYRINGE)    by Does not apply route. 31 G/8MM MIS (SHORT)    LEVOTHYROXINE (SYNTHROID) 112 MCG TABLET    Take 112 mcg by mouth daily    LOSARTAN (COZAAR) 25 MG TABLET    Take 25 mg by mouth daily    METOPROLOL (TOPROL-XL) 25 MG XL TABLET    TAKE ONE TABLET BY MOUTH EVERY DAY    MULTIPLE VITAMIN (MULTIVITAMIN) TABLET    Take 1 tablet by mouth daily       ALLERGIES    is allergic to myrbetriq [mirabegron]. FAMILY HISTORY    She indicated that her mother is . She indicated that her father is . She indicated that the status of her paternal grandmother is unknown.  She indicated that the status of her paternal uncle is unknown.   family history includes Cirrhosis in her mother; Diabetes in her paternal grandmother and paternal uncle; Heart Disease in her father. SOCIAL HISTORY     reports that she is a non-smoker but has been exposed to tobacco smoke. She has never used smokeless tobacco. She reports current alcohol use. She reports that she does not use drugs. PHYSICAL EXAM      INITIAL VITALS: /64   Pulse 76   Temp 97.7 °F (36.5 °C) (Oral)   Resp 16   Wt 141 lb (64 kg)   SpO2 98%   BMI 24.98 kg/m² Estimated body mass index is 24.98 kg/m² as calculated from the following:    Height as of 8/10/20: 5' 3\" (1.6 m). Weight as of this encounter: 141 lb (64 kg). Physical Exam  Vitals signs reviewed. Constitutional:       Appearance: She is well-developed. HENT:      Head: Normocephalic and atraumatic. Right Ear: External ear normal.      Left Ear: External ear normal.      Nose: Nose normal.   Eyes:      General: No scleral icterus. Conjunctiva/sclera: Conjunctivae normal.      Pupils: Pupils are equal, round, and reactive to light. Neck:      Musculoskeletal: Normal range of motion and neck supple. Thyroid: No thyromegaly. Vascular: No JVD. Cardiovascular:      Rate and Rhythm: Normal rate and regular rhythm. Heart sounds: No murmur. No friction rub. Pulmonary:      Effort: Pulmonary effort is normal.      Breath sounds: Normal breath sounds. No wheezing or rales. Chest:      Chest wall: No tenderness. Abdominal:      General: Bowel sounds are normal.      Palpations: Abdomen is soft. There is no mass. Tenderness: There is no abdominal tenderness. Lymphadenopathy:      Cervical: No cervical adenopathy. Skin:     Findings: No rash. Neurological:      Mental Status: She is alert and oriented to person, place, and time. Psychiatric:         Behavior: Behavior is cooperative.          MEDICAL DECISION MAKING    DIFFERENTIAL DIAGNOSIS:  Dizziness, labyrinthitis, vertigo, electrolyte and metabolic disorder, history of diabetes uncontrolled, stroke,      DIAGNOSTIC RESULTS    EKG   Interpreted by Jovon Cardona MD      Rhythm: normal sinus   Rate: normal  Axis: normal  Ectopy: none  Conduction: normal  ST Segments: no acute change  T Waves: no acute change  Q Waves: none    Clinical Impression: Normal sinus rhythm with no acute changes/normal EKG      Jovon Cardona MD      RADIOLOGY:  I have reviewed radiologic plain film image(s). The plain films will be read or overread by the radiologist.All other non-plain film images(s) such as CT, Ultrasound and MRI have been read by the radiologist.  [address]   Final Result   1. There is no acute intracranial abnormality. **This report has been created using voice recognition software. It may contain minor errors which are inherent in voice recognition technology. **      Final report electronically signed by Dr. Peggy Arroyo on 12/5/2020 5:49 PM      XR CHEST (2 VW)   Final Result   1. There is no acute cardiopulmonary process. **This report has been created using voice recognition software. It may contain minor errors which are inherent in voice recognition technology. **      Final report electronically signed by Dr. Peggy Arroyo on 12/5/2020 5:41 PM          LABS:   Labs Reviewed   CBC WITH AUTO DIFFERENTIAL - Abnormal; Notable for the following components:       Result Value    WBC 19.8 (*)     MPV 13.6 (*)     Segs Absolute 14.7 (*)     Immature Grans (Abs) 0.13 (*)     All other components within normal limits   COMPREHENSIVE METABOLIC PANEL W/ REFLEX TO MG FOR LOW K - Abnormal; Notable for the following components:    Glucose 474 (*)     CREATININE 1.8 (*)     BUN 40 (*)     Chloride 93 (*)     Calcium 11.0 (*)     All other components within normal limits   ANION GAP - Abnormal; Notable for the following components:    Anion Gap 23.0 (*)     All other components within normal limits   GLOMERULAR FILTRATION RATE, ESTIMATED - Abnormal; Notable for the following components:    Est, Glom Filt Rate 27 (*)     All other components within normal limits   OSMOLALITY - Abnormal; Notable for the following components:    Osmolality Calc 308.2 (*)     All other components within normal limits   BETA-HYDROXYBUTYRATE - Abnormal; Notable for the following components:    Beta-Hydroxybutyrate 9.35 (*)     All other components within normal limits   HEMOGLOBIN A1C - Abnormal; Notable for the following components:    Hemoglobin A1C 12.9 (*)     AVERAGE GLUCOSE 327 (*)     All other components within normal limits   URINE RT REFLEX TO CULTURE   BLOOD GAS, VENOUS   POCT GLUCOSE     All other unresulted laboratory test above are normal:    Vitals:    Vitals:    12/05/20 1623 12/05/20 1704 12/05/20 1835 12/05/20 2043   BP: (!) 63/40 (!) 122/57 (!) 114/54 122/64   Pulse: 72 70 79 76   Resp: 18 17 (!) 116 16   Temp:       TempSrc:       SpO2: 99% 98% 98% 98%   Weight:           EMERGENCY DEPARTMENT COURSE:    Medications   0.9 % sodium chloride infusion ( Intravenous New Bag 12/5/20 1836)   dextrose 50 % IV solution (has no administration in time range)   insulin regular (HUMULIN R;NOVOLIN R) 100 Units in sodium chloride 0.9 % 100 mL infusion (has no administration in time range)   0.9 % sodium chloride bolus (0 mLs Intravenous Stopped 12/5/20 1830)       The pt was seen and evaluated by me. Within the department, I observed the pt's vitalsigns to be within acceptable range. Laboratory and Radiological studies were performed, results were reviewed with the patient. Patient had significant orthostatic hypotension, her blood pressure went down to 63/40 however heart rate remained. Within the department, the pt was treated with vigorous IV fluid hydration, and insulin drip ,. Discussed with the patient regarding admission which she concurred.   The patient's case is referred to the hospitalist for admission I observed the pt's condition to be hemodynamically stable during the duration of their stay. I explained my proposed course of treatment to the pt, and they were amenable to my decision. They were discharged home, and they will return to the ED if their symptoms become more severein nature, or otherwise change. CRITICAL CARE:   None. CONSULTS:  Dr Crystal Tian graciously admitted the patient to the hospitalist services    PROCEDURES:  None. FINAL IMPRESSION       1. Uncontrolled type 2 diabetes mellitus with hyperglycemia ([facility] [address]    2. Dizziness    3. Acute kidney injury ([facility] [address]          DISPOSITION/PLAN  PATIENT REFERRED TO: Patient is admitted to the hospitalist services. By Dr. Crystal Tian      (Please note that portions of this note were completed with a voice recognition program and electronically transcribed. Efforts were [facility] edit the dictations but occasionally words are mis-transcribed . The transcription may contain errors not detected in proofreading.   This transcription was electronically signed.)     12/05/20 8:57 PM      Chinedu Sandoval MD      Emergency room physician            Chinedu Sandoval MD  12/05/20 2100

## 2020-12-05 NOTE — ED NOTES
ED nurse-to-nurse bedside report    Chief Complaint   Patient presents with   [facility] Other     balance problem       LOC: alert and orientated to name, place, date  Vital signs   Vitals:    12/05/20 1607 12/05/20 1623 12/05/20 1704   BP: 102/88 (!) 63/40 (!) 122/57   Pulse: 74 72 70   Resp: 18 18 17   Temp: 97.7 °F (36.5 °C)     TempSrc: Oral     SpO2: 98% 99% 98%   Weight: 141 lb (64 kg)        Pain:    Pain Interventions: none   Pain Goal:   Oxygen: No    Current needs required : awaiting imaging    Telemetry: No  LDAs:   Peripheral IV 12/05/20 Right Antecubital (Active)   Site Assessment Clean;Dry; Intact 12/05/20 1625   Line Status Blood return noted;Specimen collected; Flushed 12/05/20 1625   Dressing Status Clean;Dry; Intact 12/05/20 1625   Dressing Intervention New 12/05/20 1625       Peripheral IV 12/05/20 Right Antecubital (Active)     Continuous Infusions:    sodium chloride       Mobility: Requires assistance * 1  [place] Fall Risk Score: No flowsheet data found.   Fall Interventions: side rails, call light   Report given to: Javier Greene RN  12/05/20 7769

## 2020-12-06 PROBLEM — N17.9 AKI (ACUTE KIDNEY INJURY) (HCC): Status: ACTIVE | Noted: 2020-12-06

## 2020-12-06 LAB
ANION GAP SERPL CALCULATED.3IONS-SCNC: 10 MEQ/L (ref 8–16)
ANION GAP SERPL CALCULATED.3IONS-SCNC: 11 MEQ/L (ref 8–16)
ANION GAP SERPL CALCULATED.3IONS-SCNC: 12 MEQ/L (ref 8–16)
ANION GAP SERPL CALCULATED.3IONS-SCNC: 13 MEQ/L (ref 8–16)
ANION GAP SERPL CALCULATED.3IONS-SCNC: 14 MEQ/L (ref 8–16)
BACTERIA: ABNORMAL /HPF
BILIRUBIN URINE: NEGATIVE
BLOOD, URINE: ABNORMAL
BUN BLDV-MCNC: 23 MG/DL (ref 7–22)
BUN BLDV-MCNC: 25 MG/DL (ref 7–22)
BUN BLDV-MCNC: 26 MG/DL (ref 7–22)
BUN BLDV-MCNC: 30 MG/DL (ref 7–22)
BUN BLDV-MCNC: 34 MG/DL (ref 7–22)
CALCIUM SERPL-MCNC: 8.6 MG/DL (ref 8.5–10.5)
CALCIUM SERPL-MCNC: 8.6 MG/DL (ref 8.5–10.5)
CALCIUM SERPL-MCNC: 8.7 MG/DL (ref 8.5–10.5)
CALCIUM SERPL-MCNC: 8.9 MG/DL (ref 8.5–10.5)
CALCIUM SERPL-MCNC: 9.4 MG/DL (ref 8.5–10.5)
CASTS 2: ABNORMAL /LPF
CASTS UA: ABNORMAL /LPF
CHARACTER, URINE: CLEAR
CHLORIDE BLD-SCNC: 100 MEQ/L (ref 98–111)
CHLORIDE BLD-SCNC: 103 MEQ/L (ref 98–111)
CHLORIDE BLD-SCNC: 104 MEQ/L (ref 98–111)
CHLORIDE BLD-SCNC: 105 MEQ/L (ref 98–111)
CHLORIDE BLD-SCNC: 99 MEQ/L (ref 98–111)
CO2: 15 MEQ/L (ref 23–33)
CO2: 22 MEQ/L (ref 23–33)
CO2: 24 MEQ/L (ref 23–33)
COLOR: YELLOW
CREAT SERPL-MCNC: 0.8 MG/DL (ref 0.4–1.2)
CREAT SERPL-MCNC: 1.1 MG/DL (ref 0.4–1.2)
CRYSTALS, UA: ABNORMAL
EPITHELIAL CELLS, UA: ABNORMAL /HPF
GFR SERPL CREATININE-BSD FRML MDRD: 48 ML/MIN/1.73M2
GFR SERPL CREATININE-BSD FRML MDRD: 69 ML/MIN/1.73M2
GLUCOSE BLD-MCNC: 105 MG/DL (ref 70–108)
GLUCOSE BLD-MCNC: 115 MG/DL (ref 70–108)
GLUCOSE BLD-MCNC: 123 MG/DL (ref 70–108)
GLUCOSE BLD-MCNC: 135 MG/DL (ref 70–108)
GLUCOSE BLD-MCNC: 142 MG/DL (ref 70–108)
GLUCOSE BLD-MCNC: 145 MG/DL (ref 70–108)
GLUCOSE BLD-MCNC: 147 MG/DL (ref 70–108)
GLUCOSE BLD-MCNC: 159 MG/DL (ref 70–108)
GLUCOSE BLD-MCNC: 164 MG/DL (ref 70–108)
GLUCOSE BLD-MCNC: 173 MG/DL (ref 70–108)
GLUCOSE BLD-MCNC: 174 MG/DL (ref 70–108)
GLUCOSE BLD-MCNC: 176 MG/DL (ref 70–108)
GLUCOSE BLD-MCNC: 202 MG/DL (ref 70–108)
GLUCOSE BLD-MCNC: 214 MG/DL (ref 70–108)
GLUCOSE BLD-MCNC: 221 MG/DL (ref 70–108)
GLUCOSE BLD-MCNC: 256 MG/DL (ref 70–108)
GLUCOSE BLD-MCNC: 257 MG/DL (ref 70–108)
GLUCOSE BLD-MCNC: 260 MG/DL (ref 70–108)
GLUCOSE BLD-MCNC: 281 MG/DL (ref 70–108)
GLUCOSE BLD-MCNC: 282 MG/DL (ref 70–108)
GLUCOSE URINE: >= 1000 MG/DL
KETONES, URINE: 15
LEUKOCYTE ESTERASE, URINE: ABNORMAL
MAGNESIUM: 1.6 MG/DL (ref 1.6–2.4)
MAGNESIUM: 1.7 MG/DL (ref 1.6–2.4)
MAGNESIUM: 1.7 MG/DL (ref 1.6–2.4)
MAGNESIUM: 1.9 MG/DL (ref 1.6–2.4)
MAGNESIUM: 1.9 MG/DL (ref 1.6–2.4)
MISCELLANEOUS 2: ABNORMAL
NITRITE, URINE: NEGATIVE
PH UA: 5 (ref 5–9)
PHOSPHORUS: 1.7 MG/DL (ref 2.4–4.7)
PHOSPHORUS: 1.9 MG/DL (ref 2.4–4.7)
PHOSPHORUS: 2.2 MG/DL (ref 2.4–4.7)
PHOSPHORUS: 2.3 MG/DL (ref 2.4–4.7)
PHOSPHORUS: 2.8 MG/DL (ref 2.4–4.7)
POTASSIUM SERPL-SCNC: 3.6 MEQ/L (ref 3.5–5.2)
POTASSIUM SERPL-SCNC: 3.6 MEQ/L (ref 3.5–5.2)
POTASSIUM SERPL-SCNC: 3.9 MEQ/L (ref 3.5–5.2)
POTASSIUM SERPL-SCNC: 4 MEQ/L (ref 3.5–5.2)
POTASSIUM SERPL-SCNC: 4.2 MEQ/L (ref 3.5–5.2)
PROTEIN UA: ABNORMAL
RBC URINE: ABNORMAL /HPF
RENAL EPITHELIAL, UA: ABNORMAL
SCAN OF BLOOD SMEAR: NORMAL
SODIUM BLD-SCNC: 128 MEQ/L (ref 135–145)
SODIUM BLD-SCNC: 134 MEQ/L (ref 135–145)
SODIUM BLD-SCNC: 136 MEQ/L (ref 135–145)
SODIUM BLD-SCNC: 138 MEQ/L (ref 135–145)
SODIUM BLD-SCNC: 140 MEQ/L (ref 135–145)
SPECIFIC GRAVITY, URINE: > 1.03 (ref 1–1.03)
UROBILINOGEN, URINE: 0.2 EU/DL (ref 0–1)
WBC UA: ABNORMAL /HPF
YEAST: ABNORMAL

## 2020-12-06 PROCEDURE — 2580000003 HC RX 258: Performed by: STUDENT IN AN ORGANIZED HEALTH CARE EDUCATION/TRAINING PROGRAM

## 2020-12-06 PROCEDURE — 85025 COMPLETE CBC W/AUTO DIFF WBC: CPT

## 2020-12-06 PROCEDURE — 6370000000 HC RX 637 (ALT 250 FOR IP): Performed by: STUDENT IN AN ORGANIZED HEALTH CARE EDUCATION/TRAINING PROGRAM

## 2020-12-06 PROCEDURE — 84100 ASSAY OF PHOSPHORUS: CPT

## 2020-12-06 PROCEDURE — 2500000003 HC RX 250 WO HCPCS: Performed by: STUDENT IN AN ORGANIZED HEALTH CARE EDUCATION/TRAINING PROGRAM

## 2020-12-06 PROCEDURE — 82948 REAGENT STRIP/BLOOD GLUCOSE: CPT

## 2020-12-06 PROCEDURE — 87086 URINE CULTURE/COLONY COUNT: CPT

## 2020-12-06 PROCEDURE — 96365 THER/PROPH/DIAG IV INF INIT: CPT

## 2020-12-06 PROCEDURE — 94760 N-INVAS EAR/PLS OXIMETRY 1: CPT

## 2020-12-06 PROCEDURE — 81001 URINALYSIS AUTO W/SCOPE: CPT

## 2020-12-06 PROCEDURE — 96366 THER/PROPH/DIAG IV INF ADDON: CPT

## 2020-12-06 PROCEDURE — 93010 ELECTROCARDIOGRAM REPORT: CPT | Performed by: INTERNAL MEDICINE

## 2020-12-06 PROCEDURE — 36415 COLL VENOUS BLD VENIPUNCTURE: CPT

## 2020-12-06 PROCEDURE — 80048 BASIC METABOLIC PNL TOTAL CA: CPT

## 2020-12-06 PROCEDURE — 96368 THER/DIAG CONCURRENT INF: CPT

## 2020-12-06 PROCEDURE — 99222 1ST HOSP IP/OBS MODERATE 55: CPT | Performed by: STUDENT IN AN ORGANIZED HEALTH CARE EDUCATION/TRAINING PROGRAM

## 2020-12-06 PROCEDURE — G0378 HOSPITAL OBSERVATION PER HR: HCPCS

## 2020-12-06 PROCEDURE — 96372 THER/PROPH/DIAG INJ SC/IM: CPT

## 2020-12-06 PROCEDURE — 2140000000 HC CCU INTERMEDIATE R&B

## 2020-12-06 PROCEDURE — 83735 ASSAY OF MAGNESIUM: CPT

## 2020-12-06 PROCEDURE — 6370000000 HC RX 637 (ALT 250 FOR IP): Performed by: PHYSICIAN ASSISTANT

## 2020-12-06 PROCEDURE — 96367 TX/PROPH/DG ADDL SEQ IV INF: CPT

## 2020-12-06 PROCEDURE — 6360000002 HC RX W HCPCS: Performed by: STUDENT IN AN ORGANIZED HEALTH CARE EDUCATION/TRAINING PROGRAM

## 2020-12-06 RX ORDER — NICOTINE POLACRILEX 4 MG
15 LOZENGE BUCCAL PRN
Status: DISCONTINUED | OUTPATIENT
Start: 2020-12-06 | End: 2020-12-09 | Stop reason: HOSPADM

## 2020-12-06 RX ORDER — FLUCONAZOLE 100 MG/1
150 TABLET ORAL ONCE
Status: COMPLETED | OUTPATIENT
Start: 2020-12-06 | End: 2020-12-06

## 2020-12-06 RX ORDER — DEXTROSE MONOHYDRATE 25 G/50ML
12.5 INJECTION, SOLUTION INTRAVENOUS PRN
Status: DISCONTINUED | OUTPATIENT
Start: 2020-12-06 | End: 2020-12-09 | Stop reason: HOSPADM

## 2020-12-06 RX ORDER — FLUTICASONE PROPIONATE 50 MCG
1 SPRAY, SUSPENSION (ML) NASAL DAILY
Status: DISCONTINUED | OUTPATIENT
Start: 2020-12-06 | End: 2020-12-09 | Stop reason: HOSPADM

## 2020-12-06 RX ORDER — DEXTROSE MONOHYDRATE 50 MG/ML
100 INJECTION, SOLUTION INTRAVENOUS PRN
Status: DISCONTINUED | OUTPATIENT
Start: 2020-12-06 | End: 2020-12-09 | Stop reason: HOSPADM

## 2020-12-06 RX ORDER — INSULIN GLARGINE 100 [IU]/ML
32 INJECTION, SOLUTION SUBCUTANEOUS
Status: DISCONTINUED | OUTPATIENT
Start: 2020-12-06 | End: 2020-12-09 | Stop reason: HOSPADM

## 2020-12-06 RX ADMIN — SODIUM CHLORIDE: 9 INJECTION, SOLUTION INTRAVENOUS at 00:55

## 2020-12-06 RX ADMIN — METOPROLOL SUCCINATE 25 MG: 25 TABLET, FILM COATED, EXTENDED RELEASE ORAL at 17:53

## 2020-12-06 RX ADMIN — ASPIRIN 325 MG: 325 TABLET, COATED ORAL at 08:41

## 2020-12-06 RX ADMIN — POTASSIUM CHLORIDE 10 MEQ: 7.46 INJECTION, SOLUTION INTRAVENOUS at 03:49

## 2020-12-06 RX ADMIN — FLUTICASONE PROPIONATE 1 SPRAY: 50 SPRAY, METERED NASAL at 23:32

## 2020-12-06 RX ADMIN — POTASSIUM CHLORIDE 10 MEQ: 7.46 INJECTION, SOLUTION INTRAVENOUS at 01:57

## 2020-12-06 RX ADMIN — DEXTROSE AND SODIUM CHLORIDE: 5; 450 INJECTION, SOLUTION INTRAVENOUS at 10:26

## 2020-12-06 RX ADMIN — INSULIN GLARGINE 32 UNITS: 100 INJECTION, SOLUTION SUBCUTANEOUS at 10:38

## 2020-12-06 RX ADMIN — POTASSIUM CHLORIDE 10 MEQ: 7.46 INJECTION, SOLUTION INTRAVENOUS at 02:48

## 2020-12-06 RX ADMIN — ATORVASTATIN CALCIUM 40 MG: 40 TABLET, FILM COATED ORAL at 00:44

## 2020-12-06 RX ADMIN — ATORVASTATIN CALCIUM 40 MG: 40 TABLET, FILM COATED ORAL at 20:31

## 2020-12-06 RX ADMIN — SODIUM PHOSPHATE, MONOBASIC, MONOHYDRATE 15 MMOL: 276; 142 INJECTION, SOLUTION INTRAVENOUS at 18:41

## 2020-12-06 RX ADMIN — DEXTROSE AND SODIUM CHLORIDE: 5; 450 INJECTION, SOLUTION INTRAVENOUS at 01:47

## 2020-12-06 RX ADMIN — FLUCONAZOLE 150 MG: 100 TABLET ORAL at 21:49

## 2020-12-06 RX ADMIN — LEVOTHYROXINE SODIUM 112 MCG: 112 TABLET ORAL at 04:50

## 2020-12-06 RX ADMIN — SODIUM PHOSPHATE, MONOBASIC, MONOHYDRATE 15 MMOL: 276; 142 INJECTION, SOLUTION INTRAVENOUS at 02:26

## 2020-12-06 RX ADMIN — CLOPIDOGREL BISULFATE 75 MG: 75 TABLET ORAL at 08:40

## 2020-12-06 RX ADMIN — ENOXAPARIN SODIUM 30 MG: 30 INJECTION SUBCUTANEOUS at 08:41

## 2020-12-06 RX ADMIN — SERTRALINE 50 MG: 50 TABLET, FILM COATED ORAL at 21:50

## 2020-12-06 RX ADMIN — DEXTROSE AND SODIUM CHLORIDE: 5; 450 INJECTION, SOLUTION INTRAVENOUS at 10:25

## 2020-12-06 ASSESSMENT — PAIN SCALES - GENERAL
PAINLEVEL_OUTOF10: 0
PAINLEVEL_OUTOF10: 0

## 2020-12-06 NOTE — PROGRESS NOTES
Pharmacy Renal Adjustment    Soo Ledesma is a 68 y.o. female. Pharmacy renally adjust the following medications per P&T approved policy: Lovenox    Recent Labs     12/05/20  1620   BUN 40*       Recent Labs     12/05/20  1620   CREATININE 1.8*       Estimated Creatinine Clearance: 21 mL/min (A) (based on SCr of 1.8 mg/dL (H)).   Calculated CrCl:    Height:   Ht Readings from Last 1 Encounters:   12/05/20 5' 3\" (1.6 m)     Weight:  Wt Readings from Last 1 Encounters:   12/05/20 137 lb 1.6 oz (62.2 kg)     Baseline SCr: 0.5  Plan: Adjustments based on renal function:          Decrease Lovenox to 30 mg Q24H

## 2020-12-06 NOTE — PROGRESS NOTES
Hospitalist Progress Note    Patient:  Marvin Ojeda      Unit/Bed:3B-32/032-A    YOB: 1940    MRN: [id number]       Acct: [de-identified]     PCP: Levar Truong DO    Date of Admission: 12/5/2020    Assessment/Plan:    1. Diabetic ketoacidosis: Patient presented with hyperglycemia (), anion gap metabolic acidosis (AG 68.2), and ketonemia (serum beta hydroxybutyrate 9.35). HgbA1c of 12.9. S/p aggressive IV fluid resuscitation. D5W in half-normal saline initiated since blood glucose dropped below 250 mg/dL. On 12/06/2020, anion gap closure of 11. Will overlap SC Lantus with regular insulin infusion for 2 hours. 2. Acute kidney injury: Secondary to volume depletion. Cr of 1.8 on presentation. S/p aggressive IV fluid resuscitation. Cr down to 0.8. Will continue to monitor  3. Orthostatic hypotension: Secondary to volume depletion. Systolic BP difference of 39 between sitting and standing on presentation. S/p aggressive IV fluid resuscitation. Will continue to monitor  4. History of CVA: Continue clopidogrel, aspirin  5. Essential hypertension: Continue metoprolol ER 25 mg daily  6. CAD: Continue atorvastatin, aspirin  7. Hypothyroidism: continue Synthroid 112 mcg daily       Expected discharge date:  12/07/2020    Disposition:    [x] Home       [] TCU       [] Rehab       [] Psych       [] SNF       [] [facility]       [] Other-    Chief Complaint: Dizziness and lightheadedness    Hospital Course: This is a 69 yo female with a PMHx of uncontrolled DM2, hypothyroidism, HTN, CAD, history of CVA, who present with dizziness. Patient states that for the past week she has been having these dizzy spells after standing up. During a few episodes she fell back on something soft. She states that she has not been taking her insulin regularly during the past three weeks because she hasn't filled her prescription and also she has been having trouble with her Anny glucose monitor.  In the ED, patient was found to have a glucose of 474, anion gap metabolic acidosis of 44.0, and serum beta hydroxybutyrate of 9.35. Positive orthostatic hypotension. She received IV fluid resuscitation and was started on regular insulin drip. Subjective (past 24 hours):  Patient is laying in bed comfortably, doing well, with no new complaints. Has been afebrile. Denies chest pain, shortness of breath, nausea, vomiting, abdominal pain, difficulty urinating. ROS (12 point review of systems completed. Pertinent positives noted. Otherwise ROS is negative). Medications:  Reviewed    Infusion Medications    sodium chloride Stopped (12/05/20 2222)    insulin 3 Units/hr (12/06/20 0749)    dextrose 5 % and 0.45 % NaCl 150 mL/hr at 12/06/20 0147    sodium chloride Stopped (12/06/20 0147)     Scheduled Medications    influenza virus vaccine  0.5 mL Intramuscular Prior to discharge    enoxaparin  30 mg Subcutaneous Daily    amLODIPine  10 mg Oral Daily    aspirin  325 mg Oral Daily    atorvastatin  40 mg Oral Nightly    clopidogrel  75 mg Oral Daily    levothyroxine  112 mcg Oral QAM AC    metoprolol succinate  25 mg Oral Daily     PRN Meds: dextrose, insulin regular, dextrose, potassium chloride, magnesium sulfate, sodium phosphate IVPB **OR** sodium phosphate IVPB **OR** sodium phosphate IVPB, dextrose 5 % and 0.45 % NaCl      Intake/Output Summary (Last 24 hours) at 12/6/2020 0801  Last data filed at 12/6/2020 0451  Gross per 24 hour   Intake 2081.33 ml   Output 120 ml   Net 1961.33 ml       Diet:  Diet NPO Effective Now    Exam:  BP (!) 116/54   Pulse 73   Temp 97.8 °F (36.6 °C) (Oral)   Resp 17   Ht 5' 3\" (1.6 m)   Wt 137 lb 1.6 oz (62.2 kg)   SpO2 99%   BMI 24.29 kg/m²     General appearance: No apparent distress, appears stated age and cooperative. HEENT: Pupils equal, round, and reactive to light. Conjunctivae/corneas clear. Neck: Supple, with full range of motion.  No jugular venous distention. Trachea midline. Respiratory:  Normal respiratory effort. Clear to auscultation. Cardiovascular: Regular rate and rhythm with normal C4/I6, systolic murmur. Abdomen: Soft, non-tender, non-distended with normal bowel sounds. Musculoskeletal: passive and active ROM x 4 extremities. Skin: Skin color, texture, turgor normal.  No rashes or lesions. Neurologic:  Neurovascularly intact without any focal sensory/motor deficits. Psychiatric: Alert and oriented, thought content appropriate, normal insight  Capillary Refill: Brisk,< 3 seconds   Peripheral Pulses: +2 palpable, equal bilaterally       Labs:   Recent Labs     12/05/20  1620 12/06/20  0604   WBC 19.8* 17.1*   HGB 14.7 12.3   HCT 44.9 36.2*    141     Recent Labs     12/05/20  1620 12/06/20  0109 12/06/20  0604    136 140   K 4.3 3.6 4.0   CL 93* 103 105   CO2 23 22* 22*   BUN 40* 34* 30*   CREATININE 1.8* 1.1 0.8   CALCIUM 11.0* 9.4 8.7   PHOS  --  1.7* 2.8     Recent Labs     12/05/20  1620   AST 23   ALT 28   BILITOT 0.6   ALKPHOS 124     No results for input(s): INR in the last 72 hours. No results for input(s): Silvestre Mariusz in the last 72 hours. Microbiology:      Urinalysis:      Lab Results   Component Value Date    NITRU NEGATIVE 12/06/2020    WBCUA 50-75 12/06/2020    BACTERIA FEW 12/06/2020    RBCUA 3-5 12/06/2020    BLOODU TRACE 12/06/2020    GLUCOSEU >= 1000 12/06/2020       Radiology:  CT HEAD WO CONTRAST   Final Result   1. There is no acute intracranial abnormality. **This report has been created using voice recognition software. It may contain minor errors which are inherent in voice recognition technology. **      Final report electronically signed by Dr. Sylvester Andrade on 12/5/2020 5:49 PM      XR CHEST (2 VW)   Final Result   1. There is no acute cardiopulmonary process. **This report has been created using voice recognition software.   It may contain minor errors which are inherent in

## 2020-12-06 NOTE — PROGRESS NOTES
Pt admitted to  [phone] [id number] from ED. Complaints: Dizziness. IV normal saline infusing into the antecubital right, condition patent and no redness at a rate of 100 mls/ hour with about 400 mls in the bag still. IV site free of s/s of infection or infiltration. Vital signs obtained. Assessment and data collection initiated. Two nurse skin assessment performed by Kavin Johansen and [facility] RN. Oriented to room. Policies and procedures for 3B explained. Ignacio Banks RN discussed hourly rounding with patient addressing 5 P's. Fall prevention and safety brochure discussed with patient. Bed alarm on. Call light in reach. The best day to schedule a follow up Dr appointment is:  Monday p.m. Explained patients right to have family, representative or physician notified of their admission. Patient has Declined for physician to be notified. Patient has Declined for family/representative to be notified. All questions answered with no further questions at this time.

## 2020-12-06 NOTE — H&P
Hospitalist - History & Physical      Patient: Yesenia Garrett    Unit/Bed:3B-32/032-A  YOB: 1940  MRN: [id number]   Acct: [de-identified]   PCP: Per Singh DO    Date of Service: Pt seen/examined on 12/06/20  and Admitted to Inpatient with expected LOS greater than two midnights due to medical therapy. Chief Complaint:  Dizziness, Hyperglycemia    Assessment and Plan:-  1. Dizziness  2. Hyperglycemia in setting of uncontrolled DM2 (Mild DKA vs HHS)  3. Starvation ketosis  4. MYRA  Plan:  Insulin gtt; goal -180  IVFs, BMP q4h  Endocrine consult for home going DM regimen  POCT qAC + qhs + SSI    History Of Present Illness:    67 yo female with PMH of uncontrolled DM2, hypothyroidism, HTN who presents with dizziness. Patient lives with  who called EMS. Patient report no appetite for months. Also reports constipation. She notes her blood sugar has been very uncontrolled ans she was planning to see a specialist. States home regimen is humalog 20u qAC and SSI. Does not take long acting insulin. Her blood sugars have consistently been in the 400s.     Past Medical History:        Diagnosis Date    Anxiety 4/21/2020    Arthritis     Bladder spasm     CAD (coronary artery disease) 3/12/2013    Cancer ([facility] [address]     breast    Hyperlipidemia     Hypertension     Hypothyroidism     Obstructive sleep apnea of adult 12/12/2013    SOB (shortness of breath)     Type II or unspecified type diabetes mellitus without mention of complication, not stated as uncontrolled        Past Surgical History:        Procedure Laterality Date    BREAST BIOPSY  1/17/2014    BREAST SURGERY  7/1974    left breast     CARPAL TUNNEL RELEASE Right 1/2013    CHOLECYSTECTOMY  7/1968    COLONOSCOPY  11/2007    FINGER TRIGGER RELEASE      HERNIA REPAIR  6-6-14    Dr. Neris Ross  11/1996    JOINT REPLACEMENT Left     KNEE ARTHROSCOPY      left    KNEE SURGERY Right 2017    LYMPH NODE DISSECTION 12/23/1998    MASTECTOMY  7/2001       Home Medications:   No current facility-administered medications on file prior to encounter. Current Outpatient Medications on File Prior to Encounter   Medication Sig Dispense Refill    aspirin 325 MG EC tablet Take 1 tablet by mouth daily      atorvastatin (LIPITOR) 80 MG tablet Take 0.5 tablets by mouth nightly      clopidogrel (PLAVIX) 75 MG tablet Take 75 mg by mouth daily      ferrous sulfate 325 (65 Fe) MG tablet Take 325 mg by mouth daily (with breakfast)      Insulin Glargine (TOUJEO MAX SOLOSTAR SC) Inject into the skin      Insulin Regular Human (NOVOLIN R IJ) Inject as directed      levothyroxine (SYNTHROID) 112 MCG tablet Take 112 mcg by mouth daily      losartan (COZAAR) 25 MG tablet Take 25 mg by mouth daily      Cyanocobalamin (VITAMIN B 12 PO) Place  under the tongue daily.  metoprolol (TOPROL-XL) 25 MG XL tablet TAKE ONE TABLET BY MOUTH EVERY DAY 30 tablet 2    amLODIPine (NORVASC) 5 MG tablet Take 10 mg by mouth daily       Insulin Syringe-Needle U-100 (RELI-ON INSULIN SYRINGE) by Does not apply route. 31 G/8MM MIS (SHORT)         Allergies:    Myrbetriq [mirabegron]    Social History:    reports that she is a non-smoker but has been exposed to tobacco smoke. She has never used smokeless tobacco. She reports current alcohol use. She reports that she does not use drugs. Family History:       Problem Relation Age of Onset    Cirrhosis Mother     Heart Disease Father     Diabetes Paternal Uncle     Diabetes Paternal Grandmother        Diet:  Diet NPO Effective Now    Review of systems:   Pertinent positives as noted in the HPI. All other systems reviewed and negative. PHYSICAL EXAM:  BP (!) 116/54   Pulse 73   Temp 97.8 °F (36.6 °C) (Oral)   Resp 17   Ht 5' 3\" (1.6 m)   Wt 137 lb 1.6 oz (62.2 kg)   SpO2 99%   BMI 24.29 kg/m²   General appearance: No apparent distress, appears stated age and cooperative.   HEENT: Normal cephalic, atraumatic without obvious deformity. Pupils equal, round, and reactive to light. Extra ocular muscles intact. Conjunctivae/corneas clear. Neck: Supple, with full range of motion. No jugular venous distention. Trachea midline. Respiratory:  Normal respiratory effort. Clear to auscultation, bilaterally without Rales/Wheezes/Rhonchi. Cardiovascular: Regular rate and rhythm with normal S1/S2 without murmurs, rubs or gallops. Abdomen: Soft, non-tender, non-distended with normal bowel sounds. Musculoskeletal:  No clubbing, cyanosis or edema bilaterally. Skin: Skin color, texture, turgor normal.  No rashes or lesions. Neurologic:  Neurovascularly intact without any focal sensory/motor deficits. Cranial nerves: II-XII intact, grossly non-focal.  Psychiatric: Alert and oriented, thought content appropriate, normal insight  Capillary Refill: Brisk,< 3 seconds   Peripheral Pulses: +2 palpable, equal bilaterally     Labs:   Recent Labs     12/05/20  1620 12/06/20  0604   WBC 19.8* 17.1*   HGB 14.7 12.3   HCT 44.9 36.2*    141     Recent Labs     12/05/20  1620 12/06/20  0109 12/06/20  0604    136 140   K 4.3 3.6 4.0   CL 93* 103 105   CO2 23 22* 22*   BUN 40* 34* 30*   CREATININE 1.8* 1.1 0.8   CALCIUM 11.0* 9.4 8.7   PHOS  --  1.7* 2.8     Recent Labs     12/05/20  1620   AST 23   ALT 28   BILITOT 0.6   ALKPHOS 124     No results for input(s): INR in the last 72 hours. No results for input(s): Margette Dec in the last 72 hours. Urinalysis:    Lab Results   Component Value Date    NITRU NEGATIVE 12/06/2020    WBCUA 50-75 12/06/2020    BACTERIA FEW 12/06/2020    RBCUA 3-5 12/06/2020    BLOODU TRACE 12/06/2020    GLUCOSEU >= 1000 12/06/2020       Radiology:   CT HEAD WO CONTRAST   Final Result   1. There is no acute intracranial abnormality. **This report has been created using voice recognition software.   It may contain minor errors which are inherent in voice recognition technology. **      Final report electronically signed by Dr. Richi Ochoa on 12/5/2020 5:49 PM      XR CHEST (2 VW)   Final Result   1. There is no acute cardiopulmonary process. **This report has been created using voice recognition software. It may contain minor errors which are inherent in voice recognition technology. **      Final report electronically signed by Dr. Richi Ochoa on 12/5/2020 5:41 PM        Xr Chest (2 Vw)    Result Date: 12/5/2020  PROCEDURE: XR CHEST (2 VW) CLINICAL INFORMATION: Dizziness. COMPARISON: 4/1/2020. TECHNIQUE: AP upright and lateral views of the chest performed. FINDINGS: POSTSURGICAL CHANGES: None. LINES/TUBES/MECHANICAL DEVICES: None. TRACHEA/HEART/MEDIASTINUM/HILUM: 1. Stable small calcified lymph node at the AP window related to old granulomatous disease. LUNG FIELDS: 1. Stable moderate elevation of the right hemidiaphragm. There is no consolidation or infiltrate. There is no pleural effusion or pulmonary vascular congestion. 2. There is a stable calcified granuloma within the posteromedial aspect the left lower chest. OTHER: None. PNEUMOTHORAX: None. OSSEOUS STRUCTURES: 1. No acute osseous abnormality. 2. The bony structures are osteopenic. 3. There are small endplate spurs at several levels along the spine. 1. There is no acute cardiopulmonary process. **This report has been created using voice recognition software. It may contain minor errors which are inherent in voice recognition technology. ** Final report electronically signed by Dr. Richi Ochoa on 12/5/2020 5:41 PM    Ct Head Wo Contrast    Result Date: 12/5/2020  PROCEDURE: CT HEAD WO CONTRAST CLINICAL INFORMATION: Dizziness. COMPARISON: No prior study. TECHNIQUE: 5 mm axial imaging through the head without IV contrast. All CT scans at this facility use dose modulation, iterative reconstruction, and/or weight based dosing when appropriate to reduce the radiation dose to as low as reasonably achievable.

## 2020-12-07 LAB
ANION GAP SERPL CALCULATED.3IONS-SCNC: 9 MEQ/L (ref 8–16)
ANISOCYTOSIS: PRESENT
ATYPICAL LYMPHOCYTES: ABNORMAL %
BASOPHILS # BLD: 0.5 %
BASOPHILS # BLD: 0.5 %
BASOPHILS ABSOLUTE: 0 THOU/MM3 (ref 0–0.1)
BASOPHILS ABSOLUTE: 0.1 THOU/MM3 (ref 0–0.1)
BUN BLDV-MCNC: 19 MG/DL (ref 7–22)
CALCIUM IONIZED: 1.1 MMOL/L (ref 1.12–1.32)
CALCIUM SERPL-MCNC: 8.3 MG/DL (ref 8.5–10.5)
CHLORIDE BLD-SCNC: 102 MEQ/L (ref 98–111)
CO2: 20 MEQ/L (ref 23–33)
CREAT SERPL-MCNC: 0.6 MG/DL (ref 0.4–1.2)
DIFFERENTIAL TYPE: ABNORMAL
EOSINOPHIL # BLD: 0.9 %
EOSINOPHIL # BLD: 1.1 %
EOSINOPHILS ABSOLUTE: 0.1 THOU/MM3 (ref 0–0.4)
EOSINOPHILS ABSOLUTE: 0.2 THOU/MM3 (ref 0–0.4)
ERYTHROCYTE [DISTWIDTH] IN BLOOD BY AUTOMATED COUNT: 12.8 % (ref 11.5–14.5)
ERYTHROCYTE [DISTWIDTH] IN BLOOD BY AUTOMATED COUNT: 13.1 % (ref 11.5–14.5)
ERYTHROCYTE [DISTWIDTH] IN BLOOD BY AUTOMATED COUNT: 39.9 FL (ref 35–45)
ERYTHROCYTE [DISTWIDTH] IN BLOOD BY AUTOMATED COUNT: 41.9 FL (ref 35–45)
GFR SERPL CREATININE-BSD FRML MDRD: > 90 ML/MIN/1.73M2
GLUCOSE BLD-MCNC: 172 MG/DL (ref 70–108)
GLUCOSE BLD-MCNC: 200 MG/DL (ref 70–108)
GLUCOSE BLD-MCNC: 215 MG/DL (ref 70–108)
GLUCOSE BLD-MCNC: 259 MG/DL (ref 70–108)
GLUCOSE BLD-MCNC: 260 MG/DL (ref 70–108)
HCT VFR BLD CALC: 36.2 % (ref 37–47)
HCT VFR BLD CALC: 39.2 % (ref 37–47)
HEMOGLOBIN: 12.3 GM/DL (ref 12–16)
HEMOGLOBIN: 13.1 GM/DL (ref 12–16)
IMMATURE GRANS (ABS): 0.04 THOU/MM3 (ref 0–0.07)
IMMATURE GRANS (ABS): 0.08 THOU/MM3 (ref 0–0.07)
IMMATURE GRANULOCYTES: 0.4 %
IMMATURE GRANULOCYTES: 0.5 %
LYMPHOCYTES # BLD: 35.8 %
LYMPHOCYTES # BLD: 38.6 %
LYMPHOCYTES ABSOLUTE: 3.4 THOU/MM3 (ref 1–4.8)
LYMPHOCYTES ABSOLUTE: 6.6 THOU/MM3 (ref 1–4.8)
MAGNESIUM: 1.7 MG/DL (ref 1.6–2.4)
MCH RBC QN AUTO: 28.9 PG (ref 26–33)
MCH RBC QN AUTO: 29.7 PG (ref 26–33)
MCHC RBC AUTO-ENTMCNC: 33.4 GM/DL (ref 32.2–35.5)
MCHC RBC AUTO-ENTMCNC: 34 GM/DL (ref 32.2–35.5)
MCV RBC AUTO: 85.2 FL (ref 81–99)
MCV RBC AUTO: 88.9 FL (ref 81–99)
MONOCYTES # BLD: 5.1 %
MONOCYTES # BLD: 5.4 %
MONOCYTES ABSOLUTE: 0.5 THOU/MM3 (ref 0.4–1.3)
MONOCYTES ABSOLUTE: 0.9 THOU/MM3 (ref 0.4–1.3)
NUCLEATED RED BLOOD CELLS: 0 /100 WBC
NUCLEATED RED BLOOD CELLS: 0 /100 WBC
ORGANISM: ABNORMAL
PATHOLOGIST REVIEW: ABNORMAL
PHOSPHORUS: 2.8 MG/DL (ref 2.4–4.7)
PLATELET # BLD: 141 THOU/MM3 (ref 130–400)
PLATELET # BLD: 145 THOU/MM3 (ref 130–400)
PMV BLD AUTO: 13.6 FL (ref 9.4–12.4)
PMV BLD AUTO: 13.6 FL (ref 9.4–12.4)
POTASSIUM SERPL-SCNC: 4 MEQ/L (ref 3.5–5.2)
RBC # BLD: 4.25 MILL/MM3 (ref 4.2–5.4)
RBC # BLD: 4.41 MILL/MM3 (ref 4.2–5.4)
SEG NEUTROPHILS: 54.4 %
SEG NEUTROPHILS: 56.8 %
SEGMENTED NEUTROPHILS ABSOLUTE COUNT: 5.4 THOU/MM3 (ref 1.8–7.7)
SEGMENTED NEUTROPHILS ABSOLUTE COUNT: 9.3 THOU/MM3 (ref 1.8–7.7)
SODIUM BLD-SCNC: 131 MEQ/L (ref 135–145)
URINE CULTURE REFLEX: ABNORMAL
WBC # BLD: 17.1 THOU/MM3 (ref 4.8–10.8)
WBC # BLD: 9.5 THOU/MM3 (ref 4.8–10.8)

## 2020-12-07 PROCEDURE — 82330 ASSAY OF CALCIUM: CPT

## 2020-12-07 PROCEDURE — 85025 COMPLETE CBC W/AUTO DIFF WBC: CPT

## 2020-12-07 PROCEDURE — G0378 HOSPITAL OBSERVATION PER HR: HCPCS

## 2020-12-07 PROCEDURE — 6360000002 HC RX W HCPCS: Performed by: STUDENT IN AN ORGANIZED HEALTH CARE EDUCATION/TRAINING PROGRAM

## 2020-12-07 PROCEDURE — 6370000000 HC RX 637 (ALT 250 FOR IP): Performed by: STUDENT IN AN ORGANIZED HEALTH CARE EDUCATION/TRAINING PROGRAM

## 2020-12-07 PROCEDURE — 94760 N-INVAS EAR/PLS OXIMETRY 1: CPT

## 2020-12-07 PROCEDURE — 2140000000 HC CCU INTERMEDIATE R&B

## 2020-12-07 PROCEDURE — 6370000000 HC RX 637 (ALT 250 FOR IP): Performed by: INTERNAL MEDICINE

## 2020-12-07 PROCEDURE — 82948 REAGENT STRIP/BLOOD GLUCOSE: CPT

## 2020-12-07 PROCEDURE — 84100 ASSAY OF PHOSPHORUS: CPT

## 2020-12-07 PROCEDURE — 36415 COLL VENOUS BLD VENIPUNCTURE: CPT

## 2020-12-07 PROCEDURE — 99232 SBSQ HOSP IP/OBS MODERATE 35: CPT | Performed by: INTERNAL MEDICINE

## 2020-12-07 PROCEDURE — 96372 THER/PROPH/DIAG INJ SC/IM: CPT

## 2020-12-07 PROCEDURE — 6370000000 HC RX 637 (ALT 250 FOR IP): Performed by: PHYSICIAN ASSISTANT

## 2020-12-07 PROCEDURE — 80048 BASIC METABOLIC PNL TOTAL CA: CPT

## 2020-12-07 PROCEDURE — 83735 ASSAY OF MAGNESIUM: CPT

## 2020-12-07 RX ORDER — ACETAMINOPHEN 325 MG/1
650 TABLET ORAL EVERY 4 HOURS PRN
Status: DISCONTINUED | OUTPATIENT
Start: 2020-12-07 | End: 2020-12-09 | Stop reason: HOSPADM

## 2020-12-07 RX ORDER — CALCIUM CARBONATE 200(500)MG
500 TABLET,CHEWABLE ORAL 3 TIMES DAILY PRN
Status: DISCONTINUED | OUTPATIENT
Start: 2020-12-07 | End: 2020-12-09 | Stop reason: HOSPADM

## 2020-12-07 RX ADMIN — ASPIRIN 325 MG: 325 TABLET, COATED ORAL at 08:50

## 2020-12-07 RX ADMIN — ATORVASTATIN CALCIUM 40 MG: 40 TABLET, FILM COATED ORAL at 20:55

## 2020-12-07 RX ADMIN — ENOXAPARIN SODIUM 40 MG: 40 INJECTION SUBCUTANEOUS at 08:50

## 2020-12-07 RX ADMIN — SERTRALINE 50 MG: 50 TABLET, FILM COATED ORAL at 08:50

## 2020-12-07 RX ADMIN — CLOPIDOGREL BISULFATE 75 MG: 75 TABLET ORAL at 08:50

## 2020-12-07 RX ADMIN — ANTACID TABLETS 500 MG: 500 TABLET, CHEWABLE ORAL at 17:37

## 2020-12-07 RX ADMIN — METOPROLOL SUCCINATE 25 MG: 25 TABLET, FILM COATED, EXTENDED RELEASE ORAL at 08:50

## 2020-12-07 RX ADMIN — INSULIN GLARGINE 32 UNITS: 100 INJECTION, SOLUTION SUBCUTANEOUS at 08:52

## 2020-12-07 RX ADMIN — LEVOTHYROXINE SODIUM 112 MCG: 112 TABLET ORAL at 08:50

## 2020-12-07 RX ADMIN — AMLODIPINE BESYLATE 10 MG: 10 TABLET ORAL at 08:50

## 2020-12-07 RX ADMIN — FLUTICASONE PROPIONATE 1 SPRAY: 50 SPRAY, METERED NASAL at 08:50

## 2020-12-07 ASSESSMENT — PAIN SCALES - GENERAL
PAINLEVEL_OUTOF10: 0
PAINLEVEL_OUTOF10: 8

## 2020-12-07 ASSESSMENT — PAIN DESCRIPTION - LOCATION: LOCATION: SHOULDER

## 2020-12-07 ASSESSMENT — PAIN DESCRIPTION - ORIENTATION: ORIENTATION: LEFT

## 2020-12-07 ASSESSMENT — PAIN DESCRIPTION - FREQUENCY: FREQUENCY: INTERMITTENT

## 2020-12-07 ASSESSMENT — PAIN DESCRIPTION - PROGRESSION: CLINICAL_PROGRESSION: GRADUALLY WORSENING

## 2020-12-07 ASSESSMENT — PAIN DESCRIPTION - DESCRIPTORS: DESCRIPTORS: ACHING

## 2020-12-07 ASSESSMENT — PAIN DESCRIPTION - DIRECTION: RADIATING_TOWARDS: DOWN ARM

## 2020-12-07 ASSESSMENT — PAIN - FUNCTIONAL ASSESSMENT: PAIN_FUNCTIONAL_ASSESSMENT: PREVENTS OR INTERFERES SOME ACTIVE ACTIVITIES AND ADLS

## 2020-12-07 ASSESSMENT — PAIN DESCRIPTION - ONSET: ONSET: ON-GOING

## 2020-12-07 ASSESSMENT — PAIN DESCRIPTION - PAIN TYPE: TYPE: CHRONIC PAIN

## 2020-12-07 NOTE — PROGRESS NOTES
Hospitalist Progress Note    Patient:  Jonh Henley      Unit/Bed:3B-32/032-A    YOB: 1940    MRN: [id number]       Acct: [de-identified]     PCP: Val Pope DO    Date of Admission: 12/5/2020    Assessment/Plan:    1. Insulin Dependent Diabetes Mellitus: HgbA1c of 12.9 on 12/05/2020. SC Lantus 32 units nightly. 15 units Humalog before breakfast.  10 units Humalog before lunch and dinner. High-dose corrective algorithm. POC glucose. Hypoglycemic protocol in place. Will continue to monitor and adjust accordingly. 2. History of CVA: Continue clopidogrel, aspirin  3. Essential hypertension: Continue metoprolol ER 25 mg daily  4. CAD: Continue atorvastatin, aspirin  5. Hypothyroidism: continue Synthroid 112 mcg daily   6. Diabetic ketoacidosis (Resolved): Patient presented with hyperglycemia (), anion gap metabolic acidosis (AG 42.4), and ketonemia (serum beta hydroxybutyrate 9.35). HgbA1c of 12.9. S/p aggressive IV fluid resuscitation. On 12/06/2020, anion gap closure of 11.   7. Acute kidney injury (Resolved): Secondary to volume depletion. Cr of 1.8 on presentation. S/p aggressive IV fluid resuscitation. Cr down to 0.8. Will continue to monitor. Resolved. 8. Orthostatic hypotension (Resolved): Secondary to volume depletion. Systolic BP difference of 39 between sitting and standing on presentation. S/p aggressive IV fluid resuscitation. Normal orthostatic vitals 12/06/2020. Expected discharge date:  12/07/2020    Disposition:    [x] Home       [] TCU       [] Rehab       [] Psych       [] SNF       [] [facility]       [] Other-    Chief Complaint: Dizziness and lightheadedness    Hospital Course: This is a 69 yo female with a PMHx of uncontrolled DM2, hypothyroidism, HTN, CAD, history of CVA, who present with dizziness. Patient states that for the past week she has been having these dizzy spells after standing up.  During a few episodes she fell back on something soft. She states that she has not been taking her insulin regularly during the past three weeks because she hasn't filled her prescription and also she has been having trouble with her Anny glucose monitor. In the ED, patient was found to have a glucose of 474, anion gap metabolic acidosis of 99.4, and serum beta hydroxybutyrate of 9.35. Positive orthostatic hypotension. She received IV fluid resuscitation and was started on regular insulin drip. On 12/06/2020, anion gap closure of 11. Patient was transitioned to [facility] with discontinuation of regular insulin drip. DKA resolved. Subjective (past 24 hours):  Patient sitting up in bed eating her breakfast. No new complaints. Has been afebrile. Denies chest pain, shortness of breath, nausea, vomiting, abdominal pain, difficulty urinating. ROS (12 point review of systems completed. Pertinent positives noted. Otherwise ROS is negative).     Medications:  Reviewed    Infusion Medications    dextrose       Scheduled Medications    calcium replacement protocol   Other RX Placeholder    insulin glargine  32 Units Subcutaneous QAM AC    enoxaparin  40 mg Subcutaneous Daily    insulin lispro  15 Units Subcutaneous QAM AC    insulin lispro  10 Units Subcutaneous Daily before lunch    insulin lispro  10 Units Subcutaneous Dinner    insulin lispro  0-18 Units Subcutaneous TID WC    insulin lispro  0-9 Units Subcutaneous Nightly    phosphorus replacement protocol   Other RX Placeholder    sertraline  50 mg Oral Daily    fluticasone  1 spray Each Nostril Daily    influenza virus vaccine  0.5 mL Intramuscular Prior to discharge    amLODIPine  10 mg Oral Daily    aspirin  325 mg Oral Daily    atorvastatin  40 mg Oral Nightly    clopidogrel  75 mg Oral Daily    levothyroxine  112 mcg Oral QAM AC    metoprolol succinate  25 mg Oral Daily     PRN Meds: glucose, dextrose, glucagon (rDNA), dextrose, insulin regular, potassium chloride, magnesium sulfate, sodium phosphate IVPB **OR** sodium phosphate IVPB **OR** sodium phosphate IVPB      Intake/Output Summary (Last 24 hours) at 12/7/2020 1534  Last data filed at 12/7/2020 1130  Gross per 24 hour   Intake 1482 ml   Output 1100 ml   Net 382 ml       Diet:  DIET CARB CONTROL; Carb Control: 3 carb choices (45 gms)/meal; Dysphagia Soft and Bite-Sized    Exam:  BP (!) 122/59   Pulse 75   Temp 98.2 °F (36.8 °C) (Oral)   Resp 20   Ht 5' 3\" (1.6 m)   Wt 137 lb 1.6 oz (62.2 kg)   SpO2 98%   BMI 24.29 kg/m²     General appearance: No apparent distress, appears stated age and cooperative. HEENT: Pupils equal, round, and reactive to light. Conjunctivae/corneas clear. Neck: Supple, with full range of motion. No jugular venous distention. Trachea midline. Respiratory:  Normal respiratory effort. Clear to auscultation. Cardiovascular: Regular rate and rhythm with normal I3/E4, systolic murmur. Abdomen: Soft, non-tender, non-distended with normal bowel sounds. Musculoskeletal: passive and active ROM x 4 extremities. Skin: Skin color, texture, turgor normal.  No rashes or lesions. Neurologic:  Neurovascularly intact without any focal sensory/motor deficits. Psychiatric: Alert and oriented, thought content appropriate, normal insight  Capillary Refill: Brisk,< 3 seconds   Peripheral Pulses: +2 palpable, equal bilaterally       Labs:   Recent Labs     12/05/20  1620 12/06/20  0604 12/07/20  0848   WBC 19.8* 17.1* 9.5   HGB 14.7 12.3 13.1   HCT 44.9 36.2* 39.2    141 145     Recent Labs     12/06/20  1502 12/06/20  1913 12/07/20  0521   * 128* 131*   K 3.9 4.2 4.0    99 102   CO2 22* 15* 20*   BUN 23* 25* 19   CREATININE 0.8 0.8 0.6   CALCIUM 8.6 8.6 8.3*   PHOS 1.9* 2.2* 2.8     Recent Labs     12/05/20  1620   AST 23   ALT 28   BILITOT 0.6   ALKPHOS 124     No results for input(s): INR in the last 72 hours.   No results for input(s): Lorenso Favre in the last 72

## 2020-12-07 NOTE — CARE COORDINATION
DISASTER CHARTING    12/7/20, 2:28 PM EST    DISCHARGE ONGOING EVALUATION:     [facility] day: 2  Location: 3B-32/032-A Reason for admit: Hyperglycemia [R73.9]  Hyperglycemia [R73.9]   Barriers to Discharge: Admitted with dizziness and hyperglycemia. Lovenox. Had been started on insulin gtt, now weaned off. Possible discharge home today. PCP: Silke Ramírez, DO  Patient Goals/Plan/Treatment Preferences: Spoke with pt. She lives at home with her S.O. Her S.O. is a . Pt has a walker, cane and scooter at home. Denies HH currently. Pt states she has used [facility] in the past and is open to [facility] again. Would like information on Mary. SW consulted.

## 2020-12-08 VITALS
BODY MASS INDEX: 24.29 KG/M2 | HEIGHT: 63 IN | WEIGHT: 137.1 LBS | HEART RATE: 71 BPM | TEMPERATURE: 98.3 F | RESPIRATION RATE: 18 BRPM | DIASTOLIC BLOOD PRESSURE: 57 MMHG | OXYGEN SATURATION: 100 % | SYSTOLIC BLOOD PRESSURE: 98 MMHG

## 2020-12-08 LAB
ANION GAP SERPL CALCULATED.3IONS-SCNC: 10 MEQ/L (ref 8–16)
BACTERIA: ABNORMAL /HPF
BILIRUBIN URINE: NEGATIVE
BLOOD, URINE: NEGATIVE
BUN BLDV-MCNC: 19 MG/DL (ref 7–22)
CALCIUM SERPL-MCNC: 9 MG/DL (ref 8.5–10.5)
CASTS 2: ABNORMAL /LPF
CASTS UA: ABNORMAL /LPF
CHARACTER, URINE: ABNORMAL
CHLORIDE BLD-SCNC: 105 MEQ/L (ref 98–111)
CO2: 24 MEQ/L (ref 23–33)
COLOR: YELLOW
CREAT SERPL-MCNC: 0.6 MG/DL (ref 0.4–1.2)
CRYSTALS, UA: ABNORMAL
EPITHELIAL CELLS, UA: ABNORMAL /HPF
GFR SERPL CREATININE-BSD FRML MDRD: > 90 ML/MIN/1.73M2
GLUCOSE BLD-MCNC: 108 MG/DL (ref 70–108)
GLUCOSE BLD-MCNC: 157 MG/DL (ref 70–108)
GLUCOSE BLD-MCNC: 172 MG/DL (ref 70–108)
GLUCOSE BLD-MCNC: 234 MG/DL (ref 70–108)
GLUCOSE URINE: NEGATIVE MG/DL
KETONES, URINE: ABNORMAL
LEUKOCYTE ESTERASE, URINE: ABNORMAL
MISCELLANEOUS 2: ABNORMAL
NITRITE, URINE: NEGATIVE
PH UA: 5 (ref 5–9)
PHOSPHORUS: 3.3 MG/DL (ref 2.4–4.7)
POTASSIUM SERPL-SCNC: 4.9 MEQ/L (ref 3.5–5.2)
PROTEIN UA: NEGATIVE
RBC URINE: ABNORMAL /HPF
RENAL EPITHELIAL, UA: ABNORMAL
SODIUM BLD-SCNC: 139 MEQ/L (ref 135–145)
SPECIFIC GRAVITY, URINE: 1.02 (ref 1–1.03)
UROBILINOGEN, URINE: 1 EU/DL (ref 0–1)
WBC UA: ABNORMAL /HPF
YEAST: ABNORMAL

## 2020-12-08 PROCEDURE — 36415 COLL VENOUS BLD VENIPUNCTURE: CPT

## 2020-12-08 PROCEDURE — 90686 IIV4 VACC NO PRSV 0.5 ML IM: CPT

## 2020-12-08 PROCEDURE — 87077 CULTURE AEROBIC IDENTIFY: CPT

## 2020-12-08 PROCEDURE — 82948 REAGENT STRIP/BLOOD GLUCOSE: CPT

## 2020-12-08 PROCEDURE — 87186 SC STD MICRODIL/AGAR DIL: CPT

## 2020-12-08 PROCEDURE — G0378 HOSPITAL OBSERVATION PER HR: HCPCS

## 2020-12-08 PROCEDURE — 6360000002 HC RX W HCPCS: Performed by: STUDENT IN AN ORGANIZED HEALTH CARE EDUCATION/TRAINING PROGRAM

## 2020-12-08 PROCEDURE — 6370000000 HC RX 637 (ALT 250 FOR IP): Performed by: STUDENT IN AN ORGANIZED HEALTH CARE EDUCATION/TRAINING PROGRAM

## 2020-12-08 PROCEDURE — 80048 BASIC METABOLIC PNL TOTAL CA: CPT

## 2020-12-08 PROCEDURE — 6360000002 HC RX W HCPCS

## 2020-12-08 PROCEDURE — 87086 URINE CULTURE/COLONY COUNT: CPT

## 2020-12-08 PROCEDURE — 81001 URINALYSIS AUTO W/SCOPE: CPT

## 2020-12-08 PROCEDURE — 96372 THER/PROPH/DIAG INJ SC/IM: CPT

## 2020-12-08 PROCEDURE — 84100 ASSAY OF PHOSPHORUS: CPT

## 2020-12-08 PROCEDURE — 6370000000 HC RX 637 (ALT 250 FOR IP): Performed by: PHYSICIAN ASSISTANT

## 2020-12-08 PROCEDURE — 99239 HOSP IP/OBS DSCHRG MGMT >30: CPT | Performed by: INTERNAL MEDICINE

## 2020-12-08 RX ORDER — BLOOD SUGAR DIAGNOSTIC
1 STRIP MISCELLANEOUS DAILY
Qty: 100 EACH | Refills: 0 | Status: SHIPPED | OUTPATIENT
Start: 2020-12-08

## 2020-12-08 RX ORDER — GRANULES FOR ORAL 3 G/1
3 POWDER ORAL ONCE
Status: COMPLETED | OUTPATIENT
Start: 2020-12-08 | End: 2020-12-08

## 2020-12-08 RX ADMIN — ENOXAPARIN SODIUM 40 MG: 40 INJECTION SUBCUTANEOUS at 09:04

## 2020-12-08 RX ADMIN — ASPIRIN 325 MG: 325 TABLET, COATED ORAL at 09:04

## 2020-12-08 RX ADMIN — CLOPIDOGREL BISULFATE 75 MG: 75 TABLET ORAL at 09:04

## 2020-12-08 RX ADMIN — FLUTICASONE PROPIONATE 1 SPRAY: 50 SPRAY, METERED NASAL at 09:04

## 2020-12-08 RX ADMIN — FOSFOMYCIN TROMETHAMINE 1 PACKET: 3 POWDER ORAL at 17:42

## 2020-12-08 RX ADMIN — LEVOTHYROXINE SODIUM 112 MCG: 112 TABLET ORAL at 09:03

## 2020-12-08 RX ADMIN — METOPROLOL SUCCINATE 25 MG: 25 TABLET, FILM COATED, EXTENDED RELEASE ORAL at 09:03

## 2020-12-08 RX ADMIN — INFLUENZA A VIRUS A/VICTORIA/2454/2019 IVR-207 (H1N1) ANTIGEN (PROPIOLACTONE INACTIVATED), INFLUENZA A VIRUS A/HONG KONG/2671/2019 IVR-208 (H3N2) ANTIGEN (PROPIOLACTONE INACTIVATED), INFLUENZA B VIRUS B/VICTORIA/705/2018 BVR-11 ANTIGEN (PROPIOLACTONE INACTIVATED), INFLUENZA B VIRUS B/PHUKET/3073/2013 BVR-1B ANTIGEN (PROPIOLACTONE INACTIVATED) 0.5 ML: 15; 15; 15; 15 INJECTION, SUSPENSION INTRAMUSCULAR at 18:19

## 2020-12-08 RX ADMIN — SERTRALINE 50 MG: 50 TABLET, FILM COATED ORAL at 09:03

## 2020-12-08 RX ADMIN — AMLODIPINE BESYLATE 10 MG: 10 TABLET ORAL at 09:03

## 2020-12-08 RX ADMIN — INSULIN GLARGINE 32 UNITS: 100 INJECTION, SOLUTION SUBCUTANEOUS at 13:35

## 2020-12-08 RX ADMIN — ACETAMINOPHEN 650 MG: 325 TABLET ORAL at 09:04

## 2020-12-08 ASSESSMENT — PAIN SCALES - GENERAL
PAINLEVEL_OUTOF10: 10
PAINLEVEL_OUTOF10: 2
PAINLEVEL_OUTOF10: 10

## 2020-12-08 ASSESSMENT — PAIN DESCRIPTION - DESCRIPTORS: DESCRIPTORS: ACHING

## 2020-12-08 ASSESSMENT — PAIN DESCRIPTION - ORIENTATION: ORIENTATION: LEFT

## 2020-12-08 ASSESSMENT — PAIN DESCRIPTION - PAIN TYPE: TYPE: CHRONIC PAIN

## 2020-12-08 ASSESSMENT — PAIN DESCRIPTION - ONSET: ONSET: ON-GOING

## 2020-12-08 ASSESSMENT — PAIN DESCRIPTION - FREQUENCY: FREQUENCY: INTERMITTENT

## 2020-12-08 ASSESSMENT — PAIN DESCRIPTION - LOCATION: LOCATION: SHOULDER

## 2020-12-08 NOTE — DISCHARGE SUMMARY
Hospital Medicine Discharge Summary      Patient Identification:   Gorge Vargas   : 1940  MRN: [id number]   Account: [de-identified]      Patient's PCP: Arielle Ruiz DO    Admit Date: 2020     Discharge Date:   2020    Admitting Physician: Jessenia Lawler MD     Discharge Physician: Jaime Eubanks MD     Discharge Diagnoses: The patient was seen and examined on day of discharge and this discharge summary is in conjunction with any daily progress note from day of discharge. 1. Insulin Dependent Diabetes Mellitus: HgbA1c of 12.9 on 2020. SC Lantus 32 units nightly. Continue with 20 units Humalog before breakfast.  15 units Humalog before lunch and dinner. 2. History of CVA: Continue clopidogrel, aspirin  3. Essential hypertension: Continue metoprolol ER 25 mg daily  4. CAD: Continue atorvastatin, aspirin  5. Hypothyroidism: continue Synthroid 112 mcg daily   6. Diabetic ketoacidosis (Resolved): Patient presented with hyperglycemia (), anion gap metabolic acidosis (AG 98.4), and ketonemia (serum beta hydroxybutyrate 9.35). HgbA1c of 12.9. S/p aggressive IV fluid resuscitation. On 2020, anion gap closure of 11.   7. Acute kidney injury (Resolved): Secondary to volume depletion. Cr of 1.8 on presentation. S/p aggressive IV fluid resuscitation. Cr down to 0.8. Will continue to monitor. Resolved. 8. Orthostatic hypotension (Resolved): Secondary to volume depletion. Systolic BP difference of 39 between sitting and standing on presentation. S/p aggressive IV fluid resuscitation. Normal orthostatic vitals 2020. Hospital Course:   Gorge Vargas is a 68 y.o. female admitted to [address] [place] [address] on 2020 for dizziness. Patient states that for the past week she has been having these dizzy spells after standing up. During a few episodes she fell back on something soft.  She states that she has not been taking her insulin regularly during the past three weeks because she hasn't filled her prescription and also she has been having trouble with her Anny glucose monitor. In the ED, patient was found to have a glucose of 474, anion gap metabolic acidosis of 65.1, and serum beta hydroxybutyrate of 9.35. Positive orthostatic hypotension. She received IV fluid resuscitation and was started on regular insulin drip. On 12/06/2020, anion gap closure of 11. Patient was transitioned to [facility] with discontinuation of regular insulin drip. DKA resolved. Patient was feeling well today and stable for discharge. Exam:     Vitals:  Vitals:    12/07/20 2325 12/08/20 0346 12/08/20 0800 12/08/20 1215   BP: (!) 98/53 124/61 (!) 115/57 (!) 103/57   Pulse: 68 68 73 75   Resp: 18 18 18 18   Temp: 97.6 °F (36.4 °C) 97.5 °F (36.4 °C) 97.7 °F (36.5 °C) 97.9 °F (36.6 °C)   TempSrc: Oral Oral Oral Oral   SpO2: 99% 98% 98% 98%   Weight:       Height:         Weight: Weight: 137 lb 1.6 oz (62.2 kg)     24 hour intake/output:    Intake/Output Summary (Last 24 hours) at 12/8/2020 1350  Last data filed at 12/8/2020 0346  Gross per 24 hour   Intake 570 ml   Output 800 ml   Net -230 ml         General appearance:  No apparent distress, appears stated age and cooperative. HEENT:  Normal cephalic, atraumatic without obvious deformity. Pupils equal, round, and reactive to light. Extra ocular muscles intact. Conjunctivae/corneas clear. Neck: Supple, with full range of motion. No jugular venous distention. Trachea midline. Respiratory:  Normal respiratory effort. Clear to auscultation. Cardiovascular:  Regular rate and rhythm with normal R9/N9, systolic murmur. Abdomen: Soft, non-tender, non-distended with normal bowel sounds. Musculoskeletal:  No clubbing, cyanosis or edema bilaterally. Skin: Skin color, texture, turgor normal.  No rashes or lesions. Neurologic:  Neurovascularly intact without any focal sensory/motor deficits.    Psychiatric:  Alert and oriented, thought content appropriate, normal insight  Capillary Refill: Brisk,< 3 seconds   Peripheral Pulses: +2 palpable, equal bilaterally       Labs: For convenience and continuity at follow-up the following most recent labs are provided:      CBC:    Lab Results   Component Value Date    WBC 9.5 12/07/2020    HGB 13.1 12/07/2020    HCT 39.2 12/07/2020     12/07/2020       Renal:    Lab Results   Component Value Date     12/08/2020    K 4.9 12/08/2020    K 4.3 12/05/2020     12/08/2020    CO2 24 12/08/2020    BUN 19 12/08/2020    CREATININE 0.6 12/08/2020    CALCIUM 9.0 12/08/2020    PHOS 3.3 12/08/2020         Significant Diagnostic Studies    Radiology:   CT HEAD WO CONTRAST   Final Result   1. There is no acute intracranial abnormality. **This report has been created using voice recognition software. It may contain minor errors which are inherent in voice recognition technology. **      Final report electronically signed by Dr. Shira Zamora on 12/5/2020 5:49 PM      XR CHEST (2 VW)   Final Result   1. There is no acute cardiopulmonary process. **This report has been created using voice recognition software. It may contain minor errors which are inherent in voice recognition technology. **      Final report electronically signed by Dr. Shira Zamora on 12/5/2020 5:41 PM             Consults:     IP CONSULT TO DIETITIAN  IP CONSULT TO SOCIAL WORK    Disposition: Home  Condition at Discharge: Stable    Code Status:  Full Code     Patient Instructions:    Discharge lab work:    Activity: activity as tolerated  Diet: DIET CARB CONTROL; Carb Control: 3 carb choices (45 gms)/meal; Dysphagia Soft and Bite-Sized      Follow-up visits:   [address] [place] [address],[facility], [id number]  [facility]  [address] [address] [phone]               Discharge Medications:      Jose Ruth, Lizzy7 Jeremias Nobleton Medication Instructions XID:[id number]    Printed on:12/08/20 5100   Medication Information amLODIPine (NORVASC) 5 MG tablet  Take 10 mg by mouth daily              aspirin 325 MG EC tablet  Take 1 tablet by mouth daily             atorvastatin (LIPITOR) 80 MG tablet  Take 0.5 tablets by mouth nightly             clopidogrel (PLAVIX) 75 MG tablet  Take 75 mg by mouth daily             Cyanocobalamin (VITAMIN B 12 PO)  Place  under the tongue daily. ferrous sulfate 325 (65 Fe) MG tablet  Take 325 mg by mouth daily (with breakfast)             Insulin Glargine (TOUJEO MAX SOLOSTAR SC)  Inject into the skin             Insulin Regular Human (NOVOLIN R IJ)  Inject as directed             Insulin Syringe-Needle U-100 (RELI-ON INSULIN SYRINGE)  by Does not apply route. 31 G/8MM MIS (SHORT)             levothyroxine (SYNTHROID) 112 MCG tablet  Take 112 mcg by mouth daily             losartan (COZAAR) 25 MG tablet  Take 25 mg by mouth daily             metoprolol (TOPROL-XL) 25 MG XL tablet  TAKE ONE TABLET BY MOUTH EVERY DAY             sertraline (ZOLOFT) 50 MG tablet  Take 50 mg by mouth daily Nightly                 Time Spent on discharge is more than 30 minutes in the examination, evaluation, counseling and review of medications and discharge plan. Signed: Thank you Arielle Ruiz DO for the opportunity to be involved in this patient's care.     Electronically signed by Jaime Eubanks MD on 12/8/2020 at 1:50 PM

## 2020-12-08 NOTE — FLOWSHEET NOTE
12/07/20 2104   Provider Notification   Reason for Communication Evaluate   Provider Name [facility]    Provider Notification Advance Practice Clinician (CNS, NP, CNM, CRNA, PA)   Method of Communication Secure Message   Response See orders     Moi Wells patient admitted with dizziness/fall. She is complaining of chronic left shoulder pain due to arthritis. She doesn't have anything on for pain. Could I get Tylenol?  Please

## 2020-12-08 NOTE — CARE COORDINATION
DISCHARGE/PLANNING EVALUATION  12/8/20, 11:34 AM EST    Reason for Referral: Interested in [facility] and Passport services\"  Mental Status: Patient is alert and oriented  Decision Making: Patient makes own decisions  Family/Social/Home Environment: Spoke with patient, assessment completed. Patient lives with S.O. in a trailer with 4 steps to enter. Patient states she is able to navigate steps,  Her S.O. assists as needed with dressing, otherwise she is independent with ADL's. S.O. does household chores, cooking and driving. Current Services including food security, transportation and housekeeping: No current services, needs met  Current Equipment: cane, walker and scooter  Payment Source:[employer]  Concerns or Barriers to Discharge:  Patient is agreeable to [facility] at discharge, nursing, aide, PT & OT  Post acute provider list with quality measures, geographic area and applicable managed care information provided. Questions regarding selection process answered: Declined, Patient preference is [facility]    Teach Back Method used with patient regarding care plan and discharge planning. Patient  verbalize understanding of the plan of care and contribute to goal setting. Patient goals, treatment preferences and discharge plan: Patient plans home with S.O. and [facility]. Left message at [facility] regarding referral, requested return call    Electronically signed by Solomon Bamberger, LSW on 12/8/2020 at 11:34 AM     [facility] returned call, they can accept patient. Faxed facesheet and H&P, will fax AVS and orders when completed. Plan discharge today. 12/8/20, 5:15 PM EST    Patient goals/plan/ treatment preferences discussed by  and . Patient goals/plan/ treatment preferences reviewed with patient/ family. Patient/ family verbalize understanding of discharge plan and are in agreement with goal/plan/treatment preferences.   Understanding was demonstrated using the teach back method. AVS provided by RN at time of discharge, which includes all necessary medical information pertaining to the patients current course of illness, treatment, post-discharge goals of care, and treatment preferences.     Services After Discharge  Services At/After Discharge: Aide Services, Nursing Services, OT, PT([facility])   IMM Letter  IMM Letter given to Patient/Family/Significant other/Guardian/POA/by[de-identified]   IMM Letter date given[de-identified] 12/08/20  IMM Letter time given[de-identified] 1621

## 2020-12-08 NOTE — PROGRESS NOTES
Comprehensive Nutrition Assessment    Type and Reason for Visit:  Initial, Consult    Nutrition Recommendations/Plan:   May consider referral to [address] for additional monitoring/education  [place] food prefs as able. Current intake/diet as ordered capable to meet est nutr needs. Nutrition Assessment:    Pt. nutritionally compromised AEB hyperglycemia. At risk for further nutrition compromise r/t variable oral intake and underlying medical condition (DM, MYRA, anxiety, CA, CAD, HLD, HTN, SOB). Nutrition recommendations/interventions as per above. Malnutrition Assessment:  Malnutrition Status:  No malnutrition      Estimated Daily Nutrient Needs:  Energy (kcal):  2610-9299 scott/d (25-27 scott/kg admit wt 62 kg); Weight Used for Energy Requirements:  Admission     Protein (g):  74-93 gm pro/d (1.2-1.5 gm pro/kg admit wt 62 kg); Weight Used for Protein Requirements:  Admission        Fluid (ml/day):  Per Physician;    Nutrition Related Findings:  Pt with known DM. Hgb A1c 12.9%. Reports she wasn't taking her insulin. Reports fair appetite, and declined diet education (previously had). Meds include lantus, SSI, humalog. Insulin gtt off 12/6.       Wounds:  Stage II(Buttock)       Current Nutrition Therapies:    DIET CARB CONTROL; Carb Control: 3 carb choices (45 gms)/meal; Dysphagia Soft and Bite-Sized    Anthropometric Measures:  · Height: 5' 3\" (160 cm)  · Current Body Weight: 137 lb 1.6 oz (62.2 kg)(12/5)   · Admission Body Weight: 137 lb 1.6 oz (62.2 kg)(12/5)    · Usual Body Weight: 141 lb (64 kg)(4/2020 per EMR)     · Ideal Body Weight: 115 lbs;    · BMI: 24.3  ·   · BMI Categories: Normal Weight (BMI 22.0 to 24.9) age over 72       Nutrition Diagnosis:   · Altered nutrition-related lab values related to endocrine dysfuntion as evidenced by lab values      Nutrition Interventions:   Food and/or Nutrient Delivery:  Continue Current Diet  Nutrition Education/Counseling: Education declined   Coordination of Nutrition Care:  Continue to monitor while inpatient    Goals:  Pt to consume 75% or more most meals during LOS       Nutrition Monitoring and Evaluation:    Food/Nutrient Intake Outcomes:  Diet Advancement/Tolerance, Food and Nutrient Intake  Physical Signs/Symptoms Outcomes:  Biochemical Data     Discharge Planning:    Continue current diet     Electronically signed by Julio WHITTINGTON on 12/8/20 at 11:23 AM EST    Contact: [phone]

## 2020-12-08 NOTE — CARE COORDINATION
DISASTER CHARTING    12/8/20, 2:53 PM EST    DISCHARGE ONGOING EVALUATION:     Tc Cammy day: 3  Location: [facility]/032-A Reason for admit: Hyperglycemia [R73.9]  Hyperglycemia [R73.9]   Barriers to Discharge: Planning discharge home today. PCP: Silke Ramírez DO  Patient Goals/Plan/Treatment Preferences: Planning home with S.O. Has DME. Planning [facility]. SW following.

## 2020-12-10 LAB
ORGANISM: ABNORMAL
URINE CULTURE REFLEX: ABNORMAL

## 2021-01-10 ENCOUNTER — APPOINTMENT (OUTPATIENT)
Dept: GENERAL RADIOLOGY | Age: 81
End: 2021-01-10
Payer: MEDICARE

## 2021-01-10 ENCOUNTER — HOSPITAL ENCOUNTER (EMERGENCY)
Age: 81
Discharge: HOME | End: 2021-01-10
Attending: EMERGENCY MEDICINE
Payer: MEDICARE

## 2021-01-10 ENCOUNTER — APPOINTMENT (OUTPATIENT)
Dept: CT IMAGING | Age: 81
End: 2021-01-10
Payer: MEDICARE

## 2021-01-10 VITALS
OXYGEN SATURATION: 97 % | BODY MASS INDEX: 24.27 KG/M2 | HEART RATE: 75 BPM | WEIGHT: 137 LBS | HEIGHT: 63 IN | SYSTOLIC BLOOD PRESSURE: 117 MMHG | DIASTOLIC BLOOD PRESSURE: 58 MMHG | RESPIRATION RATE: 18 BRPM | TEMPERATURE: 97.7 F
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
## 2021-01-10 DIAGNOSIS — S00.03XA CONTUSION OF SCALP, INITIAL ENCOUNTER: ICD-10-CM

## 2021-01-10 DIAGNOSIS — S09.90XA CLOSED HEAD INJURY, INITIAL ENCOUNTER: ICD-10-CM

## 2021-01-10 DIAGNOSIS — N39.0 URINARY TRACT INFECTION WITHOUT HEMATURIA, SITE UNSPECIFIED: ICD-10-CM

## 2021-01-10 LAB
ALBUMIN SERPL-MCNC: 4 G/DL (ref 3.5–5.1)
ALP BLD-CCNC: 123 U/L (ref 38–126)
ALT SERPL-CCNC: 47 U/L (ref 11–66)
AMPHETAMINE+METHAMPHETAMINE URINE SCREEN: NEGATIVE
ANION GAP SERPL CALCULATED.3IONS-SCNC: 11 MEQ/L (ref 8–16)
AST SERPL-CCNC: 28 U/L (ref 5–40)
BACTERIA: ABNORMAL /HPF
BARBITURATE QUANTITATIVE URINE: NEGATIVE
BASOPHILS # BLD: 0.9 %
BASOPHILS ABSOLUTE: 0.1 THOU/MM3 (ref 0–0.1)
BENZODIAZEPINE QUANTITATIVE URINE: NEGATIVE
BILIRUB SERPL-MCNC: 0.4 MG/DL (ref 0.3–1.2)
BILIRUBIN URINE: NEGATIVE
BLOOD, URINE: NEGATIVE
BUN BLDV-MCNC: 17 MG/DL (ref 7–22)
CALCIUM SERPL-MCNC: 10.4 MG/DL (ref 8.5–10.5)
CANNABINOID QUANTITATIVE URINE: NEGATIVE
CASTS 2: ABNORMAL /LPF
CASTS UA: ABNORMAL /LPF
CHARACTER, URINE: ABNORMAL
CHLORIDE BLD-SCNC: 101 MEQ/L (ref 98–111)
CO2: 27 MEQ/L (ref 23–33)
COCAINE METABOLITE QUANTITATIVE URINE: NEGATIVE
COLOR: YELLOW
CREAT SERPL-MCNC: 0.7 MG/DL (ref 0.4–1.2)
CRYSTALS, UA: ABNORMAL
EKG ATRIAL RATE: 80 BPM
EKG P AXIS: 73 DEGREES
EKG P-R INTERVAL: 162 MS
EKG Q-T INTERVAL: 436 MS
EKG QRS DURATION: 116 MS
EKG QTC CALCULATION (BAZETT): 502 MS
EKG R AXIS: -48 DEGREES
EKG T AXIS: 28 DEGREES
EKG VENTRICULAR RATE: 80 BPM
EOSINOPHIL # BLD: 1.6 %
EOSINOPHILS ABSOLUTE: 0.1 THOU/MM3 (ref 0–0.4)
EPITHELIAL CELLS, UA: ABNORMAL /HPF
ERYTHROCYTE [DISTWIDTH] IN BLOOD BY AUTOMATED COUNT: 13.4 % (ref 11.5–14.5)
ERYTHROCYTE [DISTWIDTH] IN BLOOD BY AUTOMATED COUNT: 44.3 FL (ref 35–45)
ETHYL ALCOHOL, SERUM: < 0.01 %
GFR SERPL CREATININE-BSD FRML MDRD: 81 ML/MIN/1.73M2
GLUCOSE BLD-MCNC: 147 MG/DL (ref 70–108)
GLUCOSE BLD-MCNC: 82 MG/DL (ref 70–108)
GLUCOSE URINE: NEGATIVE MG/DL
HCT VFR BLD CALC: 39.9 % (ref 37–47)
HEMOGLOBIN: 12.8 GM/DL (ref 12–16)
IMMATURE GRANS (ABS): 0.07 THOU/MM3 (ref 0–0.07)
IMMATURE GRANULOCYTES: 0.8 %
KETONES, URINE: NEGATIVE
LEUKOCYTE ESTERASE, URINE: ABNORMAL
LIPASE: 25.8 U/L (ref 5.6–51.3)
LYMPHOCYTES # BLD: 46.8 %
LYMPHOCYTES ABSOLUTE: 4.4 THOU/MM3 (ref 1–4.8)
MAGNESIUM: 1.7 MG/DL (ref 1.6–2.4)
MCH RBC QN AUTO: 29.1 PG (ref 26–33)
MCHC RBC AUTO-ENTMCNC: 32.1 GM/DL (ref 32.2–35.5)
MCV RBC AUTO: 90.7 FL (ref 81–99)
MISCELLANEOUS 2: ABNORMAL
MONOCYTES # BLD: 7.3 %
MONOCYTES ABSOLUTE: 0.7 THOU/MM3 (ref 0.4–1.3)
NITRITE, URINE: NEGATIVE
NUCLEATED RED BLOOD CELLS: 0 /100 WBC
OPIATES, URINE: NEGATIVE
OSMOLALITY CALCULATION: 278.2 MOSMOL/KG (ref 275–300)
OXYCODONE: NEGATIVE
PH UA: 6.5 (ref 5–9)
PHENCYCLIDINE QUANTITATIVE URINE: NEGATIVE
PLATELET # BLD: 237 THOU/MM3 (ref 130–400)
PMV BLD AUTO: 12.1 FL (ref 9.4–12.4)
POTASSIUM SERPL-SCNC: 4 MEQ/L (ref 3.5–5.2)
PROTEIN UA: NEGATIVE
RBC # BLD: 4.4 MILL/MM3 (ref 4.2–5.4)
RBC URINE: ABNORMAL /HPF
RENAL EPITHELIAL, UA: ABNORMAL
SEG NEUTROPHILS: 42.6 %
SEGMENTED NEUTROPHILS ABSOLUTE COUNT: 4 THOU/MM3 (ref 1.8–7.7)
SODIUM BLD-SCNC: 139 MEQ/L (ref 135–145)
SPECIFIC GRAVITY, URINE: 1.01 (ref 1–1.03)
TOTAL PROTEIN: 7.2 G/DL (ref 6.1–8)
TROPONIN T: < 0.01 NG/ML
UROBILINOGEN, URINE: 0.2 EU/DL (ref 0–1)
WBC # BLD: 9.3 THOU/MM3 (ref 4.8–10.8)
WBC UA: > 200 /HPF
YEAST: ABNORMAL

## 2021-01-10 PROCEDURE — 87186 SC STD MICRODIL/AGAR DIL: CPT

## 2021-01-10 PROCEDURE — 83690 ASSAY OF LIPASE: CPT

## 2021-01-10 PROCEDURE — 82948 REAGENT STRIP/BLOOD GLUCOSE: CPT

## 2021-01-10 PROCEDURE — 71045 X-RAY EXAM CHEST 1 VIEW: CPT

## 2021-01-10 PROCEDURE — 85025 COMPLETE CBC W/AUTO DIFF WBC: CPT

## 2021-01-10 PROCEDURE — 80307 DRUG TEST PRSMV CHEM ANLYZR: CPT

## 2021-01-10 PROCEDURE — 99285 EMERGENCY DEPT VISIT HI MDM: CPT

## 2021-01-10 PROCEDURE — 93005 ELECTROCARDIOGRAM TRACING: CPT | Performed by: EMERGENCY MEDICINE

## 2021-01-10 PROCEDURE — 81001 URINALYSIS AUTO W/SCOPE: CPT

## 2021-01-10 PROCEDURE — 87077 CULTURE AEROBIC IDENTIFY: CPT

## 2021-01-10 PROCEDURE — 84484 ASSAY OF TROPONIN QUANT: CPT

## 2021-01-10 PROCEDURE — 87086 URINE CULTURE/COLONY COUNT: CPT

## 2021-01-10 PROCEDURE — 70450 CT HEAD/BRAIN W/O DYE: CPT

## 2021-01-10 PROCEDURE — 36415 COLL VENOUS BLD VENIPUNCTURE: CPT

## 2021-01-10 PROCEDURE — 80053 COMPREHEN METABOLIC PANEL: CPT

## 2021-01-10 PROCEDURE — 82077 ASSAY SPEC XCP UR&BREATH IA: CPT

## 2021-01-10 PROCEDURE — 83735 ASSAY OF MAGNESIUM: CPT

## 2021-01-10 PROCEDURE — 6370000000 HC RX 637 (ALT 250 FOR IP): Performed by: EMERGENCY MEDICINE

## 2021-01-10 PROCEDURE — 72125 CT NECK SPINE W/O DYE: CPT

## 2021-01-10 RX ORDER — GRANULES FOR ORAL 3 G/1
3 POWDER ORAL ONCE
Status: COMPLETED | OUTPATIENT
Start: 2021-01-10 | End: 2021-01-10

## 2021-01-10 RX ORDER — SULFAMETHOXAZOLE AND TRIMETHOPRIM 800; 160 MG/1; MG/1
1 TABLET ORAL 2 TIMES DAILY
Qty: 20 TABLET | Refills: 0 | Status: SHIPPED | OUTPATIENT
Start: 2021-01-10 | End: 2021-01-12

## 2021-01-10 RX ADMIN — FOSFOMYCIN TROMETHAMINE 1 PACKET: 3 POWDER ORAL at 06:47

## 2021-01-10 ASSESSMENT — PAIN SCALES - GENERAL: PAINLEVEL_OUTOF10: 3

## 2021-01-10 ASSESSMENT — ENCOUNTER SYMPTOMS
SHORTNESS OF BREATH: 0
DIARRHEA: 0
SORE THROAT: 0
CHOKING: 0
ABDOMINAL DISTENTION: 0
EYE DISCHARGE: 0
EYE PAIN: 0
PHOTOPHOBIA: 0
CONSTIPATION: 0
RHINORRHEA: 0
EYE REDNESS: 0
VOICE CHANGE: 0
VOMITING: 0
NAUSEA: 0
EYE ITCHING: 0
COUGH: 0
CHEST TIGHTNESS: 0
SINUS PRESSURE: 0
BACK PAIN: 0
WHEEZING: 0
TROUBLE SWALLOWING: 0
BLOOD IN STOOL: 0
ABDOMINAL PAIN: 0

## 2021-01-10 ASSESSMENT — PAIN DESCRIPTION - LOCATION: LOCATION: HEAD

## 2021-01-10 ASSESSMENT — PAIN DESCRIPTION - PAIN TYPE: TYPE: ACUTE PAIN

## 2021-01-10 NOTE — ED NOTES
Pt stable A&O x 3 given discharge and follow up info. Pt voiced no concerns and discharged from ER to self to home. Pt ambulated out of ER with no complications .        Prashant Crain RN  01/10/21 9139

## 2021-01-10 NOTE — ED NOTES
Pt arrives to ED c/o fall. Pt has history of left sided CVA and has weakness on that side. Pt states she got up to use the commode and lost her balance and fell backwards. Pt states she hit her head on the door. Pt has small hematoma on the back of her head. Pt rates pain 3/10. Pt denies any other pain. Pt shows no signs of distress. Pt respirations are unlabored. EKG complete. Pt blood glucose 50 en route. Pt given oral glucose. Pt glucose upon arrival was 147. Monitor applied.       Deann Phelps RN  01/10/21 2723

## 2021-01-10 NOTE — ED NOTES
Upon first contact with patient this RN receives bedside shift report from [facility], [facility]. Pt resting comfortably in cot. Pt breathing easy and unlabored. Pt alert and oriented. Pt denies further needs. VS updated. Will continue to monitor pt.        Maribel Malhotra RN  01/10/21 6837

## 2021-01-10 NOTE — ED NOTES
ED nurse-to-nurse bedside report    Chief Complaint   Patient presents with    Fall      LOC: alert and orientated to name, place, date  Vital signs   Vitals:    01/10/21 0338 01/10/21 0447   BP: (!) 127/56 (!) 107/46   Pulse: 80 77   Resp: 18 16   Temp: 97.7 °F (36.5 °C)    TempSrc: Oral    SpO2: 95% 97%   Weight: 137 lb (62.1 kg)    Height: 5' 3\" (1.6 m)       Pain:    Pain Interventions: n/a  Pain Goal: n/a  Oxygen: No    Current needs required room air   Telemetry: Yes  LDAs:   Peripheral IV 12/05/20 Right Antecubital (Active)       Peripheral IV 01/10/21 Right Antecubital (Active)   Site Assessment Clean; Intact;Dry 01/10/21 0353   Line Status Brisk blood return;Normal saline locked;Specimen collected 01/10/21 0353   Dressing Status Clean;Dry; Intact 01/10/21 0353   Dressing Intervention New 01/10/21 0353     Continuous Infusions:   Mobility: Requires assistance * 1  [place] Fall Risk Score: No flowsheet data found.   Fall Interventions: x 2 side rails, call light within reach  Report given to: Emery Mcmahan RN  01/10/21 4287

## 2021-01-10 NOTE — ED PROVIDER NOTES
[facility]  eMERGENCY dEPARTMENT eNCOUnter          CHIEF COMPLAINT       Chief Complaint   Patient presents with    Fall       Nurses Notes reviewed and I agree except as noted in the HPI. HISTORY OF PRESENT ILLNESS    Jessica Herrmann is a 68 y.o. female who presents headache status post fall. Apparently the patient has a history of stroke. She has a left-sided weakness. She was attempting to go to the bathroom when she lost her balance and fell down to the ground striking her head. She has no loss of consciousness. She could not get up they called the squad to bring her in. The patient is not complaining of any pelvic pain or hip pain. She is complaining about a headache. She is able to move her neck without any difficulty. Currently the patient is resting comfortably on the cot no apparent distress. Patient's blood sugar on arrival was 50 she was given medications for this. REVIEW OF SYSTEMS     Review of Systems   Constitutional: Negative for activity change, appetite change, diaphoresis, fatigue and unexpected weight change. HENT: Negative for congestion, ear discharge, ear pain, hearing loss, rhinorrhea, sinus pressure, sore throat, trouble swallowing and voice change. Minor hematoma to posterior left side of head   Eyes: Negative for photophobia, pain, discharge, redness and itching. Respiratory: Negative for cough, choking, chest tightness, shortness of breath and wheezing. Cardiovascular: Negative for chest pain, palpitations and leg swelling. Gastrointestinal: Negative for abdominal distention, abdominal pain, blood in stool, constipation, diarrhea, nausea and vomiting. Endocrine: Negative for polydipsia, polyphagia and polyuria. Genitourinary: Negative for decreased urine volume, difficulty urinating, dysuria, enuresis, frequency, hematuria and urgency.    Musculoskeletal: Negative for arthralgias, back pain, gait problem, myalgias, neck pain and neck stiffness. Skin: Negative for pallor and rash. Allergic/Immunologic: Negative for immunocompromised state. Neurological: Negative for dizziness, tremors, seizures, syncope, facial asymmetry, weakness, light-headedness, numbness and headaches. Hematological: Negative for adenopathy. Does not bruise/bleed easily. Psychiatric/Behavioral: Negative for agitation, hallucinations and suicidal ideas. The patient is not nervous/anxious. PAST MEDICAL HISTORY    has a past medical history of MYRA (acute kidney injury) ([facility] [address] Anxiety, Arthritis, Bladder spasm, CAD (coronary artery disease), Cancer ([facility] [address] Hyperlipidemia, Hypertension, Hypothyroidism, Obstructive sleep apnea of adult, SOB (shortness of breath), and Type II or unspecified type diabetes mellitus without mention of complication, not stated as uncontrolled. SURGICAL HISTORY      has a past surgical history that includes Cholecystectomy (7/1968); Mastectomy (7/2001); Hysterectomy (11/1996); Knee arthroscopy; Breast surgery (7/1974); Carpal tunnel release (Right, 1/2013); hernia repair (6-6-14); Colonoscopy (11/2007); lymph node dissection (12/23/1998); Breast biopsy (1/17/2014); joint replacement (Left); Finger trigger release; and knee surgery (Right, 2017). CURRENT MEDICATIONS       Previous Medications    AMLODIPINE (NORVASC) 5 MG TABLET    Take 10 mg by mouth daily     ASPIRIN 325 MG EC TABLET    Take 1 tablet by mouth daily    ATORVASTATIN (LIPITOR) 80 MG TABLET    Take 0.5 tablets by mouth nightly    CLOPIDOGREL (PLAVIX) 75 MG TABLET    Take 75 mg by mouth daily    CYANOCOBALAMIN (VITAMIN B 12 PO)    Place  under the tongue daily. FERROUS SULFATE 325 (65 FE) MG TABLET    Take 325 mg by mouth daily (with breakfast)    INSULIN GLARGINE (TOUJEO MAX SOLOSTAR SC)    Inject into the skin    INSULIN LISPRO (HUMALOG) 100 UNIT/ML INJECTION VIAL    Give between 15 and 35 units before you eat, based on discharge instructions.     INSULIN REGULAR HUMAN (NOVOLIN R IJ)    Inject as directed    INSULIN SYRINGE-NEEDLE U-100 ([employer] INSULIN SYRINGE) 31G X 5/16\" 0.5 ML MISC    1 each by Does not apply route daily    INSULIN SYRINGE-NEEDLE U-100 (RELI-ON INSULIN SYRINGE)    by Does not apply route. 31 G/8MM MIS (SHORT)    LEVOTHYROXINE (SYNTHROID) 112 MCG TABLET    Take 112 mcg by mouth daily    LOSARTAN (COZAAR) 25 MG TABLET    Take 25 mg by mouth daily    METOPROLOL (TOPROL-XL) 25 MG XL TABLET    TAKE ONE TABLET BY MOUTH EVERY DAY    SERTRALINE (ZOLOFT) 50 MG TABLET    Take 50 mg by mouth daily Nightly       ALLERGIES     is allergic to myrbetriq [mirabegron]. FAMILY HISTORY     She indicated that her mother is . She indicated that her father is . She indicated that the status of her paternal grandmother is unknown. She indicated that the status of her paternal uncle is unknown.   family history includes Cirrhosis in her mother; Diabetes in her paternal grandmother and paternal uncle; Heart Disease in her father. SOCIAL HISTORY      reports that she is a non-smoker but has been exposed to tobacco smoke. She has never used smokeless tobacco. She reports current alcohol use. She reports that she does not use drugs. PHYSICAL EXAM     INITIAL VITALS:  height is 5' 3\" (1.6 m) and weight is 137 lb (62.1 kg). Her oral temperature is 97.7 °F (36.5 °C). Her blood pressure is 117/58 (abnormal) and her pulse is 75. Her respiration is 18 and oxygen saturation is 97%. Physical Exam  Vitals signs and nursing note reviewed. Constitutional:       General: She is not in acute distress. Appearance: She is well-developed. She is not diaphoretic. HENT:      Head: Normocephalic. Contusion present. No raccoon eyes, Wheat's sign, abrasion, masses or laceration. Hair is normal.      Jaw: There is normal jaw occlusion. Right Ear: Hearing, tympanic membrane, ear canal and external ear normal. No hemotympanum.       Left Ear: Hearing, tympanic membrane, ear canal and external ear normal. No hemotympanum. Nose: Nose normal. No signs of injury. Right Nostril: No septal hematoma. Left Nostril: No septal hematoma. Mouth/Throat:      Lips: Pink. Mouth: Mucous membranes are moist.      Dentition: Abnormal dentition. Tongue: No lesions. Tongue does not deviate from midline. Palate: No mass. Pharynx: Oropharynx is clear. Uvula midline. No oropharyngeal exudate. Tonsils: No tonsillar exudate or tonsillar abscesses. Eyes:      General: No scleral icterus. Right eye: No discharge. Left eye: No discharge. Conjunctiva/sclera: Conjunctivae normal.      Pupils: Pupils are equal, round, and reactive to light. Right eye: Pupil is round and reactive. Left eye: Pupil is round and reactive. Funduscopic exam:     Right eye: No hemorrhage, arteriolar narrowing or papilledema. Left eye: No hemorrhage, arteriolar narrowing or papilledema. Neck:      Musculoskeletal: Normal range of motion and neck supple. Thyroid: No thyromegaly. Vascular: No JVD. Trachea: No tracheal deviation. Cardiovascular:      Rate and Rhythm: Normal rate and regular rhythm. Heart sounds: Normal heart sounds, S1 normal and S2 normal. No murmur. No friction rub. No gallop. Pulmonary:      Effort: Pulmonary effort is normal.      Breath sounds: Normal breath sounds. No stridor. No wheezing, rhonchi or rales. Chest:      Chest wall: No tenderness. Abdominal:      General: Bowel sounds are normal. There is no distension. Palpations: Abdomen is soft. There is no mass. Tenderness: There is no abdominal tenderness. There is no guarding or rebound. Hernia: No hernia is present. Musculoskeletal: Normal range of motion. General: No tenderness. Lymphadenopathy:      Cervical: No cervical adenopathy. Skin:     General: Skin is warm and dry.       Findings: No bruising, ecchymosis, lesion or rash. Neurological:      Mental Status: She is alert and oriented to person, place, and time. GCS: GCS eye subscore is 4. GCS verbal subscore is 5. GCS motor subscore is 6. Cranial Nerves: Cranial nerves are intact. No cranial nerve deficit. Sensory: Sensation is intact. Motor: Weakness present. Coordination: Coordination is intact. Coordination normal.      Gait: Gait is intact. Deep Tendon Reflexes: Reflexes are normal and symmetric. Reflex Scores:       Tricep reflexes are 2+ on the right side and 2+ on the left side. Bicep reflexes are 2+ on the right side and 2+ on the left side. Brachioradialis reflexes are 2+ on the right side and 2+ on the left side. Patellar reflexes are 2+ on the right side and 2+ on the left side. Achilles reflexes are 2+ on the right side and 2+ on the left side. Comments: Slight left-sided weakness, patient states that this is normal for her status post CVA. Psychiatric:         Speech: Speech normal.         Behavior: Behavior normal.         Thought Content: Thought content normal.         Judgment: Judgment normal.           DIFFERENTIAL DIAGNOSIS:   Contusion abrasion skull fracture intracranial injury cervical fracture, intrathoracic injury or rib fracture. DIAGNOSTIC RESULTS     EKG: All EKG's are interpreted by the Emergency Department Physician who either signs or Co-signs this chart in the absence of a cardiologist.  EKG revealed normal sinus rhythm, incomplete right bundle branch block, ventricular rate of 80, VA interval 162, QRS duration of 116, QTc of 436, QTC of 502. When compared with EKG dated 12/5/2020 no significant changes appreciated. RADIOLOGY: non-plain film images(s) such as CT, Ultrasound and MRI are read by the radiologist.  CT CERVICAL SPINE WO CONTRAST   Final Result   Impression:   No acute fracture or dislocation. Degenerative changes.    Occipital scalp hematoma. This document has been electronically signed by: Susan Joya MD on    01/10/2021 05:42 AM      All CT scans at this facility use dose modulation, iterative    reconstruction, and/or weight-based   dosing when appropriate to reduce radiation dose to as low as reasonably    achievable. CT HEAD WO CONTRAST   Final Result   No acute intracranial findings. Chronic changes as described. This document has been electronically signed by: Susan Joya MD on    01/10/2021 05:38 AM      All CT scans at this facility use dose modulation, iterative    reconstruction, and/or weight-based   dosing when appropriate to reduce radiation dose to as low as reasonably    achievable. XR CHEST PORTABLE   Final Result   Impression:   1. No active process. Negative heart and lungs. 2.  Calcific tendinitis of the right rotator cuff. 3.  Sequela of prior granulomatous infection.       This document has been electronically signed by: Jermain Downey MD on    01/10/2021 04:33 AM               LABS:   Labs Reviewed   CBC WITH AUTO DIFFERENTIAL - Abnormal; Notable for the following components:       Result Value    MCHC 32.1 (*)     All other components within normal limits   URINE WITH REFLEXED MICRO - Abnormal; Notable for the following components:    Leukocyte Esterase, Urine LARGE (*)     Character, Urine CLOUDY (*)     All other components within normal limits   GLOMERULAR FILTRATION RATE, ESTIMATED - Abnormal; Notable for the following components:    Est, Glom Filt Rate 81 (*)     All other components within normal limits   POCT GLUCOSE - Abnormal; Notable for the following components:    POC Glucose 147 (*)     All other components within normal limits   CULTURE, REFLEXED, URINE    Narrative:     Source: urine, clean catch       Site:           Current Antibiotics: not stated   LIPASE   TROPONIN   MAGNESIUM   COMPREHENSIVE METABOLIC PANEL   ETHANOL   URINE DRUG SCREEN   ANION GAP OSMOLALITY       EMERGENCY DEPARTMENT COURSE:   Vitals:    Vitals:    01/10/21 0338 01/10/21 0447 01/10/21 0528   BP: (!) 127/56 (!) 107/46 (!) 117/58   Pulse: 80 77 75   Resp: 18 16 18   Temp: 97.7 °F (36.5 °C)     TempSrc: Oral     SpO2: 95% 97% 97%   Weight: 137 lb (62.1 kg)     Height: 5' 3\" (1.6 m)       Patient was assessed at bedside appropriate labs and imaging were ordered. Patient had no loss of consciousness. She is not having any pain. She has minor hematoma to the back of her skull. On review of the labs here she does have a urinary tract infection she was treated with fosfomycin. She will be treated with Bactrim at home. CT of her head and neck were negative. Chest x-ray was negative. Patient ambulated without difficulty here in the department. At this point I feel the patient be safely discharged home. This was discussed with the patient at bedside who understood and agreed with the plan. Patient is subsequently discharged home in good condition. Patient had of minor fall with minor closed head injury. Patient is instructed use Tylenol and Motrin for any pain. She also has what appears to be a urinary tract infection. She has been given a prescription for Bactrim she is instructed to take that as prescribed. She is instructed to follow-up with her primary care physician and to do so within the next 1 to 2 days. She is instructed to return to the nearest emergency room immediately for any new or worsening complaints. CRITICAL CARE:   None    CONSULTS:  None    PROCEDURES:  None    FINAL IMPRESSION      1. Fall, initial encounter    2. Closed head injury, initial encounter    3. Contusion of scalp, initial encounter    4.  Urinary tract infection without hematuria, site unspecified          DISPOSITION/PLAN   Discharge    PATIENT REFERRED TO:  Loree Carrel, [address]  [phone]    Call in 1 day        DISCHARGE MEDICATIONS:  New Prescriptions SULFAMETHOXAZOLE-TRIMETHOPRIM (BACTRIM DS) 800-160 MG PER TABLET    Take 1 tablet by mouth 2 times daily for 10 days       (Please note that portions of this note were completed with a voice recognition program.  Efforts were made to edit the dictations but occasionally words are mis-transcribed.)    DO Steve Brewster DO  01/10/21 0930

## 2021-01-11 LAB
ORGANISM: ABNORMAL
URINE CULTURE REFLEX: ABNORMAL

## 2021-01-12 ENCOUNTER — TELEPHONE (OUTPATIENT)
Dept: PHARMACY | Age: 81
End: 2021-01-12

## 2021-01-13 NOTE — TELEPHONE ENCOUNTER
Pharmacy Note  ED Culture Follow-up    Rell Francis is a 68 y.o. female. Allergies: Myrbetriq [mirabegron]     Labs:  Lab Results   Component Value Date    BUN 17 01/10/2021    CREATININE 0.7 01/10/2021    WBC 9.3 01/10/2021     Estimated Creatinine Clearance: 55 mL/min (based on SCr of 0.7 mg/dL). Current antimicrobials:   · Bactrim x10 days    ASSESSMENT:  Micro results:   Urine culture: positive for Klebsiella pneumoniae     PLAN:  Need for intervention: Yes, patient has an uncomplicated UTI. Recommend only using antibiotic x3 days. Discussed with: Mima Hernandez DO  Chosen treatment:    · Bactrim is an appropriate antibiotic choice. Dr. Alicia Urias would like patient to stop antibiotic after 3 days of use for an uncomplicated UTI. Will contact patient to see if UTI symptoms are resolved and instruct to stop after 3 days of use. Patient response:   · Called and spoke with patient. She states that she filled the Bactrim Sunday and denies any UTI symptoms. Patient confirmed understanding to stop antibiotic at this time. Called/sent in prescription to: Not applicable    Please call with any questions.  Chely Huang, PharmD 7:26 PM 1/12/2021

## 2021-02-11 ENCOUNTER — HOSPITAL ENCOUNTER (EMERGENCY)
Age: 81
Discharge: HOME | End: 2021-02-11
Attending: EMERGENCY MEDICINE
Payer: MEDICARE

## 2021-02-11 VITALS
BODY MASS INDEX: 25.16 KG/M2 | SYSTOLIC BLOOD PRESSURE: 109 MMHG | OXYGEN SATURATION: 100 % | TEMPERATURE: 98.4 F | HEIGHT: 63 IN | HEART RATE: 86 BPM | WEIGHT: 142 LBS | DIASTOLIC BLOOD PRESSURE: 95 MMHG | RESPIRATION RATE: 17 BRPM

## 2021-02-11 DIAGNOSIS — E11.65 UNCONTROLLED TYPE 2 DIABETES MELLITUS WITH HYPERGLYCEMIA (HCC): ICD-10-CM

## 2021-02-11 DIAGNOSIS — E16.2 HYPOGLYCEMIA: Primary | ICD-10-CM

## 2021-02-11 LAB
ALBUMIN SERPL-MCNC: 3.8 G/DL (ref 3.5–5.1)
ALP BLD-CCNC: 98 U/L (ref 38–126)
ALT SERPL-CCNC: 31 U/L (ref 11–66)
ANION GAP SERPL CALCULATED.3IONS-SCNC: 12 MEQ/L (ref 8–16)
AST SERPL-CCNC: 24 U/L (ref 5–40)
BACTERIA: ABNORMAL /HPF
BASOPHILS # BLD: 0.5 %
BASOPHILS ABSOLUTE: 0.1 THOU/MM3 (ref 0–0.1)
BILIRUB SERPL-MCNC: 0.6 MG/DL (ref 0.3–1.2)
BILIRUBIN URINE: NEGATIVE
BLOOD, URINE: NEGATIVE
BUN BLDV-MCNC: 22 MG/DL (ref 7–22)
CALCIUM SERPL-MCNC: 9.5 MG/DL (ref 8.5–10.5)
CASTS 2: ABNORMAL /LPF
CASTS UA: ABNORMAL /LPF
CHARACTER, URINE: CLEAR
CHLORIDE BLD-SCNC: 102 MEQ/L (ref 98–111)
CHP ED QC CHECK: YES
CO2: 25 MEQ/L (ref 23–33)
COLOR: YELLOW
CREAT SERPL-MCNC: 0.6 MG/DL (ref 0.4–1.2)
CRYSTALS, UA: ABNORMAL
EKG ATRIAL RATE: 79 BPM
EKG P AXIS: 90 DEGREES
EKG P-R INTERVAL: 162 MS
EKG Q-T INTERVAL: 432 MS
EKG QRS DURATION: 112 MS
EKG QTC CALCULATION (BAZETT): 495 MS
EKG R AXIS: -42 DEGREES
EKG T AXIS: 49 DEGREES
EKG VENTRICULAR RATE: 79 BPM
EOSINOPHIL # BLD: 0.1 %
EOSINOPHILS ABSOLUTE: 0 THOU/MM3 (ref 0–0.4)
EPITHELIAL CELLS, UA: ABNORMAL /HPF
ERYTHROCYTE [DISTWIDTH] IN BLOOD BY AUTOMATED COUNT: 13.8 % (ref 11.5–14.5)
ERYTHROCYTE [DISTWIDTH] IN BLOOD BY AUTOMATED COUNT: 46.6 FL (ref 35–45)
GFR SERPL CREATININE-BSD FRML MDRD: > 90 ML/MIN/1.73M2
GLUCOSE BLD-MCNC: 122 MG/DL
GLUCOSE BLD-MCNC: 122 MG/DL (ref 70–108)
GLUCOSE BLD-MCNC: 84 MG/DL (ref 70–108)
GLUCOSE BLD-MCNC: 97 MG/DL (ref 70–108)
GLUCOSE URINE: NEGATIVE MG/DL
HCT VFR BLD CALC: 35.8 % (ref 37–47)
HEMOGLOBIN: 11.2 GM/DL (ref 12–16)
IMMATURE GRANS (ABS): 0.07 THOU/MM3 (ref 0–0.07)
IMMATURE GRANULOCYTES: 0.7 %
KETONES, URINE: NEGATIVE
LEUKOCYTE ESTERASE, URINE: NEGATIVE
LYMPHOCYTES # BLD: 11.1 %
LYMPHOCYTES ABSOLUTE: 1.2 THOU/MM3 (ref 1–4.8)
MAGNESIUM: 1.6 MG/DL (ref 1.6–2.4)
MCH RBC QN AUTO: 28.5 PG (ref 26–33)
MCHC RBC AUTO-ENTMCNC: 31.3 GM/DL (ref 32.2–35.5)
MCV RBC AUTO: 91.1 FL (ref 81–99)
MISCELLANEOUS 2: ABNORMAL
MONOCYTES # BLD: 5.7 %
MONOCYTES ABSOLUTE: 0.6 THOU/MM3 (ref 0.4–1.3)
NITRITE, URINE: NEGATIVE
NUCLEATED RED BLOOD CELLS: 0 /100 WBC
OSMOLALITY CALCULATION: 280.8 MOSMOL/KG (ref 275–300)
PH UA: 5 (ref 5–9)
PLATELET # BLD: 211 THOU/MM3 (ref 130–400)
PMV BLD AUTO: 12.2 FL (ref 9.4–12.4)
POTASSIUM SERPL-SCNC: 3.7 MEQ/L (ref 3.5–5.2)
PROTEIN UA: ABNORMAL
RBC # BLD: 3.93 MILL/MM3 (ref 4.2–5.4)
RBC URINE: ABNORMAL /HPF
RENAL EPITHELIAL, UA: ABNORMAL
SEG NEUTROPHILS: 81.9 %
SEGMENTED NEUTROPHILS ABSOLUTE COUNT: 8.7 THOU/MM3 (ref 1.8–7.7)
SODIUM BLD-SCNC: 139 MEQ/L (ref 135–145)
SPECIFIC GRAVITY, URINE: 1.02 (ref 1–1.03)
TOTAL PROTEIN: 6.9 G/DL (ref 6.1–8)
UROBILINOGEN, URINE: 0.2 EU/DL (ref 0–1)
WBC # BLD: 10.6 THOU/MM3 (ref 4.8–10.8)
WBC UA: ABNORMAL /HPF
YEAST: ABNORMAL

## 2021-02-11 PROCEDURE — 99284 EMERGENCY DEPT VISIT MOD MDM: CPT

## 2021-02-11 PROCEDURE — 82948 REAGENT STRIP/BLOOD GLUCOSE: CPT

## 2021-02-11 PROCEDURE — 93005 ELECTROCARDIOGRAM TRACING: CPT | Performed by: EMERGENCY MEDICINE

## 2021-02-11 PROCEDURE — 80053 COMPREHEN METABOLIC PANEL: CPT

## 2021-02-11 PROCEDURE — 93010 ELECTROCARDIOGRAM REPORT: CPT | Performed by: INTERNAL MEDICINE

## 2021-02-11 PROCEDURE — 81001 URINALYSIS AUTO W/SCOPE: CPT

## 2021-02-11 PROCEDURE — 36415 COLL VENOUS BLD VENIPUNCTURE: CPT

## 2021-02-11 PROCEDURE — 83735 ASSAY OF MAGNESIUM: CPT

## 2021-02-11 PROCEDURE — 85025 COMPLETE CBC W/AUTO DIFF WBC: CPT

## 2021-02-11 ASSESSMENT — ENCOUNTER SYMPTOMS
BLOOD IN STOOL: 0
EYE ITCHING: 0
BACK PAIN: 0
CONSTIPATION: 0
EYE DISCHARGE: 0
TROUBLE SWALLOWING: 0
SINUS PRESSURE: 0
PHOTOPHOBIA: 0
NAUSEA: 0
COUGH: 0
EYE REDNESS: 0
ABDOMINAL PAIN: 0
SHORTNESS OF BREATH: 0
RHINORRHEA: 0
WHEEZING: 0
CHOKING: 0
DIARRHEA: 0
EYE PAIN: 0
CHEST TIGHTNESS: 0
ABDOMINAL DISTENTION: 0
SORE THROAT: 0
VOMITING: 0
VOICE CHANGE: 0

## 2021-02-11 NOTE — ED TRIAGE NOTES
To ED by [facility] with complaints of hypoglycemia. Pt states that last night her glucose was in the 200's and she took her night time insulin and when she woke up she had slurred speech due to hypoglycemia. At home glucose was 50. Patient was given oral glucose in route. Patient alert and oriented x4. Patient vital signs stable and respirations unlabored. Glucose 84 at this time.

## 2021-02-11 NOTE — ED NOTES
Pt given OJ to drink. Pt denies needs at this time.  Will continue to monitor      Bill Gonsales RN  02/11/21 0278

## 2021-02-11 NOTE — ED NOTES
Pt given food and drink at this time. Pt resting comfortably in bed at this time in a semi fowlers position.       Reinaldo Hatfield RN  02/11/21 2569

## 2021-02-11 NOTE — ED PROVIDER NOTES
[facility]  eMERGENCY dEPARTMENT eNCOUnter          CHIEF COMPLAINT       Chief Complaint   Patient presents with    Hypoglycemia       Nurses Notes reviewed and I agree except as noted in the HPI. HISTORY OF PRESENT ILLNESS    Giorgio Olivares is a 68 y.o. female who presents for hypoglycemia. Apparently tonight the patient went to bed and she stated her that blood glucose was 215. She woke up and she felt like she could not talk and she was unable to communicate she tried to yell for her significant other he did not respond so she started wearing a belt. She stated that she had some slurred speech she recognized the fact that she was hypoglycemic. Significant other called the squad they came in. They got her first blood sugar at 50 and then the next one at 95. They gave her 2 oral insulins. Patient is awake alert and oriented at this time. She did not have a fall. She not hit her head. She is not currently in any pain. Patient is resting comfortably on the cot no apparent distress. Patient is on Humalog and insulin glargine is her long-acting. REVIEW OF SYSTEMS     Review of Systems   Constitutional: Negative for activity change, appetite change, diaphoresis, fatigue and unexpected weight change. HENT: Negative for congestion, ear discharge, ear pain, hearing loss, rhinorrhea, sinus pressure, sore throat, trouble swallowing and voice change. Eyes: Negative for photophobia, pain, discharge, redness and itching. Respiratory: Negative for cough, choking, chest tightness, shortness of breath and wheezing. Cardiovascular: Negative for chest pain, palpitations and leg swelling. Gastrointestinal: Negative for abdominal distention, abdominal pain, blood in stool, constipation, diarrhea, nausea and vomiting. Endocrine: Negative for polydipsia, polyphagia and polyuria.    Genitourinary: Negative for decreased urine volume, difficulty urinating, dysuria, enuresis, frequency, hematuria and urgency. Musculoskeletal: Negative for arthralgias, back pain, gait problem, myalgias, neck pain and neck stiffness. Skin: Negative for pallor and rash. Allergic/Immunologic: Negative for immunocompromised state. Neurological: Negative for dizziness, tremors, seizures, syncope, facial asymmetry, weakness, light-headedness, numbness and headaches. Hematological: Negative for adenopathy. Does not bruise/bleed easily. Psychiatric/Behavioral: Positive for confusion. Negative for agitation, hallucinations and suicidal ideas. The patient is not nervous/anxious. PAST MEDICAL HISTORY    has a past medical history of MYRA (acute kidney injury) ([facility] [address] Anxiety, Arthritis, Bladder spasm, CAD (coronary artery disease), Cancer ([facility] [address] Hyperlipidemia, Hypertension, Hypothyroidism, Obstructive sleep apnea of adult, SOB (shortness of breath), and Type II or unspecified type diabetes mellitus without mention of complication, not stated as uncontrolled. SURGICAL HISTORY      has a past surgical history that includes Cholecystectomy (7/1968); Mastectomy (7/2001); Hysterectomy (11/1996); Knee arthroscopy; Breast surgery (7/1974); Carpal tunnel release (Right, 1/2013); hernia repair (6-6-14); Colonoscopy (11/2007); lymph node dissection (12/23/1998); Breast biopsy (1/17/2014); joint replacement (Left); Finger trigger release; and knee surgery (Right, 2017). CURRENT MEDICATIONS       Previous Medications    AMLODIPINE (NORVASC) 5 MG TABLET    Take 10 mg by mouth daily     ASPIRIN 325 MG EC TABLET    Take 1 tablet by mouth daily    ATORVASTATIN (LIPITOR) 80 MG TABLET    Take 0.5 tablets by mouth nightly    CLOPIDOGREL (PLAVIX) 75 MG TABLET    Take 75 mg by mouth daily    CYANOCOBALAMIN (VITAMIN B 12 PO)    Place  under the tongue daily.     FERROUS SULFATE 325 (65 FE) MG TABLET    Take 325 mg by mouth daily (with breakfast)    INSULIN GLARGINE (TOUJEO MAX SOLOSTAR SC)    Inject into the skin    INSULIN LISPRO (HUMALOG) 100 UNIT/ML INJECTION VIAL    Give between 15 and 35 units before you eat, based on discharge instructions. INSULIN REGULAR HUMAN (NOVOLIN R IJ)    Inject as directed    INSULIN SYRINGE-NEEDLE U-100 ([employer] INSULIN SYRINGE) 31G X 5/16\" 0.5 ML MISC    1 each by Does not apply route daily    INSULIN SYRINGE-NEEDLE U-100 (RELI-ON INSULIN SYRINGE)    by Does not apply route. 31 G/8MM MIS (SHORT)    LEVOTHYROXINE (SYNTHROID) 112 MCG TABLET    Take 112 mcg by mouth daily    LOSARTAN (COZAAR) 25 MG TABLET    Take 25 mg by mouth daily    METOPROLOL (TOPROL-XL) 25 MG XL TABLET    TAKE ONE TABLET BY MOUTH EVERY DAY    SERTRALINE (ZOLOFT) 50 MG TABLET    Take 50 mg by mouth daily Nightly       ALLERGIES     is allergic to myrbetriq [mirabegron]. FAMILY HISTORY     She indicated that her mother is . She indicated that her father is . She indicated that the status of her paternal grandmother is unknown. She indicated that the status of her paternal uncle is unknown.   family history includes Cirrhosis in her mother; Diabetes in her paternal grandmother and paternal uncle; Heart Disease in her father. SOCIAL HISTORY      reports that she is a non-smoker but has been exposed to tobacco smoke. She has never used smokeless tobacco. She reports current alcohol use. She reports that she does not use drugs. PHYSICAL EXAM     INITIAL VITALS:  height is 5' 3\" (1.6 m) and weight is 142 lb (64.4 kg). Her oral temperature is 98.4 °F (36.9 °C). Her blood pressure is 109/95 (abnormal) and her pulse is 86. Her respiration is 17 and oxygen saturation is 100%. Physical Exam  Vitals signs and nursing note reviewed. Constitutional:       General: She is not in acute distress. Appearance: She is well-developed. She is not diaphoretic. HENT:      Head: Normocephalic and atraumatic.       Right Ear: External ear normal.      Left Ear: External ear normal.      Nose: Nose normal. Mouth/Throat:      Pharynx: No oropharyngeal exudate. Eyes:      General: No scleral icterus. Right eye: No discharge. Left eye: No discharge. Conjunctiva/sclera: Conjunctivae normal.      Pupils: Pupils are equal, round, and reactive to light. Neck:      Musculoskeletal: Normal range of motion and neck supple. Thyroid: No thyromegaly. Vascular: No JVD. Trachea: No tracheal deviation. Cardiovascular:      Rate and Rhythm: Normal rate and regular rhythm. Heart sounds: Normal heart sounds, S1 normal and S2 normal. No murmur. No friction rub. No gallop. Pulmonary:      Effort: Pulmonary effort is normal.      Breath sounds: Normal breath sounds. No stridor. No wheezing, rhonchi or rales. Chest:      Chest wall: No tenderness. Abdominal:      General: Bowel sounds are normal. There is no distension. Palpations: Abdomen is soft. There is no mass. Tenderness: There is no abdominal tenderness. There is no guarding or rebound. Hernia: No hernia is present. Musculoskeletal: Normal range of motion. General: No tenderness. Lymphadenopathy:      Cervical: No cervical adenopathy. Skin:     General: Skin is warm and dry. Findings: No bruising, ecchymosis, lesion or rash. Neurological:      Mental Status: She is alert and oriented to person, place, and time. GCS: GCS eye subscore is 4. GCS verbal subscore is 5. GCS motor subscore is 6. Cranial Nerves: Cranial nerves are intact. No cranial nerve deficit. Sensory: Sensation is intact. Motor: Motor function is intact. Coordination: Coordination is intact. Romberg sign negative. Coordination normal. Finger-Nose-Finger Test and Heel to NIX [facility] Test normal. Rapid alternating movements normal.      Gait: Gait is intact. Gait normal.      Deep Tendon Reflexes: Reflexes are normal and symmetric.       Reflex Scores:       Tricep reflexes are 2+ on the right side and 2+ on the left side.       Bicep reflexes are 2+ on the right side and 2+ on the left side. Brachioradialis reflexes are 2+ on the right side and 2+ on the left side. Patellar reflexes are 2+ on the right side and 2+ on the left side. Achilles reflexes are 2+ on the right side and 2+ on the left side. Psychiatric:         Speech: Speech normal.         Behavior: Behavior normal.         Thought Content: Thought content normal.         Judgment: Judgment normal.         DIFFERENTIAL DIAGNOSIS:   Hypoglycemia, infection, syncope    DIAGNOSTIC RESULTS     EKG: All EKG's are interpreted by the Emergency Department Physician who either signs or Co-signs this chart in the absence of a cardiologist.  Normal sinus rhythm left axis deviation incomplete right bundle branch block ventricular rate of 79 OH interval 162 QRS duration 112 QT interval 432 QTC of 495.     RADIOLOGY: non-plain film images(s) such as CT, Ultrasound and MRI are read by the radiologist.  No orders to display         LABS:   Labs Reviewed   CBC WITH AUTO DIFFERENTIAL - Abnormal; Notable for the following components:       Result Value    RBC 3.93 (*)     Hemoglobin 11.2 (*)     Hematocrit 35.8 (*)     MCHC 31.3 (*)     RDW-SD 46.6 (*)     Segs Absolute 8.7 (*)     All other components within normal limits   URINE WITH REFLEXED MICRO - Abnormal; Notable for the following components:    Protein, UA TRACE (*)     All other components within normal limits   POCT GLUCOSE - Abnormal; Notable for the following components:    POC Glucose 122 (*)     All other components within normal limits   POCT GLUCOSE - Normal   MAGNESIUM   COMPREHENSIVE METABOLIC PANEL   ANION GAP   GLOMERULAR FILTRATION RATE, ESTIMATED   OSMOLALITY   POCT GLUCOSE       EMERGENCY DEPARTMENT COURSE:   Vitals:    Vitals:    02/11/21 0517 02/11/21 0530 02/11/21 0612   BP: (!) 111/42 (!) 123/51 (!) 109/95   Pulse: 88  86   Resp: 20  17   Temp: 98.4 °F (36.9 °C)     TempSrc: Oral     SpO2: 100%  100%   Weight: 142 lb (64.4 kg)     Height: 5' 3\" (1.6 m)       Patient was assessed at bedside appropriate labs and imaging were ordered. Patient has had multiple bouts of hypoglycemia in the past.  She is feeling much better at this time. We will allow her to eat. There are no neurologic symptoms at this time. She is not complaining of any pain. Patient remained stable throughout she was eating well at bedside. Repeat blood glucose was 122 mentation is completely normal.  She is able to walk in move all her extremities. She is not complaining of any pain. At this point I feel the patient be safely discharged home. She is instructed to follow-up with her endocrinologist and call for an appointment today. She is instructed to continue to monitor her blood sugars at least 5 times a day. It is suggested to her to decrease her insulin before she goes to bed at night. Patient and significant other at bedside understood and agreed with the plan. Patient is subsequently discharged home in improved condition. Patient has what appears to be hyperglycemia and diabetes. Patient is instructed to watch her blood sugars closely monitor them at least 5 times daily. She is instructed to follow-up with her endocrinologist and call for an appointment within the next 1 to 2 days. She is instructed return to the nearest emergency room immediately for any new or worsening complaints. CRITICAL CARE:   None    CONSULTS:  None    PROCEDURES:  None    FINAL IMPRESSION      1. Hypoglycemia    2.  Uncontrolled type 2 diabetes mellitus with hyperglycemia [facility])          DISPOSITION/PLAN   Discharge    PATIENT REFERRED TO:  Micheline Norton  [address] [place] [address]  [phone]    Call in 2 days        DISCHARGE MEDICATIONS:  New Prescriptions    No medications on file       (Please note that portions of this note were completed with a voice recognition program.  Efforts were made to edit the dictations but occasionally words are mis-transcribed.)    Tripp Rosas, [address] [place] [address],   02/11/21 0421

## 2021-02-24 NOTE — PROGRESS NOTES
[facility] PROFESSIONAL SERVICES  [facility]   [address] [facility] [address]   Dept: [phone]   Dept Fax: [phone]   Loc: [phone]      Chief Complaint   Patient presents with    6 Month Follow-Up    Coronary Artery Disease    Check-Up    Hypertension     F/U in 69 y/o female with history of nonobstructive CAD per [facility] 2013, HTN, HLD, multiple strokes, PORFIRIO, DM type 2, arthritis, COVID infection 4/2020. Recent ER visit for hypoglycemia. Denies chest pain, palpitations, sob,  lightheadedness, dizziness or syncope. Has some mild swelling of left leg and foot post CVA. Cardiologist:  Dr. Margarette Lea, last seen 8/2020        General:   No fever, no chills, No fatigue or weight loss  Pulmonary:    No dyspnea, no wheezing  Cardiac:    Denies recent chest pain   GI:     No nausea or vomiting, no abdominal pain  Neuro:    No dizziness or light headedness  Musculoskeletal:  No recent active issues  Extremities:   Mild LLE edema, good peripheral pulses      Past Medical History:   Diagnosis Date    MYRA (acute kidney injury) ([facility] [address] 12/6/2020    Anxiety 4/21/2020    Arthritis     Bladder spasm     CAD (coronary artery disease) 3/12/2013    [facility]     breast    Hyperlipidemia     Hypertension     Hypothyroidism     Obstructive sleep apnea of adult 12/12/2013    SOB (shortness of breath)     Type II or unspecified type diabetes mellitus without mention of complication, not stated as uncontrolled        Allergies   Allergen Reactions    Myrbetriq [Mirabegron]      Made patient Blood sugar go up to 300-400       Current Outpatient Medications   Medication Sig Dispense Refill    HYDROcodone-Acetaminophen (NORCO PO) Take by mouth      insulin lispro (HUMALOG) 100 UNIT/ML injection vial Give between 15 and 35 units before you eat, based on discharge instructions.  1 vial 0    Insulin Syringe-Needle U-100 ([employer] INSULIN SYRINGE) 31G X 5/16\" 0.5 ML MISC 1 each by Does not apply route daily 100 each 0    sertraline (ZOLOFT) 50 MG tablet Take 50 mg by mouth daily Nightly      aspirin 325 MG EC tablet Take 1 tablet by mouth daily      atorvastatin (LIPITOR) 80 MG tablet Take 0.5 tablets by mouth nightly      clopidogrel (PLAVIX) 75 MG tablet Take 75 mg by mouth daily      ferrous sulfate 325 (65 Fe) MG tablet Take 325 mg by mouth daily (with breakfast)      Insulin Glargine (TOUJEO MAX SOLOSTAR SC) Inject into the skin      Insulin Regular Human (NOVOLIN R IJ) Inject as directed      levothyroxine (SYNTHROID) 112 MCG tablet Take 112 mcg by mouth daily      losartan (COZAAR) 25 MG tablet Take 25 mg by mouth daily      Cyanocobalamin (VITAMIN B 12 PO) Place  under the tongue daily.  Insulin Syringe-Needle U-100 (RELI-ON INSULIN SYRINGE) by Does not apply route. 31 G/8MM MIS (SHORT)      metoprolol (TOPROL-XL) 25 MG XL tablet TAKE ONE TABLET BY MOUTH EVERY DAY 30 tablet 2    amLODIPine (NORVASC) 5 MG tablet Take 10 mg by mouth daily        No current facility-administered medications for this visit. Social History     Socioeconomic History    Marital status:       Spouse name: None    Number of children: None    Years of education: None    Highest education level: None   Occupational History    None   Social Needs    Financial resource strain: None    Food insecurity     Worry: None     Inability: None    Transportation needs     Medical: None     Non-medical: None   Tobacco Use    Smoking status: Passive Smoke Exposure - Never Smoker    Smokeless tobacco: Never Used    Tobacco comment: worked in a bar for years   Substance and Sexual Activity    Alcohol use: Yes     Comment: seldom    Drug use: No    Sexual activity: Never   Lifestyle    Physical activity     Days per week: None     Minutes per session: None    Stress: None   Relationships    Social connections     Talks on phone: None     Gets together: None     Attends L[employer] service: None     Active member of club or organization: None     Attends meetings of clubs or organizations: None     Relationship status: None    Intimate partner violence     Fear of current or ex partner: None     Emotionally abused: None     Physically abused: None     Forced sexual activity: None   Other Topics Concern    None   Social History Narrative    None       Family History   Problem Relation Age of Onset    Cirrhosis Mother     Heart Disease Father     Diabetes Paternal Uncle     Diabetes Paternal Grandmother        Blood pressure 132/68, pulse 69, height 5' 3\" (1.6 m), weight 146 lb 8 oz (66.5 kg). General:   Well developed, well nourished  Lungs:   Clear to auscultation  Heart:    Normal S1 S2, +murmur, no rubs or gallops  Abdomen:   Soft, non tender, no organomegalies, positive bowel sounds  Extremities:   Mild lower leg and foot edema, no cyanosis, good peripheral pulses  Neurological:   Awake, alert, oriented. No obvious focal deficits  Musculoskeletal:  No obvious deformities    EK21  Normal sinus rhythm  Left axis deviation  Incomplete right bundle branch block  Prolonged QT interval or tu fusion, consider myocardial disease, electrolyte imbalance, or drug effects  Abnormal ECG  When compared with ECG of 10-DONNA-2021 03:36,  No significant change was found  Confirmed by Lyndsay Hilliard MD, [facility]     Stress Test: 5/15/18  Summary   Lexiscan EKG stress test is not suggestive for ischemia. This Nuclear Medicine study was negative for ischemia. Recommendation   Clinical correlation is recommended. Signatures      ----------------------------------------------------------------   Electronically signed by Ramiro Kinsey MD    Echo: 5/15/18   Summary   Normal left ventricle size and systolic function. Ejection fraction was   estimated at 55%. There were no regional left ventricular wall motion   abnormalities and wall thickness was within normal limits.    Doppler parameters were needed

## 2021-02-25 ENCOUNTER — OFFICE VISIT (OUTPATIENT)
Dept: CARDIOLOGY CLINIC | Age: 81
End: 2021-02-25
Payer: MEDICARE

## 2021-02-25 VITALS
BODY MASS INDEX: 25.96 KG/M2 | HEIGHT: 63 IN | WEIGHT: 146.5 LBS | DIASTOLIC BLOOD PRESSURE: 68 MMHG | SYSTOLIC BLOOD PRESSURE: 132 MMHG | HEART RATE: 69 BPM

## 2021-02-25 DIAGNOSIS — E78.01 FAMILIAL HYPERCHOLESTEROLEMIA: ICD-10-CM

## 2021-02-25 DIAGNOSIS — E11.9 INSULIN DEPENDENT TYPE 2 DIABETES MELLITUS (HCC): ICD-10-CM

## 2021-02-25 DIAGNOSIS — I35.0 MODERATE AORTIC STENOSIS: Primary | ICD-10-CM

## 2021-02-25 DIAGNOSIS — I10 ESSENTIAL HYPERTENSION: ICD-10-CM

## 2021-02-25 DIAGNOSIS — Z86.73 HISTORY OF CVA (CEREBROVASCULAR ACCIDENT): ICD-10-CM

## 2021-02-25 DIAGNOSIS — I25.10 CORONARY ARTERY DISEASE INVOLVING NATIVE CORONARY ARTERY OF NATIVE HEART WITHOUT ANGINA PECTORIS: ICD-10-CM

## 2021-02-25 DIAGNOSIS — G47.33 OBSTRUCTIVE SLEEP APNEA ON CPAP: ICD-10-CM

## 2021-02-25 DIAGNOSIS — Z79.4 INSULIN DEPENDENT TYPE 2 DIABETES MELLITUS (HCC): ICD-10-CM

## 2021-02-25 DIAGNOSIS — Z99.89 OBSTRUCTIVE SLEEP APNEA ON CPAP: ICD-10-CM

## 2021-02-25 PROCEDURE — G8427 DOCREV CUR MEDS BY ELIG CLIN: HCPCS | Performed by: NURSE PRACTITIONER

## 2021-02-25 PROCEDURE — 1090F PRES/ABSN URINE INCON ASSESS: CPT | Performed by: NURSE PRACTITIONER

## 2021-02-25 PROCEDURE — 1036F TOBACCO NON-USER: CPT | Performed by: NURSE PRACTITIONER

## 2021-02-25 PROCEDURE — G8400 PT W/DXA NO RESULTS DOC: HCPCS | Performed by: NURSE PRACTITIONER

## 2021-02-25 PROCEDURE — 99213 OFFICE O/P EST LOW 20 MIN: CPT | Performed by: NURSE PRACTITIONER

## 2021-02-25 PROCEDURE — G8482 FLU IMMUNIZE ORDER/ADMIN: HCPCS | Performed by: NURSE PRACTITIONER

## 2021-02-25 PROCEDURE — 1123F ACP DISCUSS/DSCN MKR DOCD: CPT | Performed by: NURSE PRACTITIONER

## 2021-02-25 PROCEDURE — G8417 CALC BMI ABV UP PARAM F/U: HCPCS | Performed by: NURSE PRACTITIONER

## 2021-02-25 PROCEDURE — 4040F PNEUMOC VAC/ADMIN/RCVD: CPT | Performed by: NURSE PRACTITIONER

## 2021-02-25 NOTE — PROGRESS NOTES
Pt here for a 6 month f/u    EKG done 2-11-21    Pt denies cp, sob, dizziness and palpitations     Pt c/o swelling in left leg and foot

## 2021-03-04 ENCOUNTER — HOSPITAL ENCOUNTER (OUTPATIENT)
Dept: NON INVASIVE DIAGNOSTICS | Age: 81
Discharge: HOME | End: 2021-03-04
Payer: MEDICARE

## 2021-03-04 DIAGNOSIS — I35.0 MODERATE AORTIC STENOSIS: ICD-10-CM

## 2021-03-04 LAB
LV EF: 55 %
LVEF MODALITY: NORMAL

## 2021-03-04 PROCEDURE — 93306 TTE W/DOPPLER COMPLETE: CPT

## 2021-03-05 ENCOUNTER — TELEPHONE (OUTPATIENT)
Dept: CARDIOLOGY CLINIC | Age: 81
End: 2021-03-05

## 2021-03-05 NOTE — TELEPHONE ENCOUNTER
----- Message from SAMAN Mckeon CNP sent at 3/4/2021  7:58 PM EST -----  Please notify Dr. Angella Jara of latest Echo report in comparison to 2018. OLGA LIDIA reduced from 1.2 to 0.9.

## 2021-04-15 ENCOUNTER — OFFICE VISIT (OUTPATIENT)
Dept: CARDIOLOGY CLINIC | Age: 81
End: 2021-04-15
Payer: MEDICARE

## 2021-04-15 VITALS
BODY MASS INDEX: 24.27 KG/M2 | SYSTOLIC BLOOD PRESSURE: 120 MMHG | DIASTOLIC BLOOD PRESSURE: 70 MMHG | HEART RATE: 85 BPM | HEIGHT: 63 IN | WEIGHT: 137 LBS

## 2021-04-15 DIAGNOSIS — I10 ESSENTIAL HYPERTENSION: ICD-10-CM

## 2021-04-15 DIAGNOSIS — R06.02 SHORTNESS OF BREATH: ICD-10-CM

## 2021-04-15 DIAGNOSIS — I35.0 NONRHEUMATIC AORTIC VALVE STENOSIS: Primary | ICD-10-CM

## 2021-04-15 DIAGNOSIS — I25.10 CORONARY ARTERY DISEASE INVOLVING NATIVE CORONARY ARTERY OF NATIVE HEART WITHOUT ANGINA PECTORIS: ICD-10-CM

## 2021-04-15 PROCEDURE — 1036F TOBACCO NON-USER: CPT | Performed by: NUCLEAR MEDICINE

## 2021-04-15 PROCEDURE — G8400 PT W/DXA NO RESULTS DOC: HCPCS | Performed by: NUCLEAR MEDICINE

## 2021-04-15 PROCEDURE — G8428 CUR MEDS NOT DOCUMENT: HCPCS | Performed by: NUCLEAR MEDICINE

## 2021-04-15 PROCEDURE — 1090F PRES/ABSN URINE INCON ASSESS: CPT | Performed by: NUCLEAR MEDICINE

## 2021-04-15 PROCEDURE — G8420 CALC BMI NORM PARAMETERS: HCPCS | Performed by: NUCLEAR MEDICINE

## 2021-04-15 PROCEDURE — 99214 OFFICE O/P EST MOD 30 MIN: CPT | Performed by: NUCLEAR MEDICINE

## 2021-04-15 PROCEDURE — 4040F PNEUMOC VAC/ADMIN/RCVD: CPT | Performed by: NUCLEAR MEDICINE

## 2021-04-15 PROCEDURE — 1123F ACP DISCUSS/DSCN MKR DOCD: CPT | Performed by: NUCLEAR MEDICINE

## 2021-04-15 RX ORDER — LOSARTAN POTASSIUM 25 MG/1
25 TABLET ORAL DAILY
Qty: 90 TABLET | Refills: 3 | Status: SHIPPED | OUTPATIENT
Start: 2021-04-15

## 2021-04-15 NOTE — PROGRESS NOTES
[address] [facility] [employer] .  [address] [place] [address]  Dept: [phone]  Dept Fax: [phone]  Loc: [phone]    Visit Date: 4/15/2021    Anna Patricio is a 68 y.o. female who presents todayfor:  Chief Complaint   Patient presents with    Check-Up    Shortness of Breath    Hypertension    Coronary Artery Disease    Hyperlipidemia     Worsening AS  Very limited by the strokes   Not very active   Limited patient  Known nonobostructive CAD  Bp is stable  No dizziness  No syncope  She reports no big changes in symptoms from last year   No chest pain   On statins for hyperlipidemia  D is not the best   Higher lately       HPI:  HPI  Past Medical History:   Diagnosis Date    MYRA (acute kidney injury) ([facility] [address] 12/6/2020    Anxiety 4/21/2020    Arthritis     Bladder spasm     CAD (coronary artery disease) 3/12/2013    Cancer ([facility] [address]     breast    Hyperlipidemia     Hypertension     Hypothyroidism     Obstructive sleep apnea of adult 12/12/2013    SOB (shortness of breath)     Type II or unspecified type diabetes mellitus without mention of complication, not stated as uncontrolled       Past Surgical History:   Procedure Laterality Date    BREAST BIOPSY  1/17/2014    BREAST SURGERY  7/1974    left breast     CARPAL TUNNEL RELEASE Right 1/2013    CHOLECYSTECTOMY  7/1968    COLONOSCOPY  11/2007    FINGER TRIGGER RELEASE      HERNIA REPAIR  6-6-14    Dr. Cyndy Woody  11/1996    JOINT REPLACEMENT Left     KNEE ARTHROSCOPY      left    KNEE SURGERY Right 2017    LYMPH NODE DISSECTION  12/23/1998    MASTECTOMY  7/2001     Family History   Problem Relation Age of Onset    Cirrhosis Mother     Heart Disease Father     Diabetes Paternal Uncle     Diabetes Paternal Grandmother      Social History     Tobacco Use    Smoking status: Passive Smoke Exposure - Never Smoker    Smokeless tobacco: Never Used    Tobacco comment:  Potassium monitoring  02/11/2022    Creatinine monitoring  02/11/2022    Flu vaccine  Completed    Pneumococcal 65+ years Vaccine  Completed    COVID-19 Vaccine  Completed    Hepatitis A vaccine  Aged Out    Hib vaccine  Aged Out    Meningococcal (ACWY) vaccine  Aged Out       Subjective:  Review of Systems  General:   No fever, no chills, No fatigue or weight loss  Pulmonary:    some dyspnea, no wheezing  Cardiac:    Denies recent chest pain,   GI:     No nausea or vomiting, no abdominal pain  Neuro:    No dizziness or light headedness,   Musculoskeletal:  No recent active issues  Extremities:   No edema, no obvious claudication       Objective:  Physical Exam  /70   Pulse 85   Ht 5' 3\" (1.6 m)   Wt 137 lb (62.1 kg)   BMI 24.27 kg/m²   General:   Well developed, well nourished  Lungs:   Clear to auscultation  Heart:    Normal S1 S2, Systolic murmur. no rubs, no gallops  Abdomen:   Soft, non tender, no organomegalies, positive bowel sounds  Extremities:   No edema, no cyanosis, good peripheral pulses  Neurological:   Awake, alert, oriented. No obvious focal deficits  Musculoskelatal:  No obvious deformities    Assessment:      Diagnosis Orders   1. Nonrheumatic aortic valve stenosis     2. Essential hypertension     3. Shortness of breath     4. Coronary artery disease involving native coronary artery of native heart without angina pectoris     as above  Worsening AS  Not easy to assess the symptoms  Not very active       Plan:  No follow-ups on file. Discussed at length   Hard to assess functional status   Close monitoring of the symptoms and LV   Consider a BOUCHRA   Consider a TAVR  Continue risk factor modification and medical management  Thank you for allowing me to participate in the care of your patient. Please don't hesitate to contact me regarding any further issues related to the patient care    Orders Placed:  No orders of the defined types were placed in this encounter.       Medications Prescribed:  No orders of the defined types were placed in this encounter. Discussed use, benefit, and side effects of prescribed medications. All patient questions answered. Pt voicedunderstanding. Instructed to continue current medications, diet and exercise. Continue risk factor modification and medical management. Patient agreed with treatment plan. Follow up as directed.     Electronically signedby Seymour Kruse MD on 4/15/2021 at 12:48 PM

## 2021-10-28 ENCOUNTER — OFFICE VISIT (OUTPATIENT)
Dept: CARDIOLOGY CLINIC | Age: 81
End: 2021-10-28
Payer: MEDICARE

## 2021-10-28 VITALS
HEIGHT: 63 IN | SYSTOLIC BLOOD PRESSURE: 116 MMHG | HEART RATE: 80 BPM | DIASTOLIC BLOOD PRESSURE: 62 MMHG | WEIGHT: 155 LBS | BODY MASS INDEX: 27.46 KG/M2

## 2021-10-28 DIAGNOSIS — I10 PRIMARY HYPERTENSION: ICD-10-CM

## 2021-10-28 DIAGNOSIS — I35.0 NONRHEUMATIC AORTIC VALVE STENOSIS: Primary | ICD-10-CM

## 2021-10-28 DIAGNOSIS — R06.02 SOB (SHORTNESS OF BREATH): ICD-10-CM

## 2021-10-28 PROCEDURE — 1090F PRES/ABSN URINE INCON ASSESS: CPT | Performed by: NUCLEAR MEDICINE

## 2021-10-28 PROCEDURE — G8400 PT W/DXA NO RESULTS DOC: HCPCS | Performed by: NUCLEAR MEDICINE

## 2021-10-28 PROCEDURE — 1036F TOBACCO NON-USER: CPT | Performed by: NUCLEAR MEDICINE

## 2021-10-28 PROCEDURE — G8427 DOCREV CUR MEDS BY ELIG CLIN: HCPCS | Performed by: NUCLEAR MEDICINE

## 2021-10-28 PROCEDURE — 99213 OFFICE O/P EST LOW 20 MIN: CPT | Performed by: NUCLEAR MEDICINE

## 2021-10-28 PROCEDURE — 4040F PNEUMOC VAC/ADMIN/RCVD: CPT | Performed by: NUCLEAR MEDICINE

## 2021-10-28 PROCEDURE — 1123F ACP DISCUSS/DSCN MKR DOCD: CPT | Performed by: NUCLEAR MEDICINE

## 2021-10-28 PROCEDURE — G8484 FLU IMMUNIZE NO ADMIN: HCPCS | Performed by: NUCLEAR MEDICINE

## 2021-10-28 PROCEDURE — G8417 CALC BMI ABV UP PARAM F/U: HCPCS | Performed by: NUCLEAR MEDICINE

## 2021-10-28 NOTE — PROGRESS NOTES
[address]  [address]  [place] [address]  Dept: [phone]  Dept Fax: [phone]  Loc: [phone]    Visit Date: 10/28/2021    Fanny Yañez is a 80 y.o. female who presents todayfor:  Chief Complaint   Patient presents with    Check-Up    Coronary Artery Disease    Cardiac Valve Problem    Hypertension     Known stroke history and seizures  Known AS and followed  No chest pain  No changes in breathing  Baseline dyspnea  BP is stable   No dizziness  No syncope  BS is stable     HPI:  HPI  Past Medical History:   Diagnosis Date    MYRA (acute kidney injury) ([facility] 12/6/2020    Anxiety 4/21/2020    Arthritis     Bladder spasm     CAD (coronary artery disease) 3/12/2013    Cancer ([facility]     breast    Hyperlipidemia     Hypertension     Hypothyroidism     Obstructive sleep apnea of adult 12/12/2013    SOB (shortness of breath)     Type II or unspecified type diabetes mellitus without mention of complication, not stated as uncontrolled       Past Surgical History:   Procedure Laterality Date    BREAST BIOPSY  1/17/2014    BREAST SURGERY  7/1974    left breast     CARPAL TUNNEL RELEASE Right 1/2013    CHOLECYSTECTOMY  7/1968    COLONOSCOPY  11/2007    FINGER TRIGGER RELEASE      HERNIA REPAIR  6-6-14    Dr. Celinda Krabbe  11/1996    JOINT REPLACEMENT Left     KNEE ARTHROSCOPY      left    KNEE SURGERY Right 2017    LYMPH NODE DISSECTION  12/23/1998    MASTECTOMY  7/2001     Family History   Problem Relation Age of Onset    Cirrhosis Mother     Heart Disease Father     Diabetes Paternal Uncle     Diabetes Paternal Grandmother      Social History     Tobacco Use    Smoking status: Passive Smoke Exposure - Never Smoker    Smokeless tobacco: Never Used    Tobacco comment: worked in a bar for years   Substance Use Topics    Alcohol use: Yes     Comment: seldom      Current Outpatient Medications Medication Sig Dispense Refill    losartan (COZAAR) 25 MG tablet Take 1 tablet by mouth daily 90 tablet 3    insulin lispro (HUMALOG) 100 UNIT/ML injection vial Give between 15 and 35 units before you eat, based on discharge instructions. 1 vial 0    Insulin Syringe-Needle U-100 ([employer] INSULIN SYRINGE) 31G X 5/16\" 0.5 ML MISC 1 each by Does not apply route daily 100 each 0    sertraline (ZOLOFT) 50 MG tablet Take 50 mg by mouth daily Nightly      aspirin 325 MG EC tablet Take 1 tablet by mouth daily      atorvastatin (LIPITOR) 80 MG tablet Take 0.5 tablets by mouth nightly      clopidogrel (PLAVIX) 75 MG tablet Take 75 mg by mouth daily      ferrous sulfate 325 (65 Fe) MG tablet Take 325 mg by mouth daily (with breakfast)      Insulin Glargine (TOUJEO MAX SOLOSTAR SC) Inject into the skin      Insulin Regular Human (NOVOLIN R IJ) Inject as directed      levothyroxine (SYNTHROID) 112 MCG tablet Take 112 mcg by mouth daily      Cyanocobalamin (VITAMIN B 12 PO) Place  under the tongue daily.  Insulin Syringe-Needle U-100 (RELI-ON INSULIN SYRINGE) by Does not apply route. 31 G/8MM MIS (SHORT)      metoprolol (TOPROL-XL) 25 MG XL tablet TAKE ONE TABLET BY MOUTH EVERY DAY 30 tablet 2    amLODIPine (NORVASC) 5 MG tablet Take 10 mg by mouth daily       HYDROcodone-Acetaminophen (NORCO PO) Take by mouth (Patient not taking: Reported on 10/28/2021)       No current facility-administered medications for this visit.      Allergies   Allergen Reactions    Myrbetriq [Mirabegron]      Made patient Blood sugar go up to 300-400     Health Maintenance   Topic Date Due    TSH testing  Never done    Hepatitis C screen  Never done    COVID-19 Vaccine (1) Never done    DTaP/Tdap/Td vaccine (1 - Tdap) Never done    Shingles Vaccine (1 of 2) Never done    DEXA (modify frequency per FRAX score)  Never done    Lipid screen  03/12/2014    Annual Wellness Visit (AWV)  Never done    Flu vaccine (1) 09/01/2021    Potassium monitoring  02/11/2022    Creatinine monitoring  02/11/2022    Pneumococcal 65+ years Vaccine  Completed    Hepatitis A vaccine  Aged Out    Hib vaccine  Aged Out    Meningococcal (ACWY) vaccine  Aged Out       Subjective:  Review of Systems  General:   No fever, no chills, some fatigue or weight loss  Pulmonary:    some dyspnea, no wheezing  Cardiac:    Denies recent chest pain,   GI:     No nausea or vomiting, no abdominal pain  Neuro:    No dizziness or light headedness,   Musculoskeletal:  No recent active issues  Extremities:   No edema, no obvious claudication       Objective:  Physical Exam  /62   Pulse 80   Ht 5' 3\" (1.6 m)   Wt 155 lb (70.3 kg)   BMI 27.46 kg/m²   General:   Well developed, well nourished  Lungs:   Clear to auscultation  Heart:    Normal S1 S2, Slight murmur. no rubs, no gallops  Abdomen:   Soft, non tender, no organomegalies, positive bowel sounds  Extremities:   No edema, no cyanosis, good peripheral pulses  Neurological:   Awake, alert, oriented. No obvious focal deficits  Musculoskelatal:  No obvious deformities    Assessment:      Diagnosis Orders   1. Nonrheumatic aortic valve stenosis     2. Primary hypertension     limited patient   Cant assess physical status  Last mean gradient 30 mm hg      Plan:  No follow-ups on file. As above  Discussed   Consider a follow up echo   ? TAVR at some point   Continue risk factor modification and medical management  Thank you for allowing me to participate in the care of your patient. Please don't hesitate to contact me regarding any further issues related to the patient care    Orders Placed:  No orders of the defined types were placed in this encounter. Medications Prescribed:  No orders of the defined types were placed in this encounter. Discussed use, benefit, and side effects of prescribed medications. All patient questions answered. Pt voicedunderstanding.  Instructed to continue current medications, diet and exercise. Continue risk factor modification and medical management. Patient agreed with treatment plan. Follow up as directed.     Electronically signedby Dylan Rivera MD on 10/28/2021 at 12:34 PM

## 2021-11-04 ENCOUNTER — HOSPITAL ENCOUNTER (OUTPATIENT)
Dept: NON INVASIVE DIAGNOSTICS | Age: 81
Discharge: HOME | End: 2021-11-04
Payer: MEDICARE

## 2021-11-04 DIAGNOSIS — I35.0 NONRHEUMATIC AORTIC VALVE STENOSIS: ICD-10-CM

## 2021-11-04 DIAGNOSIS — I10 PRIMARY HYPERTENSION: ICD-10-CM

## 2021-11-04 DIAGNOSIS — R06.02 SOB (SHORTNESS OF BREATH): ICD-10-CM

## 2021-11-04 LAB
LV EF: 60 %
LVEF MODALITY: NORMAL

## 2021-11-04 PROCEDURE — 93306 TTE W/DOPPLER COMPLETE: CPT

## 2021-11-05 ENCOUNTER — TELEPHONE (OUTPATIENT)
Dept: CARDIOLOGY CLINIC | Age: 81
End: 2021-11-05

## 2021-11-05 NOTE — TELEPHONE ENCOUNTER
Please see echo results. Anything needed? Next appt 4-28-22        Summary   Ejection fraction is visually estimated at 60%. Overall left ventricular function is normal.   The aortic valve leaflets were not well visualized. Aortic valve appears tricuspid. Thickened aortic valve leaflets noted. Aortic valve leaflets are Moderately calcified. The maximum aortic valve gradient is 44 mmHg, the mean gradient is 25   mmHg, and the peak velocity is 330 cm/s. There is moderate-to-severe aortic stenosis with valve area of 0.9-1.0 sq   cm. Myxomatotic degeneration of mitral valve. Calcification of the mitral valve noted. Trace mitral regurgitation is present.

## 2022-03-29 ENCOUNTER — TELEPHONE (OUTPATIENT)
Dept: CARDIOLOGY CLINIC | Age: 82
End: 2022-03-29

## 2022-03-29 NOTE — TELEPHONE ENCOUNTER
Son returned call. Appt. was rescheduled from 4-28-22 to 4-6-22 at 1:45 pm. He states he will relay the message to his mother.

## 2022-03-29 NOTE — TELEPHONE ENCOUNTER
Pt needing clearance for blepharoplasty on 4-14-22. Surgeon is requesting an EKG within the last 6 months. LM for patient to return call to make a sooner appt. Also left message on son's voicemail to return call.

## 2022-04-06 ENCOUNTER — OFFICE VISIT (OUTPATIENT)
Dept: CARDIOLOGY CLINIC | Age: 82
End: 2022-04-06
Payer: MEDICARE

## 2022-04-06 VITALS
HEIGHT: 63 IN | BODY MASS INDEX: 28.35 KG/M2 | SYSTOLIC BLOOD PRESSURE: 127 MMHG | WEIGHT: 160 LBS | HEART RATE: 78 BPM | DIASTOLIC BLOOD PRESSURE: 68 MMHG

## 2022-04-06 DIAGNOSIS — I10 PRIMARY HYPERTENSION: ICD-10-CM

## 2022-04-06 DIAGNOSIS — I35.0 NONRHEUMATIC AORTIC VALVE STENOSIS: ICD-10-CM

## 2022-04-06 DIAGNOSIS — R06.02 SOB (SHORTNESS OF BREATH): Primary | ICD-10-CM

## 2022-04-06 PROCEDURE — G8427 DOCREV CUR MEDS BY ELIG CLIN: HCPCS | Performed by: NUCLEAR MEDICINE

## 2022-04-06 PROCEDURE — 99214 OFFICE O/P EST MOD 30 MIN: CPT | Performed by: NUCLEAR MEDICINE

## 2022-04-06 PROCEDURE — 1036F TOBACCO NON-USER: CPT | Performed by: NUCLEAR MEDICINE

## 2022-04-06 PROCEDURE — 93000 ELECTROCARDIOGRAM COMPLETE: CPT | Performed by: NUCLEAR MEDICINE

## 2022-04-06 PROCEDURE — 1123F ACP DISCUSS/DSCN MKR DOCD: CPT | Performed by: NUCLEAR MEDICINE

## 2022-04-06 PROCEDURE — 1090F PRES/ABSN URINE INCON ASSESS: CPT | Performed by: NUCLEAR MEDICINE

## 2022-04-06 PROCEDURE — G8417 CALC BMI ABV UP PARAM F/U: HCPCS | Performed by: NUCLEAR MEDICINE

## 2022-04-06 PROCEDURE — G8400 PT W/DXA NO RESULTS DOC: HCPCS | Performed by: NUCLEAR MEDICINE

## 2022-04-06 PROCEDURE — 4040F PNEUMOC VAC/ADMIN/RCVD: CPT | Performed by: NUCLEAR MEDICINE

## 2022-04-06 RX ORDER — LEVETIRACETAM 500 MG/1
TABLET ORAL
COMMUNITY
Start: 2022-03-23

## 2022-04-06 RX ORDER — SEMAGLUTIDE 1.34 MG/ML
INJECTION, SOLUTION SUBCUTANEOUS
COMMUNITY
Start: 2022-03-03

## 2022-04-06 NOTE — PROGRESS NOTES
[address] [place] [address].  [address]  [facility] [address]  Dept: [phone]  Dept Fax: [phone]  Loc: [phone]    Visit Date: 4/6/2022    Tawana Cho is a 80 y.o. female who presents todayfor:  Chief Complaint   Patient presents with    Check-Up    Cardiac Valve Problem    Hypertension   going for eyelid surgery   Known moderate to severe AS  Followed closely   Some more dyspnea  No chest pain  No dizziness  No syncope  Limited patient   Bp is stable  On statins for hyperlipidemia'      HPI:  HPI  Past Medical History:   Diagnosis Date    MYRA (acute kidney injury) ([facility] 12/6/2020    Anxiety 4/21/2020    Arthritis     Bladder spasm     CAD (coronary artery disease) 3/12/2013    Cancer ([facility]     breast    Hyperlipidemia     Hypertension     Hypothyroidism     Obstructive sleep apnea of adult 12/12/2013    SOB (shortness of breath)     Type II or unspecified type diabetes mellitus without mention of complication, not stated as uncontrolled       Past Surgical History:   Procedure Laterality Date    BREAST BIOPSY  1/17/2014    BREAST SURGERY  7/1974    left breast     CARPAL TUNNEL RELEASE Right 1/2013    CHOLECYSTECTOMY  7/1968    COLONOSCOPY  11/2007    FINGER TRIGGER RELEASE      HERNIA REPAIR  6-6-14    Dr. Jonathan Castelan  11/1996    JOINT REPLACEMENT Left     KNEE ARTHROSCOPY      left    KNEE SURGERY Right 2017    LYMPH NODE DISSECTION  12/23/1998    MASTECTOMY  7/2001     Family History   Problem Relation Age of Onset    Cirrhosis Mother     Heart Disease Father     Diabetes Paternal Uncle     Diabetes Paternal Grandmother      Social History     Tobacco Use    Smoking status: Passive Smoke Exposure - Never Smoker    Smokeless tobacco: Never Used    Tobacco comment: worked in a bar for years   Substance Use Topics    Alcohol use: Yes     Comment: seldom      Current Outpatient Medications Medication Sig Dispense Refill    levETIRAcetam (KEPPRA) 500 MG tablet       OZEMPIC, 0.25 OR 0.5 MG/DOSE, 2 MG/1.5ML SOPN       losartan (COZAAR) 25 MG tablet Take 1 tablet by mouth daily 90 tablet 3    Insulin Syringe-Needle U-100 ([employer] INSULIN SYRINGE) 31G X 5/16\" 0.5 ML MISC 1 each by Does not apply route daily 100 each 0    sertraline (ZOLOFT) 50 MG tablet Take 50 mg by mouth daily Nightly      aspirin 325 MG EC tablet Take 1 tablet by mouth daily      atorvastatin (LIPITOR) 80 MG tablet Take 0.5 tablets by mouth nightly      clopidogrel (PLAVIX) 75 MG tablet Take 75 mg by mouth daily      ferrous sulfate 325 (65 Fe) MG tablet Take 325 mg by mouth daily (with breakfast)      Insulin Glargine (TOUJEO MAX SOLOSTAR SC) Inject into the skin      Insulin Regular Human (NOVOLIN R IJ) Inject as directed      levothyroxine (SYNTHROID) 112 MCG tablet Take 112 mcg by mouth daily      Cyanocobalamin (VITAMIN B 12 PO) Place  under the tongue daily.  Insulin Syringe-Needle U-100 (RELI-ON INSULIN SYRINGE) by Does not apply route. 31 G/8MM MIS (SHORT)      metoprolol (TOPROL-XL) 25 MG XL tablet TAKE ONE TABLET BY MOUTH EVERY DAY 30 tablet 2    amLODIPine (NORVASC) 5 MG tablet Take 10 mg by mouth daily        No current facility-administered medications for this visit.      Allergies   Allergen Reactions    Myrbetriq [Mirabegron]      Made patient Blood sugar go up to 300-400     Health Maintenance   Topic Date Due    TSH testing  Never done    Hepatitis C screen  Never done    COVID-19 Vaccine (1) Never done    Depression Screen  Never done    DTaP/Tdap/Td vaccine (1 - Tdap) Never done    Shingles Vaccine (1 of 2) Never done    DEXA (modify frequency per FRAX score)  Never done    Lipid screen  03/12/2014    Annual Wellness Visit (AWV)  Never done    Potassium monitoring  02/11/2022    Creatinine monitoring  02/11/2022    Flu vaccine (Season Ended) 09/01/2022    Pneumococcal 65+ years Vaccine  Completed    Hepatitis A vaccine  Aged Out    Hib vaccine  Aged Out    Meningococcal (ACWY) vaccine  Aged Out       Subjective:  Review of Systems  General:   No fever, no chills, some fatigue or weight loss  Pulmonary:    some more dyspnea, no wheezing  Cardiac:    Denies recent chest pain,   GI:     No nausea or vomiting, no abdominal pain  Neuro:     No dizziness or light headedness,   Musculoskeletal:  No recent active issues  Extremities:   No edema, no obvious claudication       Objective:  Physical Exam  /68   Pulse 78   Ht 5' 3\" (1.6 m)   Wt 160 lb (72.6 kg)   BMI 28.34 kg/m²   General:   Well developed, well nourished  Lungs:    Clear to auscultation  Heart:    Normal S1 S2, Slight murmur. no rubs, no gallops  Abdomen:   Soft, non tender, no organomegalies, positive bowel sounds  Extremities:   No edema, no cyanosis, good peripheral pulses  Neurological:   Awake, alert, oriented. No obvious focal deficits  Musculoskelatal:  No obvious deformities    Assessment:      Diagnosis Orders   1. SOB (shortness of breath)  EKG 12 lead   2. Primary hypertension  EKG 12 lead   3. Nonrheumatic aortic valve stenosis  EKG 12 lead   as above  Worrisome for needing AS taken care of  Moderate risk surgery   ECG in office was done today. I reviewed the ECG. No acute findings      Plan:  No follow-ups on file. Discussed  Discussed the risk of the operation   Needs also a hammer toe done  will need an echo shortly   Follow the AS  Refer to TAVR if As is worse  Continue risk factor modification and medical management    'Thank you for allowing me to participate in the care of your patient. Please don't hesitate to contact me regarding any further issues related to the patient care    Orders Placed:  Orders Placed This Encounter   Procedures    EKG 12 lead     Order Specific Question:   Reason for Exam?     Answer:    Other       Medications Prescribed:  No orders of the defined types were placed in this encounter. Discussed use, benefit, and side effects of prescribed medications. All patient questions answered. Pt voicedunderstanding. Instructed to continue current medications, diet and exercise. Continue risk factor modification and medical management. Patient agreed with treatment plan. Follow up as directed.     Electronically signedby Kelby Rosa MD on 4/6/2022 at 1:55 PM

## 2022-04-19 ENCOUNTER — HOSPITAL ENCOUNTER (OUTPATIENT)
Dept: NON INVASIVE DIAGNOSTICS | Age: 82
Discharge: HOME | End: 2022-04-19
Payer: MEDICARE

## 2022-04-19 DIAGNOSIS — I35.0 NONRHEUMATIC AORTIC VALVE STENOSIS: ICD-10-CM

## 2022-04-19 DIAGNOSIS — I10 PRIMARY HYPERTENSION: ICD-10-CM

## 2022-04-19 DIAGNOSIS — R06.02 SOB (SHORTNESS OF BREATH): ICD-10-CM

## 2022-04-19 LAB
LV EF: 55 %
LVEF MODALITY: NORMAL

## 2022-04-19 PROCEDURE — 93306 TTE W/DOPPLER COMPLETE: CPT

## 2022-10-13 ENCOUNTER — OFFICE VISIT (OUTPATIENT)
Dept: CARDIOLOGY CLINIC | Age: 82
End: 2022-10-13
Payer: MEDICARE

## 2022-10-13 VITALS
DIASTOLIC BLOOD PRESSURE: 69 MMHG | SYSTOLIC BLOOD PRESSURE: 130 MMHG | HEIGHT: 63 IN | WEIGHT: 156 LBS | HEART RATE: 73 BPM | BODY MASS INDEX: 27.64 KG/M2

## 2022-10-13 DIAGNOSIS — I10 PRIMARY HYPERTENSION: ICD-10-CM

## 2022-10-13 DIAGNOSIS — I35.0 NONRHEUMATIC AORTIC VALVE STENOSIS: Primary | ICD-10-CM

## 2022-10-13 PROCEDURE — G8484 FLU IMMUNIZE NO ADMIN: HCPCS | Performed by: NUCLEAR MEDICINE

## 2022-10-13 PROCEDURE — 1123F ACP DISCUSS/DSCN MKR DOCD: CPT | Performed by: NUCLEAR MEDICINE

## 2022-10-13 PROCEDURE — G8417 CALC BMI ABV UP PARAM F/U: HCPCS | Performed by: NUCLEAR MEDICINE

## 2022-10-13 PROCEDURE — 1036F TOBACCO NON-USER: CPT | Performed by: NUCLEAR MEDICINE

## 2022-10-13 PROCEDURE — 1090F PRES/ABSN URINE INCON ASSESS: CPT | Performed by: NUCLEAR MEDICINE

## 2022-10-13 PROCEDURE — G8427 DOCREV CUR MEDS BY ELIG CLIN: HCPCS | Performed by: NUCLEAR MEDICINE

## 2022-10-13 PROCEDURE — G8400 PT W/DXA NO RESULTS DOC: HCPCS | Performed by: NUCLEAR MEDICINE

## 2022-10-13 PROCEDURE — 99213 OFFICE O/P EST LOW 20 MIN: CPT | Performed by: NUCLEAR MEDICINE

## 2022-12-01 ENCOUNTER — HOSPITAL ENCOUNTER (EMERGENCY)
Age: 82
Discharge: HOME | End: 2022-12-02
Attending: EMERGENCY MEDICINE
Payer: MEDICARE

## 2022-12-01 ENCOUNTER — APPOINTMENT (OUTPATIENT)
Dept: CT IMAGING | Age: 82
End: 2022-12-01
Payer: MEDICARE

## 2022-12-01 ENCOUNTER — APPOINTMENT (OUTPATIENT)
Dept: GENERAL RADIOLOGY | Age: 82
End: 2022-12-01
Payer: MEDICARE

## 2022-12-01 DIAGNOSIS — N30.00 ACUTE CYSTITIS WITHOUT HEMATURIA: Primary | ICD-10-CM

## 2022-12-01 DIAGNOSIS — E16.2 HYPOGLYCEMIA: ICD-10-CM

## 2022-12-01 LAB
ANION GAP SERPL CALCULATED.3IONS-SCNC: 13 MEQ/L (ref 8–16)
BACTERIA: ABNORMAL /HPF
BASOPHILS # BLD: 1.2 %
BASOPHILS ABSOLUTE: 0.1 THOU/MM3 (ref 0–0.1)
BILIRUBIN URINE: NEGATIVE
BLOOD, URINE: ABNORMAL
BUN BLDV-MCNC: 20 MG/DL (ref 7–22)
CALCIUM SERPL-MCNC: 9.2 MG/DL (ref 8.5–10.5)
CASTS 2: ABNORMAL /LPF
CASTS UA: ABNORMAL /LPF
CHARACTER, URINE: ABNORMAL
CHLORIDE BLD-SCNC: 100 MEQ/L (ref 98–111)
CO2: 23 MEQ/L (ref 23–33)
COLOR: YELLOW
CREAT SERPL-MCNC: 1 MG/DL (ref 0.4–1.2)
CRYSTALS, UA: ABNORMAL
EOSINOPHIL # BLD: 1.2 %
EOSINOPHILS ABSOLUTE: 0.1 THOU/MM3 (ref 0–0.4)
EPITHELIAL CELLS, UA: ABNORMAL /HPF
ERYTHROCYTE [DISTWIDTH] IN BLOOD BY AUTOMATED COUNT: 13.9 % (ref 11.5–14.5)
ERYTHROCYTE [DISTWIDTH] IN BLOOD BY AUTOMATED COUNT: 45.7 FL (ref 35–45)
GFR SERPL CREATININE-BSD FRML MDRD: 57 ML/MIN/1.73M2
GLUCOSE BLD-MCNC: 55 MG/DL (ref 70–108)
GLUCOSE BLD-MCNC: 68 MG/DL (ref 70–108)
GLUCOSE URINE: 250 MG/DL
HCT VFR BLD CALC: 40.8 % (ref 37–47)
HEMOGLOBIN: 13.1 GM/DL (ref 12–16)
IMMATURE GRANS (ABS): 0.04 THOU/MM3 (ref 0–0.07)
IMMATURE GRANULOCYTES: 0.4 %
INFLUENZA A: NOT DETECTED
INFLUENZA B: NOT DETECTED
KETONES, URINE: NEGATIVE
LEUKOCYTE ESTERASE, URINE: ABNORMAL
LYMPHOCYTES # BLD: 35.4 %
LYMPHOCYTES ABSOLUTE: 3.5 THOU/MM3 (ref 1–4.8)
MCH RBC QN AUTO: 29.1 PG (ref 26–33)
MCHC RBC AUTO-ENTMCNC: 32.1 GM/DL (ref 32.2–35.5)
MCV RBC AUTO: 90.7 FL (ref 81–99)
MISCELLANEOUS 2: ABNORMAL
MONOCYTES # BLD: 7.8 %
MONOCYTES ABSOLUTE: 0.8 THOU/MM3 (ref 0.4–1.3)
NITRITE, URINE: NEGATIVE
NUCLEATED RED BLOOD CELLS: 0 /100 WBC
OSMOLALITY CALCULATION: 272.9 MOSMOL/KG (ref 275–300)
PH UA: 5 (ref 5–9)
PLATELET # BLD: 205 THOU/MM3 (ref 130–400)
PMV BLD AUTO: 12.2 FL (ref 9.4–12.4)
POTASSIUM REFLEX MAGNESIUM: 3.9 MEQ/L (ref 3.5–5.2)
PROTEIN UA: 30
RBC # BLD: 4.5 MILL/MM3 (ref 4.2–5.4)
RBC URINE: ABNORMAL /HPF
RENAL EPITHELIAL, UA: ABNORMAL
SARS-COV-2 RNA, RT PCR: NOT DETECTED
SEG NEUTROPHILS: 54 %
SEGMENTED NEUTROPHILS ABSOLUTE COUNT: 5.3 THOU/MM3 (ref 1.8–7.7)
SODIUM BLD-SCNC: 136 MEQ/L (ref 135–145)
SPECIFIC GRAVITY, URINE: 1.02 (ref 1–1.03)
UROBILINOGEN, URINE: 1 EU/DL (ref 0–1)
WBC # BLD: 9.9 THOU/MM3 (ref 4.8–10.8)
WBC UA: > 200 /HPF
YEAST: ABNORMAL

## 2022-12-01 PROCEDURE — 84484 ASSAY OF TROPONIN QUANT: CPT

## 2022-12-01 PROCEDURE — 82948 REAGENT STRIP/BLOOD GLUCOSE: CPT

## 2022-12-01 PROCEDURE — 87086 URINE CULTURE/COLONY COUNT: CPT

## 2022-12-01 PROCEDURE — 36415 COLL VENOUS BLD VENIPUNCTURE: CPT

## 2022-12-01 PROCEDURE — 81001 URINALYSIS AUTO W/SCOPE: CPT

## 2022-12-01 PROCEDURE — 85025 COMPLETE CBC W/AUTO DIFF WBC: CPT

## 2022-12-01 PROCEDURE — 87186 SC STD MICRODIL/AGAR DIL: CPT

## 2022-12-01 PROCEDURE — 99285 EMERGENCY DEPT VISIT HI MDM: CPT

## 2022-12-01 PROCEDURE — 70450 CT HEAD/BRAIN W/O DYE: CPT

## 2022-12-01 PROCEDURE — 87636 SARSCOV2 & INF A&B AMP PRB: CPT

## 2022-12-01 PROCEDURE — 93005 ELECTROCARDIOGRAM TRACING: CPT | Performed by: STUDENT IN AN ORGANIZED HEALTH CARE EDUCATION/TRAINING PROGRAM

## 2022-12-01 PROCEDURE — 2580000003 HC RX 258: Performed by: STUDENT IN AN ORGANIZED HEALTH CARE EDUCATION/TRAINING PROGRAM

## 2022-12-01 PROCEDURE — 80048 BASIC METABOLIC PNL TOTAL CA: CPT

## 2022-12-01 PROCEDURE — 87077 CULTURE AEROBIC IDENTIFY: CPT

## 2022-12-01 PROCEDURE — 71045 X-RAY EXAM CHEST 1 VIEW: CPT

## 2022-12-01 RX ORDER — 0.9 % SODIUM CHLORIDE 0.9 %
1000 INTRAVENOUS SOLUTION INTRAVENOUS ONCE
Status: COMPLETED | OUTPATIENT
Start: 2022-12-01 | End: 2022-12-02

## 2022-12-01 RX ADMIN — SODIUM CHLORIDE 1000 ML: 9 INJECTION, SOLUTION INTRAVENOUS at 22:46

## 2022-12-01 RX ADMIN — DEXTROSE MONOHYDRATE 250 ML: 100 INJECTION, SOLUTION INTRAVENOUS at 23:17

## 2022-12-01 ASSESSMENT — PAIN - FUNCTIONAL ASSESSMENT
PAIN_FUNCTIONAL_ASSESSMENT: NONE - DENIES PAIN
PAIN_FUNCTIONAL_ASSESSMENT: NONE - DENIES PAIN

## 2022-12-02 VITALS
RESPIRATION RATE: 18 BRPM | HEART RATE: 65 BPM | BODY MASS INDEX: 28.71 KG/M2 | DIASTOLIC BLOOD PRESSURE: 57 MMHG | TEMPERATURE: 97.7 F | WEIGHT: 156 LBS | OXYGEN SATURATION: 98 % | HEIGHT: 62 IN | SYSTOLIC BLOOD PRESSURE: 115 MMHG

## 2022-12-02 LAB
EKG ATRIAL RATE: 63 BPM
EKG P AXIS: 66 DEGREES
EKG P-R INTERVAL: 166 MS
EKG Q-T INTERVAL: 494 MS
EKG QRS DURATION: 86 MS
EKG QTC CALCULATION (BAZETT): 505 MS
EKG R AXIS: -64 DEGREES
EKG T AXIS: 61 DEGREES
EKG VENTRICULAR RATE: 63 BPM
GLUCOSE BLD-MCNC: 120 MG/DL (ref 70–108)
TROPONIN T: < 0.01 NG/ML

## 2022-12-02 PROCEDURE — 82948 REAGENT STRIP/BLOOD GLUCOSE: CPT

## 2022-12-02 PROCEDURE — 93010 ELECTROCARDIOGRAM REPORT: CPT | Performed by: NUCLEAR MEDICINE

## 2022-12-02 PROCEDURE — 96365 THER/PROPH/DIAG IV INF INIT: CPT

## 2022-12-02 RX ORDER — NITROFURANTOIN 25; 75 MG/1; MG/1
100 CAPSULE ORAL 2 TIMES DAILY
Qty: 20 CAPSULE | Refills: 0 | Status: SHIPPED | OUTPATIENT
Start: 2022-12-02 | End: 2022-12-12

## 2022-12-02 ASSESSMENT — ENCOUNTER SYMPTOMS
VOMITING: 0
CHOKING: 0
WHEEZING: 0
COUGH: 0
ABDOMINAL PAIN: 0
EYE REDNESS: 0
SHORTNESS OF BREATH: 0
PHOTOPHOBIA: 0
DIARRHEA: 0
NAUSEA: 1
CHEST TIGHTNESS: 0
ABDOMINAL DISTENTION: 0

## 2022-12-02 ASSESSMENT — PAIN - FUNCTIONAL ASSESSMENT
PAIN_FUNCTIONAL_ASSESSMENT: NONE - DENIES PAIN

## 2022-12-02 NOTE — ED TRIAGE NOTES
Pt presents to the ED from home with complaints of weakness. Pt states she tried to go to the restroom, and her partner tried helping her up and when she stood up she got really hot and sweaty. Pt states they were all worried about her getting hot and sweaty that they wanted her to come and get checked out. Pt denies pain. Pt is alert and oriented x4 with respirations even and unlabored.

## 2022-12-02 NOTE — ED NOTES
Pt watching televidion, waiting for CT results, voices no complaints. IVFs remain patent.       Roxana Dixon RN  12/01/22 1123

## 2022-12-02 NOTE — ED NOTES
Dextrose infusion completed, blood glucose rechecked 120, pt reports feeling better. Pt watching televsion watching for CT scan results, voices no complaints.       Darron Daniel RN  12/02/22 1994

## 2022-12-02 NOTE — ED NOTES
Blood glucose 55, writer notified ED provided and orders received for dextrose. Pt c/o being tried, but alert and orient.  Anusha Colindres RN  12/02/22 0152

## 2022-12-02 NOTE — ED PROVIDER NOTES
I performed a history and physical examination of the patient and discussed management with the resident. I reviewed the residents note and agree with the documented findings and plan of care. Any areas of disagreement are noted on the chart. I was personally present for the key portions of any procedures. I have documented in the chart those procedures where I was not present during the key portions. I have reviewed the emergency nurses triage note. I agree with the chief complaint, past medical history, past surgical history, allergies, medications, social and family history as documented unless otherwise noted below. Documentation of the HPI, Physical Exam and Medical Decision Making performed by medical students or scribes is based on my personal performance of the HPI, PE and MDM. For Phys Assistant/ Nurse Practitioner cases/documentation I have personally evaluated this patient and have completed at least one if not all key elements of the E/M (history, physical exam, and MDM). My findings are as noted below     Patient presents today with complaints of weakness. She states that she has a hard time ambulating. Apparently she was with her  at the [facility] [address], she was getting very tired they attempted to ambulate her to the bathroom she could not ambulate there. Patient has a history of stroke on the left side. She usually has a lot of difficulty walking. Patient is on maximal medical therapy for this. Patient also complains of some minor abdominal pain. As she states she has not had any fevers chills sweats but she did have some nausea and vomiting. Patient is otherwise resting comfortably on the cot no apparent distress no other physical complaints at this time. EKG reveals normal sinus rhythm, left axis deviation, ventricular rate of 63 W[place] interval 166 QRS duration 86 QT interval 495 QTC of 505. CT HEAD WO CONTRAST   Final Result   1. No acute intracranial process.       This document has been electronically signed by: Sukumar Mae DO on    12/01/2022 11:32 PM      All CTs at this facility use dose modulation techniques and iterative    reconstructions, and/or weight-based dosing   when appropriate to reduce radiation to a low as reasonably achievable. XR CHEST PORTABLE   Final Result   Impression:   1. No acute cardiopulmonary process. This document has been electronically signed by: Sukumar Mae DO on    12/01/2022 10:31 PM        Labs Reviewed   BASIC METABOLIC PANEL W/ REFLEX TO MG FOR LOW K - Abnormal; Notable for the following components:       Result Value    Glucose 68 (*)     All other components within normal limits   CBC WITH AUTO DIFFERENTIAL - Abnormal; Notable for the following components:    MCHC 32.1 (*)     RDW-SD 45.7 (*)     All other components within normal limits   GLOMERULAR FILTRATION RATE, ESTIMATED - Abnormal; Notable for the following components:    Est, Glom Filt Rate 57 (*)     All other components within normal limits   URINE WITH REFLEXED MICRO - Abnormal; Notable for the following components:    Glucose, Ur 250 (*)     Blood, Urine SMALL (*)     Protein, UA 30 (*)     Leukocyte Esterase, Urine LARGE (*)     Character, Urine CLOUDY (*)     All other components within normal limits   OSMOLALITY - Abnormal; Notable for the following components:    Osmolality Calc 272.9 (*)     All other components within normal limits   POCT GLUCOSE - Abnormal; Notable for the following components:    POC Glucose 55 (*)     All other components within normal limits   POCT GLUCOSE - Abnormal; Notable for the following components:    POC Glucose 120 (*)     All other components within normal limits   COVID-19 & INFLUENZA COMBO   CULTURE, REFLEXED, URINE    Narrative:     Source: cath urine       Site: catheter -mini-cath          Current Antibiotics: not stated   TROPONIN   ANION GAP         Final diagnoses:   Acute cystitis without hematuria   Hypoglycemia   .   I have

## 2022-12-02 NOTE — ED PROVIDER NOTES
[facility] ENCOUNTER          Pt Name: Coni Tilley  MRN: [id number]  [place] 1940  Date of evaluation: 12/1/2022  Treating Resident Physician: Seleta Brittle, MD  Supervising Physician: Francisco Solis COMPLAINT       Chief Complaint   Patient presents with    Fatigue     History obtained from the patient. HISTORY OF PRESENT ILLNESS    HPI  Coni Tilley is a [de-identified] y.o. female who presents to the emergency department for evaluation of a gout. Patient states that over the past couple days, she has been more fatigued, today when she stood up, she had an episode of lightheadedness, sweatiness, shakiness, no change in vision, did have mild nausea without vomiting, symptoms resolved upon sitting. denies any symptoms at this time. Patient does have history of stroke, denies any weakness, numbness, tingling. The patient has no other acute complaints at this time. REVIEW OF SYSTEMS   Review of Systems   Constitutional:  Positive for diaphoresis and fatigue. Negative for chills and fever. HENT: Negative. Eyes:  Negative for photophobia, redness and visual disturbance. Respiratory:  Negative for cough, choking, chest tightness, shortness of breath and wheezing. Cardiovascular:  Negative for chest pain, palpitations and leg swelling. Gastrointestinal:  Positive for nausea. Negative for abdominal distention, abdominal pain, diarrhea and vomiting. Genitourinary:  Negative for dysuria, frequency and urgency. Musculoskeletal:  Negative for arthralgias, myalgias, neck pain and neck stiffness. Skin: Negative. Neurological:  Positive for light-headedness. Negative for tremors, seizures, syncope, speech difficulty, weakness, numbness and headaches.         PAST MEDICAL AND SURGICAL HISTORY     Past Medical History:   Diagnosis Date    MYRA (acute kidney injury) ([facility] [address] 12/6/2020    Anxiety 4/21/2020    Arthritis     Bladder spasm CAD (coronary artery disease) 3/12/2013    Cancer ([facility] [address]     breast    Hyperlipidemia     Hypertension     Hypothyroidism     Obstructive sleep apnea of adult 12/12/2013    SOB (shortness of breath)     Type II or unspecified type diabetes mellitus without mention of complication, not stated as uncontrolled      Past Surgical History:   Procedure Laterality Date    BREAST BIOPSY  1/17/2014    BREAST SURGERY  7/1974    left breast     CARPAL TUNNEL RELEASE Right 1/2013    CHOLECYSTECTOMY  7/1968    COLONOSCOPY  11/2007    FINGER TRIGGER RELEASE      HERNIA REPAIR  6-6-14    Dr. Saniya Almeida (CERVIX STATUS UNKNOWN)  11/1996    JOINT REPLACEMENT Left     KNEE ARTHROSCOPY      left    KNEE SURGERY Right 2017    LYMPH NODE DISSECTION  12/23/1998    MASTECTOMY  7/2001         MEDICATIONS   No current facility-administered medications for this encounter.     Current Outpatient Medications:     nitrofurantoin, macrocrystal-monohydrate, (MACROBID) 100 MG capsule, Take 1 capsule by mouth 2 times daily for 10 days, Disp: 20 capsule, Rfl: 0    levETIRAcetam (KEPPRA) 500 MG tablet, , Disp: , Rfl:     OZEMPIC, 0.25 OR 0.5 MG/DOSE, 2 MG/1.5ML SOPN, , Disp: , Rfl:     losartan (COZAAR) 25 MG tablet, Take 1 tablet by mouth daily, Disp: 90 tablet, Rfl: 3    Insulin Syringe-Needle U-100 ([employer] INSULIN SYRINGE) 31G X 5/16\" 0.5 ML MISC, 1 each by Does not apply route daily, Disp: 100 each, Rfl: 0    sertraline (ZOLOFT) 50 MG tablet, Take 50 mg by mouth daily Nightly, Disp: , Rfl:     aspirin 325 MG EC tablet, Take 1 tablet by mouth daily, Disp: , Rfl:     atorvastatin (LIPITOR) 80 MG tablet, Take 0.5 tablets by mouth nightly, Disp: , Rfl:     clopidogrel (PLAVIX) 75 MG tablet, Take 75 mg by mouth daily, Disp: , Rfl:     ferrous sulfate 325 (65 Fe) MG tablet, Take 325 mg by mouth daily (with breakfast), Disp: , Rfl:     Insulin Glargine (TOUJEO MAX SOLOSTAR SC), Inject into the skin, Disp: , Rfl:     Insulin Regular Human (Mermallorie Dee R IJ), Inject as directed, Disp: , Rfl:     levothyroxine (SYNTHROID) 112 MCG tablet, Take 112 mcg by mouth daily, Disp: , Rfl:     Cyanocobalamin (VITAMIN B 12 PO), Place  under the tongue daily. , Disp: , Rfl:     Insulin Syringe-Needle U-100 (RELI-ON INSULIN SYRINGE), by Does not apply route. 31 G/8MM MIS (SHORT), Disp: , Rfl:     metoprolol (TOPROL-XL) 25 MG XL tablet, TAKE ONE TABLET BY MOUTH EVERY DAY, Disp: 30 tablet, Rfl: 2    amLODIPine (NORVASC) 5 MG tablet, Take 10 mg by mouth daily , Disp: , Rfl:       SOCIAL HISTORY     Social History     Social History Narrative    Not on file     Social History     Tobacco Use    Smoking status: Passive Smoke Exposure - Never Smoker    Smokeless tobacco: Never    Tobacco comments:     worked in a bar for years   Vaping Use    Vaping Use: Never used   Substance Use Topics    Alcohol use: Yes     Comment: seldom    Drug use: No         ALLERGIES     Allergies   Allergen Reactions    Myrbetriq [Mirabegron]      Made patient Blood sugar go up to 300-400         FAMILY HISTORY     Family History   Problem Relation Age of Onset    Cirrhosis Mother     Heart Disease Father     Diabetes Paternal Uncle     Diabetes Paternal Grandmother          PREVIOUS RECORDS   Previous records reviewed: Medical, past surgical, medications, allergies        PHYSICAL EXAM     ED Triage Vitals [12/01/22 2121]   BP Temp Temp Source Heart Rate Resp SpO2 Height Weight   (!) 114/55 97.7 °F (36.5 °C) Oral 62 16 99 % 5' 2\" (1.575 m) 156 lb (70.8 kg)     Initial vital signs and nursing assessment reviewed and normal. Body mass index is 28.53 kg/m². Pulsoximetry is normal per my interpretation. Additional Vital Signs:  Vitals:    12/02/22 0121   BP: (!) 115/57   Pulse: 65   Resp: 18   Temp:    SpO2: 98%       Physical Exam  Constitutional:       Appearance: Normal appearance. She is not ill-appearing. HENT:      Head: Normocephalic and atraumatic.       Right Ear: External ear normal.      Left Ear: External ear normal.      Nose: Nose normal.      Mouth/Throat:      Mouth: Mucous membranes are moist.      Pharynx: Oropharynx is clear. Eyes:      Extraocular Movements: Extraocular movements intact. Conjunctiva/sclera: Conjunctivae normal.      Pupils: Pupils are equal, round, and reactive to light. Cardiovascular:      Rate and Rhythm: Normal rate and regular rhythm. Pulses: Normal pulses. Heart sounds: Normal heart sounds. Pulmonary:      Effort: Pulmonary effort is normal. No respiratory distress. Breath sounds: Normal breath sounds. No wheezing or rales. Chest:      Chest wall: No tenderness. Abdominal:      General: Abdomen is flat. There is no distension. Palpations: Abdomen is soft. Tenderness: There is no abdominal tenderness. There is no guarding or rebound. Musculoskeletal:         General: No tenderness. Right lower leg: No edema. Left lower leg: No edema. Skin:     General: Skin is warm and dry. Capillary Refill: Capillary refill takes less than 2 seconds. Coloration: Skin is pale. Neurological:      General: No focal deficit present. Mental Status: She is alert and oriented to person, place, and time. Sensory: No sensory deficit. Motor: No weakness. Coordination: Coordination normal.      Gait: Gait normal.            MEDICAL DECISION MAKING   Initial Assessment:   Is an 82-year-old female, history of diabetes, CAD, presenting with presyncopal symptoms with onset of standing, resolved prior to arrival.  Physical exam significant for well-appearing female in no acute distress, lungs clear to auscultation, moist mucous membrane. Plan:   CBC, BMP, POCT glucose, UA, CT head, chest x-ray  Significant for hypoglycemia and UTI. Patient given D10 bolus, glucose repeated or after bolus completed, within normal limits. Discharged home with strict return precautions, follow-up, antibiotics for UTI. Denis Kang         ED RESULTS Laboratory results:  Labs Reviewed   BASIC METABOLIC PANEL W/ REFLEX TO MG FOR LOW K - Abnormal; Notable for the following components:       Result Value    Glucose 68 (*)     All other components within normal limits   CBC WITH AUTO DIFFERENTIAL - Abnormal; Notable for the following components:    MCHC 32.1 (*)     RDW-SD 45.7 (*)     All other components within normal limits   GLOMERULAR FILTRATION RATE, ESTIMATED - Abnormal; Notable for the following components:    Est, Glom Filt Rate 57 (*)     All other components within normal limits   URINE WITH REFLEXED MICRO - Abnormal; Notable for the following components:    Glucose, Ur 250 (*)     Blood, Urine SMALL (*)     Protein, UA 30 (*)     Leukocyte Esterase, Urine LARGE (*)     Character, Urine CLOUDY (*)     All other components within normal limits   OSMOLALITY - Abnormal; Notable for the following components:    Osmolality Calc 272.9 (*)     All other components within normal limits   POCT GLUCOSE - Abnormal; Notable for the following components:    POC Glucose 55 (*)     All other components within normal limits   POCT GLUCOSE - Abnormal; Notable for the following components:    POC Glucose 120 (*)     All other components within normal limits   COVID-19 & INFLUENZA COMBO   CULTURE, REFLEXED, URINE    Narrative:     Source: cath urine       Site: catheter -mini-cath          Current Antibiotics: not stated   TROPONIN   ANION GAP       Radiologic studies results:  CT HEAD WO CONTRAST   Final Result   1. No acute intracranial process. This document has been electronically signed by: Timothy Núñez. DO [place] on    12/01/2022 11:32 PM      All CTs at this facility use dose modulation techniques and iterative    reconstructions, and/or weight-based dosing   when appropriate to reduce radiation to a low as reasonably achievable. XR CHEST PORTABLE   Final Result   Impression:   1. No acute cardiopulmonary process.       This document has been electronically signed by: Yohan Lora. DO [place] on    12/01/2022 10:31 PM          ED Medications administered this visit:   Medications   0.9 % sodium chloride bolus (0 mLs IntraVENous Stopped 12/2/22 0100)   dextrose bolus (PED-CHAVA) 10% 250 mL (0 mLs IntraVENous Stopped 12/2/22 0004)         ED COURSE     ED Course as of 12/02/22 0718   Thu Dec 01, 2022   2325 Specific Gravity, Urine: 1.017 [EM]      ED Course User Index  [EM] Ashley Martínez MD        Strict return precautions and follow up instructions were discussed with the patient prior to discharge, with which the patient agrees. MEDICATION CHANGES     Discharge Medication List as of 12/2/2022  1:14 AM        START taking these medications    Details   nitrofurantoin, macrocrystal-monohydrate, (MACROBID) 100 MG capsule Take 1 capsule by mouth 2 times daily for 10 days, Disp-20 capsule, R-0Normal               FINAL DISPOSITION     Final diagnoses:   Acute cystitis without hematuria   Hypoglycemia     Condition: condition: good  Dispo: Discharge to home      This transcription was electronically signed. Parts of this transcriptions may have been dictated by use of voice recognition software and electronically transcribed, and parts may have been transcribed with the assistance of an ED scribe. The transcription may contain errors not detected in proofreading. Please refer to my supervising physician's documentation if my documentation differs.     Electronically Signed: Ashley Martínez MD, 12/02/22, 7:18 AM          Ashley Martínez MD  Resident  12/02/22 9304

## 2022-12-02 NOTE — DISCHARGE INSTRUCTIONS
In for an episode of presyncope. You were found to have low blood sugar and a urinary tract infection. Please take antibiotics as prescribed. Please ensure that you are checking your blood glucose frequently, do not take insulin if you are not eating, if you feel flushed or sweaty, please check your glucose immediately, if low, please eat snacks. Return to ED if any worsening of condition.

## 2022-12-03 LAB
ORGANISM: ABNORMAL
URINE CULTURE REFLEX: ABNORMAL

## 2022-12-04 NOTE — PROGRESS NOTES
Pharmacy Note  ED Culture Follow-up    Elma Nixon is a [de-identified] y.o. female. Allergies: Myrbetriq [mirabegron]     Current antimicrobials:   Reviewed patient's urine culture - culture positive for E. coli. Patient was discharged on nitrofurantoin 100 mg BID x 10 days, and culture is sensitive to prescribed medication. Antibiotic prescribed at discharge is appropriate - no changes made to antibiotic regimen. Please call with any questions.  Dorita James [facility], PharmD 2:30 PM 12/4/2022

## 2023-02-24 ENCOUNTER — APPOINTMENT (OUTPATIENT)
Dept: GENERAL RADIOLOGY | Age: 83
DRG: 683 | End: 2023-02-24
Payer: MEDICARE

## 2023-02-24 ENCOUNTER — HOSPITAL ENCOUNTER (INPATIENT)
Age: 83
LOS: 4 days | Discharge: HOME | DRG: 683 | End: 2023-02-28
Attending: EMERGENCY MEDICINE | Admitting: INTERNAL MEDICINE
Payer: MEDICARE

## 2023-02-24 DIAGNOSIS — N17.9 AKI (ACUTE KIDNEY INJURY) (HCC): Primary | ICD-10-CM

## 2023-02-24 PROBLEM — R53.1 GENERALIZED WEAKNESS: Status: ACTIVE | Noted: 2023-02-24

## 2023-02-24 LAB
25(OH)D3 SERPL-MCNC: 24 NG/ML (ref 30–100)
ALBUMIN SERPL BCG-MCNC: 4 G/DL (ref 3.5–5.1)
ALP SERPL-CCNC: 155 U/L (ref 38–126)
ALT SERPL W/O P-5'-P-CCNC: 40 U/L (ref 11–66)
ANION GAP SERPL CALC-SCNC: 17 MEQ/L (ref 8–16)
AST SERPL-CCNC: 32 U/L (ref 5–40)
B-OH-BUTYR SERPL-MSCNC: 6.73 MG/DL (ref 0.2–2.81)
BACTERIA URNS QL MICRO: ABNORMAL /HPF
BASE EXCESS BLDA CALC-SCNC: -1.9 MMOL/L (ref -2–3)
BASOPHILS ABSOLUTE: 0.1 THOU/MM3 (ref 0–0.1)
BASOPHILS NFR BLD AUTO: 0.7 %
BILIRUB SERPL-MCNC: 0.6 MG/DL (ref 0.3–1.2)
BILIRUB UR QL STRIP.AUTO: NEGATIVE
BUN SERPL-MCNC: 74 MG/DL (ref 7–22)
CALCIUM SERPL-MCNC: 10 MG/DL (ref 8.5–10.5)
CASTS #/AREA URNS LPF: ABNORMAL /LPF
CASTS 2: ABNORMAL /LPF
CHARACTER UR: CLEAR
CHLORIDE SERPL-SCNC: 90 MEQ/L (ref 98–111)
CO2 SERPL-SCNC: 22 MEQ/L (ref 23–33)
COLLECTED BY:: ABNORMAL
COLOR: YELLOW
CREAT SERPL-MCNC: 1.6 MG/DL (ref 0.4–1.2)
CRYSTALS URNS MICRO: ABNORMAL
DEPRECATED RDW RBC AUTO: 41.7 FL (ref 35–45)
EOSINOPHIL NFR BLD AUTO: 0.6 %
EOSINOPHILS ABSOLUTE: 0.1 THOU/MM3 (ref 0–0.4)
EPITHELIAL CELLS, UA: ABNORMAL /HPF
ERYTHROCYTE [DISTWIDTH] IN BLOOD BY AUTOMATED COUNT: 13.1 % (ref 11.5–14.5)
FLUAV RNA RESP QL NAA+PROBE: NOT DETECTED
FLUBV RNA RESP QL NAA+PROBE: NOT DETECTED
GFR SERPL CREATININE-BSD FRML MDRD: 32 ML/MIN/1.73M2
GLUCOSE BLD STRIP.AUTO-MCNC: 97 MG/DL (ref 70–108)
GLUCOSE SERPL-MCNC: 381 MG/DL (ref 70–108)
GLUCOSE UR QL STRIP.AUTO: 250 MG/DL
HCO3 BLDA-SCNC: 23 MMOL/L (ref 23–28)
HCT VFR BLD AUTO: 39.5 % (ref 37–47)
HEMOCCULT STL QL: NEGATIVE
HGB BLD-MCNC: 12.9 GM/DL (ref 12–16)
HGB UR QL STRIP.AUTO: ABNORMAL
IMM GRANULOCYTES # BLD AUTO: 0.16 THOU/MM3 (ref 0–0.07)
IMM GRANULOCYTES NFR BLD AUTO: 1.5 %
KETONES UR QL STRIP.AUTO: NEGATIVE
LIPASE SERPL-CCNC: 19 U/L (ref 5.6–51.3)
LYMPHOCYTES ABSOLUTE: 2.6 THOU/MM3 (ref 1–4.8)
LYMPHOCYTES NFR BLD AUTO: 23.7 %
MAGNESIUM SERPL-MCNC: 2.4 MG/DL (ref 1.6–2.4)
MCH RBC QN AUTO: 28.6 PG (ref 26–33)
MCHC RBC AUTO-ENTMCNC: 32.7 GM/DL (ref 32.2–35.5)
MCV RBC AUTO: 87.6 FL (ref 81–99)
MISCELLANEOUS 2: ABNORMAL
MONOCYTES ABSOLUTE: 0.2 THOU/MM3 (ref 0.4–1.3)
MONOCYTES NFR BLD AUTO: 2 %
NEUTROPHILS NFR BLD AUTO: 71.5 %
NITRITE UR QL STRIP: NEGATIVE
NRBC BLD AUTO-RTO: 0 /100 WBC
OSMOLALITY SERPL CALC.SUM OF ELEC: 296.5 MOSMOL/KG (ref 275–300)
PCO2 TEMP ADJ BLDMV: 40 MMHG (ref 41–51)
PH BLDMV: 7.37 [PH] (ref 7.31–7.41)
PH UR STRIP.AUTO: 5 [PH] (ref 5–9)
PHOSPHATE SERPL-MCNC: 3 MG/DL (ref 2.4–4.7)
PLATELET # BLD AUTO: 309 THOU/MM3 (ref 130–400)
PMV BLD AUTO: 12.7 FL (ref 9.4–12.4)
PO2 BLDMV: 29 MMHG (ref 25–40)
POTASSIUM SERPL-SCNC: 3.7 MEQ/L (ref 3.5–5.2)
PROT SERPL-MCNC: 8.6 G/DL (ref 6.1–8)
PROT UR STRIP.AUTO-MCNC: NEGATIVE MG/DL
RBC # BLD AUTO: 4.51 MILL/MM3 (ref 4.2–5.4)
RBC URINE: ABNORMAL /HPF
RENAL EPI CELLS #/AREA URNS HPF: ABNORMAL /[HPF]
SAO2 % BLDMV: 53 %
SARS-COV-2 RNA RESP QL NAA+PROBE: NOT DETECTED
SEGMENTED NEUTROPHILS ABSOLUTE COUNT: 7.7 THOU/MM3 (ref 1.8–7.7)
SODIUM SERPL-SCNC: 129 MEQ/L (ref 135–145)
SP GR UR REFRACT.AUTO: 1.01 (ref 1–1.03)
T4 FREE SERPL-MCNC: 0.99 NG/DL (ref 0.93–1.76)
TROPONIN T: < 0.01 NG/ML
TSH SERPL DL<=0.005 MIU/L-ACNC: 18.47 UIU/ML (ref 0.4–4.2)
UROBILINOGEN, URINE: 0.2 EU/DL (ref 0–1)
WBC # BLD AUTO: 10.8 THOU/MM3 (ref 4.8–10.8)
WBC #/AREA URNS HPF: ABNORMAL /HPF
WBC #/AREA URNS HPF: ABNORMAL /[HPF]
YEAST LIKE FUNGI URNS QL MICRO: ABNORMAL

## 2023-02-24 PROCEDURE — 87086 URINE CULTURE/COLONY COUNT: CPT

## 2023-02-24 PROCEDURE — 83036 HEMOGLOBIN GLYCOSYLATED A1C: CPT

## 2023-02-24 PROCEDURE — 74018 RADEX ABDOMEN 1 VIEW: CPT

## 2023-02-24 PROCEDURE — 81001 URINALYSIS AUTO W/SCOPE: CPT

## 2023-02-24 PROCEDURE — 99285 EMERGENCY DEPT VISIT HI MDM: CPT

## 2023-02-24 PROCEDURE — 99222 1ST HOSP IP/OBS MODERATE 55: CPT | Performed by: INTERNAL MEDICINE

## 2023-02-24 PROCEDURE — 82803 BLOOD GASES ANY COMBINATION: CPT

## 2023-02-24 PROCEDURE — 93010 ELECTROCARDIOGRAM REPORT: CPT | Performed by: NUCLEAR MEDICINE

## 2023-02-24 PROCEDURE — 82306 VITAMIN D 25 HYDROXY: CPT

## 2023-02-24 PROCEDURE — 83690 ASSAY OF LIPASE: CPT

## 2023-02-24 PROCEDURE — 82948 REAGENT STRIP/BLOOD GLUCOSE: CPT

## 2023-02-24 PROCEDURE — 93005 ELECTROCARDIOGRAM TRACING: CPT | Performed by: EMERGENCY MEDICINE

## 2023-02-24 PROCEDURE — 82272 OCCULT BLD FECES 1-3 TESTS: CPT

## 2023-02-24 PROCEDURE — 6370000000 HC RX 637 (ALT 250 FOR IP): Performed by: STUDENT IN AN ORGANIZED HEALTH CARE EDUCATION/TRAINING PROGRAM

## 2023-02-24 PROCEDURE — 71045 X-RAY EXAM CHEST 1 VIEW: CPT

## 2023-02-24 PROCEDURE — 87636 SARSCOV2 & INF A&B AMP PRB: CPT

## 2023-02-24 PROCEDURE — 85025 COMPLETE CBC W/AUTO DIFF WBC: CPT

## 2023-02-24 PROCEDURE — 84439 ASSAY OF FREE THYROXINE: CPT

## 2023-02-24 PROCEDURE — 36415 COLL VENOUS BLD VENIPUNCTURE: CPT

## 2023-02-24 PROCEDURE — 84443 ASSAY THYROID STIM HORMONE: CPT

## 2023-02-24 PROCEDURE — 82010 KETONE BODYS QUAN: CPT

## 2023-02-24 PROCEDURE — 84484 ASSAY OF TROPONIN QUANT: CPT

## 2023-02-24 PROCEDURE — 83735 ASSAY OF MAGNESIUM: CPT

## 2023-02-24 PROCEDURE — 2580000003 HC RX 258: Performed by: EMERGENCY MEDICINE

## 2023-02-24 PROCEDURE — 84100 ASSAY OF PHOSPHORUS: CPT

## 2023-02-24 PROCEDURE — 2580000003 HC RX 258: Performed by: STUDENT IN AN ORGANIZED HEALTH CARE EDUCATION/TRAINING PROGRAM

## 2023-02-24 PROCEDURE — 80053 COMPREHEN METABOLIC PANEL: CPT

## 2023-02-24 PROCEDURE — 2140000000 HC CCU INTERMEDIATE R&B

## 2023-02-24 RX ORDER — METOPROLOL SUCCINATE 25 MG/1
25 TABLET, EXTENDED RELEASE ORAL DAILY
Status: DISCONTINUED | OUTPATIENT
Start: 2023-02-25 | End: 2023-02-28 | Stop reason: HOSPADM

## 2023-02-24 RX ORDER — POLYETHYLENE GLYCOL 3350 17 G/17G
17 POWDER, FOR SOLUTION ORAL 2 TIMES DAILY
Status: DISCONTINUED | OUTPATIENT
Start: 2023-02-24 | End: 2023-02-28 | Stop reason: HOSPADM

## 2023-02-24 RX ORDER — SODIUM PHOSPHATE, DIBASIC AND SODIUM PHOSPHATE, MONOBASIC 7; 19 G/133ML; G/133ML
1 ENEMA RECTAL DAILY PRN
Status: DISCONTINUED | OUTPATIENT
Start: 2023-02-24 | End: 2023-02-28 | Stop reason: HOSPADM

## 2023-02-24 RX ORDER — ATORVASTATIN CALCIUM 40 MG/1
40 TABLET, FILM COATED ORAL NIGHTLY
Status: DISCONTINUED | OUTPATIENT
Start: 2023-02-24 | End: 2023-02-28 | Stop reason: HOSPADM

## 2023-02-24 RX ORDER — ONDANSETRON 4 MG/1
4 TABLET, ORALLY DISINTEGRATING ORAL EVERY 8 HOURS PRN
Status: DISCONTINUED | OUTPATIENT
Start: 2023-02-24 | End: 2023-02-28 | Stop reason: HOSPADM

## 2023-02-24 RX ORDER — LEVETIRACETAM 500 MG/1
500 TABLET ORAL DAILY
Status: DISCONTINUED | OUTPATIENT
Start: 2023-02-24 | End: 2023-02-25

## 2023-02-24 RX ORDER — ONDANSETRON 2 MG/ML
4 INJECTION INTRAMUSCULAR; INTRAVENOUS EVERY 6 HOURS PRN
Status: DISCONTINUED | OUTPATIENT
Start: 2023-02-24 | End: 2023-02-28 | Stop reason: HOSPADM

## 2023-02-24 RX ORDER — AMLODIPINE BESYLATE 10 MG/1
10 TABLET ORAL DAILY
Status: DISCONTINUED | OUTPATIENT
Start: 2023-02-25 | End: 2023-02-28 | Stop reason: HOSPADM

## 2023-02-24 RX ORDER — LEVOTHYROXINE SODIUM 112 UG/1
112 TABLET ORAL DAILY
Status: DISCONTINUED | OUTPATIENT
Start: 2023-02-25 | End: 2023-02-28 | Stop reason: HOSPADM

## 2023-02-24 RX ORDER — INSULIN LISPRO 100 [IU]/ML
0-16 INJECTION, SOLUTION INTRAVENOUS; SUBCUTANEOUS
Status: DISCONTINUED | OUTPATIENT
Start: 2023-02-25 | End: 2023-02-28 | Stop reason: HOSPADM

## 2023-02-24 RX ORDER — SODIUM CHLORIDE 0.9 % (FLUSH) 0.9 %
5-40 SYRINGE (ML) INJECTION EVERY 12 HOURS SCHEDULED
Status: DISCONTINUED | OUTPATIENT
Start: 2023-02-24 | End: 2023-02-28 | Stop reason: HOSPADM

## 2023-02-24 RX ORDER — ACETAMINOPHEN 325 MG/1
650 TABLET ORAL EVERY 6 HOURS PRN
Status: DISCONTINUED | OUTPATIENT
Start: 2023-02-24 | End: 2023-02-28 | Stop reason: HOSPADM

## 2023-02-24 RX ORDER — CLOPIDOGREL BISULFATE 75 MG/1
75 TABLET ORAL DAILY
Status: DISCONTINUED | OUTPATIENT
Start: 2023-02-25 | End: 2023-02-28 | Stop reason: HOSPADM

## 2023-02-24 RX ORDER — 0.9 % SODIUM CHLORIDE 0.9 %
1000 INTRAVENOUS SOLUTION INTRAVENOUS ONCE
Status: COMPLETED | OUTPATIENT
Start: 2023-02-24 | End: 2023-02-24

## 2023-02-24 RX ORDER — SODIUM CHLORIDE 0.9 % (FLUSH) 0.9 %
5-40 SYRINGE (ML) INJECTION PRN
Status: DISCONTINUED | OUTPATIENT
Start: 2023-02-24 | End: 2023-02-28 | Stop reason: HOSPADM

## 2023-02-24 RX ORDER — DEXTROSE MONOHYDRATE 100 MG/ML
INJECTION, SOLUTION INTRAVENOUS CONTINUOUS PRN
Status: DISCONTINUED | OUTPATIENT
Start: 2023-02-24 | End: 2023-02-28 | Stop reason: HOSPADM

## 2023-02-24 RX ORDER — ACETAMINOPHEN 650 MG/1
650 SUPPOSITORY RECTAL EVERY 6 HOURS PRN
Status: DISCONTINUED | OUTPATIENT
Start: 2023-02-24 | End: 2023-02-28 | Stop reason: HOSPADM

## 2023-02-24 RX ORDER — ENOXAPARIN SODIUM 100 MG/ML
30 INJECTION SUBCUTANEOUS DAILY
Status: DISCONTINUED | OUTPATIENT
Start: 2023-02-25 | End: 2023-02-26

## 2023-02-24 RX ORDER — SODIUM CHLORIDE 9 MG/ML
INJECTION, SOLUTION INTRAVENOUS PRN
Status: DISCONTINUED | OUTPATIENT
Start: 2023-02-24 | End: 2023-02-28 | Stop reason: HOSPADM

## 2023-02-24 RX ORDER — INSULIN LISPRO 100 [IU]/ML
0-4 INJECTION, SOLUTION INTRAVENOUS; SUBCUTANEOUS NIGHTLY
Status: DISCONTINUED | OUTPATIENT
Start: 2023-02-24 | End: 2023-02-28 | Stop reason: HOSPADM

## 2023-02-24 RX ORDER — SENNOSIDES 8.8 MG/5ML
5 LIQUID ORAL 2 TIMES DAILY PRN
Status: DISCONTINUED | OUTPATIENT
Start: 2023-02-24 | End: 2023-02-28 | Stop reason: HOSPADM

## 2023-02-24 RX ORDER — FERROUS SULFATE 325(65) MG
325 TABLET ORAL EVERY OTHER DAY
Status: DISCONTINUED | OUTPATIENT
Start: 2023-02-25 | End: 2023-02-28 | Stop reason: HOSPADM

## 2023-02-24 RX ORDER — LOSARTAN POTASSIUM 25 MG/1
25 TABLET ORAL DAILY
Status: DISCONTINUED | OUTPATIENT
Start: 2023-02-25 | End: 2023-02-28 | Stop reason: HOSPADM

## 2023-02-24 RX ORDER — INSULIN GLARGINE 100 [IU]/ML
30 INJECTION, SOLUTION SUBCUTANEOUS NIGHTLY
Status: DISCONTINUED | OUTPATIENT
Start: 2023-02-24 | End: 2023-02-28 | Stop reason: HOSPADM

## 2023-02-24 RX ORDER — VIBEGRON 75 MG/1
75 TABLET, FILM COATED ORAL DAILY
COMMUNITY

## 2023-02-24 RX ORDER — AMOXICILLIN 875 MG/1
875 TABLET, COATED ORAL 2 TIMES DAILY
COMMUNITY
Start: 2023-02-21

## 2023-02-24 RX ADMIN — SODIUM CHLORIDE, PRESERVATIVE FREE 10 ML: 5 INJECTION INTRAVENOUS at 21:37

## 2023-02-24 RX ADMIN — SERTRALINE 50 MG: 50 TABLET, FILM COATED ORAL at 21:36

## 2023-02-24 RX ADMIN — SODIUM CHLORIDE 1000 ML: 9 INJECTION, SOLUTION INTRAVENOUS at 15:48

## 2023-02-24 RX ADMIN — POLYETHYLENE GLYCOL 3350 17 G: 17 POWDER, FOR SOLUTION ORAL at 21:36

## 2023-02-24 RX ADMIN — ATORVASTATIN CALCIUM 40 MG: 40 TABLET, FILM COATED ORAL at 21:36

## 2023-02-24 ASSESSMENT — PAIN - FUNCTIONAL ASSESSMENT
PAIN_FUNCTIONAL_ASSESSMENT: 0-10
PAIN_FUNCTIONAL_ASSESSMENT: NONE - DENIES PAIN

## 2023-02-24 ASSESSMENT — PAIN SCALES - GENERAL: PAINLEVEL_OUTOF10: 0

## 2023-02-24 NOTE — H&P
Hospitalist - History & Physical      Patient: Dipti Banegas    Unit/Bed:18/018A  YOB: 1940  MRN: [id number]   Acct: [de-identified]   PCP: Thomas Garduno DO    Date of Service: Pt seen/examined on 02/24/23  and Admitted to Inpatient with expected LOS greater than two midnights due to medical therapy. Chief Complaint: Generalized weakness    Assessment and Plan:-  MYRA -likely prerenal, Cr 1.6, BUN 74. Baseline creatinine 0.8-1. endorsed poor p.o. intake and vomiting, dysphagia to solids and liquids. Received 1L NS bolus in ED. Last echo shows HFpEF with moderate AS. Patient was administered fluids. Urine studies sent. Bladder scan and straight cath as needed. Generalized weakness -ordered TSH, general work-up. PT OT. Suspect she may need barium swallow. Ordered KUB. Pseudohyponatremia -noted. Mildly elevated AG-AG 17, glucose 381, beta hydroxybutyrate 6.73. Gases show pH 7.37 with PCO2 40, PO2 29, HCO3 23. Trending labs. DM 2 -restarted Lantus per previous dosages, ISS and hypoglycemia treatment orders. PORFIRIO on CPAP    Chronic medical conditions reviewed, appropriate medications restarted. History Of Present Illness: This is an 81-year-old female who was admitted to [facility] after presenting to the ED with generalized weakness and inability to tolerate p.o. intake both solids and liquids for about a week. PMH including HLD, HTN, PORFIRIO on CPAP. Patient has been hardly able to tolerate any p.o. intake with both solids and liquids and ended up vomiting most of her meals for the last 2 days. Denies chest pain, fever, congestion, dysuria. She did endorse cough for the last day or 2. COVID-negative.       Past Medical History:        Diagnosis Date    MYRA (acute kidney injury) ([facility] [address] 12/6/2020    Anxiety 4/21/2020    Arthritis     Bladder spasm     CAD (coronary artery disease) 3/12/2013    [facility]     breast    Hyperlipidemia     Hypertension     Hypothyroidism     Obstructive sleep apnea of adult 12/12/2013    SOB (shortness of breath)     Type II or unspecified type diabetes mellitus without mention of complication, not stated as uncontrolled        Past Surgical History:        Procedure Laterality Date    BREAST BIOPSY  1/17/2014    BREAST SURGERY  7/1974    left breast     CARPAL TUNNEL RELEASE Right 1/2013    CHOLECYSTECTOMY  7/1968    COLONOSCOPY  11/2007    FINGER TRIGGER RELEASE      HERNIA REPAIR  6-6-14    Dr. Naldo Saravia (CERVIX STATUS UNKNOWN)  11/1996    JOINT REPLACEMENT Left     KNEE ARTHROSCOPY      left    KNEE SURGERY Right 2017    LYMPH NODE DISSECTION  12/23/1998    MASTECTOMY  7/2001       Home Medications:   No current facility-administered medications on file prior to encounter. Current Outpatient Medications on File Prior to Encounter   Medication Sig Dispense Refill    amoxicillin (AMOXIL) 875 MG tablet Take 875 mg by mouth 2 times daily Indications: Urinary Tract Infection      levETIRAcetam (KEPPRA) 500 MG tablet       OZEMPIC, 0.25 OR 0.5 MG/DOSE, 2 MG/1.5ML SOPN       losartan (COZAAR) 25 MG tablet Take 1 tablet by mouth daily 90 tablet 3    Insulin Syringe-Needle U-100 ([employer] INSULIN SYRINGE) 31G X 5/16\" 0.5 ML MISC 1 each by Does not apply route daily 100 each 0    sertraline (ZOLOFT) 50 MG tablet Take 50 mg by mouth daily Nightly      aspirin 325 MG EC tablet Take 1 tablet by mouth daily      atorvastatin (LIPITOR) 80 MG tablet Take 0.5 tablets by mouth nightly      clopidogrel (PLAVIX) 75 MG tablet Take 75 mg by mouth daily      ferrous sulfate 325 (65 Fe) MG tablet Take 325 mg by mouth daily (with breakfast)      Insulin Glargine (TOUJEO MAX SOLOSTAR SC) Inject into the skin      Insulin Regular Human (NOVOLIN R IJ) Inject as directed      levothyroxine (SYNTHROID) 112 MCG tablet Take 112 mcg by mouth daily      Cyanocobalamin (VITAMIN B 12 PO) Place  under the tongue daily.       Insulin Syringe-Needle U-100 (RELI-ON INSULIN SYRINGE) by Does not apply route. 31 G/8MM MIS (SHORT)      metoprolol (TOPROL-XL) 25 MG XL tablet TAKE ONE TABLET BY MOUTH EVERY DAY 30 tablet 2    amLODIPine (NORVASC) 5 MG tablet Take 10 mg by mouth daily          Allergies:    Myrbetriq [mirabegron]    Social History:    reports that she is a non-smoker but has been exposed to tobacco smoke. She has never used smokeless tobacco. She reports current alcohol use. She reports that she does not use drugs. Family History:       Problem Relation Age of Onset    Cirrhosis Mother     Heart Disease Father     Diabetes Paternal Uncle     Diabetes Paternal Grandmother        Diet:  No diet orders on file    Review of systems:   Pertinent positives as noted in the HPI. All other systems reviewed and negative. PHYSICAL EXAM:  BP (!) 144/88   Pulse 68   Temp 97.4 °F (36.3 °C) (Oral)   Resp 14   Ht 5' 2\" (1.575 m)   Wt 160 lb (72.6 kg)   SpO2 97%   BMI 29.26 kg/m²   Physical Exam  Vitals and nursing note reviewed. Constitutional:       Appearance: Normal appearance. HENT:      Head: Normocephalic and atraumatic. Right Ear: External ear normal.      Left Ear: External ear normal.      Nose: Nose normal.   Eyes:      Extraocular Movements: Extraocular movements intact. Cardiovascular:      Rate and Rhythm: Normal rate and regular rhythm. Pulses: Normal pulses. Heart sounds: Normal heart sounds. Pulmonary:      Effort: Pulmonary effort is normal.      Breath sounds: Normal breath sounds. Abdominal:      General: Abdomen is flat. Palpations: Abdomen is soft. Musculoskeletal:         General: No deformity. Normal range of motion. Cervical back: Normal range of motion and neck supple. Skin:     General: Skin is warm and dry. Capillary Refill: Capillary refill takes less than 2 seconds. Neurological:      General: No focal deficit present. Mental Status: She is alert. Mental status is at baseline.    Psychiatric:         Mood and Affect: Mood normal.         Behavior: Behavior normal.         Labs:   Recent Labs     02/24/23  1525   WBC 10.8   HGB 12.9   HCT 39.5        Recent Labs     02/24/23  1525   *   K 3.7   CL 90*   CO2 22*   BUN 74*   CREATININE 1.6*   CALCIUM 10.0     Recent Labs     02/24/23  1525   AST 32   ALT 40   BILITOT 0.6   ALKPHOS 155*     No results for input(s): INR in the last 72 hours. No results for input(s): [place] in the last 72 hours. Urinalysis:    Lab Results   Component Value Date/Time    NITRU NEGATIVE 12/01/2022 10:15 PM    WBCUA > 200 12/01/2022 10:15 PM    BACTERIA MANY 12/01/2022 10:15 PM    RBCUA 0-2 12/01/2022 10:15 PM    BLOODU SMALL 12/01/2022 10:15 PM    GLUCOSEU 250 12/01/2022 10:15 PM       Radiology:   XR CHEST PORTABLE   Final Result   Stable radiographic appearance of the chest. No evidence of an acute process. **This report has been created using voice recognition software. It may contain minor errors which are inherent in voice recognition technology. **      Final report electronically signed by Dr. Jessy Mercedes MD on 2/24/2023 5:21 PM            EKG:  No acute ST changes    Electronically signed by Anmol Gardner MD on 2/24/2023 at 6:06 PM

## 2023-02-24 NOTE — ED NOTES
Patient is resting in bed. VSS and no distress noted. Call light within reach.      Livan Robles  02/24/23 [id number]

## 2023-02-24 NOTE — ED NOTES
ED to inpatient nurses report    Chief Complaint   Patient presents with    Fatigue      Present to ED from home  LOC: alert and orientated to name, place, date  Vital signs   Vitals:    02/24/23 1505 02/24/23 1706 02/24/23 1807 02/24/23 1827   BP: 95/71 (!) 144/88 (!) 145/72 118/82   Pulse: 82 68 72 69   Resp: 16 14 20 16   Temp: 97.4 °F (36.3 °C)      TempSrc: Oral      SpO2: 99% 97% 100% 98%   Weight: 160 lb (72.6 kg)      Height: 5' 2\" (1.575 m)         Oxygen Baseline     Current needs required RA   LDAs:   Peripheral IV 02/24/23 Right Antecubital (Active)   Site Assessment Clean, dry & intact 02/24/23 1526     Mobility: Requires assistance * 1  Pending ED orders: none  Present condition: stable      C-SSRS Risk of Suicide: No Risk  Swallow Screening    Preferred Language: [place]     Electronically signed by Quiana Rojo RN on 2/24/2023 at 6:27 PM       Quiana Rojo RN  02/24/23 1825

## 2023-02-24 NOTE — ED PROVIDER NOTES
[facility] [address] [address] EMERGENCY DEPT      EMERGENCY MEDICINE     Pt Name: Marla Howard  MRN: [id number]  Elizabethgfapril 1940  Date of evaluation: 2/24/2023  Provider: Pradeep Olivas DO  Supervising Physician: Constance Panotja DO    CHIEF COMPLAINT       Chief Complaint   Patient presents with    Fatigue     HISTORY OF PRESENT ILLNESS   Marla Howard is a [de-identified] y.o. female with a history of CAD, HTN, DM, and breast cancer who presents to the emergency department from home for evaluation of fatigue. 1 week ago she had 2 episodes of vomiting, since then she has not had an appetite. She has been hardly eating any food and also has not been drinking as much either. She has not had any episodes of vomiting since. Last bowel movement was last night, is a little smaller than normal.  Patient denies any chest pain, difficulty breathing, fever, cough, congestion, dysuria, and she denies any focal weakness. Her blood sugar has been running high, it was in the 400s today, so she took 40 units of insulin.     PASTMEDICAL HISTORY     Past Medical History:   Diagnosis Date    MYRA (acute kidney injury) ([facility] [address] 12/6/2020    Anxiety 4/21/2020    Arthritis     Bladder spasm     CAD (coronary artery disease) 3/12/2013    Cancer ([facility] [address]     breast    Hyperlipidemia     Hypertension     Hypothyroidism     Obstructive sleep apnea of adult 12/12/2013    SOB (shortness of breath)     Type II or unspecified type diabetes mellitus without mention of complication, not stated as uncontrolled        Patient Active Problem List   Diagnosis Code    Type 2 diabetes mellitus with circulatory disorder, with long-term current use of insulin ([facility]) E11.59, Z79.4    Hypothyroidism E03.9    Bladder spasm N32.89    SOB (shortness of breath) D01.28    Diastolic dysfunction Q82.11    Pulmonary hypertension (HCC) I27.20    DM hyperosmolarity type II, uncontrolled ([facility]) E11.00, E11.65    AMBROCIO (dyspnea on exertion) R06.09    Dyslipidemia, goal LDL below 100 E78.5 Diabetes mellitus type II, uncontrolled [id number]    Moderate tricuspid regurgitation I07.1    Fatigue R53.83    Gout M10.9    Deviated septum J34.2    Hypertrophy, nasal, turbinate J34.3    Obstructive sleep apnea on CPAP G47.33, Z99.89    Essential hypertension I10    Coronary artery disease involving native coronary artery of native heart without angina pectoris I25.10    Murmur R01.1    COVID-19 U07.1    Sprain and strain of left wrist S63.502A, U55.041O    Sprain of left foot S93.602A    Strain of left inguinal muscle S39.013A    Anxiety F41.9    Hyperglycemia R73.9    MYRA (acute kidney injury) ([facility] [address] N17.9     SURGICAL HISTORY       Past Surgical History:   Procedure Laterality Date    BREAST BIOPSY  1/17/2014    BREAST SURGERY  7/1974    left breast     CARPAL TUNNEL RELEASE Right 1/2013    CHOLECYSTECTOMY  7/1968    COLONOSCOPY  11/2007    FINGER TRIGGER RELEASE      HERNIA REPAIR  6-6-14    Dr. Leonardo Ibarra (CERVIX STATUS UNKNOWN)  11/1996    JOINT REPLACEMENT Left     KNEE ARTHROSCOPY      left    KNEE SURGERY Right 2017    LYMPH NODE DISSECTION  12/23/1998    MASTECTOMY  7/2001       CURRENT MEDICATIONS       Previous Medications    AMLODIPINE (NORVASC) 5 MG TABLET    Take 10 mg by mouth daily     AMOXICILLIN (AMOXIL) 875 MG TABLET    Take 875 mg by mouth 2 times daily Indications: Urinary Tract Infection    ASPIRIN 325 MG EC TABLET    Take 1 tablet by mouth daily    ATORVASTATIN (LIPITOR) 80 MG TABLET    Take 0.5 tablets by mouth nightly    CLOPIDOGREL (PLAVIX) 75 MG TABLET    Take 75 mg by mouth daily    CYANOCOBALAMIN (VITAMIN B 12 PO)    Place  under the tongue daily.     FERROUS SULFATE 325 (65 FE) MG TABLET    Take 325 mg by mouth daily (with breakfast)    INSULIN GLARGINE (TOUJEO MAX SOLOSTAR SC)    Inject into the skin    INSULIN REGULAR HUMAN (NOVOLIN R IJ)    Inject as directed    INSULIN SYRINGE-NEEDLE U-100 ([employer] INSULIN SYRINGE) 31G X 5/16\" 0.5 ML MISC    1 each by Does not apply route daily    INSULIN SYRINGE-NEEDLE U-100 (RELI-ON INSULIN SYRINGE)    by Does not apply route. 31 G/8MM MIS (SHORT)    LEVETIRACETAM (KEPPRA) 500 MG TABLET        LEVOTHYROXINE (SYNTHROID) 112 MCG TABLET    Take 112 mcg by mouth daily    LOSARTAN (COZAAR) 25 MG TABLET    Take 1 tablet by mouth daily    METOPROLOL (TOPROL-XL) 25 MG XL TABLET    TAKE ONE TABLET BY MOUTH EVERY DAY    OZEMPIC, 0.25 OR 0.5 MG/DOSE, 2 MG/1.5ML SOPN        SERTRALINE (ZOLOFT) 50 MG TABLET    Take 50 mg by mouth daily Nightly       ALLERGIES     is allergic to myrbetriq [mirabegron]. FAMILY HISTORY     She indicated that her mother is . She indicated that her father is . She indicated that the status of her paternal grandmother is unknown. She indicated that the status of her paternal uncle is unknown. SOCIAL HISTORY       Social History     Tobacco Use    Smoking status: Passive Smoke Exposure - Never Smoker    Smokeless tobacco: Never    Tobacco comments:     worked in a bar for years   Vaping Use    Vaping Use: Never used   Substance Use Topics    Alcohol use: Yes     Comment: seldom    Drug use: No       PHYSICAL EXAM       ED Triage Vitals [23 1505]   BP Temp Temp Source Heart Rate Resp SpO2 Height Weight   95/71 97.4 °F (36.3 °C) Oral 82 16 99 % 5' 2\" (1.575 m) 160 lb (72.6 kg)       Physical Exam  Vitals and nursing note reviewed. Constitutional:       General: She is not in acute distress. Appearance: She is not ill-appearing or toxic-appearing. HENT:      Head: Normocephalic and atraumatic. Right Ear: External ear normal.      Left Ear: External ear normal.      Nose: Nose normal. No congestion. Mouth/Throat:      Mouth: Mucous membranes are dry. Pharynx: Oropharynx is clear. Eyes:      Conjunctiva/sclera: Conjunctivae normal.   Cardiovascular:      Rate and Rhythm: Normal rate and regular rhythm. Pulses: Normal pulses. Heart sounds: Normal heart sounds.    Pulmonary: Effort: Pulmonary effort is normal. No respiratory distress. Breath sounds: Normal breath sounds. No wheezing. Abdominal:      General: There is no distension. Palpations: Abdomen is soft. Tenderness: There is no abdominal tenderness. There is no guarding. Musculoskeletal:         General: Normal range of motion. Cervical back: Normal range of motion and neck supple. No tenderness. Lymphadenopathy:      Cervical: No cervical adenopathy. Skin:     General: Skin is warm and dry. Capillary Refill: Capillary refill takes less than 2 seconds. Neurological:      General: No focal deficit present. Mental Status: She is alert and oriented to person, place, and time. Psychiatric:         Mood and Affect: Mood normal.       FORMAL DIAGNOSTIC RESULTS     RADIOLOGY: Interpretation per the Radiologist below, if available at the time of this note (none if blank):    XR CHEST PORTABLE   Final Result   Stable radiographic appearance of the chest. No evidence of an acute process. **This report has been created using voice recognition software. It may contain minor errors which are inherent in voice recognition technology. **      Final report electronically signed by Dr. Cristina Flores MD on 2/24/2023 5:21 PM          LABS: (none if blank)  Labs Reviewed   CBC WITH AUTO DIFFERENTIAL - Abnormal; Notable for the following components:       Result Value    MPV 12.7 (*)     Monocytes Absolute 0.2 (*)     Immature Grans (Abs) 0.16 (*)     All other components within normal limits   COMPREHENSIVE METABOLIC PANEL - Abnormal; Notable for the following components:    Glucose 381 (*)     Creatinine 1.6 (*)     BUN 74 (*)     Sodium 129 (*)     Chloride 90 (*)     CO2 22 (*)     Alkaline Phosphatase 155 (*)     Total Protein 8.6 (*)     All other components within normal limits   ANION GAP - Abnormal; Notable for the following components:    Anion Gap 17.0 (*)     All other components within normal limits   GLOMERULAR FILTRATION RATE, ESTIMATED - Abnormal; Notable for the following components:    Est, Glom Filt Rate 32 (*)     All other components within normal limits   BETA-HYDROXYBUTYRATE - Abnormal; Notable for the following components:    Beta-Hydroxybutyrate 6.73 (*)     All other components within normal limits   COVID-19 & INFLUENZA COMBO   MAGNESIUM   TROPONIN   OSMOLALITY   BLOOD OCCULT STOOL SCREEN #1   URINALYSIS WITH REFLEX TO CULTURE   BLOOD GAS, VENOUS   URINALYSIS WITH MICROSCOPIC       (Any cultures that may have been sent were not resulted at the time of this patient visit)    [facility] / ED COURSE:     1) Number and Complexity of Problems            Problem List This Visit:         Chief Complaint   Patient presents with    Fatigue            Differential Diagnosis includes (but not limited to):  MYRA, dehydration, viral gastritis, electrolyte abnormalities, upper GI bleed, UTI, pneumonia            2)  Data Reviewed (none if left blank)          My Independent interpretations:     EKG:      Normal sinus rhythm with bifascicular block. RRR, left axis deviation, RSR prime in V1, widened QRS, T wave inversion in V2, normal QTc                 Decision Rules/Clinical Scores utilized:  N/A            External Documentation Reviewed:         Previous patient encounter documents & history available on EMR was reviewed progress note on 10/13/2022             See Formal Diagnostic Results above for the lab and radiology tests and orders.     3)  Treatment and Disposition         ED Reassessment:  See ED course         Case discussed with consulting clinician: N/A         Shared Decision-Making was performed and disposition discussed with the        Patient/Family and questions answered yes         Social determinants of health impacting treatment or disposition: N/A         Code Status: Full      Summary of Patient Presentation:      MDM  Number of Diagnoses or Management Options  MYRA (acute kidney injury) [facility]): new, needed workup  Diagnosis management comments: Patient is a well-appearing 53-year-old female with a history of CAD, HTN, and DM who presents for evaluation of fatigue and poor p.o. intake for the past week. Patient's vitals are stable. Her mucous membranes are dry but she has good capillary refill. Creatinine of 1.6, elevated from 1.0.  BUN of 72. I suspect this MYRA is likely prerenal in nature, likely from the patient's poor p.o. intake and lack of appetite. With that elevated BUN is also possible the patient is having a GI bleed. However, her hemoglobin is stable and her guaiac test is negative. Her  notes her stools have been a little darker than normal.  Chest x-ray is unremarkable for any acute findings. I have given the patient 1 L IV fluids. Amount and/or Complexity of Data Reviewed  Clinical lab tests: ordered and reviewed  Tests in the radiology section of CPT®: ordered and reviewed  Tests in the medicine section of CPT®: ordered and reviewed  Discuss the patient with other providers: yes  Independent visualization of images, tracings, or specimens: yes    Patient Progress  Patient progress: stable  /  ED Course as of 02/24/23 1754   Fri Feb 24, 2023   1631 Creatinine(!): 1.6 [AC]   1631 Sodium(!): 129 [AC]   1634 BUN,BUNPL(!): 74 [AC]   1634 Suspect MYRA is likely prerenal in nature [AC]      ED Course User Index  [AC] Estevan Gross DO     Vitals Reviewed:    Vitals:    02/24/23 1505 02/24/23 1706   BP: 95/71 (!) 144/88   Pulse: 82 68   Resp: 16 14   Temp: 97.4 °F (36.3 °C)    TempSrc: Oral    SpO2: 99% 97%   Weight: 160 lb (72.6 kg)    Height: 5' 2\" (1.575 m)        The patient was seen and examined. Appropriate diagnostic testing was performed and results reviewed with the patient. The results of pertinent diagnostic studies and exam findings were discussed.  The patients provisional diagnosis and plan of care were discussed with the patient and present family who expressed understanding. Any medications were reviewed and indications and risks of medications were discussed with the patient /family present. Strict verbal and written return precautions, instructions and appropriate follow-up provided to  the patient. ED Medications administered this visit:  (None if blank)  Medications   0.9 % sodium chloride bolus (1,000 mLs IntraVENous New Bag 2/24/23 1548)         PROCEDURES: (None if blank)  Procedures:     CRITICAL CARE: (None if blank)      DISCHARGE PRESCRIPTIONS: (None if blank)  New Prescriptions    No medications on file       FINAL IMPRESSION      1. MYRA (acute kidney injury) [facility])          DISPOSITION/PLAN   DISPOSITION Decision To Admit 02/24/2023 05:01:31 PM      OUTPATIENT FOLLOW UP THE PATIENT:  No follow-up provider specified. Jonh Urena DO    This transcription was electronically signed. Parts of this transcriptions may have been dictated by use of voice recognition software and electronically transcribed, and parts may have been transcribed with the assistance of an ED scribe. The transcription may contain errors not detected in proofreading. Please refer to my supervising physician's documentation if my documentation differs.     Electronically Signed: Jonh Urena DO, 02/24/23, 5:54 PM      Jonh Urena DO  Resident  02/24/23 2364

## 2023-02-24 NOTE — ED NOTES
UA obtained by straight cath. VSS and no distress noted. Call light within reach.      Lennox Rusk  02/24/23 3013

## 2023-02-24 NOTE — ED TRIAGE NOTES
Patient is  presenting in the emergency department with weakness, fatigue, and lack of appetite. Patient has been having trouble for about a week and it is getting progressively worse. Partner states that she has been having trouble getting around and she could not get up in steps and had to have neighbor come help him. She states that all she wants to do is sleep and she is really tired. No appetite since last Friday, patient had a couple spoonfuls of applesauce today; couple bites of meatloaf last night. Patient is diabetic and her blood glucose \"has a mind of its own\" as stated by patient. She took 40 units of insulin this morning because it was high and it was 415 at 1416 today.      Pt has history of left mastectomy, limb alert in place

## 2023-02-25 LAB
ALBUMIN SERPL BCG-MCNC: 3 G/DL (ref 3.5–5.1)
ALP SERPL-CCNC: 119 U/L (ref 38–126)
ALT SERPL W/O P-5'-P-CCNC: 29 U/L (ref 11–66)
ANION GAP SERPL CALC-SCNC: 16 MEQ/L (ref 8–16)
AST SERPL-CCNC: 24 U/L (ref 5–40)
BASOPHILS ABSOLUTE: 0.1 THOU/MM3 (ref 0–0.1)
BASOPHILS NFR BLD AUTO: 0.9 %
BILIRUB SERPL-MCNC: 0.5 MG/DL (ref 0.3–1.2)
BUN SERPL-MCNC: 60 MG/DL (ref 7–22)
CALCIUM SERPL-MCNC: 8.8 MG/DL (ref 8.5–10.5)
CHLORIDE SERPL-SCNC: 101 MEQ/L (ref 98–111)
CK SERPL-CCNC: 15 U/L (ref 30–135)
CO2 SERPL-SCNC: 19 MEQ/L (ref 23–33)
CREAT SERPL-MCNC: 1.1 MG/DL (ref 0.4–1.2)
CREAT UR-MCNC: 60.8 MG/DL
DEPRECATED MEAN GLUCOSE BLD GHB EST-ACNC: 219 MG/DL (ref 70–126)
DEPRECATED RDW RBC AUTO: 43.1 FL (ref 35–45)
EOSINOPHIL NFR BLD AUTO: 0.9 %
EOSINOPHILS ABSOLUTE: 0.1 THOU/MM3 (ref 0–0.4)
ERYTHROCYTE [DISTWIDTH] IN BLOOD BY AUTOMATED COUNT: 13 % (ref 11.5–14.5)
GFR SERPL CREATININE-BSD FRML MDRD: 51 ML/MIN/1.73M2
GLUCOSE BLD STRIP.AUTO-MCNC: 268 MG/DL (ref 70–108)
GLUCOSE BLD STRIP.AUTO-MCNC: 277 MG/DL (ref 70–108)
GLUCOSE BLD STRIP.AUTO-MCNC: 323 MG/DL (ref 70–108)
GLUCOSE BLD STRIP.AUTO-MCNC: 339 MG/DL (ref 70–108)
GLUCOSE SERPL-MCNC: 241 MG/DL (ref 70–108)
HBA1C MFR BLD HPLC: 9.3 % (ref 4.4–6.4)
HCT VFR BLD AUTO: 35.5 % (ref 37–47)
HGB BLD-MCNC: 11.4 GM/DL (ref 12–16)
IMM GRANULOCYTES # BLD AUTO: 0.22 THOU/MM3 (ref 0–0.07)
IMM GRANULOCYTES NFR BLD AUTO: 1.8 %
LACTATE SERPL-SCNC: 0.9 MMOL/L (ref 0.5–2)
LYMPHOCYTES ABSOLUTE: 2 THOU/MM3 (ref 1–4.8)
LYMPHOCYTES NFR BLD AUTO: 16.3 %
MAGNESIUM SERPL-MCNC: 2.1 MG/DL (ref 1.6–2.4)
MCH RBC QN AUTO: 28.7 PG (ref 26–33)
MCHC RBC AUTO-ENTMCNC: 32.1 GM/DL (ref 32.2–35.5)
MCV RBC AUTO: 89.4 FL (ref 81–99)
MONOCYTES ABSOLUTE: 1 THOU/MM3 (ref 0.4–1.3)
MONOCYTES NFR BLD AUTO: 7.7 %
NEUTROPHILS NFR BLD AUTO: 72.4 %
NRBC BLD AUTO-RTO: 0 /100 WBC
PLATELET # BLD AUTO: 264 THOU/MM3 (ref 130–400)
PMV BLD AUTO: 12.6 FL (ref 9.4–12.4)
POTASSIUM SERPL-SCNC: 4.4 MEQ/L (ref 3.5–5.2)
PROT SERPL-MCNC: 5.7 G/DL (ref 6.1–8)
RBC # BLD AUTO: 3.97 MILL/MM3 (ref 4.2–5.4)
SEGMENTED NEUTROPHILS ABSOLUTE COUNT: 9 THOU/MM3 (ref 1.8–7.7)
SODIUM SERPL-SCNC: 136 MEQ/L (ref 135–145)
SODIUM UR-SCNC: 30 MEQ/L
UUN 24H UR-MCNC: 908 MG/DL
WBC # BLD AUTO: 12.4 THOU/MM3 (ref 4.8–10.8)

## 2023-02-25 PROCEDURE — 82570 ASSAY OF URINE CREATININE: CPT

## 2023-02-25 PROCEDURE — 6360000002 HC RX W HCPCS: Performed by: STUDENT IN AN ORGANIZED HEALTH CARE EDUCATION/TRAINING PROGRAM

## 2023-02-25 PROCEDURE — 80053 COMPREHEN METABOLIC PANEL: CPT

## 2023-02-25 PROCEDURE — 84300 ASSAY OF URINE SODIUM: CPT

## 2023-02-25 PROCEDURE — 2580000003 HC RX 258: Performed by: INTERNAL MEDICINE

## 2023-02-25 PROCEDURE — 6370000000 HC RX 637 (ALT 250 FOR IP): Performed by: INTERNAL MEDICINE

## 2023-02-25 PROCEDURE — 84540 ASSAY OF URINE/UREA-N: CPT

## 2023-02-25 PROCEDURE — 92610 EVALUATE SWALLOWING FUNCTION: CPT

## 2023-02-25 PROCEDURE — 6370000000 HC RX 637 (ALT 250 FOR IP): Performed by: STUDENT IN AN ORGANIZED HEALTH CARE EDUCATION/TRAINING PROGRAM

## 2023-02-25 PROCEDURE — 2140000000 HC CCU INTERMEDIATE R&B

## 2023-02-25 PROCEDURE — 99232 SBSQ HOSP IP/OBS MODERATE 35: CPT | Performed by: INTERNAL MEDICINE

## 2023-02-25 PROCEDURE — 82948 REAGENT STRIP/BLOOD GLUCOSE: CPT

## 2023-02-25 PROCEDURE — 85025 COMPLETE CBC W/AUTO DIFF WBC: CPT

## 2023-02-25 PROCEDURE — 83735 ASSAY OF MAGNESIUM: CPT

## 2023-02-25 PROCEDURE — 36415 COLL VENOUS BLD VENIPUNCTURE: CPT

## 2023-02-25 PROCEDURE — 83605 ASSAY OF LACTIC ACID: CPT

## 2023-02-25 PROCEDURE — 82550 ASSAY OF CK (CPK): CPT

## 2023-02-25 RX ORDER — SODIUM CHLORIDE 9 MG/ML
INJECTION, SOLUTION INTRAVENOUS CONTINUOUS
Status: ACTIVE | OUTPATIENT
Start: 2023-02-25 | End: 2023-02-26

## 2023-02-25 RX ORDER — LEVETIRACETAM 500 MG/1
500 TABLET ORAL 2 TIMES DAILY
Status: DISCONTINUED | OUTPATIENT
Start: 2023-02-25 | End: 2023-02-28 | Stop reason: HOSPADM

## 2023-02-25 RX ADMIN — ATORVASTATIN CALCIUM 40 MG: 40 TABLET, FILM COATED ORAL at 20:35

## 2023-02-25 RX ADMIN — LEVETIRACETAM 500 MG: 500 TABLET, FILM COATED ORAL at 20:35

## 2023-02-25 RX ADMIN — ENOXAPARIN SODIUM 30 MG: 100 INJECTION SUBCUTANEOUS at 09:48

## 2023-02-25 RX ADMIN — ASPIRIN 325 MG: 325 TABLET, COATED ORAL at 09:48

## 2023-02-25 RX ADMIN — LEVOTHYROXINE SODIUM 112 MCG: 0.11 TABLET ORAL at 06:49

## 2023-02-25 RX ADMIN — FERROUS SULFATE TAB 325 MG (65 MG ELEMENTAL FE) 325 MG: 325 (65 FE) TAB at 09:48

## 2023-02-25 RX ADMIN — SODIUM CHLORIDE: 9 INJECTION, SOLUTION INTRAVENOUS at 16:45

## 2023-02-25 RX ADMIN — INSULIN GLARGINE 30 UNITS: 100 INJECTION, SOLUTION SUBCUTANEOUS at 20:35

## 2023-02-25 RX ADMIN — METOPROLOL SUCCINATE 25 MG: 25 TABLET, EXTENDED RELEASE ORAL at 09:48

## 2023-02-25 RX ADMIN — SERTRALINE 50 MG: 50 TABLET, FILM COATED ORAL at 09:48

## 2023-02-25 RX ADMIN — INSULIN LISPRO 6 UNITS: 100 INJECTION, SOLUTION INTRAVENOUS; SUBCUTANEOUS at 09:49

## 2023-02-25 RX ADMIN — LEVETIRACETAM 500 MG: 500 TABLET, FILM COATED ORAL at 09:48

## 2023-02-25 RX ADMIN — INSULIN LISPRO 12 UNITS: 100 INJECTION, SOLUTION INTRAVENOUS; SUBCUTANEOUS at 12:27

## 2023-02-25 RX ADMIN — INSULIN LISPRO 8 UNITS: 100 INJECTION, SOLUTION INTRAVENOUS; SUBCUTANEOUS at 17:28

## 2023-02-25 RX ADMIN — CLOPIDOGREL BISULFATE 75 MG: 75 TABLET ORAL at 09:48

## 2023-02-25 NOTE — FLOWSHEET NOTE
02/24/23 2122   Treatment Team Notification   Reason for Communication Medication concern   Team Member Name Dr. Daysi Bay Team Role Resident   Method of Communication Secure Message   Response Other (Comment)   Notification Time 2122     Patient's sugar is 97. Do you still want me to give her insulin tonight? Shes NPO as well.     Response: No dont give her the insulin

## 2023-02-25 NOTE — PLAN OF CARE
Problem: Discharge Planning  Goal: Discharge to home or other facility with appropriate resources  Outcome: Progressing  Flowsheets (Taken 2/25/2023 0220)  Discharge to home or other facility with appropriate resources:   Identify barriers to discharge with patient and caregiver   Identify discharge learning needs (meds, wound care, etc)  Note: He is from home with her partner and plans on returning there at discharge. Problem: Pain  Goal: Verbalizes/displays adequate comfort level or baseline comfort level  Outcome: Progressing  Flowsheets (Taken 2/25/2023 0220)  Verbalizes/displays adequate comfort level or baseline comfort level:   Encourage patient to monitor pain and request assistance   Assess pain using appropriate pain scale     Problem: Skin/Tissue Integrity  Goal: Absence of new skin breakdown  Description: 1. Monitor for areas of redness and/or skin breakdown  2. Assess vascular access sites hourly  3. Every 4-6 hours minimum:  Change oxygen saturation probe site  4. Every 4-6 hours:  If on nasal continuous positive airway pressure, respiratory therapy assess nares and determine need for appliance change or resting period. Outcome: Progressing     Problem: Safety - Adult  Goal: Free from fall injury  Outcome: Progressing     Problem: ABCDS Injury Assessment  Goal: Absence of physical injury  Outcome: Progressing     Problem: Nutrition Deficit:  Goal: Optimize nutritional status  Outcome: Progressing  Flowsheets (Taken 2/25/2023 0230)  Nutrient intake appropriate for improving, restoring, or maintaining nutritional needs:   Assess nutritional status and recommend course of action   Monitor oral intake, labs, and treatment plans   Care plan reviewed with patient. Patient verbalizes understanding of the care plan and contributed to goal setting.

## 2023-02-25 NOTE — PLAN OF CARE
Problem: Discharge Planning  Goal: Discharge to home or other facility with appropriate resources  2/25/2023 1501 by Bailee Duncan RN  Outcome: Progressing  Flowsheets (Taken 2/25/2023 0915)  Discharge to home or other facility with appropriate resources:   Identify barriers to discharge with patient and caregiver   Arrange for needed discharge resources and transportation as appropriate     Problem: Pain  Goal: Verbalizes/displays adequate comfort level or baseline comfort level  2/25/2023 1501 by Bailee Duncan RN  Outcome: Progressing  Flowsheets (Taken 2/25/2023 1501)  Verbalizes/displays adequate comfort level or baseline comfort level: Encourage patient to monitor pain and request assistance     Problem: Skin/Tissue Integrity  Goal: Absence of new skin breakdown  Description: 1. Monitor for areas of redness and/or skin breakdown  2. Assess vascular access sites hourly  3. Every 4-6 hours minimum:  Change oxygen saturation probe site  4. Every 4-6 hours:  If on nasal continuous positive airway pressure, respiratory therapy assess nares and determine need for appliance change or resting period. 2/25/2023 1501 by Bailee Duncan RN  Outcome: Progressing  Note: Ongoing assessment & interventions provided throughout shift. Skin assessments provided. Encouraging/assisting patient to turn as needed.          Problem: Safety - Adult  Goal: Free from fall injury  2/25/2023 1501 by Bailee Duncan RN  Outcome: Progressing  Flowsheets (Taken 2/25/2023 1501)  Free From Fall Injury: Instruct family/caregiver on patient safety     Problem: ABCDS Injury Assessment  Goal: Absence of physical injury  2/25/2023 1501 by Bailee Duncan RN  Outcome: Progressing  Flowsheets (Taken 2/25/2023 1501)  Absence of Physical Injury: Implement safety measures based on patient assessment  2/25/2023 0220 by Leobardo Chapman RN  Outcome: Progressing     Problem: Nutrition Deficit:  Goal: Optimize nutritional status  2/25/2023 1501 by Jasmyn Rodriguez [place], RN  Outcome: Progressing  Flowsheets (Taken 2/25/2023 0230 by Annie Leon RN)  Nutrient intake appropriate for improving, restoring, or maintaining nutritional needs:   Assess nutritional status and recommend course of action   Monitor oral intake, labs, and treatment plans     Problem: Chronic Conditions and Co-morbidities  Goal: Patient's chronic conditions and co-morbidity symptoms are monitored and maintained or improved  Outcome: Progressing   Care plan reviewed with patient. Patient verbalizes understanding of the care plan and contributed to goal setting.

## 2023-02-25 NOTE — PROGRESS NOTES
Progress Note    Patient:  Taco Chen    Unit/Bed:3B-27/027-A  YOB: 1940  MRN: [id number]   Acct: [de-identified]   Admit date: 2/24/2023      Principal Problem:    Generalized weakness  Resolved Problems:    * No resolved hospital problems. *    Disposition continued inpatient care for IV fluids overnight and PT evaluation she continues to report subjective weakness and is a high fall risk      Assessment and Plan:  MYRA -likely prerenal, Cr 1.6, BUN 74 patient's creatinine has decreased overnight to 1.0 mg/dL, baseline creatinine 0.8-1. endorsed poor p.o. intake and vomiting, dysphagia to solids and liquids. Received 1L NS bolus in ED. Last echo shows HFpEF with moderate AS. Patient was administered fluids. Urine studies sent. Bladder scan and straight cath as needed. Generalized weakness -ordered TSH, general work-up. PT OT.    BONITA hebert reviewed it is a normal bowel gas pattern or appears to be some constipation  Pseudohyponatremia -noted. Mildly elevated AG-AG 17, glucose 381, beta hydroxybutyrate 6.73. Gases show pH 7.37 with PCO2 40, PO2 29, HCO3 23. Trending labs. DM 2 -restarted Lantus per previous dosages, ISS and hypoglycemia treatment orders. PORFIRIO on CPAP  Dehydration maintenance fluids additional 12 hours  Hypothyroidism we will continue levothyroxine at current dose          Patient Seen, Chart, Consults notes, Labs, Radiology studies reviewed.     Subjective: Day 1 of stay with hospital stay    Patient had no acute decompensation overnight    Of note had to hold her losartan and amlodipine secondary to low normal blood pressures, but I will continue her metoprolol XL    She reports subjective improvement in weakness I will advance her diet today she denies any vomiting overnight mild persistent nausea    Secondary to decreased p.o. intake and dehydration on presentation I will continue maintenance fluids for additional 12 hours    All other ROS negative except noted in HPI    Past, Family, Social History unchanged from admission. Diet:  ADULT DIET; Regular; 4 carb choices (60 gm/meal)    Medications:  Scheduled Meds:   levETIRAcetam  500 mg Oral BID    amLODIPine  10 mg Oral Daily    aspirin  325 mg Oral Daily    atorvastatin  40 mg Oral Nightly    clopidogrel  75 mg Oral Daily    ferrous sulfate  325 mg Oral Every Other Day    insulin glargine  30 Units SubCUTAneous Nightly    insulin lispro  0-16 Units SubCUTAneous TID WC    insulin lispro  0-4 Units SubCUTAneous Nightly    levothyroxine  112 mcg Oral Daily    losartan  25 mg Oral Daily    metoprolol succinate  25 mg Oral Daily    sertraline  50 mg Oral Daily    sodium chloride flush  5-40 mL IntraVENous 2 times per day    enoxaparin  30 mg SubCUTAneous Daily    polyethylene glycol  17 g Oral BID     Continuous Infusions:   dextrose      sodium chloride       PRN Meds:glucose, dextrose bolus **OR** dextrose bolus, glucagon (rDNA), dextrose, sodium chloride flush, sodium chloride, ondansetron **OR** ondansetron, acetaminophen **OR** acetaminophen, senna, sodium phosphate    Objective:  CBC:   Recent Labs     02/24/23  1525 02/25/23  0327   WBC 10.8 12.4*   HGB 12.9 11.4*    264     BMP:    Recent Labs     02/24/23  1525 02/25/23  0327   * 136   K 3.7 4.4   CL 90* 101   CO2 22* 19*   BUN 74* 60*   CREATININE 1.6* 1.1   GLUCOSE 381* 241*     Calcium:  Recent Labs     02/25/23  0327   CALCIUM 8.8     Ionized Calcium:No results for input(s): IONCA in the last 72 hours. Magnesium:  Recent Labs     02/25/23  0327   MG 2.1     Phosphorus:  Recent Labs     02/24/23  1909   PHOS 3.0     BNP:No results for input(s): BNP in the last 72 hours. Glucose:  Recent Labs     02/24/23  2119 02/25/23  0838 02/25/23  1117   POCGLU 97 339* 323*     HgbA1C:   Recent Labs     02/24/23  1525   LABA1C 9.3*     INR: No results for input(s): INR in the last 72 hours.   Hepatic:   Recent Labs     02/25/23  0327   ALKPHOS 119 ALT 29   AST 24   PROT 5.7*   BILITOT 0.5   LABALBU 3.0*     Amylase and Lipase:  Recent Labs     02/25/23  0327   LACTA 0.9     Lactic Acid:   Recent Labs     02/25/23  0327   LACTA 0.9     Troponin:   Recent Labs     02/24/23  1525 02/25/23  0327   CKTOTAL  --  15*   TROPONINT < 0.010  --      BNP: No results for input(s): BNP in the last 72 hours. Lipids: No results for input(s): CHOL, TRIG, HDL, LDL, LDLCALC in the last 72 hours. ABGs: No results found for: PH, PCO2, PO2, HCO3, O2SAT        Radiology reports as per the Radiologist  Radiology: XR ABDOMEN (KUB) (SINGLE AP VIEW)    Result Date: 2/24/2023  Abdomen X-ray, 1 View COMPARISON: None FINDINGS: Nonobstructive bowel gas pattern. No visible free air. No acute fracture. Right sacral neurostimulator in place. No acute findings. This document has been electronically signed by: Author Jenni MD on 02/24/2023 10:43 PM    XR CHEST PORTABLE    Result Date: 2/24/2023  PROCEDURE: XR CHEST PORTABLE CLINICAL INFORMATION: Fatigue. Loss of appetite for one week. COMPARISON: Chest radiograph dated 12/1/2022. TECHNIQUE: AP upright view of the chest. FINDINGS: The lungs appear clear. The pulmonary vasculature and cardiomediastinal silhouette are within normal limits. There are stable mild degenerative changes of the thoracic spine. Stable radiographic appearance of the chest. No evidence of an acute process. **This report has been created using voice recognition software. It may contain minor errors which are inherent in voice recognition technology. ** Final report electronically signed by Dr. Vidhi Lechuga MD on 2/24/2023 5:21 PM        Physical Exam:  Vitals: BP (!) 107/54   Pulse 72   Temp 98.2 °F (36.8 °C) (Oral)   Resp 18   Ht 5' 2\" (1.575 m)   Wt 162 lb 0.6 oz (73.5 kg)   SpO2 93%   BMI 29.64 kg/m²   24 hour intake/output:  Intake/Output Summary (Last 24 hours) at 2/25/2023 1516  Last data filed at 2/25/2023 0602  Gross per 24 hour   Intake 0 ml   Output 250 ml   Net -250 ml     Last 3 weights: Wt Readings from Last 3 Encounters:   02/25/23 162 lb 0.6 oz (73.5 kg)   12/01/22 156 lb (70.8 kg)   10/13/22 156 lb (70.8 kg)       General appearance - alert, awake appears to be in no acute distress improved interactions compared to 2/24/2023  HEENT: Atraumatic normocephalic, no JVD. Trachea midline.    Chest - Bilateral air entry, no wheezes, crackles or rhonchi  Cardiovascular - S1S2 RRR, no murmurs or gallops  Abdomen - Soft non tender non distended, normoactive bowel sounds   Neurological - non focal, no neurological deficits noted  Integumentary - Skin color, texture, turgor normal. No Rashes or lesions  Musculoskeletal -Full ROM, No clubbing or cyanosis  Extremities: No peripheral edema    DVT prophylaxis: [x] Lovenox                                 [] SCDs                                 [] SQ Heparin                                 [] Encourage ambulation           [] Already on Anticoagulation               Electronically signed by Miki Castañeda MD on 2/25/2023 at 3:16 PM    Rounding Hospitalist

## 2023-02-25 NOTE — ED NOTES
Pt transported to 3B room 027 nurse informed prior to taking pt up in a stable condition.       Pepe Samayoa  02/24/23 1933

## 2023-02-25 NOTE — PROGRESS NOTES
[facility]  SPEECH THERAPY  STRZ CCU-STEPDOWN 3B  Clinical Swallow Evaluation      SLP Individual Minutes  Time In: 8776  Time Out: 0740  Minutes: 9  Timed Code Treatment Minutes: 0 Minutes       Date: 2023  Patient Name: Sarika Alex      CSN: [id number]   : 1940  ([de-identified] y.o.)  Gender: female   Referring Physician:  Arnulfo Salazar MD    Diagnosis: Generalized weakness    History of Present Illness/Injury: Patient admitted with above diagnosis. Per chart review, \"This is an 51-year-old female who was admitted to [facility] after presenting to the ED with generalized weakness and inability to tolerate p.o. intake both solids and liquids for about a week. PMH including HLD, HTN, PORFIRIO on CPAP. Patient has been hardly able to tolerate any p.o. intake with both solids and liquids and ended up vomiting most of her meals for the last 2 days. Denies chest pain, fever, congestion, dysuria. She did endorse cough for the last day or 2. COVID-negative. \"     ST consulted to complete clinical swallow evaluation to assess current swallow function and update POC as clinically indicated. Past Medical History:   Diagnosis Date    MYRA (acute kidney injury) ([facility] [address] 2020    Anxiety 2020    Arthritis     Bladder spasm     CAD (coronary artery disease) 3/12/2013    Cancer ([facility]     breast    Hyperlipidemia     Hypertension     Hypothyroidism     Obstructive sleep apnea of adult 2013    SOB (shortness of breath)     Type II or unspecified type diabetes mellitus without mention of complication, not stated as uncontrolled        SUBJECTIVE:  ELIJAH French approved completion of clinical swallow evaluation. Upon arrival to room, patient resting in recliner and easily awakened to name. Patient agreeable to clinical swallow evaluation and denies difficulty with current diet level. OBJECTIVE:    Pain:  No pain reported.     Current Diet: Regular and Thin Liquids     Respiratory Status:  Room Air    Behavioral Observation:  Alert and Oriented    CRANIAL NERVE ASSESSMENT   CN V (Trigeminal) Closes and Opens Mandible WFL    Rotary Jaw Movement WFL      CN VII (Facial) Cheeks Hold Food out of Sulci Impaired: Bilateral    Opens, Closes/Seals, Protrudes, Retracts Lips Impaired: Unilateral - Left    General Appearance WFL    Sensation WFL      CN X (Vagus - Pharyngeal) Raises Back of Tongue WFL      CN XI (Accessory) Lifts Soft Palate WFL      CN XII (Hypoglossal) Elevates Tongue Up and Back WFL    Protrusion   WFL    Lateralizes Tongue WFL    Sensation Not Tested      Other Observations Dentition Upper/lower dentures     Vocal Quality WFL     Cough DNT      Patient Evaluated Using: Thin Liquids and Coarse Solids    Oral Phase:  Impaired:  Reduced Bolus Formation    Pharyngeal Phase: WFL:  Pharyngeal phase appears WFL but cannot rule out pharyngeal phase deficits from a bedside swallowing evaluation alone. Signs and Symptoms of Laryngeal Penetration/Aspiration: No signs/symptoms of aspiration evident in this evaluation, but cannot rule out silent aspiration. Impressions: Patient presents with WFL/MOD I oral dysphagia with inability to fully discern potential presence of pharyngeal phase deficits without formal instrumentation. Patient with adequate bolus control and manipulation, timely rotary mastication with effective textural breakdown, and decreased bolus formation resulting in diffuse oral residue that patient independently clears with utilization of lingual sweep and liquid wash. Patient with consistent swallow initiation achieved across all trials and no overt s/s of aspiration. Of course pharyngeal dysfunction and totality of airway invasion events cannot be formally assessed without presence of instrumental evaluation; however, instrumental evaluation is not indicated at this time. Recommend patient continues with a regular texture diet and thin liquids.  No further skilled ST services indicated at this time to address dysphagia. Please re-consult as needs arise. RECOMMENDATIONS/ASSESSMENT:  Instrumental Evaluation: Instrumental evaluation not indicated at this time. Diet Recommendations:  Regular and Thin Liquids   Strategies:  Full Upright Position, Small Bite/Sip, Medications Whole with Thin, and Alternate Solids and Liquids       EDUCATION:  Learner: Patient  Education:  Reviewed results and recommendations of this evaluation, Reviewed diet and strategies, Reviewed signs, symptoms and risks of aspiration, and Education Related to [employer] and Wellness  Evaluation of Education: [employer] understanding    PLAN:  No further speech therapy services indicated.       [place] [facility] Encinas HospitalAnoop Chatman M.A., [address]

## 2023-02-25 NOTE — FLOWSHEET NOTE
Patient arrived per wheelchair to 3B. Heart monitor applied and vitals taken. Admission paperwork completed. Explained to patient that [facility] is not responsible for any lost or stolen items. Patient verbalized understanding. Oriented to room and use of call light and bed controls. Patient denies pain or needs. No signs of distress noted. Bed locked & in low position, side-rails up x2. Call light in reach. Reminded patient to call nurse if any needs arise. 2 person skin assessment performed by this nurse and Liseth Raya RN. Explained patients right to have family, representative or physician notified of their admission. Patient has Requested for physician to be notified. Patient has Declined for family/representative to be notified.

## 2023-02-25 NOTE — PROGRESS NOTES
Physician Progress Note      PATIENT:               BRIELLE FAJARDO  CSN #:                  [id number]  :                       1940  ADMIT DATE:       2023 3:00 PM  DISCH DATE:  RESPONDING  PROVIDER #:        Fazal Nguyen MD          QUERY TEXT:    Pt admitted with MYRA and has HFpEF documented. If possible, please document in   progress notes and discharge summary further specificity regarding the acuity   of CHF:    The medical record reflects the following:    Risk Factors: HTN, HLD  Clinical Indicators: Echo 22 EF 55%  Treatment: Losartan, Metoprolol, Lipitor    Thank you!    Savage Islas, BSN,RN, CRCR  RN Clinical   P: [phone]  Options provided:  -- Chronic Diastolic CHF/HFpEF  -- Chronic Systolic and Diastolic CHF  -- Other - I will add my own diagnosis  -- Disagree - Not applicable / Not valid  -- Disagree - Clinically unable to determine / Unknown  -- Refer to Clinical Documentation Reviewer    PROVIDER RESPONSE TEXT:    This patient has chronic diastolic CHF/HFpEF.    Query created by: Savage Islas on 2023 12:03 PM      Electronically signed by:  Fazal Nguyen MD 2023 4:39 PM

## 2023-02-26 LAB
ANION GAP SERPL CALC-SCNC: 12 MEQ/L (ref 8–16)
BACTERIA UR CULT: NORMAL
BUN SERPL-MCNC: 37 MG/DL (ref 7–22)
CALCIUM SERPL-MCNC: 8.7 MG/DL (ref 8.5–10.5)
CHLORIDE SERPL-SCNC: 105 MEQ/L (ref 98–111)
CO2 SERPL-SCNC: 21 MEQ/L (ref 23–33)
CREAT SERPL-MCNC: 0.9 MG/DL (ref 0.4–1.2)
GFR SERPL CREATININE-BSD FRML MDRD: > 60 ML/MIN/1.73M2
GLUCOSE BLD STRIP.AUTO-MCNC: 166 MG/DL (ref 70–108)
GLUCOSE BLD STRIP.AUTO-MCNC: 200 MG/DL (ref 70–108)
GLUCOSE BLD STRIP.AUTO-MCNC: 208 MG/DL (ref 70–108)
GLUCOSE BLD STRIP.AUTO-MCNC: 269 MG/DL (ref 70–108)
GLUCOSE SERPL-MCNC: 245 MG/DL (ref 70–108)
POTASSIUM SERPL-SCNC: 4.3 MEQ/L (ref 3.5–5.2)
SODIUM SERPL-SCNC: 138 MEQ/L (ref 135–145)

## 2023-02-26 PROCEDURE — 36415 COLL VENOUS BLD VENIPUNCTURE: CPT

## 2023-02-26 PROCEDURE — 6370000000 HC RX 637 (ALT 250 FOR IP): Performed by: STUDENT IN AN ORGANIZED HEALTH CARE EDUCATION/TRAINING PROGRAM

## 2023-02-26 PROCEDURE — 80048 BASIC METABOLIC PNL TOTAL CA: CPT

## 2023-02-26 PROCEDURE — 2140000000 HC CCU INTERMEDIATE R&B

## 2023-02-26 PROCEDURE — 99232 SBSQ HOSP IP/OBS MODERATE 35: CPT | Performed by: INTERNAL MEDICINE

## 2023-02-26 PROCEDURE — 2580000003 HC RX 258: Performed by: INTERNAL MEDICINE

## 2023-02-26 PROCEDURE — 2580000003 HC RX 258: Performed by: STUDENT IN AN ORGANIZED HEALTH CARE EDUCATION/TRAINING PROGRAM

## 2023-02-26 PROCEDURE — 82948 REAGENT STRIP/BLOOD GLUCOSE: CPT

## 2023-02-26 PROCEDURE — 6370000000 HC RX 637 (ALT 250 FOR IP): Performed by: INTERNAL MEDICINE

## 2023-02-26 PROCEDURE — 6360000002 HC RX W HCPCS: Performed by: STUDENT IN AN ORGANIZED HEALTH CARE EDUCATION/TRAINING PROGRAM

## 2023-02-26 RX ORDER — ENOXAPARIN SODIUM 100 MG/ML
40 INJECTION SUBCUTANEOUS DAILY
Status: DISCONTINUED | OUTPATIENT
Start: 2023-02-27 | End: 2023-02-28 | Stop reason: HOSPADM

## 2023-02-26 RX ORDER — SODIUM CHLORIDE 9 MG/ML
INJECTION, SOLUTION INTRAVENOUS CONTINUOUS
Status: ACTIVE | OUTPATIENT
Start: 2023-02-26 | End: 2023-02-27

## 2023-02-26 RX ADMIN — ENOXAPARIN SODIUM 30 MG: 100 INJECTION SUBCUTANEOUS at 08:32

## 2023-02-26 RX ADMIN — ASPIRIN 325 MG: 325 TABLET, COATED ORAL at 08:28

## 2023-02-26 RX ADMIN — SERTRALINE 50 MG: 50 TABLET, FILM COATED ORAL at 08:28

## 2023-02-26 RX ADMIN — ATORVASTATIN CALCIUM 40 MG: 40 TABLET, FILM COATED ORAL at 20:21

## 2023-02-26 RX ADMIN — METOPROLOL SUCCINATE 25 MG: 25 TABLET, EXTENDED RELEASE ORAL at 08:28

## 2023-02-26 RX ADMIN — SODIUM CHLORIDE, PRESERVATIVE FREE 10 ML: 5 INJECTION INTRAVENOUS at 20:17

## 2023-02-26 RX ADMIN — AMLODIPINE BESYLATE 10 MG: 10 TABLET ORAL at 12:32

## 2023-02-26 RX ADMIN — INSULIN LISPRO 4 UNITS: 100 INJECTION, SOLUTION INTRAVENOUS; SUBCUTANEOUS at 08:29

## 2023-02-26 RX ADMIN — INSULIN LISPRO 4 UNITS: 100 INJECTION, SOLUTION INTRAVENOUS; SUBCUTANEOUS at 12:29

## 2023-02-26 RX ADMIN — POLYETHYLENE GLYCOL 3350 17 G: 17 POWDER, FOR SOLUTION ORAL at 20:21

## 2023-02-26 RX ADMIN — SODIUM CHLORIDE: 9 INJECTION, SOLUTION INTRAVENOUS at 20:16

## 2023-02-26 RX ADMIN — LEVOTHYROXINE SODIUM 112 MCG: 0.11 TABLET ORAL at 08:28

## 2023-02-26 RX ADMIN — LEVETIRACETAM 500 MG: 500 TABLET, FILM COATED ORAL at 08:28

## 2023-02-26 RX ADMIN — LEVETIRACETAM 500 MG: 500 TABLET, FILM COATED ORAL at 20:21

## 2023-02-26 RX ADMIN — INSULIN GLARGINE 30 UNITS: 100 INJECTION, SOLUTION SUBCUTANEOUS at 20:25

## 2023-02-26 RX ADMIN — CLOPIDOGREL BISULFATE 75 MG: 75 TABLET ORAL at 08:29

## 2023-02-26 ASSESSMENT — PAIN SCALES - GENERAL: PAINLEVEL_OUTOF10: 0

## 2023-02-26 NOTE — PROGRESS NOTES
Progress Note    Patient:  Pablo Aguiar    Unit/Bed:3B-27/027-A  YOB: 1940  MRN: [id number]   Acct: [de-identified]   Admit date: 2/24/2023      Principal Problem:    Generalized weakness  Resolved Problems:    * No resolved hospital problems. *    Disposition continued inpatient care for IV fluids overnight and PT evaluation she continues to report subjective weakness and is a high fall risk, she has improved p.o. intake compared to yesterday but appears mildly dehydrated on physical survey, glucoses have been noted we will continue current insulin regimen since his seems to be downtrending      Assessment and Plan:  MYRA -likely prerenal, Cr 1.6, BUN 74 patient's creatinine has decreased overnight to 0.9 mg/dL showing a modest improvement from yesterday's values we will continue low volume IV isotonic fluids today, baseline creatinine 0.8-1. Last echo shows HFpEF with moderate AS. Patient was administered fluids. Urine studies sent. Bladder scan and straight cath as needed. Generalized weakness -ordered TSH, general work-up. PT OT.    BONITA w reviewed it is a normal bowel gas pattern or appears to be some constipation  Pseudohyponatremia -noted. Mildly elevated AG-AG 17, glucose 381, beta hydroxybutyrate 6.73. Gases show pH 7.37 with PCO2 40, PO2 29, HCO3 23. Trending labs. DM 2 -restarted Lantus per previous dosages, ISS and hypoglycemia treatment orders. ,  I will not adjust insulin regimen for now since her glucoses seem to be downtrending  PORFIRIO on CPAP  Dehydration maintenance fluids additional 10 hours the evening of 3/26/2023  Hypothyroidism we will continue levothyroxine at current dose          Patient Seen, Chart, Consults notes, Labs, Radiology studies reviewed.     Subjective: Day 2 of stay with hospital stay    Patient had no acute decompensation overnight    Of note had to hold her losartan and amlodipine secondary to low normal blood pressures, but I will continue her metoprolol XL      Secondary to decreased p.o. intake and dehydration on presentation I will continue maintenance fluids for additional 10 hours    All other ROS negative except noted in HPI    Past, Family, Social History unchanged from admission. Diet:  ADULT DIET; Regular; 4 carb choices (60 gm/meal)    Medications:  Scheduled Meds:   [START ON 2/27/2023] enoxaparin  40 mg SubCUTAneous Daily    levETIRAcetam  500 mg Oral BID    [Held by provider] amLODIPine  10 mg Oral Daily    aspirin  325 mg Oral Daily    atorvastatin  40 mg Oral Nightly    clopidogrel  75 mg Oral Daily    ferrous sulfate  325 mg Oral Every Other Day    insulin glargine  30 Units SubCUTAneous Nightly    insulin lispro  0-16 Units SubCUTAneous TID WC    insulin lispro  0-4 Units SubCUTAneous Nightly    levothyroxine  112 mcg Oral Daily    losartan  25 mg Oral Daily    metoprolol succinate  25 mg Oral Daily    sertraline  50 mg Oral Daily    sodium chloride flush  5-40 mL IntraVENous 2 times per day    polyethylene glycol  17 g Oral BID     Continuous Infusions:   sodium chloride      dextrose      sodium chloride       PRN Meds:glucose, dextrose bolus **OR** dextrose bolus, glucagon (rDNA), dextrose, sodium chloride flush, sodium chloride, ondansetron **OR** ondansetron, acetaminophen **OR** acetaminophen, senna, sodium phosphate    Objective:  CBC:   Recent Labs     02/24/23  1525 02/25/23  0327   WBC 10.8 12.4*   HGB 12.9 11.4*    264       BMP:    Recent Labs     02/24/23  1525 02/25/23  0327 02/26/23  1006   * 136 138   K 3.7 4.4 4.3   CL 90* 101 105   CO2 22* 19* 21*   BUN 74* 60* 37*   CREATININE 1.6* 1.1 0.9   GLUCOSE 381* 241* 245*       Calcium:  Recent Labs     02/26/23  1006   CALCIUM 8.7       Ionized Calcium:No results for input(s): IONCA in the last 72 hours.   Magnesium:  Recent Labs     02/25/23  0327   MG 2.1       Phosphorus:  Recent Labs     02/24/23  1909   PHOS 3.0       BNP:No results for input(s): BNP in the last 72 hours. Glucose:  Recent Labs     02/26/23  0747 02/26/23  1126 02/26/23  1645   POCGLU 200* 208* 166*       HgbA1C:   Recent Labs     02/24/23  1525   LABA1C 9.3*       INR: No results for input(s): INR in the last 72 hours. Hepatic:   Recent Labs     02/25/23  0327   ALKPHOS 119   ALT 29   AST 24   PROT 5.7*   BILITOT 0.5   LABALBU 3.0*       Amylase and Lipase:  Recent Labs     02/25/23  0327   LACTA 0.9       Lactic Acid:   Recent Labs     02/25/23  0327   LACTA 0.9       Troponin:   Recent Labs     02/24/23  1525 02/25/23  0327   CKTOTAL  --  15*   TROPONINT < 0.010  --        BNP: No results for input(s): BNP in the last 72 hours. Lipids: No results for input(s): CHOL, TRIG, HDL, LDL, LDLCALC in the last 72 hours. ABGs: No results found for: PH, PCO2, PO2, HCO3, O2SAT        Radiology reports as per the Radiologist  Radiology: XR ABDOMEN (KUB) (SINGLE AP VIEW)    Result Date: 2/24/2023  Abdomen X-ray, 1 View COMPARISON: None FINDINGS: Nonobstructive bowel gas pattern. No visible free air. No acute fracture. Right sacral neurostimulator in place. No acute findings. This document has been electronically signed by: Ariadna Chang MD on 02/24/2023 10:43 PM    XR CHEST PORTABLE    Result Date: 2/24/2023  PROCEDURE: XR CHEST PORTABLE CLINICAL INFORMATION: Fatigue. Loss of appetite for one week. COMPARISON: Chest radiograph dated 12/1/2022. TECHNIQUE: AP upright view of the chest. FINDINGS: The lungs appear clear. The pulmonary vasculature and cardiomediastinal silhouette are within normal limits. There are stable mild degenerative changes of the thoracic spine. Stable radiographic appearance of the chest. No evidence of an acute process. **This report has been created using voice recognition software. It may contain minor errors which are inherent in voice recognition technology. ** Final report electronically signed by Dr. Evelia Kinsey MD on 2/24/2023 5:21 PM        Physical Exam:  Vitals: BP (!) 110/56   Pulse 73   Temp 97.7 °F (36.5 °C) (Oral)   Resp 16   Ht 5' 2\" (1.575 m)   Wt 151 lb 6.4 oz (68.7 kg)   SpO2 96%   BMI 27.69 kg/m²   24 hour intake/output:  Intake/Output Summary (Last 24 hours) at 2/26/2023 1841  Last data filed at 2/26/2023 1512  Gross per 24 hour   Intake 760 ml   Output --   Net 760 ml       Last 3 weights: Wt Readings from Last 3 Encounters:   02/26/23 151 lb 6.4 oz (68.7 kg)   12/01/22 156 lb (70.8 kg)   10/13/22 156 lb (70.8 kg)       General appearance - alert, awake appears to be in no acute distress improved interactions compared to 2/24/2023  HEENT: Atraumatic normocephalic, no JVD. Trachea midline.    Chest - Bilateral air entry, no wheezes, crackles or rhonchi  Cardiovascular - S1S2 RRR, no murmurs or gallops  Abdomen - Soft non tender non distended, normoactive bowel sounds   Neurological - non focal, no neurological deficits noted  Integumentary - Skin color, texture, turgor normal. No Rashes or lesions  Musculoskeletal -Full ROM, No clubbing or cyanosis  Extremities: No peripheral edema    DVT prophylaxis: [x] Lovenox                                 [] SCDs                                 [] SQ Heparin                                 [] Encourage ambulation           [] Already on Anticoagulation               Electronically signed by Nico Galaviz MD on 2/26/2023 at 6:41 PM    Rounding Hospitalist

## 2023-02-26 NOTE — PROGRESS NOTES
Pharmacist Review and Automatic Dose Adjustment of Prophylactic Enoxaparin      The reviewing pharmacist has made an adjustment to the ordered enoxaparin dose or converted to UFH per the approved [facility] protocol and table as identified below. Shrely Tello is a [de-identified] y.o. female. Recent Labs     02/24/23  1525 02/25/23  0327 02/26/23  1006   CREATININE 1.6* 1.1 0.9       Estimated Creatinine Clearance: 45 mL/min (based on SCr of 0.9 mg/dL). Height:   Ht Readings from Last 1 Encounters:   02/24/23 5' 2\" (1.575 m)     Weight:  Wt Readings from Last 1 Encounters:   02/26/23 151 lb 6.4 oz (68.7 kg)               Plan: Based upon the patient's weight and renal function, the enoxaparin dose has been changed/converted to enoxaparin 40 mg daily from 30 mg daily.       Thank you,  Mathew Sales, [facility]  2/26/2023, 3:21 PM

## 2023-02-27 LAB
ALBUMIN SERPL BCG-MCNC: 3 G/DL (ref 3.5–5.1)
ALP SERPL-CCNC: 109 U/L (ref 38–126)
ALT SERPL W/O P-5'-P-CCNC: 23 U/L (ref 11–66)
ANION GAP SERPL CALC-SCNC: 12 MEQ/L (ref 8–16)
AST SERPL-CCNC: 19 U/L (ref 5–40)
BASOPHILS ABSOLUTE: 0.1 THOU/MM3 (ref 0–0.1)
BASOPHILS NFR BLD AUTO: 0.9 %
BILIRUB SERPL-MCNC: 0.3 MG/DL (ref 0.3–1.2)
BUN SERPL-MCNC: 26 MG/DL (ref 7–22)
CALCIUM SERPL-MCNC: 8.6 MG/DL (ref 8.5–10.5)
CHLORIDE SERPL-SCNC: 105 MEQ/L (ref 98–111)
CO2 SERPL-SCNC: 22 MEQ/L (ref 23–33)
CREAT SERPL-MCNC: 0.7 MG/DL (ref 0.4–1.2)
DEPRECATED RDW RBC AUTO: 43.4 FL (ref 35–45)
EKG ATRIAL RATE: 80 BPM
EKG P AXIS: 75 DEGREES
EKG P-R INTERVAL: 186 MS
EKG Q-T INTERVAL: 444 MS
EKG QRS DURATION: 132 MS
EKG QTC CALCULATION (BAZETT): 512 MS
EKG R AXIS: -66 DEGREES
EKG T AXIS: 58 DEGREES
EKG VENTRICULAR RATE: 80 BPM
EOSINOPHIL NFR BLD AUTO: 1.6 %
EOSINOPHILS ABSOLUTE: 0.2 THOU/MM3 (ref 0–0.4)
ERYTHROCYTE [DISTWIDTH] IN BLOOD BY AUTOMATED COUNT: 13.3 % (ref 11.5–14.5)
GFR SERPL CREATININE-BSD FRML MDRD: > 60 ML/MIN/1.73M2
GLUCOSE BLD STRIP.AUTO-MCNC: 137 MG/DL (ref 70–108)
GLUCOSE BLD STRIP.AUTO-MCNC: 169 MG/DL (ref 70–108)
GLUCOSE BLD STRIP.AUTO-MCNC: 192 MG/DL (ref 70–108)
GLUCOSE BLD STRIP.AUTO-MCNC: 301 MG/DL (ref 70–108)
GLUCOSE SERPL-MCNC: 182 MG/DL (ref 70–108)
HCT VFR BLD AUTO: 33.7 % (ref 37–47)
HGB BLD-MCNC: 10.9 GM/DL (ref 12–16)
IMM GRANULOCYTES # BLD AUTO: 0.26 THOU/MM3 (ref 0–0.07)
IMM GRANULOCYTES NFR BLD AUTO: 2.5 %
LYMPHOCYTES ABSOLUTE: 3.2 THOU/MM3 (ref 1–4.8)
LYMPHOCYTES NFR BLD AUTO: 30.6 %
MCH RBC QN AUTO: 28.7 PG (ref 26–33)
MCHC RBC AUTO-ENTMCNC: 32.3 GM/DL (ref 32.2–35.5)
MCV RBC AUTO: 88.7 FL (ref 81–99)
MONOCYTES ABSOLUTE: 0.8 THOU/MM3 (ref 0.4–1.3)
MONOCYTES NFR BLD AUTO: 7.8 %
NEUTROPHILS NFR BLD AUTO: 56.6 %
NRBC BLD AUTO-RTO: 0 /100 WBC
PLATELET # BLD AUTO: 312 THOU/MM3 (ref 130–400)
PMV BLD AUTO: 12.5 FL (ref 9.4–12.4)
POTASSIUM SERPL-SCNC: 4.3 MEQ/L (ref 3.5–5.2)
PROT SERPL-MCNC: 5.8 G/DL (ref 6.1–8)
RBC # BLD AUTO: 3.8 MILL/MM3 (ref 4.2–5.4)
SEGMENTED NEUTROPHILS ABSOLUTE COUNT: 5.8 THOU/MM3 (ref 1.8–7.7)
SODIUM SERPL-SCNC: 139 MEQ/L (ref 135–145)
WBC # BLD AUTO: 10.3 THOU/MM3 (ref 4.8–10.8)

## 2023-02-27 PROCEDURE — 80053 COMPREHEN METABOLIC PANEL: CPT

## 2023-02-27 PROCEDURE — 6370000000 HC RX 637 (ALT 250 FOR IP): Performed by: INTERNAL MEDICINE

## 2023-02-27 PROCEDURE — 36415 COLL VENOUS BLD VENIPUNCTURE: CPT

## 2023-02-27 PROCEDURE — 97530 THERAPEUTIC ACTIVITIES: CPT

## 2023-02-27 PROCEDURE — 97162 PT EVAL MOD COMPLEX 30 MIN: CPT

## 2023-02-27 PROCEDURE — 85025 COMPLETE CBC W/AUTO DIFF WBC: CPT

## 2023-02-27 PROCEDURE — 82948 REAGENT STRIP/BLOOD GLUCOSE: CPT

## 2023-02-27 PROCEDURE — 6370000000 HC RX 637 (ALT 250 FOR IP): Performed by: STUDENT IN AN ORGANIZED HEALTH CARE EDUCATION/TRAINING PROGRAM

## 2023-02-27 PROCEDURE — 1200000003 HC TELEMETRY R&B

## 2023-02-27 PROCEDURE — 2580000003 HC RX 258: Performed by: STUDENT IN AN ORGANIZED HEALTH CARE EDUCATION/TRAINING PROGRAM

## 2023-02-27 PROCEDURE — 99231 SBSQ HOSP IP/OBS SF/LOW 25: CPT | Performed by: INTERNAL MEDICINE

## 2023-02-27 PROCEDURE — 6360000002 HC RX W HCPCS: Performed by: STUDENT IN AN ORGANIZED HEALTH CARE EDUCATION/TRAINING PROGRAM

## 2023-02-27 RX ADMIN — SERTRALINE 50 MG: 50 TABLET, FILM COATED ORAL at 08:39

## 2023-02-27 RX ADMIN — LEVETIRACETAM 500 MG: 500 TABLET, FILM COATED ORAL at 08:39

## 2023-02-27 RX ADMIN — METOPROLOL SUCCINATE 25 MG: 25 TABLET, EXTENDED RELEASE ORAL at 08:39

## 2023-02-27 RX ADMIN — LEVOTHYROXINE SODIUM 112 MCG: 0.11 TABLET ORAL at 05:39

## 2023-02-27 RX ADMIN — SODIUM CHLORIDE, PRESERVATIVE FREE 10 ML: 5 INJECTION INTRAVENOUS at 21:50

## 2023-02-27 RX ADMIN — LOSARTAN POTASSIUM 25 MG: 25 TABLET, FILM COATED ORAL at 08:43

## 2023-02-27 RX ADMIN — INSULIN GLARGINE 30 UNITS: 100 INJECTION, SOLUTION SUBCUTANEOUS at 21:51

## 2023-02-27 RX ADMIN — ASPIRIN 325 MG: 325 TABLET, COATED ORAL at 08:39

## 2023-02-27 RX ADMIN — FERROUS SULFATE TAB 325 MG (65 MG ELEMENTAL FE) 325 MG: 325 (65 FE) TAB at 08:39

## 2023-02-27 RX ADMIN — ATORVASTATIN CALCIUM 40 MG: 40 TABLET, FILM COATED ORAL at 21:50

## 2023-02-27 RX ADMIN — POLYETHYLENE GLYCOL 3350 17 G: 17 POWDER, FOR SOLUTION ORAL at 21:50

## 2023-02-27 RX ADMIN — LEVETIRACETAM 500 MG: 500 TABLET, FILM COATED ORAL at 21:50

## 2023-02-27 RX ADMIN — SODIUM CHLORIDE, PRESERVATIVE FREE 10 ML: 5 INJECTION INTRAVENOUS at 08:39

## 2023-02-27 RX ADMIN — CLOPIDOGREL BISULFATE 75 MG: 75 TABLET ORAL at 08:40

## 2023-02-27 RX ADMIN — INSULIN LISPRO 12 UNITS: 100 INJECTION, SOLUTION INTRAVENOUS; SUBCUTANEOUS at 12:23

## 2023-02-27 RX ADMIN — ENOXAPARIN SODIUM 40 MG: 100 INJECTION SUBCUTANEOUS at 08:39

## 2023-02-27 ASSESSMENT — ENCOUNTER SYMPTOMS
NAUSEA: 0
SHORTNESS OF BREATH: 0
VOMITING: 0
DIARRHEA: 0
ABDOMINAL PAIN: 0
COUGH: 0

## 2023-02-27 ASSESSMENT — PAIN SCALES - GENERAL: PAINLEVEL_OUTOF10: 0

## 2023-02-27 NOTE — PROGRESS NOTES
PROGRESS NOTE      Patient:  Sarika Alex      Unit/Bed:3B-27/027-A    YOB: 1940    MRN: [id number]       Acct: [de-identified]     PCP: Anneliese Villeda DO    Date of Admission: 2/24/2023      Assessment/Plan:    Anticipated Discharge in : Pending PT    Active Hospital Problems    Diagnosis Date Noted    Generalized weakness [R53.1] 02/24/2023     Priority: Medium     Generalized weakness  Chronic from previous stroke  PT/OT consulted. Recommended continue PT  PM&R consulted for possible IPR    MYRA, resolved  Likely prerenal 2/2 dehydration based on urine studies. Cr 1.6 on admission  At baseline 2/27 at 0.7 s/p fluids. Continue to monitor with daily BMP  Bladder scan and cath PRN    Pseudohyponatremia  Noted  Monitor BMP    Mildly elevated AG, improving  Continue daily labs    Diabetes mellitus type 2  Sugars improving  Continue home lantus, ISS   Accuchecks and hypoglycemia protocols in place    Obstructive sleep apnea  On CPAP    Normocytic Anemia  10.8 2/27, down from 11.4 2/25  Monitor with daily CBC    Subclinical Hypothyroidism  Noted on admission elevated TSH with normal T4  Continue home synthroid  Follow up in outpatient    Hypertension  Continue home meds with hold parameters  Holding Norvasc 2/2 low normal BP readings, resume when able. Dehydration  Does not appear dry on exam today  Monitor oral intake    Hx of CAD  Continue home plavix    Depression/Anxiety   Continue home Zoloft          Chief Complaint: Generalized weakness    Hospital Course:   Per initial H&P:  \"This is an 74-year-old female who was admitted to [facility] after presenting to the ED with generalized weakness and inability to tolerate p.o. intake both solids and liquids for about a week. PMH including HLD, HTN, PORFIRIO on CPAP. Patient has been hardly able to tolerate any p.o. intake with both solids and liquids and ended up vomiting most of her meals for the last 2 days. Denies chest pain, fever, congestion, dysuria. yes She did endorse cough for the last day or 2. COVID-negative. \"    Subjective:  Patient seen and examined at bedside. Patient states she is feeling well. Denies chest pain, shortness of breath, nausea, vomiting, diarrhea. Awaiting PT evaluation. Patient continues to complain of chronic left-sided weakness. Vital signs stable. Review of Systems   Constitutional:  Negative for fatigue. Respiratory:  Negative for cough and shortness of breath. Cardiovascular:  Negative for chest pain and palpitations. Gastrointestinal:  Negative for abdominal pain, diarrhea, nausea and vomiting. Neurological:  Positive for weakness (chronic right sided). Negative for light-headedness and headaches. Psychiatric/Behavioral:  Negative for behavioral problems.       Medications:  Reviewed    Infusion Medications    dextrose      sodium chloride       Scheduled Medications    enoxaparin  40 mg SubCUTAneous Daily    levETIRAcetam  500 mg Oral BID    [Held by provider] amLODIPine  10 mg Oral Daily    aspirin  325 mg Oral Daily    atorvastatin  40 mg Oral Nightly    clopidogrel  75 mg Oral Daily    ferrous sulfate  325 mg Oral Every Other Day    insulin glargine  30 Units SubCUTAneous Nightly    insulin lispro  0-16 Units SubCUTAneous TID WC    insulin lispro  0-4 Units SubCUTAneous Nightly    levothyroxine  112 mcg Oral Daily    losartan  25 mg Oral Daily    metoprolol succinate  25 mg Oral Daily    sertraline  50 mg Oral Daily    sodium chloride flush  5-40 mL IntraVENous 2 times per day    polyethylene glycol  17 g Oral BID     PRN Meds: glucose, dextrose bolus **OR** dextrose bolus, glucagon (rDNA), dextrose, sodium chloride flush, sodium chloride, ondansetron **OR** ondansetron, acetaminophen **OR** acetaminophen, senna, sodium phosphate      Intake/Output Summary (Last 24 hours) at 2/27/2023 0720  Last data filed at 2/27/2023 1308  Gross per 24 hour   Intake 810 ml   Output --   Net 810 ml       Diet:  ADULT DIET; Regular; 4 carb choices (60 gm/meal)    Exam:  /63   Pulse 72   Temp 98.2 °F (36.8 °C) (Oral)   Resp 20   Ht 5' 2\" (1.575 m)   Wt 147 lb 8 oz (66.9 kg)   SpO2 97%   BMI 26.98 kg/m²     Physical Exam  Vitals reviewed. Constitutional:       General: She is not in acute distress. Appearance: She is not ill-appearing. HENT:      Mouth/Throat:      Mouth: Mucous membranes are moist.   Eyes:      General:         Right eye: No discharge. Left eye: No discharge. Cardiovascular:      Rate and Rhythm: Normal rate and regular rhythm. Heart sounds: Normal heart sounds. No murmur heard. No friction rub. No gallop. Pulmonary:      Effort: Pulmonary effort is normal. No respiratory distress. Breath sounds: Normal breath sounds. No stridor. No wheezing, rhonchi or rales. Abdominal:      General: There is no distension. Palpations: Abdomen is soft. Tenderness: There is no abdominal tenderness. There is no guarding or rebound. Skin:     General: Skin is warm. Neurological:      General: No focal deficit present. Mental Status: She is alert. Mental status is at baseline. Motor: Weakness (left sided) present. Psychiatric:         Mood and Affect: Mood normal.         Behavior: Behavior normal.       Labs:   Recent Labs     02/24/23  1525 02/25/23 0327 02/27/23  0356   WBC 10.8 12.4* 10.3   HGB 12.9 11.4* 10.9*   HCT 39.5 35.5* 33.7*    264 312     Recent Labs     02/24/23  1909 02/25/23 0327 02/26/23  1006 02/27/23  0356   NA  --  136 138 139   K  --  4.4 4.3 4.3   CL  --  101 105 105   CO2  --  19* 21* 22*   BUN  --  60* 37* 26*   CREATININE  --  1.1 0.9 0.7   CALCIUM  --  8.8 8.7 8.6   PHOS 3.0  --   --   --      Recent Labs     02/24/23  1525 02/25/23  0327 02/27/23  0356   AST 32 24 19   ALT 40 29 23   BILITOT 0.6 0.5 0.3   ALKPHOS 155* 119 109     No results for input(s): INR in the last 72 hours.   Recent Labs     02/25/23  0327   CKTOTAL 15* Urinalysis:      Lab Results   Component Value Date/Time    NITRU NEGATIVE 02/24/2023 06:05 PM    WBCUA 15-25 02/24/2023 06:05 PM    BACTERIA NONE SEEN 02/24/2023 06:05 PM    RBCUA 3-5 02/24/2023 06:05 PM    BLOODU SMALL 02/24/2023 06:05 PM    GLUCOSEU 250 02/24/2023 06:05 PM       Radiology:  XR ABDOMEN (KUB) (SINGLE AP VIEW)   Final Result   No acute findings. This document has been electronically signed by: Rusty Aase, MD on    02/24/2023 10:43 PM      XR CHEST PORTABLE   Final Result   Stable radiographic appearance of the chest. No evidence of an acute process. **This report has been created using voice recognition software. It may contain minor errors which are inherent in voice recognition technology. **      Final report electronically signed by Dr. Zulma Sanon MD on 2/24/2023 5:21 PM          Diet: ADULT DIET; Regular; 4 carb choices (60 gm/meal)    DVT prophylaxis: [x] Lovenox                                 [] SCDs                                 [] SQ Heparin                                 [] Encourage ambulation           [] Already on Anticoagulation     Disposition:    [x] Home       [] TCU       [x] Rehab       [] Psych       [] SNF       [] [place]       [] Other-    Code Status: Full Code    PT/OT Eval Status: Consulted      Electronically signed by Charlie Marie DO on 2/27/2023 at 7:20 AM    This note was electronically signed. Parts of this note may have been dictated by use of voice recognition software and electronically transcribed. The note may contain errors not detected in proofreading. Please refer to my supervising physician's documentation if my documentation differs.

## 2023-02-27 NOTE — CONSULTS
Physical Medicine & Rehabilitation   Consult Note      Admitting Physician: Valente Nevarez MD    Primary Care Provider: Kashmir Prince DO     Reason for Consult:  Assess for rehab needs     History of Present Illness:  Brooke Patricia is a [de-identified] y.o. female with PMH significant for CVA, CAD, Breast cancer, HLD, HTN, Hypothyroidism, and T2DM who was admitted to [address] [place] [address] on 2/24/2023. Patient initially presented to [facility] ED on 2/24/2023. Patient presented for evaluation of fatigue. Per report patient had reported that decreased appetite and p.o. intake. She also reported 2 episodes of vomiting earlier in the week. Labs in the ED revealed MYRA. Patient was admitted for further evaluation and management. SLP evaluated patient improved for regular and thin diet. Patient received IV fluids for dehydration. Pateint has history of CVA with residual left sided weakness. On day of consultation, patient is seen with  and room. Per patient and  report, patient suffered from a CVA in December 2019. She initially recovered pretty well, however, then she was sick with COVID and since that time patient has had ongoing deficits with her left upper and lower extremities. At baseline, patient is modified independent with mobility within their mobile home. She uses a rollator for ambulation. In the community patient utilizes a motorized scooter. If she is not using her rollator, patient will provide hand-held assist.  Patient receives assistance from her  with lower body dressing. He also provides assist getting her into and out of the shower. In the days leading up to hospitalization, patient was requiring increased assistance at home. However, at this time, both patient and  feel like she is at her functional baseline. However, patient does report that she would like to continue working towards increased functional independence at home.   Patient does attend a adult day program through [facility] agency for aging. She attends this approximately 3 days a week from 9 AM to 3 PM.  She is not currently receiving any sort of formal therapies at home. Current Rehabilitation Assessments:  PT 02/27/2023:  Balance:  Static Sitting Balance:  Supervision  Static Standing Balance: Contact Guard Assistance, tolerated 5 min to take vitals then required seated rest break before amb trial due to fatigue. No UE support, WBOS in stding  Bed Mobility:  Rolling to Right: Minimal Assistance   Supine to Sit: Minimal Assistance  Scooting: contact guard Assistance fwd in sitting  HOB up 20 degrees     Transfers:  Sit to Stand: Contact Guard Assistance  Stand to Sit:Stand By Assistance  Cues for hand placement  Ambulation:  Contact Guard Assistance  Distance: 3'x1 with RW, 20'x1 with HHA  Surface: Level Tile  Device:Hand-Held Assist  Gait Deviations:  Slow Namita, Decreased Heel Strike on Left, Mild Path Deviations, and tendency for step to gait pattern leading with LLE, per pt min weaker than baseline. Uses rollator in home and electric scooter outside home. OT evaluations pending       SLP 02/25/2023:  Patient presents with WFL/MOD I oral dysphagia with inability to fully discern potential presence of pharyngeal phase deficits without formal instrumentation. Patient with adequate bolus control and manipulation, timely rotary mastication with effective textural breakdown, and decreased bolus formation resulting in diffuse oral residue that patient independently clears with utilization of lingual sweep and liquid wash. Patient with consistent swallow initiation achieved across all trials and no overt s/s of aspiration. Of course pharyngeal dysfunction and totality of airway invasion events cannot be formally assessed without presence of instrumental evaluation; however, instrumental evaluation is not indicated at this time.  Recommend patient continues with a regular texture diet and thin liquids. No further skilled ST services indicated at this time to address dysphagia. Please re-consult as needs arise. Past Medical History:        Diagnosis Date    MYRA (acute kidney injury) ([facility] [address] 12/6/2020    Anxiety 4/21/2020    Arthritis     Bladder spasm     CAD (coronary artery disease) 3/12/2013    Cancer ([facility] [address]     breast    Hyperlipidemia     Hypertension     Hypothyroidism     Obstructive sleep apnea of adult 12/12/2013    SOB (shortness of breath)     Type II or unspecified type diabetes mellitus without mention of complication, not stated as uncontrolled        Past Surgical History:        Procedure Laterality Date    BREAST BIOPSY  1/17/2014    BREAST SURGERY  7/1974    left breast     CARPAL TUNNEL RELEASE Right 1/2013    CHOLECYSTECTOMY  7/1968    COLONOSCOPY  11/2007    FINGER TRIGGER RELEASE      HERNIA REPAIR  6-6-14    Dr. Rex Fisher (CERVIX STATUS UNKNOWN)  11/1996    JOINT REPLACEMENT Left     KNEE ARTHROSCOPY      left    KNEE SURGERY Right 2017    LYMPH NODE DISSECTION  12/23/1998    MASTECTOMY  7/2001       Allergies:     Allergies   Allergen Reactions    Myrbetriq [Mirabegron]      Made patient Blood sugar go up to 300-400        Current Medications:   Current Facility-Administered Medications: enoxaparin (LOVENOX) injection 40 mg, 40 mg, SubCUTAneous, Daily  levETIRAcetam (KEPPRA) tablet 500 mg, 500 mg, Oral, BID  [Held by provider] amLODIPine (NORVASC) tablet 10 mg, 10 mg, Oral, Daily  aspirin EC tablet 325 mg, 325 mg, Oral, Daily  atorvastatin (LIPITOR) tablet 40 mg, 40 mg, Oral, Nightly  clopidogrel (PLAVIX) tablet 75 mg, 75 mg, Oral, Daily  ferrous sulfate (IRON 325) tablet 325 mg, 325 mg, Oral, Every Other Day  insulin glargine (LANTUS) injection vial 30 Units, 30 Units, SubCUTAneous, Nightly  insulin lispro (HUMALOG) injection vial 0-16 Units, 0-16 Units, SubCUTAneous, TID WC  insulin lispro (HUMALOG) injection vial 0-4 Units, 0-4 Units, SubCUTAneous, Nightly  glucose chewable tablet 16 g, 4 tablet, Oral, PRN  dextrose bolus 10% 125 mL, 125 mL, IntraVENous, PRN **OR** dextrose bolus 10% 250 mL, 250 mL, IntraVENous, PRN  glucagon (rDNA) injection 1 mg, 1 mg, SubCUTAneous, PRN  dextrose 10 % infusion, , IntraVENous, Continuous PRN  levothyroxine (SYNTHROID) tablet 112 mcg, 112 mcg, Oral, Daily  losartan (COZAAR) tablet 25 mg, 25 mg, Oral, Daily  metoprolol succinate (TOPROL XL) extended release tablet 25 mg, 25 mg, Oral, Daily  sertraline (ZOLOFT) tablet 50 mg, 50 mg, Oral, Daily  sodium chloride flush 0.9 % injection 5-40 mL, 5-40 mL, IntraVENous, 2 times per day  sodium chloride flush 0.9 % injection 5-40 mL, 5-40 mL, IntraVENous, PRN  0.9 % sodium chloride infusion, , IntraVENous, PRN  ondansetron (ZOFRAN-ODT) disintegrating tablet 4 mg, 4 mg, Oral, Q8H PRN **OR** ondansetron (ZOFRAN) injection 4 mg, 4 mg, IntraVENous, Q6H PRN  acetaminophen (TYLENOL) tablet 650 mg, 650 mg, Oral, Q6H PRN **OR** acetaminophen (TYLENOL) suppository 650 mg, 650 mg, Rectal, Q6H PRN  polyethylene glycol (GLYCOLAX) packet 17 g, 17 g, Oral, BID  senna (SENOKOT) 8.8 MG/5ML syrup 8.8 mg, 5 mL, Oral, BID PRN  sodium phosphate (FLEET) rectal enema 1 enema, 1 enema, Rectal, Daily PRN    Social History:  Social History     Socioeconomic History    Marital status:       Spouse name: Not on file    Number of children: Not on file    Years of education: Not on file    Highest education level: Not on file   Occupational History    Not on file   Tobacco Use    Smoking status: Passive Smoke Exposure - Never Smoker    Smokeless tobacco: Never    Tobacco comments:     worked in a bar for years   Vaping Use    Vaping Use: Never used   Substance and Sexual Activity    Alcohol use: Yes     Comment: seldom    Drug use: No    Sexual activity: Never   Other Topics Concern    Not on file   Social History Narrative    Not on file     Social Determinants of Health     Financial Resource Strain: Not on file   Food Insecurity: Not on file   Transportation Needs: Not on file   Physical Activity: Not on file   Stress: Not on file   Social Connections: Not on file   Intimate Partner Violence: Not on file   Housing Stability: Not on file     Lives With: Significant other  Type of Home: [address]: One level  Home Access: Stairs to enter with [address] - Number of Steps: 4  Home Equipment: Bernice Alcantara, 4 wheeled, Lift chair, [employer], Electric scooter      Bathroom Toilet: Handicap height  Bathroom Equipment: Grab bars around toilet     Ambulation Assistance: Needs assistance  Transfer Assistance: Needs assistance     Additional Comments: per pt goes to  3x/week from 9am to 3pm, pt has assist for bed mobility and transfers. amb short distances with rollator otherwise will use electric scooter.  hx of CVA with residual left hemibody weakness    Family History:       Problem Relation Age of Onset    Cirrhosis Mother     Heart Disease Father     Diabetes Paternal Uncle     Diabetes Paternal Grandmother        Review of Systems:  CONSTITUTIONAL:  negative for  fevers and chills  EYES:  negative for  visual disturbance, positive for glasses  HEENT:  negative for  nasal congestion and sore throat  RESPIRATORY:  negative for  dyspnea and wheezing  CARDIOVASCULAR:  negative for  chest pain, palpitations  GASTROINTESTINAL:  positive for constipation and decreased appetite (improving)  GENITOURINARY:  negative for frequency and dysuria  SKIN:  negative for rash  MUSCULOSKELETAL:  positive for  arthralgias and muscle weakness  NEUROLOGICAL:  positive for gait problems and weakness  BEHAVIOR/PSYCH:  negative for depressed mood and anxiety  System review otherwise negative    Physical Exam:  /68   Pulse 74   Temp 98 °F (36.7 °C) (Oral)   Resp 20   Ht 5' 2\" (1.575 m)   Wt 147 lb 8 oz (66.9 kg)   SpO2 97%   BMI 26.98 kg/m²   Patient is awake and alert   Orientation: oriented to person, place, time  Mood: within normal limits  Affect: calm  General appearance: Patient is well nourished, well developed, well groomed and in no acute distress    Memory:   normal, able to provide history  Attention/Concentration: normal  Language:  normal    Cranial Nerves:  cranial nerves II-XII are grossly intact  ROM: abnormal, LUE  Motor Exam:  Full strenGth throughout RUE and RLE                         LUE: 4/5  2-3/5 EF and EE                         LLE: 4/5 throughout   Muscle bulk: normal  Sensory:  Sensory intact to light touch  Gait: not assessed     Heart: normal rate, regular rhythm, normal S1, S2, no murmurs, rubs, clicks or gallops  Lungs: clear to auscultation without wheezes or rales  Abdomen: soft, non-tender and non-distended  Skin: warm and dry, no rash or erythema on exposed skin      Diagnostics:  Recent Results (from the past 24 hour(s))   POCT Glucose    Collection Time: 02/26/23  4:45 PM   Result Value Ref Range    POC Glucose 166 (H) 70 - 108 mg/dl   POCT Glucose    Collection Time: 02/26/23  8:15 PM   Result Value Ref Range    POC Glucose 269 (H) 70 - 108 mg/dl   Comprehensive Metabolic Panel    Collection Time: 02/27/23  3:56 AM   Result Value Ref Range    Glucose 182 (H) 70 - 108 mg/dL    Creatinine 0.7 0.4 - 1.2 mg/dL    BUN 26 (H) 7 - 22 mg/dL    Sodium 139 135 - 145 meq/L    Potassium 4.3 3.5 - 5.2 meq/L    Chloride 105 98 - 111 meq/L    CO2 22 (L) 23 - 33 meq/L    Calcium 8.6 8.5 - 10.5 mg/dL    AST 19 5 - 40 U/L    Alkaline Phosphatase 109 38 - 126 U/L    Total Protein 5.8 (L) 6.1 - 8.0 g/dL    Albumin 3.0 (L) 3.5 - 5.1 g/dL    Total Bilirubin 0.3 0.3 - 1.2 mg/dL    ALT 23 11 - 66 U/L   CBC with Auto Differential    Collection Time: 02/27/23  3:56 AM   Result Value Ref Range    WBC 10.3 4.8 - 10.8 thou/mm3    RBC 3.80 (L) 4.20 - 5.40 mill/mm3    Hemoglobin 10.9 (L) 12.0 - 16.0 gm/dl    Hematocrit 33.7 (L) 37.0 - 47.0 %    MCV 88.7 81.0 - 99.0 fL    MCH 28.7 26.0 - 33.0 pg    MCHC 32.3 32.2 - 35.5 gm/dl    RDW-CV 13.3 11.5 - 14.5 %    RDW-SD 43.4 35.0 - 45.0 fL    Platelets 303 039 - 597 thou/mm3    MPV 12.5 (H) 9.4 - 12.4 fL    Seg Neutrophils 56.6 %    Lymphocytes 30.6 %    Monocytes 7.8 %    Eosinophils 1.6 %    Basophils 0.9 %    Immature Granulocytes 2.5 %    Segs Absolute 5.8 1.8 - 7.7 thou/mm3    Lymphocytes Absolute 3.2 1.0 - 4.8 thou/mm3    Monocytes Absolute 0.8 0.4 - 1.3 thou/mm3    Eosinophils Absolute 0.2 0.0 - 0.4 thou/mm3    Basophils Absolute 0.1 0.0 - 0.1 thou/mm3    Immature Grans (Abs) 0.26 (H) 0.00 - 0.07 thou/mm3    nRBC 0 /100 wbc   Anion Gap    Collection Time: 02/27/23  3:56 AM   Result Value Ref Range    Anion Gap 12.0 8.0 - 16.0 meq/L   Glomerular Filtration Rate, Estimated    Collection Time: 02/27/23  3:56 AM   Result Value Ref Range    Est, Glom Filt Rate >60 >60 ml/min/1.73m2   POCT Glucose    Collection Time: 02/27/23  8:00 AM   Result Value Ref Range    POC Glucose 169 (H) 70 - 108 mg/dl       Radiology Review:    CXR 02/24/2023:   Impression:       Stable radiographic appearance of the chest. No evidence of an acute process. KUB 02/24/2023:   Impression:       No acute findings       Impression:  MYRA  Generalized weakness   T2DM  PORFIRIO  Dehydration  Hypothyroidism  Impaired mobility     Recommendations:  Functional Impairments: mobility, ambulation, endurance  OT evaluation is pending   Patient would benefit from ongoing therapies to address impairments listed above. Discussed patient's current functional status with both patient and  in depth today. Both patient and  feel that she is close to her functional baseline. They both feel comfortable with providing her current level of care at home. Patient does wish to improve her functional independence at home beyond her current baseline. Therefore, she will likely benefit from home health therapies on discharge.   Patient does not meet criteria for acute inpatient rehab stay, as she is close to her functional baseline. However, patient would benefit from discharge home with assistance of  and home health therapies. It was my pleasure to evaluate Dipti Banegas today. Please call with questions.     Digna Lopez, DO

## 2023-02-27 NOTE — PROGRESS NOTES
[address] [address]  INPATIENT PHYSICAL THERAPY  EVALUATION  [facility] CCU-STEPDOWN 3B - 3B-27/027-A    Time In: 8897  Time Out: 0806  Timed Code Treatment Minutes: 11 Minutes  Minutes: 22          Date: 2023  Patient Name: Kel Borrego,  Gender:  female        MRN: [id number]  : 1940  ([de-identified] y.o.)      Referring Practitioner: Jeanie Penn  Diagnosis: generalized weakness  Additional Pertinent Hx: admit with above diagnosis, MYRA, generalized weakness, DM, PORFIRIO, dehydration, hypothyroidism, tachybradycardia syndrome     Restrictions/Precautions:  Restrictions/Precautions: General Precautions, Fall Risk  Position Activity Restriction  Other position/activity restrictions: hx CVA with residual left hemibody weakness    Subjective:  Chart Reviewed: Yes  Patient assessed for rehabilitation services?: Yes  Subjective: pleasant and cooperative, per pt min weaker than baseline and would like to cont therapy    General:        Hearing: Within functional limits       Pain: no c/o pain    Vitals:   BP MAP HR  Semifowlers 129/68 90 74  Sitting  129/71 93 77  After 5min std 133/76 99 85    Social/Functional History:    Lives With: Significant other  Type of Home: Trailer  Home Layout: One level  Home Access: Stairs to enter with rails  Entrance Stairs - Number of Steps: 4  Home Equipment: Zoraida Yancey, 4 wheeled, Lift chair, Dey Boris, Electric scooter     Bathroom Toilet: Handicap height  Bathroom Equipment: Grab bars around toilet             Ambulation Assistance: Needs assistance  Transfer Assistance: Needs assistance          Additional Comments: per pt goes to  3x/week from 9am to 3pm, pt has assist for bed mobility and transfers. amb short distances with rollator otherwise will use electric scooter.  hx of CVA with residual left hemibody weakness    OBJECTIVE:  Range of Motion:  Bilateral Lower Extremity: WFL    Strength:  Right Lower Extremity: WFL  Left Lower Extremity: Impaired - residual from hx of CVA, >/=2-/5, hip flex 3/5    Balance:  Static Sitting Balance:  Supervision  Static Standing Balance: Contact Guard Assistance, tolerated 5 min to take vitals then required seated rest break before amb trial due to fatigue. No UE support, WBOS in stding  Bed Mobility:  Rolling to Right: Minimal Assistance   Supine to Sit: Minimal Assistance  Scooting: contact guard Assistance fwd in sitting  HOB up 20 degrees    Transfers:  Sit to Stand: Contact Guard Assistance  Stand to Sit:Stand By Assistance  Cues for hand placement  Ambulation:  Contact Guard Assistance  Distance: 3'x1 with RW, 20'x1 with HHA  Surface: Level Tile  Device:Hand-Held Assist  Gait Deviations:  Slow Namita, Decreased Heel Strike on Left, Mild Path Deviations, and tendency for step to gait pattern leading with LLE, per pt min weaker than baseline. Uses rollator in home and electric scooter outside home. Functional Outcome Measures: Completed  AM-PAC Inpatient Mobility Raw Score : 18  AM-PAC Inpatient T-Scale Score : 43.63    ASSESSMENT:  Activity Tolerance:  Patient tolerance of  treatment: good. Treatment Initiated: Treatment and education initiated within context of evaluation. Evaluation time included review of current medical information, gathering information related to past medical, social and functional history, completion of standardized testing, formal and informal observation of tasks, assessment of data and development of plan of care and goals. Treatment time included skilled education and facilitation of tasks to increase safety and independence with functional mobility for improved independence and quality of life. Assessment:   Body Structures, Functions, Activity Limitations Requiring Skilled Therapeutic Intervention: Decreased functional mobility , Decreased endurance, Decreased tolerance to work activity, Decreased balance, Decreased strength  Assessment: pt with hx CVA and left sided residual weakness, dec balance, generalized weakness from baseline, inc assist for safe mobility. Recommend cont PT to inc pt functional mobility. Therapy Prognosis: Good    Requires PT Follow-Up: Yes    Discharge Recommendations:  Discharge Recommendations: Continue to assess pending progress, Patient would benefit from continued therapy after discharge, 24 hour supervision or assist    Patient Education:      . Patient Education  Education Given To: Patient  Education Provided: Role of Therapy, Plan of Care  Education Method: Verbal  Education Outcome: Verbalized understanding, Continued education needed       Equipment Recommendations:  Equipment Needed: No    Plan:  Current Treatment Recommendations: Strengthening, ROM, Balance training, Gait training, Stair training, Functional mobility training, Transfer training, Endurance training, Equipment evaluation, education, & procurement, Patient/Caregiver education & training, Safety education & training, Home exercise program, Therapeutic activities  General Plan:  (5X GM)    Goals:  Patient Goals : get stronger  Short Term Goals  Time Frame for Short Term Goals: by discharge  Short Term Goal 1: bed mobility with CGA to get in/out of bed  Short Term Goal 2: transfer with SBA to get in/out of chairs  Short Term Goal 3: amb 50'x1 with RW and CGA to walk safely in home  Short Term Goal 4: negotiate 4 steps with HR and CGA to enter home safely  Long Term Goals  Time Frame for Long Term Goals : no LTGs set secondary to short ELOS    Following session, patient left in safe position with all fall risk precautions in place.

## 2023-02-27 NOTE — PLAN OF CARE
Problem: Respiratory - Adult  Goal: Achieves optimal ventilation and oxygenation  Outcome: Progressing   Patient mutually agrees upon goal. Patient refusing NIV. back/RIGHT HIP

## 2023-02-27 NOTE — PLAN OF CARE
Problem: Discharge Planning  Goal: Discharge to home or other facility with appropriate resources  2/27/2023 1401 by Melissa Ross RN  Outcome: Progressing  Flowsheets (Taken 2/27/2023 1401)  Discharge to home or other facility with appropriate resources:   Identify barriers to discharge with patient and caregiver   Arrange for needed discharge resources and transportation as appropriate     Problem: Pain  Goal: Verbalizes/displays adequate comfort level or baseline comfort level  2/27/2023 1401 by Melissa Ross RN  Outcome: Progressing  Flowsheets (Taken 2/27/2023 1401)  Verbalizes/displays adequate comfort level or baseline comfort level:   Encourage patient to monitor pain and request assistance   Assess pain using appropriate pain scale     Problem: Safety - Adult  Goal: Free from fall injury  2/27/2023 1401 by Melissa Ross RN  Outcome: Progressing  Flowsheets (Taken 2/27/2023 1401)  Free From Fall Injury: Instruct family/caregiver on patient safety     Problem: Chronic Conditions and Co-morbidities  Goal: Patient's chronic conditions and co-morbidity symptoms are monitored and maintained or improved  2/27/2023 1403 by Melissa Ross RN  Outcome: Progressing  Flowsheets (Taken 2/27/2023 1403)  Care Plan - Patient's Chronic Conditions and Co-Morbidity Symptoms are Monitored and Maintained or Improved:   Monitor and assess patient's chronic conditions and comorbid symptoms for stability, deterioration, or improvement   Collaborate with multidisciplinary team to address chronic and comorbid conditions and prevent exacerbation or deterioration     Care plan reviewed with patient. Patient verbalizes understanding of the care plan and contributed to goal setting.

## 2023-02-27 NOTE — PLAN OF CARE
Problem: Discharge Planning  Goal: Discharge to home or other facility with appropriate resources  Outcome: Progressing  Flowsheets (Taken 2/26/2023 2000)  Discharge to home or other facility with appropriate resources: Identify barriers to discharge with patient and caregiver  Note: Patient plans to go to in patient rehab before home. Continue to assess discharge needs. Problem: Pain  Goal: Verbalizes/displays adequate comfort level or baseline comfort level  Outcome: Progressing  Flowsheets  Taken 2/27/2023 0508  Verbalizes/displays adequate comfort level or baseline comfort level:   Encourage patient to monitor pain and request assistance   Assess pain using appropriate pain scale  Taken 2/27/2023 0300  Verbalizes/displays adequate comfort level or baseline comfort level: Encourage patient to monitor pain and request assistance  Taken 2/26/2023 2000  Verbalizes/displays adequate comfort level or baseline comfort level: Encourage patient to monitor pain and request assistance     Problem: Safety - Adult  Goal: Free from fall injury  Outcome: Progressing  Note: Ongoing assessment and intervention provided this shift. Call light kept within reach, bed in lowest position, Bed alarm on.       Problem: ABCDS Injury Assessment  Goal: Absence of physical injury  Outcome: Progressing  Flowsheets (Taken 2/27/2023 0508)  Absence of Physical Injury: Implement safety measures based on patient assessment     Problem: Nutrition Deficit:  Goal: Optimize nutritional status  Outcome: Progressing  Flowsheets (Taken 2/27/2023 0508)  Nutrient intake appropriate for improving, restoring, or maintaining nutritional needs:   Assess nutritional status and recommend course of action   Monitor oral intake, labs, and treatment plans     Problem: Chronic Conditions and Co-morbidities  Goal: Patient's chronic conditions and co-morbidity symptoms are monitored and maintained or improved  Outcome: Progressing  Flowsheets  Taken 2/27/2023 [address] - Patient's Chronic Conditions and Co-Morbidity Symptoms are Monitored and Maintained or Improved: Monitor and assess patient's chronic conditions and comorbid symptoms for stability, deterioration, or improvement  Taken 2/26/2023 2000  Care Plan - Patient's Chronic Conditions and Co-Morbidity Symptoms are Monitored and Maintained or Improved: Monitor and assess patient's chronic conditions and comorbid symptoms for stability, deterioration, or improvement  Note: Continue Accuchecks ACHS. Insulin coverage per scale. Problem: Respiratory - Adult  Goal: Achieves optimal ventilation and oxygenation  2/27/2023 0508 by Gamaliel Riojas RN  Outcome: Progressing  Flowsheets (Taken 2/27/2023 0508)  Achieves optimal ventilation and oxygenation:   Assess for changes in respiratory status   Assess for changes in mentation and behavior   Care plan reviewed with patient . Patient  verbalize understanding of the plan of care and contribute to goal setting.

## 2023-02-27 NOTE — CARE COORDINATION
Case Management Assessment  Initial Evaluation    Date/Time of Evaluation: 2/27/2023 2:06 PM  Assessment Completed by: Ward Mendiola RN    If patient is discharged prior to next notation, then this note serves as note for discharge by case management. Patient Name: Joe Miller                   YOB: 1940  Diagnosis: Generalized weakness [R53.1]  MYRA (acute kidney injury) [facility]) [N17.9]                   Date / Time: 2/24/2023  3:00 PM  Location: [address] [place] [address]     Patient Admission Status: Inpatient   Readmission Risk Low 0-14, Mod 15-19), High > 20: Readmission Risk Score: 13.5    Current PCP: Carlee Hernandez, DO  PCP verified by CM? Yes    Chart Reviewed: Yes      History Provided by: Patient, Friend  Patient Orientation: Alert and Oriented    Patient Cognition: Alert    Hospitalization in the last 30 days (Readmission):  No    If yes, Readmission Assessment in CM Navigator will be completed. Advance Directives:      Code Status: Full Code   Patient's Primary Decision Maker is: Legal Next of Kin (Has at home)    Primary Decision Maker: Cherry Pedraza - Child - [phone]    Secondary Decision Maker: Claudette Crate - Other - [phone]    Secondary Decision Maker: Azael Benavides - [phone]    Discharge Planning:    Patient lives with: Spouse/Significant Other Type of Home: Trailer/Mobile Home  Primary Care Giver: Friend (S.O.)  Patient Support Systems include: Spouse/Significant Other, Family Members, Children   Current Financial resources: Medicare  Current community resources:  Other (Comment) ([facility] )  Current services prior to admission: Durable Medical Equipment, C-pap            Current DME: Cpap, Walker, Other (Comment), Bedside Commode, Shower Chair, Glucometer (CPAP has not used in 4 years, SR HME, Rollator, Bed rail, BSC, SC, continuous glucose monitor)            Type of Home Care services:  None    ADLS  Prior functional level: Assistance with the following:, Dressing, Bathing, Cooking, Housework, Shopping  Current functional level: Assistance with the following:, Bathing, Dressing, Cooking, Housework, Shopping    Family can provide assistance at DC: Yes  Would you like Case Management to discuss the discharge plan with any other family members/significant others, and if so, who? No  Plans to Return to Present Housing: Yes  Other Identified Issues/Barriers to RETURNING to current housing: None  Potential Assistance needed at discharge: N/A            Potential DME:    Patient expects to discharge to: Trailer/mobile home  Plan for transportation at discharge: Family    Financial    Payor: Luis Manuel Jules / Plan: Holli Neves / Product Type: *No Product type* /     Does insurance require precert for SNF: Yes    Potential assistance Purchasing Medications: No  Meds-to-Beds request: Yes      [address]  [address]  Phone: [phone] Fax: [phone]      Notes:    Factors facilitating achievement of predicted outcomes: Family support, Caregiver support, Cooperative, Pleasant, Sense of humor, Good insight into deficits, and Has needed Durable Medical Equipment at home    Barriers to discharge: Upper extremity weakness, Lower extremity weakness, and possible IPR vs SNF vs home with [facility]. Additional Case Management Notes: Admitted through ED with generalized weakness and inability to tolerate diet. BUN 74 and creatinine 1.6 upon arrival. Today BUN 26 and creatinine 0.6. Lovenox. Diabetes management. PT/OT. Per Hospitalist, possible IPR vs SNF vs Home with HH. Procedure: None    The Plan for Transition of Care is related to the following treatment goals of Generalized weakness [R53.1]  MYRA (acute kidney injury) ([facility] [address] [N17.9]    Patient Goals/Plan/Treatment Preferences: Spoke with pt, she lives at home with her S.O.  States she has ACP's but that they are home, encouraged pt to bring in to hospital to be scanned to chart. States her son is her POA. Has DME (see above). IPR consulted, possible SNF or HH at discharge. Has used [facility] in the past, but is open to any. Goes to adult  three times a week. Transportation/Food Security/Housekeeping Addressed: No issues identified.      Philomena Bhardwaj RN  Case Management Department

## 2023-02-28 VITALS
OXYGEN SATURATION: 98 % | BODY MASS INDEX: 27.64 KG/M2 | HEART RATE: 72 BPM | DIASTOLIC BLOOD PRESSURE: 62 MMHG | SYSTOLIC BLOOD PRESSURE: 141 MMHG | WEIGHT: 150.2 LBS | HEIGHT: 62 IN | TEMPERATURE: 98 F | RESPIRATION RATE: 18 BRPM

## 2023-02-28 LAB
ALBUMIN SERPL BCG-MCNC: 2.8 G/DL (ref 3.5–5.1)
ALP SERPL-CCNC: 113 U/L (ref 38–126)
ALT SERPL W/O P-5'-P-CCNC: 22 U/L (ref 11–66)
ANION GAP SERPL CALC-SCNC: 12 MEQ/L (ref 8–16)
AST SERPL-CCNC: 23 U/L (ref 5–40)
BILIRUB SERPL-MCNC: 0.3 MG/DL (ref 0.3–1.2)
BUN SERPL-MCNC: 14 MG/DL (ref 7–22)
CALCIUM SERPL-MCNC: 8.6 MG/DL (ref 8.5–10.5)
CHLORIDE SERPL-SCNC: 105 MEQ/L (ref 98–111)
CO2 SERPL-SCNC: 21 MEQ/L (ref 23–33)
CREAT SERPL-MCNC: 0.7 MG/DL (ref 0.4–1.2)
DEPRECATED RDW RBC AUTO: 43.9 FL (ref 35–45)
ERYTHROCYTE [DISTWIDTH] IN BLOOD BY AUTOMATED COUNT: 13.3 % (ref 11.5–14.5)
GFR SERPL CREATININE-BSD FRML MDRD: > 60 ML/MIN/1.73M2
GLUCOSE BLD STRIP.AUTO-MCNC: 106 MG/DL (ref 70–108)
GLUCOSE SERPL-MCNC: 106 MG/DL (ref 70–108)
HCT VFR BLD AUTO: 34.2 % (ref 37–47)
HGB BLD-MCNC: 10.8 GM/DL (ref 12–16)
MCH RBC QN AUTO: 28.7 PG (ref 26–33)
MCHC RBC AUTO-ENTMCNC: 31.6 GM/DL (ref 32.2–35.5)
MCV RBC AUTO: 91 FL (ref 81–99)
PLATELET # BLD AUTO: 314 THOU/MM3 (ref 130–400)
PMV BLD AUTO: 12 FL (ref 9.4–12.4)
POTASSIUM SERPL-SCNC: 4.4 MEQ/L (ref 3.5–5.2)
PROT SERPL-MCNC: 5.5 G/DL (ref 6.1–8)
RBC # BLD AUTO: 3.76 MILL/MM3 (ref 4.2–5.4)
SODIUM SERPL-SCNC: 138 MEQ/L (ref 135–145)
WBC # BLD AUTO: 10.4 THOU/MM3 (ref 4.8–10.8)

## 2023-02-28 PROCEDURE — 82948 REAGENT STRIP/BLOOD GLUCOSE: CPT

## 2023-02-28 PROCEDURE — 6370000000 HC RX 637 (ALT 250 FOR IP): Performed by: INTERNAL MEDICINE

## 2023-02-28 PROCEDURE — 97535 SELF CARE MNGMENT TRAINING: CPT

## 2023-02-28 PROCEDURE — 36415 COLL VENOUS BLD VENIPUNCTURE: CPT

## 2023-02-28 PROCEDURE — 97166 OT EVAL MOD COMPLEX 45 MIN: CPT

## 2023-02-28 PROCEDURE — 6370000000 HC RX 637 (ALT 250 FOR IP): Performed by: STUDENT IN AN ORGANIZED HEALTH CARE EDUCATION/TRAINING PROGRAM

## 2023-02-28 PROCEDURE — 85027 COMPLETE CBC AUTOMATED: CPT

## 2023-02-28 PROCEDURE — 6360000002 HC RX W HCPCS: Performed by: STUDENT IN AN ORGANIZED HEALTH CARE EDUCATION/TRAINING PROGRAM

## 2023-02-28 PROCEDURE — 2580000003 HC RX 258: Performed by: STUDENT IN AN ORGANIZED HEALTH CARE EDUCATION/TRAINING PROGRAM

## 2023-02-28 PROCEDURE — 99239 HOSP IP/OBS DSCHRG MGMT >30: CPT | Performed by: INTERNAL MEDICINE

## 2023-02-28 PROCEDURE — 80053 COMPREHEN METABOLIC PANEL: CPT

## 2023-02-28 RX ADMIN — CLOPIDOGREL BISULFATE 75 MG: 75 TABLET ORAL at 08:31

## 2023-02-28 RX ADMIN — ASPIRIN 325 MG: 325 TABLET, COATED ORAL at 08:28

## 2023-02-28 RX ADMIN — LOSARTAN POTASSIUM 25 MG: 25 TABLET, FILM COATED ORAL at 08:29

## 2023-02-28 RX ADMIN — LEVOTHYROXINE SODIUM 112 MCG: 0.11 TABLET ORAL at 06:41

## 2023-02-28 RX ADMIN — SODIUM CHLORIDE, PRESERVATIVE FREE 10 ML: 5 INJECTION INTRAVENOUS at 08:36

## 2023-02-28 RX ADMIN — ENOXAPARIN SODIUM 40 MG: 100 INJECTION SUBCUTANEOUS at 08:34

## 2023-02-28 RX ADMIN — LEVETIRACETAM 500 MG: 500 TABLET, FILM COATED ORAL at 08:33

## 2023-02-28 RX ADMIN — SERTRALINE 50 MG: 50 TABLET, FILM COATED ORAL at 08:30

## 2023-02-28 RX ADMIN — METOPROLOL SUCCINATE 25 MG: 25 TABLET, EXTENDED RELEASE ORAL at 08:34

## 2023-02-28 NOTE — DISCHARGE SUMMARY
DISCHARGE SUMMARY      Patient Identification:   Sarah Munguia   : 1940  MRN: [id number]   Account: [de-identified]      Patient's PCP: Katt Hernandes DO    Admit Date: 2023     Discharge Date: 2023      Admitting Physician: Alberto Clement MD     Discharge Physician: Eugene Gibbs DO     Discharge Diagnoses: Active Hospital Problems    Diagnosis Date Noted    Generalized weakness [R53.1] 2023     Priority: Medium     Generalized weakness, home with Home Health  MYRA, resolved  Pseudohyponatremia, resolved  Mildly elevated AG, resolved  Diabetes mellitus type 2  Obstructive sleep apnea  Normocytic Anemia  Subclinical Hypothyroidism   Hypertension, continue home Amlodipine  Dehydration, resolved  Hx of CAD  Depression/Anxiety     The patient was seen and examined on day of discharge and this discharge summary is in conjunction with any daily progress note from day of discharge. Hospital Course:   Sarah Munguia is a [de-identified] y.o. female admitted to [facility] on 2023 for generalized weakness. Per initial H&P:  \"This is an 82-year-old female who was admitted to [facility] after presenting to the ED with generalized weakness and inability to tolerate p.o. intake both solids and liquids for about a week. PMH including HLD, HTN, PORFIRIO on CPAP. Patient has been hardly able to tolerate any p.o. intake with both solids and liquids and ended up vomiting most of her meals for the last 2 days. Denies chest pain, fever, congestion, dysuria. She did endorse cough for the last day or 2. COVID-negative. \"    Patient received IV fluids and her MYRA subsequently resolved throughout admission. She was also monitored with her generalized weakness that was chronic. Patient will resume her therapy throughout her admission. She was back in today for inpatient rehab. KUB showed normal gas pattern during admission.     On day of discharge, patient states she felt well and had no new complaints. States she felt much stronger than when she did when she first came to the hospital.  She denies chest pain, shortness of breath, n/v/d, and worsening weakness states she was ready to go home. She was discharged home with home health and PCP follow-up. Exam:     Vitals:  Vitals:    02/27/23 2253 02/28/23 0353 02/28/23 0516 02/28/23 0757   BP: (!) 155/70 (!) 141/65  (!) 141/62   Pulse: 77 70  72   Resp: 18 18  18   Temp: 98 °F (36.7 °C) 97.9 °F (36.6 °C)  98 °F (36.7 °C)   TempSrc: Oral Oral  Oral   SpO2: 96% 96%  98%   Weight:   150 lb 3.2 oz (68.1 kg)    Height:         Weight: Weight: 150 lb 3.2 oz (68.1 kg)     24 hour intake/output:  Intake/Output Summary (Last 24 hours) at 2/28/2023 1608  Last data filed at 2/28/2023 1121  Gross per 24 hour   Intake 320 ml   Output --   Net 320 ml       Physical Exam  Vitals reviewed. Constitutional:       General: She is not in acute distress. Appearance: She is not ill-appearing. HENT:      Mouth/Throat:      Mouth: Mucous membranes are moist.   Eyes:      General:         Right eye: No discharge. Left eye: No discharge. Cardiovascular:      Rate and Rhythm: Normal rate and regular rhythm. Heart sounds: Normal heart sounds. No murmur heard. No friction rub. No gallop. Pulmonary:      Effort: Pulmonary effort is normal. No respiratory distress. Breath sounds: Normal breath sounds. No stridor. No wheezing, rhonchi or rales. Abdominal:      General: There is no distension. Palpations: Abdomen is soft. Tenderness: There is no abdominal tenderness. There is no guarding or rebound. Skin:     General: Skin is warm. Neurological:      Mental Status: She is alert. Mental status is at baseline. Motor: Weakness (left sided, chronic) present. Psychiatric:         Mood and Affect: Mood normal.         Behavior: Behavior normal.         Labs:  For convenience and continuity at follow-up the following most recent labs are provided:      CBC:    Lab Results   Component Value Date/Time    WBC 10.4 02/28/2023 03:38 AM    HGB 10.8 02/28/2023 03:38 AM    HCT 34.2 02/28/2023 03:38 AM     02/28/2023 03:38 AM       Renal:    Lab Results   Component Value Date/Time     02/28/2023 03:38 AM    K 4.4 02/28/2023 03:38 AM     02/28/2023 03:38 AM    CO2 21 02/28/2023 03:38 AM    BUN 14 02/28/2023 03:38 AM    CREATININE 0.7 02/28/2023 03:38 AM    CALCIUM 8.6 02/28/2023 03:38 AM    PHOS 3.0 02/24/2023 07:09 PM         Significant Diagnostic Studies    Radiology:   XR ABDOMEN (KUB) (SINGLE AP VIEW)   Final Result   No acute findings. This document has been electronically signed by: Maria Eugenia Urrutia MD on    02/24/2023 10:43 PM      XR CHEST PORTABLE   Final Result   Stable radiographic appearance of the chest. No evidence of an acute process. **This report has been created using voice recognition software. It may contain minor errors which are inherent in voice recognition technology. **      Final report electronically signed by Dr. Juan A Awad MD on 2/24/2023 5:21 PM             Consults:     IP CONSULT TO SOCIAL WORK  IP CONSULT TO PHYSICAL MEDICINE REHAB  IP CONSULT TO HOME CARE NEEDS    Disposition:    [x] Home       [] TCU       [] Rehab       [] Psych       [] SNF       [] [place]       [] Other-    Condition at Discharge: Stable    Code Status:  Prior     Patient Instructions:    Discharge lab work: N/A  Activity: activity as tolerated  Diet: No diet orders on file      Follow-up visits:   DO Cierra Clement Kee [address] [address] [phone]    Schedule an appointment as soon as possible for a visit in 1 week(s)  office will call patient    [facility].   [facility] [address] [phone]             Discharge Medications:        Medication List        CONTINUE taking these medications      amLODIPine 5 MG tablet  Commonly known as: NORVASC     amoxicillin 875 MG tablet  Commonly known as: AMOXIL     aspirin 325 MG EC tablet  Take 1 tablet by mouth daily     atorvastatin 80 MG tablet  Commonly known as: LIPITOR     clopidogrel 75 MG tablet  Commonly known as: PLAVIX     ferrous sulfate 325 (65 Fe) MG tablet  Commonly known as: IRON 325     Gemtesa 75 MG Tabs tablet  Generic drug: vibegron     levETIRAcetam 500 MG tablet  Commonly known as: KEPPRA     levothyroxine 112 MCG tablet  Commonly known as: SYNTHROID     losartan 25 MG tablet  Commonly known as: Cozaar  Take 1 tablet by mouth daily     metoprolol succinate 25 MG extended release tablet  Commonly known as: TOPROL XL  TAKE ONE TABLET BY MOUTH EVERY DAY     NOVOLOG FLEXPEN SC     * RELI-ON INSULIN SYRINGE     * Insulin Syringe-Needle U-100 31G X 5/16\" 0.5 ML Misc  Commonly known as: [employer] Insulin Syringe  1 each by Does not apply route daily     sertraline 50 MG tablet  Commonly known as: ZOLOFT     TOUJEO MAX SOLOSTAR SC     VITAMIN B 12 PO           * This list has 2 medication(s) that are the same as other medications prescribed for you. Read the directions carefully, and ask your doctor or other care provider to review them with you. STOP taking these medications      NOVOLIN R IJ     Ozempic (0.25 or 0.5 MG/DOSE) 2 MG/1.5ML Sopn  Generic drug: Semaglutide(0.25 or 0.5MG/DOS)              Time Spent on discharge is more than 45 minutes in the examination, evaluation, counseling and review of medications and discharge plan. Signed: Thank you Kashmir Prince DO for the opportunity to be involved in this patient's care.     Electronically signed by Jessa Gonzalez DO on 2/28/2023 at 4:08 PM

## 2023-02-28 NOTE — DISCHARGE INSTR - COC
Continuity of Care Form    Patient Name: James Booker   :  1940  MRN:  [id number]    Admit date:  2023  Discharge date:  ***    Code Status Order: Full Code   Advance Directives:     Admitting Physician:  Herman Ledbetter MD  PCP: Amaris Palacios DO    Discharging Nurse: [facility] Unit/Room#: 3B-27/027-A  Discharging Unit Phone Number: ***    Emergency Contact:   Extended Emergency Contact Information  Primary Emergency Contact: Justusmerari [address] Phone: [phone]  Relation: Child  Secondary Emergency Contact: [facility] [address] Phone: [phone]  Relation: Other    Past Surgical History:  Past Surgical History:   Procedure Laterality Date    BREAST BIOPSY  2014    BREAST SURGERY  1974    left breast     CARPAL TUNNEL RELEASE Right 2013    CHOLECYSTECTOMY  1968    COLONOSCOPY  2007    FINGER TRIGGER RELEASE      HERNIA REPAIR  14    Dr. Kari Hines ([address] [place] [address])  1996    JOINT REPLACEMENT Left     KNEE ARTHROSCOPY      left    KNEE SURGERY Right 2017    LYMPH NODE DISSECTION  1998    MASTECTOMY  2001       Immunization History:   Immunization History   Administered Date(s) Administered    Influenza Virus Vaccine 2013    Influenza, Pepe Sahni (age 10 mo+) AND AFLURIA, (age 1 y+), PF, 0.5mL 2020    PPD Test 04/10/2020, 2020       Active Problems:  Patient Active Problem List   Diagnosis Code    Type 2 diabetes mellitus with circulatory disorder, with long-term current use of insulin (HCC) E11.59, Z79.4    Hypothyroidism E03.9    Bladder spasm N32.89    SOB (shortness of breath) Q58.52    Diastolic dysfunction D64.93    Pulmonary hypertension ([facility] [address] I27.20    DM hyperosmolarity type II, uncontrolled (HCC) E11.00, E11.65    AMBROCIO (dyspnea on exertion) R06.09    Dyslipidemia, goal LDL below 100 E78.5    Diabetes mellitus type II, uncontrolled IRE4134    Moderate tricuspid regurgitation I07.1    Fatigue R53.83    Gout M10.9    Deviated septum J34.2    Hypertrophy, nasal, turbinate J34.3    Obstructive sleep apnea on CPAP G47.33, Z99.89    Essential hypertension I10    Coronary artery disease involving native coronary artery of native heart without angina pectoris I25.10    Murmur R01.1    COVID-19 U07.1    Sprain and strain of left wrist S99.965N, P19.516U    Sprain of left foot S93.602A    Strain of left inguinal muscle S39.013A    Anxiety F41.9    Hyperglycemia R73.9    MYRA (acute kidney injury) (HCC) N17.9    Generalized weakness R53.1       Isolation/Infection:   Isolation            No Isolation          Patient Infection Status       Infection Onset Added Last Indicated Last Indicated By Review Planned Expiration Resolved Resolved By    None active    Resolved    COVID-19 (Rule Out) 23 COVID-19 & Influenza Combo (Ordered)   23 Rule-Out Test Resulted    COVID-19 (Rule Out) 22 COVID-19 & Influenza Combo (Ordered)   22 Rule-Out Test Resulted    COVID-19 20 Emergent Disease Panel   20     COVID-19 (Rule Out) 20 Emergent Disease Panel (Ordered)   20 Rule-Out Test Resulted            Nurse Assessment:  Last Vital Signs: BP (!) 141/62   Pulse 72   Temp 98 °F (36.7 °C) (Oral)   Resp 18   Ht 5' 2\" (1.575 m)   Wt 150 lb 3.2 oz (68.1 kg)   SpO2 98%   BMI 27.47 kg/m²     Last documented pain score (0-10 scale): Pain Level: 0  Last Weight:   Wt Readings from Last 1 Encounters:   23 150 lb 3.2 oz (68.1 kg)     Mental Status:  {IP PT MENTAL STATUS:}    IV Access:  {[facility] IV ACCESS:580605728}    Nursing Mobility/ADLs:  Walking   {[facility] WZRN:363839071}  Transfer  {[facility] ETHO:413959679}  Bathing  {[facility] D[facility] EUIJ:569123182}  Dressing  {[facility] D[facility] DBNY:590431355}  Toileting  {[facility] BGY}  Feeding  {[facility] DME CSOA:896806412}  Med Admin  {[facility] D[facility] ADLs:245213574}  Med Delivery   { VITALIY MED Delivery:359190457}    Wound Care Documentation and Therapy:  Wound 20 Toe (Comment  which one) Anterior;Left rug burn  (Active)   Number of days: 1054       Wound 04/10/20 Buttocks Inner;Right DTI (Active)   Number of days: 1053       Wound 20 Buttocks stage 2 (Active)   Number of days: 814        Elimination:  Continence:   Bowel: {YES / NO:}  Bladder: {YES / NO:}  Urinary Catheter: {Urinary Catheter:351922921}   Colostomy/Ileostomy/Ileal Conduit: {YES / NO:}       Date of Last BM: ***    Intake/Output Summary (Last 24 hours) at 2023 1152  Last data filed at 2023 1121  Gross per 24 hour   Intake 320 ml   Output --   Net 320 ml     I/O last 3 completed shifts:  In: 490 [P.O.:490]  Out: -     Safety Concerns:     { VITALIY Safety Concerns:846996811}    Impairments/Disabilities:      {[facility] Impairments/Disabilities:990834453}    Nutrition Therapy:  Current Nutrition Therapy:   {[facility] Diet List:653050144}    Routes of Feeding: {[facility] Other Feedings:150947811}  Liquids: {Slp liquid thickness:86432}  Daily Fluid Restriction: {[facility] DME Yes amt example:089189131}  Last Modified Barium Swallow with Video (Video Swallowing Test): {Done Not Done Date:}    Treatments at the Time of Hospital Discharge:   Respiratory Treatments: ***  Oxygen Therapy:  {Therapy; copd oxygen:86587}  Ventilator:    { CC Vent List:300882115}    Rehab Therapies: {THERAPEUTIC INTERVENTION:6083438355}  Weight Bearing Status/Restrictions: {[facility] Weight Bearin}  Other Medical Equipment (for information only, NOT a DME order):  {EQUIPMENT:585908054}  Other Treatments: ***    Patient's personal belongings (please select all that are sent with patient):  {[facility] DME Belongings:937724054}    RN SIGNATURE:  {Esignature:439365386}    CASE MANAGEMENT/SOCIAL WORK SECTION    Inpatient Status Date: ***    Readmission Risk Assessment Score:  Readmission Risk          Risk of Unplanned Readmission:  15           Discharging to Facility/ Agency   Name:   Address:  Phone:  Fax:    Dialysis Facility (if applicable)   Name:  Address:  Dialysis Schedule:  Phone:  Fax:    / signature: {Esignature:084265867}    PHYSICIAN SECTION    Prognosis: {Prognosis:2106301922}    Condition at Discharge: Jeremias Perkins Patient Condition:893503462}    Rehab Potential (if transferring to Rehab): {Prognosis:3356049163}    Recommended Labs or Other Treatments After Discharge: ***    Physician Certification: I certify the above information and transfer of Pablo Aguiar  is necessary for the continuing treatment of the diagnosis listed and that she requires {Admit to Appropriate Level of Care:87089} for {GREATER/LESS:617109005} 30 days.      Update Admission H&P: {[facility] DME Changes in XFHEW:193017890}    PHYSICIAN SIGNATURE:  {Esignature:964101628}

## 2023-02-28 NOTE — PROGRESS NOTES
Discharge teaching and instructions for diagnosis/procedure of MYRA, dehydration completed with patient using teachback method. AVS reviewed. No new medications prescribed. Patient voiced understanding regarding medications, follow up appointments, and care of self at home. Discharged in a wheelchair to  home with support per family. Patient stable at discharge.

## 2023-02-28 NOTE — PLAN OF CARE
Problem: Discharge Planning  Goal: Discharge to home or other facility with appropriate resources  Outcome: Progressing  Flowsheets (Taken 2/27/2023 2015)  Discharge to home or other facility with appropriate resources:   Identify barriers to discharge with patient and caregiver   Arrange for needed discharge resources and transportation as appropriate   Identify discharge learning needs (meds, wound care, etc)   Refer to discharge planning if patient needs post-hospital services based on physician order or complex needs related to functional status, cognitive ability or social support system     Problem: Pain  Goal: Verbalizes/displays adequate comfort level or baseline comfort level  Outcome: Progressing  Flowsheets (Taken 2/27/2023 2015)  Verbalizes/displays adequate comfort level or baseline comfort level:   Encourage patient to monitor pain and request assistance   Assess pain using appropriate pain scale   Administer analgesics based on type and severity of pain and evaluate response   Implement non-pharmacological measures as appropriate and evaluate response     Problem: Skin/Tissue Integrity  Goal: Absence of new skin breakdown  Description: 1. Monitor for areas of redness and/or skin breakdown  2. Assess vascular access sites hourly  3. Every 4-6 hours minimum:  Change oxygen saturation probe site  4. Every 4-6 hours:  If on nasal continuous positive airway pressure, respiratory therapy assess nares and determine need for appliance change or resting period. Outcome: Progressing  Note: No skin breakdown this shift. Patient turns self.      Problem: Safety - Adult  Goal: Free from fall injury  Outcome: Progressing  Flowsheets (Taken 2/28/2023 0422)  Free From Fall Injury: Instruct family/caregiver on patient safety     Problem: ABCDS Injury Assessment  Goal: Absence of physical injury  Outcome: Progressing  Flowsheets (Taken 2/28/2023 0422)  Absence of Physical Injury: Implement safety measures based on patient assessment     Problem: Nutrition Deficit:  Goal: Optimize nutritional status  Outcome: Progressing  Flowsheets (Taken 2/28/2023 0424)  Nutrient intake appropriate for improving, restoring, or maintaining nutritional needs:   Assess nutritional status and recommend course of action   Monitor oral intake, labs, and treatment plans     Problem: Chronic Conditions and Co-morbidities  Goal: Patient's chronic conditions and co-morbidity symptoms are monitored and maintained or improved  Outcome: Progressing  Flowsheets (Taken 2/27/2023 2015)  Care Plan - Patient's Chronic Conditions and Co-Morbidity Symptoms are Monitored and Maintained or Improved:   Monitor and assess patient's chronic conditions and comorbid symptoms for stability, deterioration, or improvement   Collaborate with multidisciplinary team to address chronic and comorbid conditions and prevent exacerbation or deterioration     Problem: Respiratory - Adult  Goal: Achieves optimal ventilation and oxygenation  Outcome: Progressing  Flowsheets (Taken 2/27/2023 2015)  Achieves optimal ventilation and oxygenation:   Assess for changes in respiratory status   Assess for changes in mentation and behavior   Position to facilitate oxygenation and minimize respiratory effort   Oxygen supplementation based on oxygen saturation or arterial blood gases   Encourage broncho-pulmonary hygiene including cough, deep breathe, incentive spirometry   Assess and instruct to report shortness of breath or any respiratory difficulty     Problem: Metabolic/Fluid and Electrolytes - Adult  Goal: Electrolytes maintained within normal limits  Outcome: Progressing  Flowsheets (Taken 2/28/2023 0424)  Electrolytes maintained within normal limits:   Monitor labs and assess patient for signs and symptoms of electrolyte imbalances   Administer electrolyte replacement as ordered   Monitor response to electrolyte replacements, including repeat lab results as appropriate  Goal: Hemodynamic stability and optimal renal function maintained  Outcome: Progressing  Flowsheets (Taken 2/28/2023 0424)  Hemodynamic stability and optimal renal function maintained:   Monitor labs and assess for signs and symptoms of volume excess or deficit   Monitor intake, output and patient weight   Encourage oral intake as appropriate  Goal: Glucose maintained within prescribed range  Outcome: Progressing  Flowsheets (Taken 2/28/2023 0424)  Glucose maintained within prescribed range:   Monitor blood glucose as ordered   Assess for signs and symptoms of hyperglycemia and hypoglycemia   Administer ordered medications to maintain glucose within target range   Care plan reviewed with patient. Patient verbalized understanding of the plan of care and contribute to goal setting.

## 2023-02-28 NOTE — PROGRESS NOTES
Offered patient a bedside bath at this time. Patient states, \"No, I am going home. \" No needs at this time. Call light within reach.   Dmitry IWLSON/RSC

## 2023-02-28 NOTE — DISCHARGE INSTR - DIET

## 2023-02-28 NOTE — PROGRESS NOTES
Shauna Ndiaye 62  INPATIENT OCCUPATIONAL THERAPY  [facility] CCU-STEPDOWN 3B  EVALUATION    Time:   Time In: 940  Time Out: 4316  Timed Code Treatment Minutes: 9 Minutes  Minutes: 18          Date: 2023  Patient Name: James Booker,   Gender: female      MRN: [id number]  : 1940  ([de-identified] y.o.)  Referring Practitioner: Lai Morton MD  Diagnosis: generalized weakness  Additional Pertinent Hx: per chart review; This is an [de-identified] female who was admitted to [facility] after presenting to the ED with generalized weakness and inability to tolerate p.o. intake both solids and liquids for about a week. PMH including HLD, HTN, PORFIRIO on CPAP. Patient has been hardly able to tolerate any p.o. intake with both solids and liquids and ended up vomiting most of her meals for the last 2 days. Denies chest pain, fever, congestion, dysuria. She did endorse cough for the last day or 2. COVID-negative. Restrictions/Precautions:  Restrictions/Precautions: General Precautions, Fall Risk  Position Activity Restriction  Other position/activity restrictions: hx CVA with residual left hemibody weakness    Subjective  Chart Reviewed: Yes, Orders, Progress Notes, History and Physical  Patient assessed for rehabilitation services?: Yes  Family / Caregiver Present: Yes (significant other)    Subjective: patient seated on toilet upon OT arrival with significant other present in room. patient agreeable to eval-states she is going home with home health instead of [facility]. patient A & O x 4.     Pain: 0/10:     Vitals: Vitals not assessed per clinical judgement, see nursing flowsheet    Social/Functional History:  Lives With: Significant other  Type of Home: Trailer  Home Layout: One level  Home Access: Stairs to enter with rails  Entrance Stairs - Number of Steps: 4  Home Equipment: Emma Courser, 4 wheeled, Lift chair, [employer], Electric scooter   Bathroom Shower/Tub: Walk-in shower  Bathroom Toilet: Handicap height  Bathroom Equipment: Grab bars around toilet, Grab bars in shower, Shower chair  Bathroom Accessibility: Walker accessible    [address] Help From: Family  ADL Assistance: Needs assistance  Ambulation Assistance: Needs assistance  Transfer Assistance: Needs assistance    Active : No  Patient's  Info: Family takes to appts or significant other     Additional Comments: per pt goes to  3x/week from 9am to 3pm, pt has assist for bed mobility and transfers. amb short distances with rollator otherwise will use electric scooter. hx of CVA with residual left hemibody weakness    VISION:Corrected    HEARING:  WFL    COGNITION: WFL    RANGE OF MOTION:  Right Upper Extremity: WFL  Left Upper Extremity:  Impaired - hemiparesis with spasticity at elbow and hand. Able to actively extend digits of L hand, difficulty with actively extending elbow with firm end feel with PROM. PROM shldr into flex to approx 90 degrees with firm end feel    STRENGTH:  Right Upper Extremity: shldr 4/5 elbow flex and ext 4/5  (F)  Left Upper Extremity:   (F-)  -patient is R hand dominant    SENSATION:   WFL    ADL:   Toileting: Minimal Assistance. To pull up depend up L side of body, was able to complete toilet hygiene for urine with (S)  Toilet Transfer: Stand By Assistance and [address] With use of grab bars. Increased time on toilet attempting to have BM with patient unable to go . BALANCE:  Sitting Balance:  Stand By Assistance. Standing Balance: Contact Guard Assistance, Minimal Assistance. With hand held assist. Does not like 2 w/w due to L elbow not extending enough to hold onto handle of 2 w/w to safely push    BED MOBILITY:  Not Tested    TRANSFERS:  Sit to Stand:  Stand By Assistance, [address] FUNCTIONAL MOBILITY:  Assistive Device: hand held assist  Assist Level:  Contact Guard Assistance and Minimal Assistance. Distance:  To and from bathroom  Unsteady during  L hemiparesis of UE and LE. Minimal L knee movement     Activity Tolerance:  Patient tolerance of  treatment: Good treatment tolerance      Assessment:  Assessment: patient demo overall de-conditioning and weakness with hx of CVA with L sided deficits impacting her ability to effectively complete ADLs and functional transfers as prior. patient would benefit from continued, skilled OT to progress toward PLOF, decrease caregiver burden and increase occupational performance. Performance deficits / Impairments: Decreased functional mobility , Decreased ADL status, Decreased endurance, Decreased ROM, Decreased strength, Decreased safe awareness, Decreased balance, Decreased coordination, Decreased fine motor control  Prognosis: Good  REQUIRES OT FOLLOW-UP: Yes  Decision Making: Medium Complexity    Treatment Initiated: Treatment and education initiated within context of evaluation. Evaluation time included review of current medical information, gathering information related to past medical, social and functional history, completion of standardized testing, formal and informal observation of tasks, assessment of data and development of plan of care and goals. Treatment time included skilled education and facilitation of tasks to increase safety and independence with ADL's for improved functional independence and quality of life. Discharge Recommendations:  Continue to assess pending progress, Patient would benefit from continued therapy after discharge, Home with Home health OT    Patient Education:     Patient Education  Education Given To: Patient  Education Provided: Role of Therapy, ADL Adaptive Strategies, Plan of Care, Transfer Training, Fall Prevention Strategies, Precautions  Barriers to Learning: None    Equipment Recommendations:  Equipment Needed: No    Plan:  Times Per Week: 5x  Times Per Day:  Once a day  Current Treatment Recommendations: Strengthening, ROM, Balance training, Functional mobility training, Endurance training, Safety education & training, Neuromuscular re-education, Patient/Caregiver education & training, Self-Care / ADL, Positioning, Coordination training.  See long-term goal time frame for expected duration of plan of care.  If no long-term goals established, a short length of stay is anticipated.    Goals:  Patient goals : return home at [facility]  Short Term Goals  Time Frame for Short Term Goals: by discharge  Short Term Goal 1: patient will tolerate 5 min functional standing with SBA to increase activity tolerance for toileting and grooming.  Short Term Goal 2: patient will functionally ambulate house hold distances with SBA and least restrictive device.  Short Term Goal 3: patient will complete UB ADLs with SBA and use of one handed dressing tech to compensate for L UE hemiparesis.  Short Term Goal 4: patient will participate in moderate resistive UB exer for R UE to increase UB strength for functional transfers.  Short Term Goal 5: patient will be edu on L UE positioning to prevent contracture and pain.         Following session, patient left in safe position with all fall risk precautions in place.

## 2023-02-28 NOTE — CARE COORDINATION
2/28/23, 11:52 AM EST    Patient goals/plan/ treatment preferences discussed by  and . Patient goals/plan/ treatment preferences reviewed with patient/ family. Patient/ family verbalize understanding of discharge plan and are in agreement with goal/plan/treatment preferences. Understanding was demonstrated using the teach back method. AVS provided by RN at time of discharge, which includes all necessary medical information pertaining to the patients current course of illness, treatment, post-discharge goals of care, and treatment preferences. Services At/After Discharge: Home Health, OT, and PT       IMM Letter  IMM Letter given to Patient/Family/Significant other/Guardian/POA/by[de-identified] Mel Fairbanks RN   IMM Letter date given[de-identified] 02/28/23  IMM Letter time given[de-identified] 0008     Met with Izzy Frederick this morning. She plans to return home with her significant other. She goes to a day programs 3 days a week from 9 am -3pm.  She would like [facility] for RN, [place]. Spoke with Vee Duval at [facility] and she is aware that the patient goes to a day program 3 days a week. They will be able to see her on Tuesdays and Thursdays. Faxed chart information. Will fax AVS and [place] orders when completed. Update 2:19 pm: Faxed home health orders and AVS to [facility]. Called and spoke with Harriet Jennings at the agency. Told her SW faxed information and asked her to call if they do not receive it.

## 2023-02-28 NOTE — DISCHARGE INSTRUCTIONS
Acute Kidney Injury: Care Instructions  Overview     Acute kidney injury (MYRA) is a sudden decrease in kidney function. This can happen over a period of hours, days or, in some cases, weeks. MYRA used to be called acute renal failure, but kidney failure doesn't always happen with MYRA. Common causes of MYRA are serious infection, blood loss, and some medicines. When MYRA happens, the kidneys have trouble removing waste and excess fluids from the body. The waste and fluids build up and become harmful. Kidney function may return to normal if the cause of MYRA is treated quickly. Your chance of a full recovery depends on what caused the problem, how quickly the cause was treated, and what other medical problems you have. You may have a treatment called dialysis. It does the work of healthy kidneys to remove waste and fluids for a short time. Follow-up care is a key part of your treatment and safety. Be sure to make and go to all appointments, and call your doctor if you are having problems. It's also a good idea to know your test results and keep a list of the medicines you take. How can you care for yourself at home? Talk to your doctor about how much fluid you should drink. Eat a balanced diet. Talk to your doctor or a dietitian about what type of diet may be best for you. You may need to limit sodium, potassium, and phosphorus. If you need dialysis, follow the instructions and schedule for dialysis that your doctor gives you. Do not smoke. Smoking can make your condition worse. If you need help quitting, talk to your doctor about stop-smoking programs and medicines. These can increase your chances of quitting for good. Limit alcohol. Review all of your medicines with your doctor. Do not take any medicines, including nonsteroidal anti-inflammatory drugs (NSAIDs), such as ibuprofen (Advil, Motrin) or naproxen (Aleve), unless your doctor says it is safe for you to do so.   Make sure that anyone treating you for any health problem knows that you have had MYRA. When should you call for help? Call 911 anytime you think you may need emergency care. For example, call if:    You passed out (lost consciousness). Call your doctor now or seek immediate medical care if:    You have new or worse nausea and vomiting. You have much less urine than normal, or you have no urine. You are feeling confused or cannot think clearly. You have new or more blood in your urine. You have new swelling. You are dizzy or lightheaded, or feel like you may faint. Watch closely for changes in your health, and be sure to contact your doctor if:    You do not get better as expected. Where can you learn more? Go to http://www.woods.com/ and enter D885 to learn more about \"Acute Kidney Injury: Care Instructions. \"  Current as of: May 4, 2022               Content Version: 13.5  © 2006-2022 [employer]. Care instructions adapted under license by [facility] ([facility]). Dehydration: Care Instructions  Your Care Instructions  Dehydration happens when your body loses too much fluid. This might happen when you do not drink enough water or you lose large amounts of fluids from your body because of diarrhea, vomiting, or sweating. Severe dehydration can be life-threatening. Water and minerals called electrolytes help put your body fluids back in balance. Learn the early signs of fluid loss, and drink more fluids to prevent dehydration. Follow-up care is a key part of your treatment and safety. Be sure to make and go to all appointments, and call your doctor if you are having problems. It's also a good idea to know your test results and keep a list of the medicines you take.   How can you care for yourself at home? Drink plenty of fluids. Choose water and other clear liquids until you feel better. If you have kidney, heart, or liver disease and have to limit fluids, talk with your doctor before you increase the amount of fluids you drink. If you do not feel like eating or drinking, try taking small sips of water, sports drinks, or other rehydration drinks. Get plenty of rest.  To prevent dehydration  Add more fluids to your diet and daily routine, unless your doctor has told you not to. During hot weather, drink more fluids. Drink even more fluids if you exercise a lot. Stay away from drinks with alcohol. Watch for the symptoms of dehydration. These include:  A dry, sticky mouth. Not much urine. Dry and sunken eyes. Feeling very tired. Learn what problems can lead to dehydration. These include:  Diarrhea, fever, and vomiting. Any illness with a fever, such as pneumonia or the flu. Activities that cause heavy sweating, such as endurance races and heavy outdoor work in hot or humid weather. Certain medicines, such as cold and allergy pills (antihistamines), pills that remove water from the body (diuretics), and laxatives. Certain diseases, such as diabetes, cancer, and heart or kidney disease. When should you call for help? Call 911 anytime you think you may need emergency care. For example, call if:    You passed out (lost consciousness). Call your doctor now or seek immediate medical care if:    You are confused and cannot think clearly. You are dizzy or lightheaded, or you feel like you may faint. You have signs of needing more fluids. You have sunken eyes, a dry mouth, and you pass only a little urine. You cannot keep fluids down. You have diarrhea that lasts for more than a few days. Watch closely for changes in your health, and be sure to contact your doctor if:    You are not making tears. Your skin is very dry and sags slowly back into place after you pinch it.      Your mouth and eyes are very dry. Where can you learn more? Go to http://www.woods.com/ and enter Q814 to learn more about \"Dehydration: Care Instructions. \"  Current as of: March 9, 2022               Content Version: 13.5  © 3034-8481 [employer]. Care instructions adapted under license by [facility] ([facility]). 

## 2023-02-28 NOTE — CARE COORDINATION
2/28/23, 7:48 AM EST    DISCHARGE ON GOING EVALUATION    Rose Mary Valero day: 4  Location: [facility]27/027-A Reason for admit: Generalized weakness [R53.1]  MYRA (acute kidney injury) ([facility] [address] [N17.9]   Procedure: None    Barriers to Discharge: Hospitalist following. PM&R evaluated pt and feels pt does not meet criteria for IPR. Feels she would benefit from [facility] at discharge. BUN down to 14 (26), creatinine 0.7 (0.7). Hgb 10.8. Lovenox. Diabetes management. PCP: Uzair Sanchez DO  Readmission Risk Score: 13.3%  Patient Goals/Plan/Treatment Preferences: From home with . Anticipate discharge home with [facility]. SW has been consulted.

## 2023-02-28 NOTE — PROGRESS NOTES
Patient alert and oriented x4, speech appropriate and clear. No pain reported at this time. Pupils equal, round, reactive to light, 4mm to 2mm with consensual response. Upper extremities pink, warm and dry. Patient has full sensation to upper extremities, denies numbness or tingling. Hand grasp equal and moderate bilaterally. Negative arm drift. Skin turgur and capillary refill both less than 3 seconds. Heart strong and regular. Apical pule 76. Lung sounds clear throughout. Regular breathing pattern, moderate depth of respirations and symmetrical chest movement. Abdomen soft and round. Bowel sounds active in all four quadrants. Lower extremites pink, warm and dry. Patient has full sensation to lower extremities, no numbness or tingling. Pedal push and pull equal and weak bilaterally. No edema present. Dorsalis pedis and posterior tibialis pulses present. Patient is up with one assist and a walker.    Liza Carrera SN/RSC

## 2023-07-31 ENCOUNTER — APPOINTMENT (OUTPATIENT)
Dept: GENERAL RADIOLOGY | Age: 83
End: 2023-07-31
Payer: MEDICARE

## 2023-07-31 ENCOUNTER — HOSPITAL ENCOUNTER (INPATIENT)
Age: 83
LOS: 4 days | Discharge: HOME | End: 2023-08-04
Attending: EMERGENCY MEDICINE | Admitting: INTERNAL MEDICINE
Payer: MEDICARE

## 2023-07-31 DIAGNOSIS — Q25.1 STENOSIS OF AORTA: ICD-10-CM

## 2023-07-31 DIAGNOSIS — E03.9 HYPOTHYROIDISM, UNSPECIFIED TYPE: ICD-10-CM

## 2023-07-31 DIAGNOSIS — I95.1 ORTHOSTATIC HYPOTENSION: Primary | ICD-10-CM

## 2023-07-31 DIAGNOSIS — R53.81 PHYSICAL DECONDITIONING: ICD-10-CM

## 2023-07-31 PROBLEM — E83.39 HYPOPHOSPHATEMIA: Status: ACTIVE | Noted: 2023-07-31

## 2023-07-31 PROBLEM — E16.2 HYPOGLYCEMIA: Status: ACTIVE | Noted: 2023-07-31

## 2023-07-31 PROBLEM — E87.6 HYPOKALEMIA: Status: ACTIVE | Noted: 2023-07-31

## 2023-07-31 PROBLEM — G73.7 HYPOTHYROID MYOPATHY: Status: ACTIVE | Noted: 2023-07-31

## 2023-07-31 PROBLEM — R55 SYNCOPE: Status: ACTIVE | Noted: 2023-07-31

## 2023-07-31 LAB
ANION GAP SERPL CALC-SCNC: 16 MEQ/L (ref 8–16)
BASOPHILS ABSOLUTE: 0.1 THOU/MM3 (ref 0–0.1)
BASOPHILS NFR BLD AUTO: 1 %
BUN SERPL-MCNC: 17 MG/DL (ref 7–22)
CALCIUM SERPL-MCNC: 10.2 MG/DL (ref 8.5–10.5)
CHLORIDE SERPL-SCNC: 104 MEQ/L (ref 98–111)
CO2 SERPL-SCNC: 22 MEQ/L (ref 23–33)
CREAT SERPL-MCNC: 0.9 MG/DL (ref 0.4–1.2)
D DIMER PPP IA.FEU-MCNC: 799 NG/ML FEU (ref 0–500)
DEPRECATED RDW RBC AUTO: 44.4 FL (ref 35–45)
EOSINOPHIL NFR BLD AUTO: 0.9 %
EOSINOPHILS ABSOLUTE: 0.1 THOU/MM3 (ref 0–0.4)
ERYTHROCYTE [DISTWIDTH] IN BLOOD BY AUTOMATED COUNT: 13.7 % (ref 11.5–14.5)
GFR SERPL CREATININE-BSD FRML MDRD: > 60 ML/MIN/1.73M2
GLUCOSE BLD STRIP.AUTO-MCNC: 190 MG/DL (ref 70–108)
GLUCOSE BLD STRIP.AUTO-MCNC: 328 MG/DL (ref 70–108)
GLUCOSE SERPL-MCNC: 61 MG/DL (ref 70–108)
HCT VFR BLD AUTO: 41.3 % (ref 37–47)
HGB BLD-MCNC: 13.1 GM/DL (ref 12–16)
IMM GRANULOCYTES # BLD AUTO: 0.05 THOU/MM3 (ref 0–0.07)
IMM GRANULOCYTES NFR BLD AUTO: 0.4 %
LYMPHOCYTES ABSOLUTE: 4.4 THOU/MM3 (ref 1–4.8)
LYMPHOCYTES NFR BLD AUTO: 37.7 %
MAGNESIUM SERPL-MCNC: 2 MG/DL (ref 1.6–2.4)
MCH RBC QN AUTO: 28.1 PG (ref 26–33)
MCHC RBC AUTO-ENTMCNC: 31.7 GM/DL (ref 32.2–35.5)
MCV RBC AUTO: 88.6 FL (ref 81–99)
MONOCYTES ABSOLUTE: 0.6 THOU/MM3 (ref 0.4–1.3)
MONOCYTES NFR BLD AUTO: 5.1 %
NEUTROPHILS NFR BLD AUTO: 54.9 %
NRBC BLD AUTO-RTO: 0 /100 WBC
NT-PROBNP SERPL IA-MCNC: 465.3 PG/ML (ref 0–449)
OSMOLALITY SERPL CALC.SUM OF ELEC: 282.6 MOSMOL/KG (ref 275–300)
PHOSPHATE SERPL-MCNC: 2.1 MG/DL (ref 2.4–4.7)
PLATELET # BLD AUTO: 190 THOU/MM3 (ref 130–400)
PMV BLD AUTO: 13.6 FL (ref 9.4–12.4)
POTASSIUM SERPL-SCNC: 3.4 MEQ/L (ref 3.5–5.2)
RBC # BLD AUTO: 4.66 MILL/MM3 (ref 4.2–5.4)
REASON FOR REJECTION: NORMAL
REJECTED TEST: NORMAL
SEGMENTED NEUTROPHILS ABSOLUTE COUNT: 6.4 THOU/MM3 (ref 1.8–7.7)
SODIUM SERPL-SCNC: 142 MEQ/L (ref 135–145)
T4 FREE SERPL-MCNC: 0.36 NG/DL (ref 0.93–1.76)
TROPONIN, HIGH SENSITIVITY: 15 NG/L (ref 0–12)
TROPONIN, HIGH SENSITIVITY: 15 NG/L (ref 0–12)
TSH SERPL DL<=0.005 MIU/L-ACNC: 144.2 UIU/ML (ref 0.4–4.2)
WBC # BLD AUTO: 11.6 THOU/MM3 (ref 4.8–10.8)

## 2023-07-31 PROCEDURE — 85379 FIBRIN DEGRADATION QUANT: CPT

## 2023-07-31 PROCEDURE — 1200000000 HC SEMI PRIVATE

## 2023-07-31 PROCEDURE — 85025 COMPLETE CBC W/AUTO DIFF WBC: CPT

## 2023-07-31 PROCEDURE — 82948 REAGENT STRIP/BLOOD GLUCOSE: CPT

## 2023-07-31 PROCEDURE — 2500000003 HC RX 250 WO HCPCS

## 2023-07-31 PROCEDURE — 6370000000 HC RX 637 (ALT 250 FOR IP)

## 2023-07-31 PROCEDURE — 84443 ASSAY THYROID STIM HORMONE: CPT

## 2023-07-31 PROCEDURE — 80048 BASIC METABOLIC PNL TOTAL CA: CPT

## 2023-07-31 PROCEDURE — 2580000003 HC RX 258: Performed by: STUDENT IN AN ORGANIZED HEALTH CARE EDUCATION/TRAINING PROGRAM

## 2023-07-31 PROCEDURE — 36415 COLL VENOUS BLD VENIPUNCTURE: CPT

## 2023-07-31 PROCEDURE — 84100 ASSAY OF PHOSPHORUS: CPT

## 2023-07-31 PROCEDURE — 99222 1ST HOSP IP/OBS MODERATE 55: CPT

## 2023-07-31 PROCEDURE — 2580000003 HC RX 258

## 2023-07-31 PROCEDURE — 84484 ASSAY OF TROPONIN QUANT: CPT

## 2023-07-31 PROCEDURE — 71045 X-RAY EXAM CHEST 1 VIEW: CPT

## 2023-07-31 PROCEDURE — 1200000003 HC TELEMETRY R&B

## 2023-07-31 PROCEDURE — 83735 ASSAY OF MAGNESIUM: CPT

## 2023-07-31 PROCEDURE — 84439 ASSAY OF FREE THYROXINE: CPT

## 2023-07-31 PROCEDURE — 93005 ELECTROCARDIOGRAM TRACING: CPT | Performed by: STUDENT IN AN ORGANIZED HEALTH CARE EDUCATION/TRAINING PROGRAM

## 2023-07-31 PROCEDURE — 99285 EMERGENCY DEPT VISIT HI MDM: CPT

## 2023-07-31 PROCEDURE — 93005 ELECTROCARDIOGRAM TRACING: CPT

## 2023-07-31 PROCEDURE — 83880 ASSAY OF NATRIURETIC PEPTIDE: CPT

## 2023-07-31 RX ORDER — DEXTROSE MONOHYDRATE 100 MG/ML
INJECTION, SOLUTION INTRAVENOUS CONTINUOUS PRN
Status: DISCONTINUED | OUTPATIENT
Start: 2023-07-31 | End: 2023-07-31 | Stop reason: SDUPTHER

## 2023-07-31 RX ORDER — SODIUM CHLORIDE, SODIUM LACTATE, POTASSIUM CHLORIDE, CALCIUM CHLORIDE 600; 310; 30; 20 MG/100ML; MG/100ML; MG/100ML; MG/100ML
INJECTION, SOLUTION INTRAVENOUS CONTINUOUS
Status: DISCONTINUED | OUTPATIENT
Start: 2023-07-31 | End: 2023-08-04 | Stop reason: HOSPADM

## 2023-07-31 RX ORDER — ACETAMINOPHEN 325 MG/1
650 TABLET ORAL EVERY 6 HOURS PRN
Status: DISCONTINUED | OUTPATIENT
Start: 2023-07-31 | End: 2023-08-04 | Stop reason: HOSPADM

## 2023-07-31 RX ORDER — POLYETHYLENE GLYCOL 3350 17 G/17G
17 POWDER, FOR SOLUTION ORAL DAILY PRN
Status: DISCONTINUED | OUTPATIENT
Start: 2023-07-31 | End: 2023-08-04 | Stop reason: HOSPADM

## 2023-07-31 RX ORDER — LEVETIRACETAM 500 MG/1
500 TABLET ORAL 2 TIMES DAILY
Status: DISCONTINUED | OUTPATIENT
Start: 2023-07-31 | End: 2023-08-04 | Stop reason: HOSPADM

## 2023-07-31 RX ORDER — DEXTROSE MONOHYDRATE 100 MG/ML
INJECTION, SOLUTION INTRAVENOUS CONTINUOUS PRN
Status: DISCONTINUED | OUTPATIENT
Start: 2023-07-31 | End: 2023-08-04 | Stop reason: HOSPADM

## 2023-07-31 RX ORDER — ASPIRIN 325 MG
325 TABLET, DELAYED RELEASE (ENTERIC COATED) ORAL DAILY
Status: DISCONTINUED | OUTPATIENT
Start: 2023-08-01 | End: 2023-08-01

## 2023-07-31 RX ORDER — POTASSIUM CHLORIDE 7.45 MG/ML
10 INJECTION INTRAVENOUS PRN
Status: DISCONTINUED | OUTPATIENT
Start: 2023-07-31 | End: 2023-08-04 | Stop reason: HOSPADM

## 2023-07-31 RX ORDER — IBUPROFEN 600 MG/1
1 TABLET ORAL PRN
Status: DISCONTINUED | OUTPATIENT
Start: 2023-07-31 | End: 2023-07-31 | Stop reason: SDUPTHER

## 2023-07-31 RX ORDER — POTASSIUM CHLORIDE 20 MEQ/1
40 TABLET, EXTENDED RELEASE ORAL PRN
Status: DISCONTINUED | OUTPATIENT
Start: 2023-07-31 | End: 2023-08-04 | Stop reason: HOSPADM

## 2023-07-31 RX ORDER — INSULIN LISPRO 100 [IU]/ML
0-4 INJECTION, SOLUTION INTRAVENOUS; SUBCUTANEOUS NIGHTLY
Status: DISCONTINUED | OUTPATIENT
Start: 2023-07-31 | End: 2023-08-01

## 2023-07-31 RX ORDER — FERROUS SULFATE 325(65) MG
325 TABLET ORAL
Status: DISCONTINUED | OUTPATIENT
Start: 2023-08-01 | End: 2023-08-04 | Stop reason: HOSPADM

## 2023-07-31 RX ORDER — CLOPIDOGREL BISULFATE 75 MG/1
75 TABLET ORAL DAILY
Status: DISCONTINUED | OUTPATIENT
Start: 2023-08-01 | End: 2023-08-04 | Stop reason: HOSPADM

## 2023-07-31 RX ORDER — SODIUM CHLORIDE 0.9 % (FLUSH) 0.9 %
5-40 SYRINGE (ML) INJECTION EVERY 12 HOURS SCHEDULED
Status: DISCONTINUED | OUTPATIENT
Start: 2023-07-31 | End: 2023-08-04 | Stop reason: HOSPADM

## 2023-07-31 RX ORDER — SODIUM CHLORIDE 0.9 % (FLUSH) 0.9 %
5-40 SYRINGE (ML) INJECTION PRN
Status: DISCONTINUED | OUTPATIENT
Start: 2023-07-31 | End: 2023-08-04 | Stop reason: HOSPADM

## 2023-07-31 RX ORDER — ONDANSETRON 4 MG/1
4 TABLET, ORALLY DISINTEGRATING ORAL EVERY 8 HOURS PRN
Status: DISCONTINUED | OUTPATIENT
Start: 2023-07-31 | End: 2023-08-04 | Stop reason: HOSPADM

## 2023-07-31 RX ORDER — AMLODIPINE BESYLATE 10 MG/1
10 TABLET ORAL DAILY
Status: DISCONTINUED | OUTPATIENT
Start: 2023-08-01 | End: 2023-08-04 | Stop reason: HOSPADM

## 2023-07-31 RX ORDER — LEVOTHYROXINE SODIUM 112 UG/1
112 TABLET ORAL DAILY
Status: DISCONTINUED | OUTPATIENT
Start: 2023-08-01 | End: 2023-07-31

## 2023-07-31 RX ORDER — ENOXAPARIN SODIUM 100 MG/ML
40 INJECTION SUBCUTANEOUS DAILY
Status: DISCONTINUED | OUTPATIENT
Start: 2023-08-01 | End: 2023-08-04 | Stop reason: HOSPADM

## 2023-07-31 RX ORDER — DEXTROSE MONOHYDRATE 100 MG/ML
INJECTION, SOLUTION INTRAVENOUS CONTINUOUS
Status: DISCONTINUED | OUTPATIENT
Start: 2023-07-31 | End: 2023-07-31

## 2023-07-31 RX ORDER — INSULIN LISPRO 100 [IU]/ML
0-4 INJECTION, SOLUTION INTRAVENOUS; SUBCUTANEOUS
Status: DISCONTINUED | OUTPATIENT
Start: 2023-08-01 | End: 2023-08-01

## 2023-07-31 RX ORDER — LEVOTHYROXINE SODIUM 0.12 MG/1
125 TABLET ORAL
Status: DISCONTINUED | OUTPATIENT
Start: 2023-08-01 | End: 2023-08-04 | Stop reason: HOSPADM

## 2023-07-31 RX ORDER — ONDANSETRON 2 MG/ML
4 INJECTION INTRAMUSCULAR; INTRAVENOUS EVERY 6 HOURS PRN
Status: DISCONTINUED | OUTPATIENT
Start: 2023-07-31 | End: 2023-08-04 | Stop reason: HOSPADM

## 2023-07-31 RX ORDER — ATORVASTATIN CALCIUM 40 MG/1
40 TABLET, FILM COATED ORAL NIGHTLY
Status: DISCONTINUED | OUTPATIENT
Start: 2023-07-31 | End: 2023-08-04 | Stop reason: HOSPADM

## 2023-07-31 RX ORDER — METOPROLOL SUCCINATE 25 MG/1
25 TABLET, EXTENDED RELEASE ORAL DAILY
Status: DISCONTINUED | OUTPATIENT
Start: 2023-08-01 | End: 2023-08-03

## 2023-07-31 RX ORDER — ACETAMINOPHEN 650 MG/1
650 SUPPOSITORY RECTAL EVERY 6 HOURS PRN
Status: DISCONTINUED | OUTPATIENT
Start: 2023-07-31 | End: 2023-08-04 | Stop reason: HOSPADM

## 2023-07-31 RX ORDER — LOSARTAN POTASSIUM 25 MG/1
25 TABLET ORAL DAILY
Status: DISCONTINUED | OUTPATIENT
Start: 2023-08-01 | End: 2023-08-04 | Stop reason: HOSPADM

## 2023-07-31 RX ORDER — SODIUM CHLORIDE 9 MG/ML
INJECTION, SOLUTION INTRAVENOUS PRN
Status: DISCONTINUED | OUTPATIENT
Start: 2023-07-31 | End: 2023-08-04 | Stop reason: HOSPADM

## 2023-07-31 RX ORDER — 0.9 % SODIUM CHLORIDE 0.9 %
1000 INTRAVENOUS SOLUTION INTRAVENOUS ONCE
Status: COMPLETED | OUTPATIENT
Start: 2023-07-31 | End: 2023-07-31

## 2023-07-31 RX ADMIN — SODIUM CHLORIDE, POTASSIUM CHLORIDE, SODIUM LACTATE AND CALCIUM CHLORIDE: 600; 310; 30; 20 INJECTION, SOLUTION INTRAVENOUS at 22:14

## 2023-07-31 RX ADMIN — POTASSIUM BICARBONATE 40 MEQ: 391 TABLET, EFFERVESCENT ORAL at 22:15

## 2023-07-31 RX ADMIN — SODIUM PHOSPHATE, MONOBASIC, MONOHYDRATE AND SODIUM PHOSPHATE, DIBASIC, ANHYDROUS 15 MMOL: 276; 142 INJECTION, SOLUTION INTRAVENOUS at 23:05

## 2023-07-31 RX ADMIN — SODIUM CHLORIDE, PRESERVATIVE FREE 10 ML: 5 INJECTION INTRAVENOUS at 22:24

## 2023-07-31 RX ADMIN — INSULIN LISPRO 4 UNITS: 100 INJECTION, SOLUTION INTRAVENOUS; SUBCUTANEOUS at 22:26

## 2023-07-31 RX ADMIN — SODIUM CHLORIDE 1000 ML: 9 INJECTION, SOLUTION INTRAVENOUS at 16:46

## 2023-07-31 RX ADMIN — LEVETIRACETAM 500 MG: 500 TABLET, FILM COATED ORAL at 22:16

## 2023-07-31 RX ADMIN — ATORVASTATIN CALCIUM 40 MG: 40 TABLET, FILM COATED ORAL at 22:24

## 2023-07-31 ASSESSMENT — PAIN - FUNCTIONAL ASSESSMENT
PAIN_FUNCTIONAL_ASSESSMENT: NONE - DENIES PAIN

## 2023-07-31 NOTE — ED PROVIDER NOTES
[address] [place] [address]  EMERGENCY MEDICINE ATTENDING ATTESTATION      Evaluation of Reanna Cesar. Case discussed and care plan developed with resident physician. I agree with the resident physician documentation and plan as documented by him, except if my documentation differs. Patient seen, interviewed and examined by me. I reviewed the medical, surgical, family and social history, medications and allergies. I have reviewed and interpreted all available lab, radiology and ekg results available at the moment. I have reviewed the nursing documentation. Brief H&P   Patient brought in by EMS after a syncopal episode at the care facility during lunch. Patient felt nauseous, warm and then had a syncopal episode. She is asymptomatic on arrival but becomes symptomatic again during orthostatic vital signs and has orthostatic vital sign changes. Physical exam is notable for well appearing, mildly dry mucous membranes on arrival, much better appearing at the moment of my evaluation. Otherwise nonfocal exam.  Head is atraumatic. Medical Decision Making   MDM:   Syncope  Possible dehydration  Plan:   IV line, labs  IV fluids  EKG  Observation in the ED while awaiting results    Please see the resident physician completed note for final disposition except as documented on this attestation. I have reviewed and interpreted all available lab, radiology and ekg results available at the moment. Diagnosis, treatment and disposition plans were discussed and agreed upon by patient. This transcription was electronically signed. It was dictated by use of voice recognition software and electronically transcribed. The transcription may contain errors not detected in proofreading.      I performed direct supervision and was present for the critical portion following procedures: None  Critical care time on this case: None    Electronically signed by Jed Bradley MD on 7/31/23 at 3:07 PM

## 2023-07-31 NOTE — ED NOTES
ED to inpatient nurses report    Chief Complaint   Patient presents with    Hypotension      Present to ED from adult   LOC: alert and orientated to name, place, date  Vital signs   Vitals:    07/31/23 1706 07/31/23 1721 07/31/23 1806 07/31/23 1808   BP: (!) 96/44 85/73 (!) 97/51 (!) 97/51   Pulse: 64 64 64 64   Resp: 21 27 23 16   Temp:       TempSrc:       SpO2: 93% 92% 96% 97%   Weight:       Height:          Oxygen Baseline 97%    Current needs required RA Bipap/Cpap No  LDAs:   Peripheral IV 07/31/23 Distal;Right Forearm (Active)     Mobility: Requires assistance * 1  Pending ED orders: none  Present condition: stable     C-SSRS Risk of Suicide: No Risk  Swallow Screening    Preferred Language: [place]     Electronically signed by Beny King, RN on 7/31/2023 at 6:59 PM      Beny King RN  07/31/23 9071

## 2023-07-31 NOTE — ED NOTES
Resting in bed watching tv. Voiced no concerns. Call light within reach.  Will monitor      Samina Madrigal RN  07/31/23 6068

## 2023-07-31 NOTE — ED TRIAGE NOTES
[facility] facility reports pt was stood up and became diaphoretic and nauseated. Reports pt eyes became \"glossy\" and pt was \"unresponsive\". [address]. Pt denies any CP, SOb, or dizziness.  Will monitor
No

## 2023-07-31 NOTE — ED NOTES
Pt resting in bed. Reports feeling tired. Dr. Joshua Carlin made aware of BP.  Will monitor      Leander Rea RN  07/31/23 7784

## 2023-07-31 NOTE — H&P
Hospitalist History & Physical    Patient:  Chastity Johnson    Unit/Bed:14/014A  YOB: 1940  MRN: [id number]   Acct: [de-identified]   PCP: Ata Ruiz DO  Code Status: Prior    Date of Service: Pt seen/examined on 07/31/23 and admitted to Inpatient with expected LOS greater than two midnights due to medical therapy. Chief Complaint: Syncope    Assessment/Plan:    Syncope and collapse likely secondary to orthostatic hypotension:  Positive orthostatic vital signs in emergency department. Received 1 liter fluid bolus in the emergency department. Reports no change in eating or drinking habits. Does have history of moderate aortic stenosis. Holding home antihypertensives for now. Daily orthostatics until normalized. LR @ 100 ml/hr . Obtain EKG. D-dimer elevated at 799. Well's score 0 making a PE unlikely but will proceed with CTA chest to r/o. Hypothyroidism: TSH on admission 144.2 and free T4 0.36. Patient on home synthroid and reports compliance. Will increase dose to 125 mcg. Will need OP follow up. Hypoglycemia with hx of IDDM2:  Glucose level on arrival 61. Started on D10 infusion by emergency department. POCT up to 190 on arrival to the floor. Will d/c D10. PCOT q4h for now. Will continue home Jardiance. On daily insulin regimen that includes 28 units of insulin glargine  at night and insulin aspart sliding scale. Will hold for now. Initiate LDSSI until glucose levels are stabilized. Hypoglycemia protocol. Carb control diet. Hypokalemia: Mild. Admitting potassium level 3.4. Replace per protocol. Daily BMP    Hypophosphatemia: Mild. Admitting phosphorus level 2.1. Replace per protocol. Daily phos level. Moderate aortic Stenosis: Follows with Dr. Anish Miller outpatient. Echocardiogram 4/19/22 shows an EF 55%. Valve area 1.1 sq cm Max aortic valve gradient is 30 mmHg and mean gradient is 16 mmHg and peak velocity is 267 cm/sec.   BNP slightly elevated at PHOS 2.1*     No results for input(s): AST, ALT, BILIDIR, BILITOT, ALKPHOS in the last 72 hours. No results for input(s): INR in the last 72 hours. No results for input(s): Runell Dryer in the last 72 hours. Urinalysis:   Lab Results   Component Value Date/Time    NITRU NEGATIVE 02/24/2023 06:05 PM    WBCUA 15-25 02/24/2023 06:05 PM    BACTERIA NONE SEEN 02/24/2023 06:05 PM    RBCUA 3-5 02/24/2023 06:05 PM    BLOODU SMALL 02/24/2023 06:05 PM    GLUCOSEU 250 02/24/2023 06:05 PM       EKG: pending    Radiology:  XR CHEST PORTABLE   Final Result   1. No acute cardiopulmonary findings. **This report has been created using voice recognition software. It may contain minor errors which are inherent in voice recognition technology. **      Final report electronically signed by Dr. Sera García on 7/31/2023 2:05 PM        XR CHEST PORTABLE    Result Date: 7/31/2023  PROCEDURE: XR CHEST PORTABLE CLINICAL INFORMATION: syncope COMPARISON: 2/24/2023 TECHNIQUE:  AP mobile chest single view  FINDINGS: The heart size appears within normal limits. No focal consolidation, pleural effusion or pneumothorax is seen. There is a calcified granuloma at the medial left midlung. No acute osseous findings are seen. 1. No acute cardiopulmonary findings. **This report has been created using voice recognition software. It may contain minor errors which are inherent in voice recognition technology. ** Final report electronically signed by Dr. Sera García on 7/31/2023 2:05 PM        Tele:   [x] yes             [] no      Thank you Jeancarlos Negron DO for the opportunity to be involved in this patient's care.     Electronically signed by SAMAN Mckeon CNP on 7/31/2023 at 7:30 PM

## 2023-08-01 ENCOUNTER — APPOINTMENT (OUTPATIENT)
Dept: CT IMAGING | Age: 83
End: 2023-08-01
Payer: MEDICARE

## 2023-08-01 PROBLEM — D35.01 ADENOMA OF RIGHT ADRENAL GLAND: Status: ACTIVE | Noted: 2023-08-01

## 2023-08-01 LAB
ANION GAP SERPL CALC-SCNC: 11 MEQ/L (ref 8–16)
BASOPHILS ABSOLUTE: 0.1 THOU/MM3 (ref 0–0.1)
BASOPHILS NFR BLD AUTO: 1 %
BUN SERPL-MCNC: 13 MG/DL (ref 7–22)
CALCIUM SERPL-MCNC: 8.7 MG/DL (ref 8.5–10.5)
CHLORIDE SERPL-SCNC: 102 MEQ/L (ref 98–111)
CO2 SERPL-SCNC: 22 MEQ/L (ref 23–33)
CREAT SERPL-MCNC: 0.8 MG/DL (ref 0.4–1.2)
DEPRECATED MEAN GLUCOSE BLD GHB EST-ACNC: 285 MG/DL (ref 70–126)
DEPRECATED RDW RBC AUTO: 44.1 FL (ref 35–45)
EOSINOPHIL NFR BLD AUTO: 1.3 %
EOSINOPHILS ABSOLUTE: 0.1 THOU/MM3 (ref 0–0.4)
ERYTHROCYTE [DISTWIDTH] IN BLOOD BY AUTOMATED COUNT: 13.9 % (ref 11.5–14.5)
GFR SERPL CREATININE-BSD FRML MDRD: > 60 ML/MIN/1.73M2
GLUCOSE BLD STRIP.AUTO-MCNC: 153 MG/DL (ref 70–108)
GLUCOSE BLD STRIP.AUTO-MCNC: 273 MG/DL (ref 70–108)
GLUCOSE BLD STRIP.AUTO-MCNC: 468 MG/DL (ref 70–108)
GLUCOSE BLD STRIP.AUTO-MCNC: 99 MG/DL (ref 70–108)
GLUCOSE SERPL-MCNC: 246 MG/DL (ref 70–108)
HBA1C MFR BLD HPLC: 11.5 % (ref 4.4–6.4)
HCT VFR BLD AUTO: 34.3 % (ref 37–47)
HGB BLD-MCNC: 10.9 GM/DL (ref 12–16)
IMM GRANULOCYTES # BLD AUTO: 0.02 THOU/MM3 (ref 0–0.07)
IMM GRANULOCYTES NFR BLD AUTO: 0.2 %
LYMPHOCYTES ABSOLUTE: 3.6 THOU/MM3 (ref 1–4.8)
LYMPHOCYTES NFR BLD AUTO: 42.7 %
MCH RBC QN AUTO: 27.7 PG (ref 26–33)
MCHC RBC AUTO-ENTMCNC: 31.8 GM/DL (ref 32.2–35.5)
MCV RBC AUTO: 87.3 FL (ref 81–99)
MONOCYTES ABSOLUTE: 0.5 THOU/MM3 (ref 0.4–1.3)
MONOCYTES NFR BLD AUTO: 6 %
NEUTROPHILS NFR BLD AUTO: 48.8 %
NRBC BLD AUTO-RTO: 0 /100 WBC
PHOSPHATE SERPL-MCNC: 3.5 MG/DL (ref 2.4–4.7)
PLATELET # BLD AUTO: 168 THOU/MM3 (ref 130–400)
PMV BLD AUTO: 13.7 FL (ref 9.4–12.4)
POTASSIUM SERPL-SCNC: 4.2 MEQ/L (ref 3.5–5.2)
RBC # BLD AUTO: 3.93 MILL/MM3 (ref 4.2–5.4)
SEGMENTED NEUTROPHILS ABSOLUTE COUNT: 4.1 THOU/MM3 (ref 1.8–7.7)
SODIUM SERPL-SCNC: 135 MEQ/L (ref 135–145)
WBC # BLD AUTO: 8.4 THOU/MM3 (ref 4.8–10.8)

## 2023-08-01 PROCEDURE — 99222 1ST HOSP IP/OBS MODERATE 55: CPT | Performed by: INTERNAL MEDICINE

## 2023-08-01 PROCEDURE — 93010 ELECTROCARDIOGRAM REPORT: CPT | Performed by: INTERNAL MEDICINE

## 2023-08-01 PROCEDURE — 80048 BASIC METABOLIC PNL TOTAL CA: CPT

## 2023-08-01 PROCEDURE — 6370000000 HC RX 637 (ALT 250 FOR IP): Performed by: INTERNAL MEDICINE

## 2023-08-01 PROCEDURE — 97116 GAIT TRAINING THERAPY: CPT

## 2023-08-01 PROCEDURE — 83036 HEMOGLOBIN GLYCOSYLATED A1C: CPT

## 2023-08-01 PROCEDURE — 2580000003 HC RX 258

## 2023-08-01 PROCEDURE — 84100 ASSAY OF PHOSPHORUS: CPT

## 2023-08-01 PROCEDURE — 97162 PT EVAL MOD COMPLEX 30 MIN: CPT

## 2023-08-01 PROCEDURE — 82948 REAGENT STRIP/BLOOD GLUCOSE: CPT

## 2023-08-01 PROCEDURE — 85025 COMPLETE CBC W/AUTO DIFF WBC: CPT

## 2023-08-01 PROCEDURE — 6360000002 HC RX W HCPCS

## 2023-08-01 PROCEDURE — 97530 THERAPEUTIC ACTIVITIES: CPT

## 2023-08-01 PROCEDURE — 6360000004 HC RX CONTRAST MEDICATION

## 2023-08-01 PROCEDURE — 1200000000 HC SEMI PRIVATE

## 2023-08-01 PROCEDURE — 97110 THERAPEUTIC EXERCISES: CPT

## 2023-08-01 PROCEDURE — 6370000000 HC RX 637 (ALT 250 FOR IP)

## 2023-08-01 PROCEDURE — 1200000003 HC TELEMETRY R&B

## 2023-08-01 PROCEDURE — 71275 CT ANGIOGRAPHY CHEST: CPT

## 2023-08-01 PROCEDURE — 36415 COLL VENOUS BLD VENIPUNCTURE: CPT

## 2023-08-01 PROCEDURE — 97166 OT EVAL MOD COMPLEX 45 MIN: CPT

## 2023-08-01 RX ORDER — INSULIN LISPRO 100 [IU]/ML
0.08 INJECTION, SOLUTION INTRAVENOUS; SUBCUTANEOUS
Status: DISCONTINUED | OUTPATIENT
Start: 2023-08-01 | End: 2023-08-04 | Stop reason: HOSPADM

## 2023-08-01 RX ORDER — INSULIN LISPRO 100 [IU]/ML
0-4 INJECTION, SOLUTION INTRAVENOUS; SUBCUTANEOUS NIGHTLY
Status: DISCONTINUED | OUTPATIENT
Start: 2023-08-01 | End: 2023-08-04 | Stop reason: HOSPADM

## 2023-08-01 RX ORDER — INSULIN LISPRO 100 [IU]/ML
0-8 INJECTION, SOLUTION INTRAVENOUS; SUBCUTANEOUS
Status: DISCONTINUED | OUTPATIENT
Start: 2023-08-01 | End: 2023-08-04 | Stop reason: HOSPADM

## 2023-08-01 RX ORDER — INSULIN GLARGINE 100 [IU]/ML
20 INJECTION, SOLUTION SUBCUTANEOUS NIGHTLY
Status: DISCONTINUED | OUTPATIENT
Start: 2023-08-01 | End: 2023-08-04 | Stop reason: HOSPADM

## 2023-08-01 RX ORDER — INSULIN LISPRO 100 [IU]/ML
12 INJECTION, SOLUTION INTRAVENOUS; SUBCUTANEOUS ONCE
Status: COMPLETED | OUTPATIENT
Start: 2023-08-01 | End: 2023-08-01

## 2023-08-01 RX ADMIN — IOPAMIDOL 80 ML: 755 INJECTION, SOLUTION INTRAVENOUS at 04:56

## 2023-08-01 RX ADMIN — EMPAGLIFLOZIN 25 MG: 25 TABLET, FILM COATED ORAL at 09:44

## 2023-08-01 RX ADMIN — FERROUS SULFATE TAB 325 MG (65 MG ELEMENTAL FE) 325 MG: 325 (65 FE) TAB at 09:44

## 2023-08-01 RX ADMIN — CLOPIDOGREL BISULFATE 75 MG: 75 TABLET ORAL at 09:42

## 2023-08-01 RX ADMIN — ATORVASTATIN CALCIUM 40 MG: 40 TABLET, FILM COATED ORAL at 20:14

## 2023-08-01 RX ADMIN — LEVETIRACETAM 500 MG: 500 TABLET, FILM COATED ORAL at 09:44

## 2023-08-01 RX ADMIN — SODIUM CHLORIDE, POTASSIUM CHLORIDE, SODIUM LACTATE AND CALCIUM CHLORIDE: 600; 310; 30; 20 INJECTION, SOLUTION INTRAVENOUS at 21:37

## 2023-08-01 RX ADMIN — INSULIN LISPRO 5 UNITS: 100 INJECTION, SOLUTION INTRAVENOUS; SUBCUTANEOUS at 12:00

## 2023-08-01 RX ADMIN — ENOXAPARIN SODIUM 40 MG: 100 INJECTION SUBCUTANEOUS at 09:42

## 2023-08-01 RX ADMIN — INSULIN LISPRO 5 UNITS: 100 INJECTION, SOLUTION INTRAVENOUS; SUBCUTANEOUS at 18:26

## 2023-08-01 RX ADMIN — SERTRALINE 50 MG: 50 TABLET, FILM COATED ORAL at 09:44

## 2023-08-01 RX ADMIN — INSULIN LISPRO 4 UNITS: 100 INJECTION, SOLUTION INTRAVENOUS; SUBCUTANEOUS at 09:45

## 2023-08-01 RX ADMIN — LEVOTHYROXINE SODIUM 125 MCG: 0.12 TABLET ORAL at 05:28

## 2023-08-01 RX ADMIN — LEVETIRACETAM 500 MG: 500 TABLET, FILM COATED ORAL at 20:14

## 2023-08-01 RX ADMIN — ASPIRIN 325 MG: 325 TABLET, COATED ORAL at 09:42

## 2023-08-01 RX ADMIN — INSULIN LISPRO 12 UNITS: 100 INJECTION, SOLUTION INTRAVENOUS; SUBCUTANEOUS at 11:52

## 2023-08-01 ASSESSMENT — PAIN SCALES - GENERAL
PAINLEVEL_OUTOF10: 0
PAINLEVEL_OUTOF10: 0

## 2023-08-01 NOTE — PROGRESS NOTES
Pt admitted to  [id number] from ED. Complaints: Syncope. IV none infusing into the forearm right, condition patent and no redness. IV site free of s/s of infection or infiltration. Vital signs obtained. Assessment and data collection initiated. Two nurse skin assessment performed by Sudheer Maurer RN and Frankie Walden. Oriented to room. Policies and procedures for 6K explained. Jessika NAVA discussed hourly rounding with patient addressing 5 P's. Fall prevention and safety brochure discussed with patient. Bed alarm on. Call light in reach. All questions answered with no further questions at this time.

## 2023-08-01 NOTE — CARE COORDINATION
Case Management Assessment  Initial Evaluation    Date/Time of Evaluation: 8/1/2023 1:30 PM  Assessment Completed by: Lg Foley RN    If patient is discharged prior to next notation, then this note serves as note for discharge by case management. 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 Yes    Chart Reviewed: Yes      History Provided by: Patient  Patient Orientation: Alert and Oriented    Patient Cognition: Alert    Hospitalization in the last 30 days (Readmission):  No    If yes, Readmission Assessment in CM Navigator will be completed. Advance Directives:      Code Status: Full Code   Patient's Primary Decision Maker is: Patient Declined (Legal Next of Kin Remains as Decision Maker)    Primary Decision Maker: Kristen Worthy - Child - [phone]    Secondary Decision Maker: Renetta Vila - Other - [phone]    Secondary Decision Maker: Gracy Naranjo - [phone]    Discharge Planning:    Patient lives with: Spouse/Significant Other Type of Home: Trailer/Mobile Home  Primary Care Giver: Self (assist from Abhinav Cervantes (significant other))  Patient Support Systems include: Spouse/Significant Other   Current Financial resources: Medicare  Current community resources:  Other (Comment) (Adult  through [facility] A[facility].)  Current services prior to admission: [facility], Other (Comment), C-pap (Adult  ([facility]); states she got CPAP years ago through Dr Soy Brito and does not know where)            Current DME: Bedside [employer], Cpap, Elnor Schwab, Wheelchair, [employer]

## 2023-08-01 NOTE — PLAN OF CARE
Problem: Discharge Planning  Goal: Discharge to home or other facility with appropriate resources  8/1/2023 0751 by Lissette Mabry RN  Outcome: Progressing  8/1/2023 0031 by Rose Mary Ng RN  Outcome: Progressing  Flowsheets (Taken 8/1/2023 0031)  Discharge to home or other facility with appropriate resources:   Identify barriers to discharge with patient and caregiver   Arrange for needed discharge resources and transportation as appropriate   Identify discharge learning needs (meds, wound care, etc)     Problem: Safety - Adult  Goal: Free from fall injury  8/1/2023 0751 by Lissette Mabry RN  Outcome: Progressing  8/1/2023 0031 by Rose Mary Ng RN  Outcome: Progressing  Flowsheets (Taken 8/1/2023 0031)  Free From Fall Injury: Instruct family/caregiver on patient safety     Problem: ABCDS Injury Assessment  Goal: Absence of physical injury  8/1/2023 0751 by Lissette Mabry RN  Outcome: Progressing  8/1/2023 0031 by Rose Mary Ng RN  Outcome: Progressing  Flowsheets (Taken 8/1/2023 0031)  Absence of Physical Injury: Implement safety measures based on patient assessment     Problem: Cardiovascular - Adult  Goal: Maintains optimal cardiac output and hemodynamic stability  8/1/2023 0751 by Lissette Mabry RN  Outcome: Progressing  8/1/2023 0031 by Rose Mary Ng RN  Outcome: Progressing  Flowsheets (Taken 8/1/2023 0031)  Maintains optimal cardiac output and hemodynamic stability:   Monitor blood pressure and heart rate   Assess for signs of decreased cardiac output     Problem: Skin/Tissue Integrity - Adult  Goal: Skin integrity remains intact  8/1/2023 0751 by Lissette Mabry RN  Outcome: Progressing  8/1/2023 0031 by Rose Mary Ng RN  Outcome: Progressing  Flowsheets (Taken 8/1/2023 0031)  Skin Integrity Remains Intact: Monitor for areas of redness and/or skin breakdown     Problem: Infection - Adult  Goal: Absence of infection at discharge  8/1/2023 0751 by Lissette Mabry RN  Outcome:

## 2023-08-01 NOTE — PROGRESS NOTES
[facility] OCCUPATIONAL THERAPY  STR[facility] RENAL TELEMETRY 6K  EVALUATION    Time:   Time In: 1030  Time Out: 1048  Timed Code Treatment Minutes: 9 Minutes  Minutes: 18          Date: 2023  Patient Name: Lady Tellez,   Gender: female      MRN: [id number]  : 1940  (82 y.o.)  Referring Practitioner: JENNIFER Arvizu  Diagnosis: hypothyroidism myopathy  Additional Pertinent Hx: per chart review; Lady Tellez is a 82 y.o. female with PMHx of hypothyroid, HTN, IDDM2, CVA, PORFIRIO, and AS, who presented to [facility] with chief complaint of a syncopal episode. The patient was attending her \"adult \" and she said that her nurse told her that she seemed a little off. The patient states that she is incontinent and she felt like she needed to use the bathroom. As the nurse was preparing to help her, the patients states she began to feel very hot, sweaty, and nauseous. She then had syncopal episode and was lowered to ground by nursing staff. Did not hit her head. She reports she has had one episode like this in the past and they believed at that time she had had a seizure so she was put on Keppra. There was no reports of seizure activity during this event. She was sent to [facility] for further workup. At the time of assessment, she is asymptomatic. She denies fever, cough chills. Reports that she feels cold all the time. She denies chest pain, shortness of breath. Had nausea but has since resolved. No vomiting or diarrhea. Reports that she has been eating and drinking well.     Restrictions/Precautions:  Restrictions/Precautions: Fall Risk, General Precautions  Position Activity Restriction  Other position/activity restrictions: possible orthostatic hypotension, Hx CVA with L sided weakness    Subjective  Chart Reviewed: Orders, Yes, Progress Notes, History and Physical  Patient assessed for rehabilitation services?: Yes  Family / Caregiver Present: Yes (significant Supervision. Standing Balance: Contact Guard Assistance. With use of 2 w/w. L  hand minimally rested on hand rest of 2 w/w due to flexed position of L UE    BED MOBILITY:  Not Tested    TRANSFERS:  Sit to Stand:  Contact Guard Assistance. FUNCTIONAL MOBILITY:  Assistive Device: Rolling Walker  Assist Level:  Contact Guard Assistance. Distance:  few steps forward and then backwards to recliner  Slight unsteadiness. Patient is used to a 4 w/w       Activity Tolerance:  Patient tolerance of  treatment: Good treatment tolerance      Assessment:  Assessment: patient demo overall de-conditioning and weakness with hx of CVA with L sided deficits and was admitted to hospital for syncope impacting patient's ability to effectivley complet AADLs and functional transfers as prior. patient would benefit from continued, skilled OT to progress toward PLOF, decrease caregiver burden and increase occupational performance. Performance deficits / Impairments: Decreased functional mobility , Decreased ADL status, Decreased endurance, Decreased strength, Decreased ROM, Decreased balance, Decreased fine motor control  Prognosis: Good  REQUIRES OT FOLLOW-UP: Yes  Decision Making: Medium Complexity    Treatment Initiated: Treatment and education initiated within context of evaluation. Evaluation time included review of current medical information, gathering information related to past medical, social and functional history, completion of standardized testing, formal and informal observation of tasks, assessment of data and development of plan of care and goals. Treatment time included skilled education and facilitation of tasks to increase safety and independence with ADL's for improved functional independence and quality of life.     Discharge Recommendations:  Continue to assess pending progress, Patient would benefit from continued therapy after discharge    Patient Education:     Patient Education  Education Given To:

## 2023-08-01 NOTE — PROGRESS NOTES
Hospitalist Progress Note      Patient:  Peter Pak    Unit/Bed:6K-15/015-A  YOB: 1940  MRN: [id number]   Acct: [de-identified]   PCP: Sinan Hu DO  Date of Admission: 7/31/2023    Assessment/Plan:    Syncope  Orthostatic hypotension  Hypertension  Patient admitted for evaluation of syncope, found to be hypoglycemic with blood glucose of 60. Additionally orthostatic vitals positive. Etiology may be multifactorial, possibly from dehydration (patient states that she has had polyuria and polydipsia for the past 2 weeks). Alternatively, may be from hypoglycemia, especially that she experienced prodromal symptoms of diaphoresis and nausea. She has had similar symptoms in the past, especially when she had hypoglycemia  Currently hemodynamically stable, seen at bedside and doing well and without any complaints. Continue IV fluid resuscitation for now, encourage oral intake  Trend orthostatic vitals  Continue holding home antihypertensives, including Norvasc, losartan, metoprolol. Monitor and resume as appropriate  Hypoglycemia on IDDM  Patient and her  both acknowledge that she has labile blood sugars, with blood glucose ranging from 30 all the way to 500.   Currently, patient is hyperglycemic with blood glucose of 460 (this is after receiving glycemic support with D5)  On review of patient's home antihyperglycemic regimen, she appears to be on Toujeo 28 units nightly, scheduled NovoLog, and Jardiance  At this time we will start basal bolus regimen  Repeat A1c ordered, last approximately 10 in May 2023  If patient is experiencing polyuria and polydipsia, will need better glycemic control on outpatient basis; however, may be side effect from [address] as well  Electrolyte derangements  Patient found to be hypokalemic and hypophosphatemic  Improved with repletion  hypothyroidism  Markedly elevated TSH at 144, low free T4 effusion or pneumothorax is seen. There is a calcified granuloma at the medial left midlung. No acute osseous findings are seen. 1. No acute cardiopulmonary findings. **This report has been created using voice recognition software. It may contain minor errors which are inherent in voice recognition technology. ** Final report electronically signed by Dr. Carissa Haile on 7/31/2023 2:05 PM      Electronically signed by Chepe Sawant DO on 8/1/2023 at 12:40 PM

## 2023-08-01 NOTE — PROGRESS NOTES
Patient verbally provided permission to access camera. This virtual nurse reviewed admission navigators, education, home medications, and allergies with the patient. Patient verbalized understanding of all educational information regarding safety, call dont fall, pain reporting, use of call light, and repositioning. This virtual nurse observes call light to be within reach, bed appears to be in lowest position. Bed rails in raised locked position x 2. There are no safety concerns observed and patient not exhibiting any signs of acute distress. Patient denies any concerns or needs at this time and verbalizes agreement with virtual nursing services.

## 2023-08-01 NOTE — PLAN OF CARE
Progressing  Flowsheets (Taken 8/1/2023 0031)  Electrolytes maintained within normal limits:   Monitor labs and assess patient for signs and symptoms of electrolyte imbalances   Administer electrolyte replacement as ordered   Monitor response to electrolyte replacements, including repeat lab results as appropriate  Goal: Hemodynamic stability and optimal renal function maintained  Outcome: Progressing  Flowsheets (Taken 8/1/2023 0031)  Hemodynamic stability and optimal renal function maintained:   Monitor labs and assess for signs and symptoms of volume excess or deficit   Monitor intake, output and patient weight  Goal: Glucose maintained within prescribed range  Outcome: Progressing  Flowsheets (Taken 8/1/2023 0031)  Glucose maintained within prescribed range:   Monitor blood glucose as ordered   Assess for signs and symptoms of hyperglycemia and hypoglycemia   Administer ordered medications to maintain glucose within target range     Problem: Hematologic - Adult  Goal: Maintains hematologic stability  Outcome: Progressing  Flowsheets (Taken 8/1/2023 0031)  Maintains hematologic stability:   Assess for signs and symptoms of bleeding or hemorrhage   Monitor labs for bleeding or clotting disorders     Problem: Chronic Conditions and Co-morbidities  Goal: Patient's chronic conditions and co-morbidity symptoms are monitored and maintained or improved  Outcome: Progressing  Flowsheets (Taken 8/1/2023 0031)  Care Plan - Patient's Chronic Conditions and Co-Morbidity Symptoms are Monitored and Maintained or Improved:   Monitor and assess patient's chronic conditions and comorbid symptoms for stability, deterioration, or improvement   Collaborate with multidisciplinary team to address chronic and comorbid conditions and prevent exacerbation or deterioration   Update acute care plan with appropriate goals if chronic or comorbid symptoms are exacerbated and prevent overall improvement and discharge     Problem: Pain  Goal: Verbalizes/displays adequate comfort level or baseline comfort level  Outcome: Progressing  Flowsheets (Taken 8/1/2023 0031)  Verbalizes/displays adequate comfort level or baseline comfort level:   Encourage patient to monitor pain and request assistance   Assess pain using appropriate pain scale   Administer analgesics based on type and severity of pain and evaluate response     Care plan reviewed with patient. Patient verbalizes understanding of the care plan and contributed to goal setting.

## 2023-08-01 NOTE — PROGRESS NOTES
[facility]  INPATIENT PHYSICAL THERAPY  EVALUATION  STR[facility] RENAL TELEMETRY 6K - 6K-15/015-A    Time In: 7706  Time Out: 7716  Timed Code Treatment Minutes: 25 Minutes  Minutes: 41          Date: 2023  Patient Name: Chastity Johnson,  Gender:  female        MRN: [id number]  : 1940  (82 y.o.)      Referring Practitioner: SAMAN Kimble CNP  Diagnosis: Orthostatic hypotension  Additional Pertinent Hx: Chastity Johnson is a 82 y.o. female with PMHx of hypothyroid, HTN, IDDM2, CVA, PORFIRIO, and AS, who presented to [facility] with chief complaint of a syncopal episode. The patient was attending her \"adult \" and she said that her nurse told her that she seemed a little off. The patient states that she is incontinent and she felt like she needed to use the bathroom. As the nurse was preparing to help her, the patients states she began to feel very hot, sweaty, and nauseous. She then had syncopal episode and was lowered to ground by nursing staff. Did not hit her head. She reports she has had one episode like this in the past and they believed at that time she had had a seizure so she was put on Keppra. There was no reports of seizure activity during this event. She was sent to [facility] for further workup. Restrictions/Precautions:  Restrictions/Precautions: Fall Risk, General Precautions  Position Activity Restriction  Other position/activity restrictions: possible orthostatic hypotension    Subjective:  Chart Reviewed: Yes  Patient assessed for rehabilitation services?: Yes  Subjective: Pt resting in bed and agrees to therapy. RN approved session.     General:     Vision: Impaired  Vision Exceptions: Wears glasses at all times  Hearing: Exceptions to [facility]  Hearing Exceptions: Bilateral hearing aid, Hard of hearing/hearing concerns       Pain: denies      Vitals: Vitals not assessed per clinical judgement, see nursing flowsheet    Social/Functional History:    Lives With: Family,

## 2023-08-01 NOTE — ED NOTES
This SNE in to assess VS and collect POC BGL from pt. Refer to flowsheets for relevant VS.   at this time. Pt transported to [facility] in stable condition. Floor called prior to transport. Spoke with Floor staff.      Ruth Go  07/31/23 2019

## 2023-08-02 LAB
GLUCOSE BLD STRIP.AUTO-MCNC: 136 MG/DL (ref 70–108)
GLUCOSE BLD STRIP.AUTO-MCNC: 150 MG/DL (ref 70–108)
GLUCOSE BLD STRIP.AUTO-MCNC: 177 MG/DL (ref 70–108)
GLUCOSE BLD STRIP.AUTO-MCNC: 204 MG/DL (ref 70–108)
LV EF: 60 %
LVEF MODALITY: NORMAL

## 2023-08-02 PROCEDURE — 97116 GAIT TRAINING THERAPY: CPT

## 2023-08-02 PROCEDURE — 6360000002 HC RX W HCPCS

## 2023-08-02 PROCEDURE — 93306 TTE W/DOPPLER COMPLETE: CPT

## 2023-08-02 PROCEDURE — 97535 SELF CARE MNGMENT TRAINING: CPT

## 2023-08-02 PROCEDURE — 93010 ELECTROCARDIOGRAM REPORT: CPT | Performed by: INTERNAL MEDICINE

## 2023-08-02 PROCEDURE — 97110 THERAPEUTIC EXERCISES: CPT

## 2023-08-02 PROCEDURE — 6370000000 HC RX 637 (ALT 250 FOR IP): Performed by: HOSPITALIST

## 2023-08-02 PROCEDURE — 1200000000 HC SEMI PRIVATE

## 2023-08-02 PROCEDURE — 82948 REAGENT STRIP/BLOOD GLUCOSE: CPT

## 2023-08-02 PROCEDURE — 6370000000 HC RX 637 (ALT 250 FOR IP)

## 2023-08-02 PROCEDURE — 99233 SBSQ HOSP IP/OBS HIGH 50: CPT | Performed by: HOSPITALIST

## 2023-08-02 PROCEDURE — 6370000000 HC RX 637 (ALT 250 FOR IP): Performed by: INTERNAL MEDICINE

## 2023-08-02 RX ORDER — LEVOTHYROXINE SODIUM 0.1 MG/1
200 TABLET ORAL ONCE
Status: COMPLETED | OUTPATIENT
Start: 2023-08-02 | End: 2023-08-02

## 2023-08-02 RX ADMIN — LEVETIRACETAM 500 MG: 500 TABLET, FILM COATED ORAL at 08:26

## 2023-08-02 RX ADMIN — EMPAGLIFLOZIN 25 MG: 25 TABLET, FILM COATED ORAL at 08:26

## 2023-08-02 RX ADMIN — INSULIN GLARGINE 20 UNITS: 100 INJECTION, SOLUTION SUBCUTANEOUS at 20:00

## 2023-08-02 RX ADMIN — INSULIN LISPRO 2 UNITS: 100 INJECTION, SOLUTION INTRAVENOUS; SUBCUTANEOUS at 12:09

## 2023-08-02 RX ADMIN — ENOXAPARIN SODIUM 40 MG: 100 INJECTION SUBCUTANEOUS at 08:26

## 2023-08-02 RX ADMIN — ATORVASTATIN CALCIUM 40 MG: 40 TABLET, FILM COATED ORAL at 19:55

## 2023-08-02 RX ADMIN — SERTRALINE 50 MG: 50 TABLET, FILM COATED ORAL at 08:26

## 2023-08-02 RX ADMIN — INSULIN LISPRO 5 UNITS: 100 INJECTION, SOLUTION INTRAVENOUS; SUBCUTANEOUS at 07:48

## 2023-08-02 RX ADMIN — LEVOTHYROXINE SODIUM 125 MCG: 0.12 TABLET ORAL at 05:51

## 2023-08-02 RX ADMIN — CLOPIDOGREL BISULFATE 75 MG: 75 TABLET ORAL at 08:26

## 2023-08-02 RX ADMIN — INSULIN LISPRO 5 UNITS: 100 INJECTION, SOLUTION INTRAVENOUS; SUBCUTANEOUS at 12:10

## 2023-08-02 RX ADMIN — FERROUS SULFATE TAB 325 MG (65 MG ELEMENTAL FE) 325 MG: 325 (65 FE) TAB at 08:26

## 2023-08-02 RX ADMIN — LEVETIRACETAM 500 MG: 500 TABLET, FILM COATED ORAL at 19:55

## 2023-08-02 RX ADMIN — INSULIN LISPRO 5 UNITS: 100 INJECTION, SOLUTION INTRAVENOUS; SUBCUTANEOUS at 17:37

## 2023-08-02 RX ADMIN — LEVOTHYROXINE SODIUM 200 MCG: 0.1 TABLET ORAL at 22:29

## 2023-08-02 ASSESSMENT — PAIN SCALES - GENERAL
PAINLEVEL_OUTOF10: 0

## 2023-08-02 NOTE — PROGRESS NOTES
[facility] RENAL TELEMETRY 6K  Occupational Therapy  Daily Note  Time:   Time In: 8817  Time Out: 2594  Timed Code Treatment Minutes: 37 Minutes  Minutes: 43          Date: 2023  Patient Name: Ana Lilia Myers,   Gender: female      Room: [facility]15015-A  MRN: [id number]  : 1940  (82 y.o.)  Referring Practitioner: JENNIFER Hopkins  Diagnosis: hypothyroidism myopathy  Additional Pertinent Hx: per chart review; Ana Lilia Myers is a 82 y.o. female with PMHx of hypothyroid, HTN, IDDM2, CVA, PORFIRIO, and AS, who presented to [facility] with chief complaint of a syncopal episode. The patient was attending her \"adult \" and she said that her nurse told her that she seemed a little off. The patient states that she is incontinent and she felt like she needed to use the bathroom. As the nurse was preparing to help her, the patients states she began to feel very hot, sweaty, and nauseous. She then had syncopal episode and was lowered to ground by nursing staff. Did not hit her head. She reports she has had one episode like this in the past and they believed at that time she had had a seizure so she was put on Keppra. There was no reports of seizure activity during this event. She was sent to [facility] for further workup. At the time of assessment, she is asymptomatic. She denies fever, cough chills. Reports that she feels cold all the time. She denies chest pain, shortness of breath. Had nausea but has since resolved. No vomiting or diarrhea. Reports that she has been eating and drinking well. Restrictions/Precautions:  Restrictions/Precautions: Fall Risk, General Precautions  Position Activity Restriction  Other position/activity restrictions: possible orthostatic hypotension, Hx CVA with L sided weakness     SUBJECTIVE: Nurse ok'd session.  Patient seated in bedside chair upon arrival. Agreeable to OT session    PAIN: Denies    Vitals: Vitals not assessed per clinical judgement, see evaluation, education, & procurement, Self-Care / ADL, Coordination training, Positioning    Patient Education  Patient Education:  safety with transfers and mobility, PROM, table top slides, ADL strateiges    Goals  Short Term Goals  Time Frame for Short Term Goals: by discharge  Short Term Goal 1: patient will tolerate 5 min functional standing with one hand release with (S) to increase activity tolerance for self care routine. Short Term Goal 2: patient will functionally ambulate to/from BR with SBA. Short Term Goal 3: patient will complete UB ADLs with (S). Short Term Goal 4: patient will participate in R UE moderate resisive UB exer and L UE ROM HEP and positioning to increase UE function for self care routine. Following session, patient left in safe position with all fall risk precautions in place.

## 2023-08-02 NOTE — PALLIATIVE CARE
Initial Evaluation        Patient:   Reanna Cesar  YOB: 1940  Age:  82 y.o. Room:  [address] [place] [address]  MRN:  [id number]   Acct: [de-identified]    Date of Admission:  7/31/2023  1:01 PM  Date of Service:  8/2/2023  Completed By:  Edenilson Vaughan RN                 Reason for Palliative Care Evaluation:-               [x] Code Status Discussion              [x] Goals of Care              [] Pain/Symptom Management               [] Emotional Support              [] Other:                    Current Issues:- denies  []  Pain  []  Fatigue  []  Nausea  []  Anxiety  []  Depression  []  Shortness of Breath  []  Constipation  []  Appetite  []  Other:              Advance Directives:-  none on file  [] [place] DNR Form  [] Living Will  [] Medical POA              Current Code Status:-     [x] Full Resuscitation  [] DNR-Comfort Care-Arrest  [] DNR-Comfort Care       [] Limited Resuscitation             [] No CPR            [] No shock            [] No ET intubation/reintubation            [] No resuscitative medications            [] Other limitation:               Palliative Performance Status:-      [] 100%  Full ambulation; normal activity and work; no evidence of disease; able to do own self care; normal intake; fully conscious     [] 90%   Full ambulation; normal activity and work; some evidence of disease; able to do own self care; normal intake; fully conscious    [] 80%   Full ambulation; normal activity with effort; some evidence of disease; able to do own self care; normal or reduced intake; fully conscious    [] 70%  Ambulation reduced; unable to perform normal job/work; significant  disease; able to do own self care; normal or reduced intake; fully conscious    [x] 60%  Ambulation reduced; Significant disease;Can't do hobbies/housework; intake normal or reduced; occasional assist; LOC full/confusion    [] 50%  Mainly sit/lie;  Extensive disease; Can't do any work; Considerable assist; intake normal or wishes, it may lead to a quality of life that is not acceptable to her. Patient verbalizes understanding that she is a full code and if a change is desired, she must alert staff. Emotional support provided. Plan/Follow-Up:-    Patient wishes to remain a full code at this time. Palliative care will continue to follow and staff may call prn with any concerns or needs.            Electronically signed by Jazmyne Jimenez RN on 8/2/2023 at 10:10 AM           Palliative Care Office: [phone]

## 2023-08-02 NOTE — PLAN OF CARE
Problem: Discharge Planning  Goal: Discharge to home or other facility with appropriate resources  Outcome: Progressing  Flowsheets (Taken 8/1/2023 2000)  Discharge to home or other facility with appropriate resources: Identify barriers to discharge with patient and caregiver     Problem: Safety - Adult  Goal: Free from fall injury  Outcome: Progressing  Flowsheets (Taken 8/1/2023 0031 by Clare Serrano RN)  Free From Fall Injury: Instruct family/caregiver on patient safety     Problem: ABCDS Injury Assessment  Goal: Absence of physical injury  Outcome: Progressing  Flowsheets (Taken 8/1/2023 0031 by Clare Serrano, RN)  Absence of Physical Injury: Implement safety measures based on patient assessment     Problem: Cardiovascular - Adult  Goal: Maintains optimal cardiac output and hemodynamic stability  Outcome: Progressing  Flowsheets (Taken 8/1/2023 0800 by Nyla Verdin RN)  Maintains optimal cardiac output and hemodynamic stability:   Monitor urine output and notify Licensed Independent Practitioner for values outside of normal range   Monitor blood pressure and heart rate     Problem: Skin/Tissue Integrity - Adult  Goal: Skin integrity remains intact  Outcome: Progressing  Flowsheets (Taken 8/1/2023 0800 by Nyla Verdin, RN)  Skin Integrity Remains Intact:   Monitor for areas of redness and/or skin breakdown   Assess vascular access sites hourly     Problem: Infection - Adult  Goal: Absence of infection at discharge  Outcome: Progressing  Flowsheets (Taken 8/1/2023 2000)  Absence of infection at discharge: Assess and monitor for signs and symptoms of infection     Problem: Hematologic - Adult  Goal: Maintains hematologic stability  Outcome: Progressing  Flowsheets (Taken 8/1/2023 2000)  Maintains hematologic stability: Assess for signs and symptoms of bleeding or hemorrhage     Problem: Pain  Goal: Verbalizes/displays adequate comfort level or baseline comfort level  Outcome: Progressing  Flowsheets

## 2023-08-02 NOTE — PROGRESS NOTES
bilaterally     Labs:   Recent Labs     07/31/23  1330 08/01/23  0514   WBC 11.6* 8.4   HGB 13.1 10.9*   HCT 41.3 34.3*    168     Recent Labs     07/31/23  1414 08/01/23  0514    135   K 3.4* 4.2    102   CO2 22* 22*   BUN 17 13   CREATININE 0.9 0.8   CALCIUM 10.2 8.7   PHOS 2.1* 3.5     No results for input(s): AST, ALT, BILIDIR, BILITOT, ALKPHOS in the last 72 hours. No results for input(s): INR in the last 72 hours. No results for input(s): Eliza Avani in the last 72 hours. Microbiology:    Blood culture #1:   Lab Results   Component Value Date/Time    BC No growth-preliminary No growth 04/01/2020 07:05 PM       Blood culture #2:No results found for: Sadiq Reyes    Organism:  Lab Results   Component Value Date/Time    ORG Escherichia coli 12/01/2022 10:15 PM       No results found for: LABGRAM    MRSA culture only:No results found for: [facility]    Urine culture: No results found for: LABURIN    Respiratory culture: No results found for: CULTRESP    Aerobic and Anaerobic :  No results found for: LABAERO  No results found for: LABANAE    Urinalysis:      Lab Results   Component Value Date/Time    NITRU NEGATIVE 02/24/2023 06:05 PM    WBCUA 15-25 02/24/2023 06:05 PM    BACTERIA NONE SEEN 02/24/2023 06:05 PM    RBCUA 3-5 02/24/2023 06:05 PM    BLOODU SMALL 02/24/2023 06:05 PM    GLUCOSEU 250 02/24/2023 06:05 PM       Radiology:  CTA CHEST W WO CONTRAST   Final Result   1. Negative for pulmonary embolism. 2. Coronary artery disease. 3. Old granulomatous disease. 4. Lipid rich right adrenal adenoma. 5. Questionable contour abnormality of the left kidney. Consider    nonemergent renal sonography to further evaluate.       This document has been electronically signed by: Gretta Eastman DO    on 08/01/2023 07:21 AM      All CTs at this facility use dose modulation techniques and iterative    reconstructions, and/or weight-based dosing   when appropriate to reduce radiation to a adenoma. 5. Questionable contour abnormality of the left kidney. Consider nonemergent renal sonography to further evaluate. This document has been electronically signed by: Gloria Gonsales DO on 08/01/2023 07:21 AM All CTs at this facility use dose modulation techniques and iterative reconstructions, and/or weight-based dosing when appropriate to reduce radiation to a low as reasonably achievable. 3D Post-processing was performed on this study. XR CHEST PORTABLE    Result Date: 7/31/2023  PROCEDURE: XR CHEST PORTABLE CLINICAL INFORMATION: syncope COMPARISON: 2/24/2023 TECHNIQUE:  AP mobile chest single view  FINDINGS: The heart size appears within normal limits. No focal consolidation, pleural effusion or pneumothorax is seen. There is a calcified granuloma at the medial left midlung. No acute osseous findings are seen. 1. No acute cardiopulmonary findings. **This report has been created using voice recognition software. It may contain minor errors which are inherent in voice recognition technology. ** Final report electronically signed by Dr. Wolfgang Sands on 7/31/2023 2:05 PM    With RN in room, patient was updated about and agreed upon the treatment plan, all the questions and concerns were addressed. More than 30 minutes of time spent on counseling.        Electronically signed by Myles Sears MD on 8/2/2023 at 8:28 AM

## 2023-08-03 LAB
25(OH)D3 SERPL-MCNC: 20 NG/ML (ref 30–100)
CORTIS SERPL-MCNC: 11.52 UG/DL
GLUCOSE BLD STRIP.AUTO-MCNC: 135 MG/DL (ref 70–108)
GLUCOSE BLD STRIP.AUTO-MCNC: 146 MG/DL (ref 70–108)
GLUCOSE BLD STRIP.AUTO-MCNC: 168 MG/DL (ref 70–108)
GLUCOSE BLD STRIP.AUTO-MCNC: 179 MG/DL (ref 70–108)
T4 FREE SERPL-MCNC: 1.04 NG/DL (ref 0.93–1.76)

## 2023-08-03 PROCEDURE — 6370000000 HC RX 637 (ALT 250 FOR IP)

## 2023-08-03 PROCEDURE — 97530 THERAPEUTIC ACTIVITIES: CPT

## 2023-08-03 PROCEDURE — 97116 GAIT TRAINING THERAPY: CPT

## 2023-08-03 PROCEDURE — 80177 DRUG SCRN QUAN LEVETIRACETAM: CPT

## 2023-08-03 PROCEDURE — 82306 VITAMIN D 25 HYDROXY: CPT

## 2023-08-03 PROCEDURE — 6370000000 HC RX 637 (ALT 250 FOR IP): Performed by: HOSPITALIST

## 2023-08-03 PROCEDURE — 6370000000 HC RX 637 (ALT 250 FOR IP): Performed by: INTERNAL MEDICINE

## 2023-08-03 PROCEDURE — 36415 COLL VENOUS BLD VENIPUNCTURE: CPT

## 2023-08-03 PROCEDURE — 84439 ASSAY OF FREE THYROXINE: CPT

## 2023-08-03 PROCEDURE — 2580000003 HC RX 258

## 2023-08-03 PROCEDURE — 1200000000 HC SEMI PRIVATE

## 2023-08-03 PROCEDURE — 99233 SBSQ HOSP IP/OBS HIGH 50: CPT | Performed by: HOSPITALIST

## 2023-08-03 PROCEDURE — 97110 THERAPEUTIC EXERCISES: CPT

## 2023-08-03 PROCEDURE — 6360000002 HC RX W HCPCS

## 2023-08-03 PROCEDURE — 82533 TOTAL CORTISOL: CPT

## 2023-08-03 PROCEDURE — 97535 SELF CARE MNGMENT TRAINING: CPT

## 2023-08-03 PROCEDURE — 82948 REAGENT STRIP/BLOOD GLUCOSE: CPT

## 2023-08-03 RX ORDER — ERGOCALCIFEROL 1.25 MG/1
50000 CAPSULE ORAL WEEKLY
Status: DISCONTINUED | OUTPATIENT
Start: 2023-08-03 | End: 2023-08-04 | Stop reason: HOSPADM

## 2023-08-03 RX ORDER — METOPROLOL SUCCINATE 25 MG/1
25 TABLET, EXTENDED RELEASE ORAL DAILY
Status: DISCONTINUED | OUTPATIENT
Start: 2023-08-03 | End: 2023-08-04 | Stop reason: HOSPADM

## 2023-08-03 RX ORDER — LEVOTHYROXINE SODIUM 0.1 MG/1
200 TABLET ORAL ONCE
Status: COMPLETED | OUTPATIENT
Start: 2023-08-03 | End: 2023-08-03

## 2023-08-03 RX ADMIN — SODIUM CHLORIDE, POTASSIUM CHLORIDE, SODIUM LACTATE AND CALCIUM CHLORIDE: 600; 310; 30; 20 INJECTION, SOLUTION INTRAVENOUS at 01:53

## 2023-08-03 RX ADMIN — CLOPIDOGREL BISULFATE 75 MG: 75 TABLET ORAL at 08:33

## 2023-08-03 RX ADMIN — ERGOCALCIFEROL 50000 UNITS: 1.25 CAPSULE ORAL at 09:04

## 2023-08-03 RX ADMIN — INSULIN LISPRO 5 UNITS: 100 INJECTION, SOLUTION INTRAVENOUS; SUBCUTANEOUS at 07:29

## 2023-08-03 RX ADMIN — INSULIN LISPRO 5 UNITS: 100 INJECTION, SOLUTION INTRAVENOUS; SUBCUTANEOUS at 16:47

## 2023-08-03 RX ADMIN — LEVOTHYROXINE SODIUM 200 MCG: 0.1 TABLET ORAL at 09:04

## 2023-08-03 RX ADMIN — INSULIN GLARGINE 20 UNITS: 100 INJECTION, SOLUTION SUBCUTANEOUS at 20:28

## 2023-08-03 RX ADMIN — ATORVASTATIN CALCIUM 40 MG: 40 TABLET, FILM COATED ORAL at 20:28

## 2023-08-03 RX ADMIN — LEVETIRACETAM 500 MG: 500 TABLET, FILM COATED ORAL at 08:33

## 2023-08-03 RX ADMIN — LEVETIRACETAM 500 MG: 500 TABLET, FILM COATED ORAL at 20:28

## 2023-08-03 RX ADMIN — LEVOTHYROXINE SODIUM 125 MCG: 0.12 TABLET ORAL at 06:55

## 2023-08-03 RX ADMIN — FERROUS SULFATE TAB 325 MG (65 MG ELEMENTAL FE) 325 MG: 325 (65 FE) TAB at 08:33

## 2023-08-03 RX ADMIN — ENOXAPARIN SODIUM 40 MG: 100 INJECTION SUBCUTANEOUS at 08:33

## 2023-08-03 RX ADMIN — INSULIN LISPRO 5 UNITS: 100 INJECTION, SOLUTION INTRAVENOUS; SUBCUTANEOUS at 11:33

## 2023-08-03 RX ADMIN — SODIUM CHLORIDE, POTASSIUM CHLORIDE, SODIUM LACTATE AND CALCIUM CHLORIDE: 600; 310; 30; 20 INJECTION, SOLUTION INTRAVENOUS at 21:29

## 2023-08-03 RX ADMIN — SERTRALINE 50 MG: 50 TABLET, FILM COATED ORAL at 08:33

## 2023-08-03 RX ADMIN — EMPAGLIFLOZIN 25 MG: 25 TABLET, FILM COATED ORAL at 08:33

## 2023-08-03 ASSESSMENT — PAIN SCALES - GENERAL
PAINLEVEL_OUTOF10: 0
PAINLEVEL_OUTOF10: 0

## 2023-08-03 NOTE — PROGRESS NOTES
[facility] RENAL TELEMETRY 6K  Occupational Therapy  Daily Note  Time:   Time In: 1034  Time Out: 1100  Timed Code Treatment Minutes: 26 Minutes  Minutes: 26          Date: 8/3/2023  Patient Name: Master Agudelo,   Gender: female      Room: [facility]/015-A  MRN: [id number]  : 1940  (82 y.o.)  Referring Practitioner: JENNIFER Borjas  Diagnosis: hypothyroidism myopathy  Additional Pertinent Hx: per chart review; Master Agudelo is a 82 y.o. female with PMHx of hypothyroid, HTN, IDDM2, CVA, PORFIRIO, and AS, who presented to [facility] with chief complaint of a syncopal episode. The patient was attending her \"adult \" and she said that her nurse told her that she seemed a little off. The patient states that she is incontinent and she felt like she needed to use the bathroom. As the nurse was preparing to help her, the patients states she began to feel very hot, sweaty, and nauseous. She then had syncopal episode and was lowered to ground by nursing staff. Did not hit her head. She reports she has had one episode like this in the past and they believed at that time she had had a seizure so she was put on Keppra. There was no reports of seizure activity during this event. She was sent to [facility] for further workup. At the time of assessment, she is asymptomatic. She denies fever, cough chills. Reports that she feels cold all the time. She denies chest pain, shortness of breath. Had nausea but has since resolved. No vomiting or diarrhea. Reports that she has been eating and drinking well.     Restrictions/Precautions:  Restrictions/Precautions: Fall Risk, General Precautions  Position Activity Restriction  Other position/activity restrictions: possible orthostatic hypotension, Hx CVA with L sided weakness     SUBJECTIVE: Nurse Richi Tamez ok'd session, Up in chair upon arrival, requesting to use bathroom    PAIN: 0/10:     Vitals: Vitals not assessed per clinical judgement, see nursing

## 2023-08-03 NOTE — PROGRESS NOTES
[facility]  INPATIENT PHYSICAL THERAPY  DAILY NOTE  STRZ RENAL TELEMETRY 6K - 6K-15/015-A     Time In: 1683  Time Out: 1421  Timed Code Treatment Minutes: 26 Minutes  Minutes: 26          Date: 8/3/2023  Patient Name: Norvel Gitelman,  Gender:  female        MRN: [id number]  : 1940  (82 y.o.)     Referring Practitioner: SAMAN Ma CNP  Diagnosis: Orthostatic hypotension  Additional Pertinent Hx: Norvel Gitelman is a 82 y.o. female with PMHx of hypothyroid, HTN, IDDM2, CVA, PORFIRIO, and AS, who presented to [facility] with chief complaint of a syncopal episode. The patient was attending her \"adult \" and she said that her nurse told her that she seemed a little off. The patient states that she is incontinent and she felt like she needed to use the bathroom. As the nurse was preparing to help her, the patients states she began to feel very hot, sweaty, and nauseous. She then had syncopal episode and was lowered to ground by nursing staff. Did not hit her head. She reports she has had one episode like this in the past and they believed at that time she had had a seizure so she was put on Keppra. There was no reports of seizure activity during this event. She was sent to [facility] for further workup.      Prior Level of Function:  Lives With: Family, Significant other  Type of Home: Mobile home  Home Layout: One level  Home Access: Ramped entrance  Home Equipment: Rufus Abraham, [address], [facility], [facility], Mardanny Abraham, 4 wheeled, Lift chair   Bathroom Shower/Tub: Walk-in shower  Bathroom Toilet: Handicap height  Bathroom Equipment: Shower chair (has a suction grab bar in shower, however doesn't trust it.)  Bathroom Accessibility: Walker accessible    [address] Help From: Family, Friend(s)  ADL Assistance: Needs assistance (for L side of body)  Ambulation Assistance: Independent  Transfer Assistance: Needs assistance  Active : No  Additional Comments: Pt generally has someone at home with

## 2023-08-04 VITALS
HEIGHT: 63 IN | DIASTOLIC BLOOD PRESSURE: 64 MMHG | WEIGHT: 145.4 LBS | SYSTOLIC BLOOD PRESSURE: 132 MMHG | OXYGEN SATURATION: 99 % | HEART RATE: 65 BPM | TEMPERATURE: 97.3 F | BODY MASS INDEX: 25.76 KG/M2 | RESPIRATION RATE: 18 BRPM

## 2023-08-04 LAB
ALBUMIN SERPL BCG-MCNC: 3.1 G/DL (ref 3.5–5.1)
ALP SERPL-CCNC: 75 U/L (ref 38–126)
ALT SERPL W/O P-5'-P-CCNC: 19 U/L (ref 11–66)
ANION GAP SERPL CALC-SCNC: 10 MEQ/L (ref 8–16)
AST SERPL-CCNC: 26 U/L (ref 5–40)
BASOPHILS ABSOLUTE: 0.1 THOU/MM3 (ref 0–0.1)
BASOPHILS NFR BLD AUTO: 1.1 %
BILIRUB SERPL-MCNC: 0.4 MG/DL (ref 0.3–1.2)
BUN SERPL-MCNC: 12 MG/DL (ref 7–22)
CALCIUM SERPL-MCNC: 9 MG/DL (ref 8.5–10.5)
CHLORIDE SERPL-SCNC: 104 MEQ/L (ref 98–111)
CO2 SERPL-SCNC: 25 MEQ/L (ref 23–33)
CREAT SERPL-MCNC: 0.7 MG/DL (ref 0.4–1.2)
DEPRECATED RDW RBC AUTO: 43.8 FL (ref 35–45)
EKG ATRIAL RATE: 64 BPM
EKG ATRIAL RATE: 65 BPM
EKG P AXIS: 63 DEGREES
EKG P AXIS: 71 DEGREES
EKG P-R INTERVAL: 150 MS
EKG P-R INTERVAL: 152 MS
EKG Q-T INTERVAL: 488 MS
EKG Q-T INTERVAL: 504 MS
EKG QRS DURATION: 118 MS
EKG QRS DURATION: 122 MS
EKG QTC CALCULATION (BAZETT): 503 MS
EKG QTC CALCULATION (BAZETT): 524 MS
EKG R AXIS: -51 DEGREES
EKG R AXIS: -60 DEGREES
EKG T AXIS: 25 DEGREES
EKG T AXIS: 40 DEGREES
EKG VENTRICULAR RATE: 64 BPM
EKG VENTRICULAR RATE: 65 BPM
EOSINOPHIL NFR BLD AUTO: 1.6 %
EOSINOPHILS ABSOLUTE: 0.1 THOU/MM3 (ref 0–0.4)
ERYTHROCYTE [DISTWIDTH] IN BLOOD BY AUTOMATED COUNT: 13.8 % (ref 11.5–14.5)
GFR SERPL CREATININE-BSD FRML MDRD: > 60 ML/MIN/1.73M2
GLUCOSE BLD STRIP.AUTO-MCNC: 109 MG/DL (ref 70–108)
GLUCOSE BLD STRIP.AUTO-MCNC: 110 MG/DL (ref 70–108)
GLUCOSE BLD STRIP.AUTO-MCNC: 92 MG/DL (ref 70–108)
GLUCOSE SERPL-MCNC: 151 MG/DL (ref 70–108)
HCT VFR BLD AUTO: 34 % (ref 37–47)
HGB BLD-MCNC: 10.8 GM/DL (ref 12–16)
IMM GRANULOCYTES # BLD AUTO: 0.02 THOU/MM3 (ref 0–0.07)
IMM GRANULOCYTES NFR BLD AUTO: 0.4 %
LEVETIRACETAM SERPL-MCNC: 25 UG/ML (ref 10–40)
LYMPHOCYTES ABSOLUTE: 2.4 THOU/MM3 (ref 1–4.8)
LYMPHOCYTES NFR BLD AUTO: 42.9 %
MCH RBC QN AUTO: 27.9 PG (ref 26–33)
MCHC RBC AUTO-ENTMCNC: 31.8 GM/DL (ref 32.2–35.5)
MCV RBC AUTO: 87.9 FL (ref 81–99)
MONOCYTES ABSOLUTE: 0.4 THOU/MM3 (ref 0.4–1.3)
MONOCYTES NFR BLD AUTO: 6.7 %
NEUTROPHILS NFR BLD AUTO: 47.3 %
NRBC BLD AUTO-RTO: 0 /100 WBC
PLATELET # BLD AUTO: 175 THOU/MM3 (ref 130–400)
PMV BLD AUTO: 13.5 FL (ref 9.4–12.4)
POTASSIUM SERPL-SCNC: 4.1 MEQ/L (ref 3.5–5.2)
PROT SERPL-MCNC: 5.7 G/DL (ref 6.1–8)
RBC # BLD AUTO: 3.87 MILL/MM3 (ref 4.2–5.4)
SEGMENTED NEUTROPHILS ABSOLUTE COUNT: 2.7 THOU/MM3 (ref 1.8–7.7)
SODIUM SERPL-SCNC: 139 MEQ/L (ref 135–145)
WBC # BLD AUTO: 5.7 THOU/MM3 (ref 4.8–10.8)

## 2023-08-04 PROCEDURE — 6370000000 HC RX 637 (ALT 250 FOR IP)

## 2023-08-04 PROCEDURE — 85025 COMPLETE CBC W/AUTO DIFF WBC: CPT

## 2023-08-04 PROCEDURE — 6370000000 HC RX 637 (ALT 250 FOR IP): Performed by: INTERNAL MEDICINE

## 2023-08-04 PROCEDURE — 2580000003 HC RX 258

## 2023-08-04 PROCEDURE — 82948 REAGENT STRIP/BLOOD GLUCOSE: CPT

## 2023-08-04 PROCEDURE — 6360000002 HC RX W HCPCS

## 2023-08-04 PROCEDURE — 80053 COMPREHEN METABOLIC PANEL: CPT

## 2023-08-04 PROCEDURE — 36415 COLL VENOUS BLD VENIPUNCTURE: CPT

## 2023-08-04 PROCEDURE — 6370000000 HC RX 637 (ALT 250 FOR IP): Performed by: HOSPITALIST

## 2023-08-04 RX ORDER — AMLODIPINE BESYLATE 5 MG/1
10 TABLET ORAL DAILY
Qty: 30 TABLET | Refills: 0 | Status: SHIPPED | OUTPATIENT
Start: 2023-08-04 | End: 2023-08-11

## 2023-08-04 RX ORDER — ERGOCALCIFEROL 1.25 MG/1
50000 CAPSULE ORAL WEEKLY
Qty: 5 CAPSULE | Refills: 0 | Status: SHIPPED | OUTPATIENT
Start: 2023-08-10

## 2023-08-04 RX ORDER — LOSARTAN POTASSIUM 25 MG/1
25 TABLET ORAL DAILY
Qty: 30 TABLET | Refills: 0 | Status: SHIPPED | OUTPATIENT
Start: 2023-08-04 | End: 2023-08-11

## 2023-08-04 RX ORDER — LEVOTHYROXINE SODIUM 0.15 MG/1
150 TABLET ORAL
Qty: 30 TABLET | Refills: 0 | Status: SHIPPED | OUTPATIENT
Start: 2023-08-05

## 2023-08-04 RX ADMIN — INSULIN LISPRO 5 UNITS: 100 INJECTION, SOLUTION INTRAVENOUS; SUBCUTANEOUS at 08:47

## 2023-08-04 RX ADMIN — EMPAGLIFLOZIN 25 MG: 25 TABLET, FILM COATED ORAL at 08:48

## 2023-08-04 RX ADMIN — FERROUS SULFATE TAB 325 MG (65 MG ELEMENTAL FE) 325 MG: 325 (65 FE) TAB at 08:48

## 2023-08-04 RX ADMIN — SODIUM CHLORIDE, POTASSIUM CHLORIDE, SODIUM LACTATE AND CALCIUM CHLORIDE: 600; 310; 30; 20 INJECTION, SOLUTION INTRAVENOUS at 08:47

## 2023-08-04 RX ADMIN — CLOPIDOGREL BISULFATE 75 MG: 75 TABLET ORAL at 08:48

## 2023-08-04 RX ADMIN — LEVETIRACETAM 500 MG: 500 TABLET, FILM COATED ORAL at 08:48

## 2023-08-04 RX ADMIN — ENOXAPARIN SODIUM 40 MG: 100 INJECTION SUBCUTANEOUS at 08:48

## 2023-08-04 RX ADMIN — SERTRALINE 50 MG: 50 TABLET, FILM COATED ORAL at 08:48

## 2023-08-04 RX ADMIN — LEVOTHYROXINE SODIUM 125 MCG: 0.12 TABLET ORAL at 06:14

## 2023-08-04 RX ADMIN — INSULIN LISPRO 5 UNITS: 100 INJECTION, SOLUTION INTRAVENOUS; SUBCUTANEOUS at 11:50

## 2023-08-04 RX ADMIN — METOPROLOL SUCCINATE 25 MG: 25 TABLET, EXTENDED RELEASE ORAL at 08:48

## 2023-08-04 ASSESSMENT — PAIN SCALES - GENERAL: PAINLEVEL_OUTOF10: 0

## 2023-08-04 NOTE — DISCHARGE SUMMARY
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 Near Syncope   - Pt presented to [facility]. [facility] after an episode of near syncope at adult day care. - Likely multifactorial etiology -- possible orthostatic hypotension from dehydration, possible hypothyroidism, possible hypoglycemic episode with hx of IDDM2, possible polypharmacy for hypertension, possible hx of mild aortic stenosis. - CTA Chest () was negative for pulmonary embolism. - Positive orthostatics. Patient treated with LR @ 100 ml/hr.  - Patient stable for discharge (). Instructed to continue to HOLD norvasc and losartan until f/u appt with PCP. Continue taking Metoprolol     Hypothyroidism   - Known hx of hypothyroidism treated with Synthroid 112 mcg (per pt) at home. - Thyroid function upon admission () - .2  and T4 0.36. -- Synthroid dose increased to 125 mcg.   Additionally, given 200 mcg x 2 doses for a total dose of 325mcg on  Final report electronically signed by Dr. Sera García on 7/31/2023 2:05 PM             Consults:     PALLIATIVE CARE EVAL    Disposition:    [x] Home       [] TCU       [] Rehab       [] Psych       [] SNF       [] [address]       [] Other-    Condition at Discharge: Stable    Code Status:  Full Code     Patient Instructions:    Discharge lab work: Follow up TSH, T4 in 1 week to monitor thyroid function. Activity: activity as tolerated  Diet: ADULT DIET; Regular; 4 carb choices (60 gm/meal);  Low Fat/Low Chol/High Fiber/ESTEBAN      Follow-up visits:   Jeancarlos Negron DO  [address] [facility] [address] [phone]    Schedule an appointment as soon as possible for a visit on 8/7/2023  Follow up for BP and Thyroid and DM    [address] [facility]  [address]   [address] [facility]  [phone]    Schedule an appointment as soon as possible for a visit in 1 week(s)  Follow up for DM and Thyroid      Discharge Medications:        Medication List        START taking these medications      Vitamin D (Ergocalciferol) 69660 units Caps  Take 50,000 Units by mouth once a week  Start taking on: August 10, 2023            CHANGE how you take these medications      amLODIPine 5 MG tablet  Commonly known as: NORVASC  Take 2 tablets by mouth daily Hold medication until follow up with PCP  What changed: additional instructions     levothyroxine 150 MCG tablet  Commonly known as: SYNTHROID  Take 1 tablet by mouth every morning (before breakfast)  Start taking on: August 5, 2023  What changed:   medication strength  how much to take  when to take this     losartan 25 MG tablet  Commonly known as: Cozaar  Take 1 tablet by mouth daily Hold medication until patient follows up with PCP  What changed: additional instructions            CONTINUE taking these medications      atorvastatin 80 MG tablet  Commonly known as: LIPITOR     clopidogrel 75 MG tablet  Commonly known as: PLAVIX     ferrous sulfate 325 (65

## 2023-08-04 NOTE — PLAN OF CARE
Problem: Discharge Planning  Goal: Discharge to home or other facility with appropriate resources  Outcome: Progressing  Flowsheets (Taken 8/4/2023 0023)  Discharge to home or other facility with appropriate resources: Identify barriers to discharge with patient and caregiver     Problem: Safety - Adult  Goal: Free from fall injury  Outcome: Progressing  Flowsheets (Taken 8/4/2023 0023)  Free From Fall Injury:   Instruct family/caregiver on patient safety   Based on caregiver fall risk screen, instruct family/caregiver to ask for assistance with transferring infant if caregiver noted to have fall risk factors     Problem: ABCDS Injury Assessment  Goal: Absence of physical injury  Outcome: Progressing  Flowsheets (Taken 8/4/2023 0023)  Absence of Physical Injury: Implement safety measures based on patient assessment     Problem: Cardiovascular - Adult  Goal: Maintains optimal cardiac output and hemodynamic stability  Outcome: Progressing     Problem: Skin/Tissue Integrity - Adult  Goal: Skin integrity remains intact  Outcome: Progressing  Flowsheets  Taken 8/3/2023 2318 by Justina Tracey RN  Skin Integrity Remains Intact:   Monitor for areas of redness and/or skin breakdown   Assess vascular access sites hourly  Taken 8/3/2023 1058 by Donna Azar RN  Skin Integrity Remains Intact: Monitor for areas of redness and/or skin breakdown     Problem: Chronic Conditions and Co-morbidities  Goal: Patient's chronic conditions and co-morbidity symptoms are monitored and maintained or improved  Outcome: Progressing  Flowsheets (Taken 8/4/2023 0023)  Care Plan - Patient's Chronic Conditions and Co-Morbidity Symptoms are Monitored and Maintained or Improved:   Monitor and assess patient's chronic conditions and comorbid symptoms for stability, deterioration, or improvement   Collaborate with multidisciplinary team to address chronic and comorbid conditions and prevent exacerbation or deterioration   Update acute care plan with

## 2023-08-04 NOTE — CARE COORDINATION
8/4/23, 2:01 PM EDT    Discharge ordered. Home with s.o. Plans to return to adult . Patient goals/plan/ treatment preferences discussed by  and . Patient goals/plan/ treatment preferences reviewed with patient/ family. Patient/ family verbalize understanding of discharge plan and are in agreement with goal/plan/treatment preferences. Understanding was demonstrated using the teach back method. AVS provided by RN at time of discharge, which includes all necessary medical information pertaining to the patients current course of illness, treatment, post-discharge goals of care, and treatment preferences.      Services At/After Discharge: None       IMM Letter  IMM Letter given to Patient/Family/Significant other/Guardian/POA/by[de-identified] Larry Archuleta CM  IMM Letter date given[de-identified] 08/04/23  IMM Letter time given[de-identified] 0070

## 2023-08-10 ASSESSMENT — ENCOUNTER SYMPTOMS
SHORTNESS OF BREATH: 0
DIARRHEA: 0
NAUSEA: 0
ABDOMINAL PAIN: 0
VOMITING: 0
CONSTIPATION: 0

## 2023-08-10 NOTE — PROGRESS NOTES
Reanna Cesar (:  1940) is a 82 y.o. female,Established patient, here for evaluation of the following chief complaint(s):  Follow-Up from Hospital (Patient would like a Handicap placard and scooter)      Subjective   SUBJECTIVE/OBJECTIVE:  HPI  Face to face evaluation for motorized scooter. Patient has been using a motorized scooter at home that she purchased with her own money for years but it has stopped working despite numerous repairs. Starting PT to help with strengthening again---can't even shut a door with her left arm at this time  States she was also recently in the hospital for near syncopal episode---was found to have hypotension (stopped amlodipine and losartan) and hypothyroid myopathy (.2)  Has upcoming appt with endocrinology due to uncontrolled DM and thyroid problems    Review of Systems   Constitutional:  Positive for fatigue (improving with the increased dose of levothyroxine). Negative for activity change and unexpected weight change. Respiratory:  Negative for shortness of breath. Cardiovascular:  Negative for chest pain. Gastrointestinal:  Negative for abdominal pain, constipation, diarrhea, nausea and vomiting. Neurological:  Positive for weakness (left sided weakness from previous stroke). Negative for dizziness (resolved), syncope (near syncope due to orthostasis and hypothyroid myopathy) and headaches. Objective   Physical Exam  Constitutional:       Appearance: Normal appearance. Comments: In wheelchair, son helps to push wheelchair due to inability to use L arm effectively   Cardiovascular:      Rate and Rhythm: Normal rate and regular rhythm. Heart sounds: Murmur (systolic) heard. No gallop. Pulmonary:      Effort: Pulmonary effort is normal.      Breath sounds: Normal breath sounds. No wheezing or rhonchi. Abdominal:      General: Abdomen is flat. Bowel sounds are normal. There is no distension. Palpations: Abdomen is soft.

## 2023-08-11 ENCOUNTER — OFFICE VISIT (OUTPATIENT)
Dept: FAMILY MEDICINE CLINIC | Age: 83
End: 2023-08-11
Payer: MEDICARE

## 2023-08-11 VITALS
DIASTOLIC BLOOD PRESSURE: 58 MMHG | WEIGHT: 151.8 LBS | BODY MASS INDEX: 26.9 KG/M2 | HEART RATE: 80 BPM | RESPIRATION RATE: 16 BRPM | OXYGEN SATURATION: 98 % | TEMPERATURE: 97.9 F | HEIGHT: 63 IN | SYSTOLIC BLOOD PRESSURE: 116 MMHG

## 2023-08-11 DIAGNOSIS — Z79.4 TYPE 2 DIABETES MELLITUS WITH HYPERGLYCEMIA, WITH LONG-TERM CURRENT USE OF INSULIN (HCC): ICD-10-CM

## 2023-08-11 DIAGNOSIS — I69.354 HEMIPARESIS AFFECTING LEFT SIDE AS LATE EFFECT OF CEREBROVASCULAR ACCIDENT (CVA) (HCC): Primary | ICD-10-CM

## 2023-08-11 DIAGNOSIS — E11.65 TYPE 2 DIABETES MELLITUS WITH HYPERGLYCEMIA, WITH LONG-TERM CURRENT USE OF INSULIN (HCC): ICD-10-CM

## 2023-08-11 DIAGNOSIS — G73.7 HYPOTHYROID MYOPATHY: ICD-10-CM

## 2023-08-11 DIAGNOSIS — E03.9 HYPOTHYROID MYOPATHY: ICD-10-CM

## 2023-08-11 DIAGNOSIS — E03.9 HYPOTHYROIDISM, UNSPECIFIED TYPE: ICD-10-CM

## 2023-08-11 PROBLEM — M47.812 CERVICAL OSTEOARTHRITIS: Status: ACTIVE | Noted: 2023-08-11

## 2023-08-11 PROBLEM — E87.1 HYPONATREMIA: Status: ACTIVE | Noted: 2020-01-27

## 2023-08-11 PROBLEM — R41.82 ALTERED MENTAL STATUS: Status: ACTIVE | Noted: 2023-08-11

## 2023-08-11 PROBLEM — R09.89 BILATERAL CAROTID BRUITS: Status: ACTIVE | Noted: 2020-01-27

## 2023-08-11 PROBLEM — G81.94 LEFT HEMIPARESIS (HCC): Status: ACTIVE | Noted: 2023-08-11

## 2023-08-11 PROBLEM — I63.30 CEREBRAL INFARCTION DUE TO THROMBOSIS OF CEREBRAL ARTERY (HCC): Status: ACTIVE | Noted: 2022-01-04

## 2023-08-11 PROBLEM — N36.42 URINARY INCONTINENCE DUE TO URETHRAL SPHINCTER INCOMPETENCE: Status: ACTIVE | Noted: 2022-11-30

## 2023-08-11 PROBLEM — M19.049 ARTHRITIS, DEGENERATIVE, LOCALIZED, PRIMARY, HAND: Status: ACTIVE | Noted: 2023-08-11

## 2023-08-11 PROBLEM — Z96.60 HISTORY OF ARTIFICIAL JOINT: Status: ACTIVE | Noted: 2023-08-11

## 2023-08-11 PROBLEM — N39.3 FEMALE STRESS INCONTINENCE: Status: ACTIVE | Noted: 2022-11-30

## 2023-08-11 PROBLEM — E78.5 DYSLIPIDEMIA: Status: ACTIVE | Noted: 2022-01-04

## 2023-08-11 PROBLEM — E66.3 OVERWEIGHT WITH BODY MASS INDEX (BMI) 25.0-29.9: Status: ACTIVE | Noted: 2022-01-04

## 2023-08-11 PROBLEM — I63.9 CEREBROVASCULAR ACCIDENT (CVA) (HCC): Status: ACTIVE | Noted: 2023-08-11

## 2023-08-11 PROBLEM — N32.81 OVERACTIVE BLADDER: Status: ACTIVE | Noted: 2023-01-12

## 2023-08-11 PROBLEM — R31.29 MICROSCOPIC HEMATURIA: Status: ACTIVE | Noted: 2022-11-30

## 2023-08-11 PROBLEM — M17.9 OSTEOARTHRITIS OF KNEE: Status: ACTIVE | Noted: 2023-08-11

## 2023-08-11 PROBLEM — R09.89 DECREASED PEDAL PULSES: Status: ACTIVE | Noted: 2020-01-27

## 2023-08-11 PROBLEM — E83.42 HYPOMAGNESEMIA: Status: ACTIVE | Noted: 2023-08-11

## 2023-08-11 PROBLEM — R32 URINARY INCONTINENCE DUE TO URETHRAL SPHINCTER INCOMPETENCE: Status: ACTIVE | Noted: 2022-11-30

## 2023-08-11 PROBLEM — N18.30 STAGE 3 CHRONIC KIDNEY DISEASE (HCC): Status: ACTIVE | Noted: 2022-01-04

## 2023-08-11 PROCEDURE — 3046F HEMOGLOBIN A1C LEVEL >9.0%: CPT | Performed by: FAMILY MEDICINE

## 2023-08-11 PROCEDURE — 99214 OFFICE O/P EST MOD 30 MIN: CPT | Performed by: FAMILY MEDICINE

## 2023-08-11 PROCEDURE — 1124F ACP DISCUSS-NO DSCNMKR DOCD: CPT | Performed by: FAMILY MEDICINE

## 2023-08-11 PROCEDURE — 3074F SYST BP LT 130 MM HG: CPT | Performed by: FAMILY MEDICINE

## 2023-08-11 PROCEDURE — 3078F DIAST BP <80 MM HG: CPT | Performed by: FAMILY MEDICINE

## 2023-08-11 RX ORDER — QUINIDINE GLUCONATE 324 MG
240 TABLET, EXTENDED RELEASE ORAL
COMMUNITY
Start: 2021-07-05

## 2023-08-11 RX ORDER — FLASH GLUCOSE SCANNING READER
EACH MISCELLANEOUS
COMMUNITY

## 2023-08-11 RX ORDER — INSULIN ASPART 100 [IU]/ML
INJECTION, SUSPENSION SUBCUTANEOUS
COMMUNITY

## 2023-08-11 SDOH — ECONOMIC STABILITY: HOUSING INSECURITY
IN THE LAST 12 MONTHS, WAS THERE A TIME WHEN YOU DID NOT HAVE A STEADY PLACE TO SLEEP OR SLEPT IN A SHELTER (INCLUDING NOW)?: NO

## 2023-08-11 SDOH — ECONOMIC STABILITY: FOOD INSECURITY: WITHIN THE PAST 12 MONTHS, YOU WORRIED THAT YOUR FOOD WOULD RUN OUT BEFORE YOU GOT MONEY TO BUY MORE.: NEVER TRUE

## 2023-08-11 SDOH — ECONOMIC STABILITY: INCOME INSECURITY: HOW HARD IS IT FOR YOU TO PAY FOR THE VERY BASICS LIKE FOOD, HOUSING, MEDICAL CARE, AND HEATING?: NOT HARD AT ALL

## 2023-08-11 SDOH — ECONOMIC STABILITY: FOOD INSECURITY: WITHIN THE PAST 12 MONTHS, THE FOOD YOU BOUGHT JUST DIDN'T LAST AND YOU DIDN'T HAVE MONEY TO GET MORE.: NEVER TRUE

## 2023-08-11 ASSESSMENT — PATIENT HEALTH QUESTIONNAIRE - PHQ9
SUM OF ALL RESPONSES TO PHQ9 QUESTIONS 1 & 2: 0
2. FEELING DOWN, DEPRESSED OR HOPELESS: 0
SUM OF ALL RESPONSES TO PHQ QUESTIONS 1-9: 0
SUM OF ALL RESPONSES TO PHQ QUESTIONS 1-9: 0
1. LITTLE INTEREST OR PLEASURE IN DOING THINGS: 0
SUM OF ALL RESPONSES TO PHQ QUESTIONS 1-9: 0
SUM OF ALL RESPONSES TO PHQ QUESTIONS 1-9: 0

## 2023-08-14 ENCOUNTER — TELEPHONE (OUTPATIENT)
Dept: FAMILY MEDICINE CLINIC | Age: 83
End: 2023-08-14

## 2023-08-14 DIAGNOSIS — I69.354 HEMIPARESIS AFFECTING LEFT SIDE AS LATE EFFECT OF CEREBROVASCULAR ACCIDENT (CVA) (HCC): ICD-10-CM

## 2023-08-14 PROBLEM — I34.0 MILD MITRAL REGURGITATION: Status: ACTIVE | Noted: 2023-08-14

## 2023-08-14 PROBLEM — D64.9 CHRONIC ANEMIA: Status: ACTIVE | Noted: 2023-08-14

## 2023-08-14 PROBLEM — E78.5 HYPERLIPIDEMIA: Status: ACTIVE | Noted: 2020-03-25

## 2023-08-14 PROBLEM — I35.0 MODERATE AORTIC STENOSIS: Status: ACTIVE | Noted: 2023-08-14

## 2023-08-14 PROBLEM — I69.30 HISTORY OF CVA WITH RESIDUAL DEFICIT: Status: ACTIVE | Noted: 2023-08-14

## 2023-08-14 PROBLEM — E55.9 VITAMIN D DEFICIENCY: Status: ACTIVE | Noted: 2023-08-14

## 2023-08-14 NOTE — TELEPHONE ENCOUNTER
----- Message from [employer] sent at 8/14/2023  4:41 PM EDT -----  Subject: Message to Provider    QUESTIONS  Information for Provider? patient has questions about the order for the   scooter, please call the patient, and about her handicap plaque  ---------------------------------------------------------------------------  --------------  Nicholas GILL  [phone]; OK to leave message on voicemail  ---------------------------------------------------------------------------  --------------  SCRIPT ANSWERS  Relationship to Patient?  Self

## 2023-08-14 NOTE — TELEPHONE ENCOUNTER
I called and spoke to the patient. She needs the handicap placard mailed to her. When she was here the printer was not working. Also the order for the scooter needs to be sent in. Please advise.

## 2023-08-15 NOTE — TELEPHONE ENCOUNTER
I called and spoke to the patient. She said that you will need to send the order to [facility] for the scooter. Did you put the handicap placard in the mail to the patient?

## 2023-08-16 NOTE — ADDENDUM NOTE
Addended by: Elmer Harvey on: 8/16/2023 08:56 AM     Modules accepted: Orders Island Pedicle Flap With Canthal Suspension Text: The defect edges were debeveled with a #15 scalpel blade.  Given the location of the defect, shape of the defect and the proximity to free margins an island pedicle advancement flap was deemed most appropriate.  Using a sterile surgical marker, an appropriate advancement flap was drawn incorporating the defect, outlining the appropriate donor tissue and placing the expected incisions within the relaxed skin tension lines where possible. The area thus outlined was incised deep to adipose tissue with a #15 scalpel blade.  The skin margins were undermined to an appropriate distance in all directions around the primary defect and laterally outward around the island pedicle utilizing iris scissors.  There was minimal undermining beneath the pedicle flap. A suspension suture was placed in the canthal tendon to prevent tension and prevent ectropion.

## 2023-08-17 ENCOUNTER — TELEPHONE (OUTPATIENT)
Dept: CARDIOLOGY CLINIC | Age: 83
End: 2023-08-17

## 2023-08-17 NOTE — TELEPHONE ENCOUNTER
Patient last seen 10-13-22. Next appointment 10-12-23. No openings before scheduled surgery on 8-25-23. Needs clearance for Cysto Botox injection under anesthesia? Asking to hold Plavix? Form under media tab.

## 2023-08-18 NOTE — TELEPHONE ENCOUNTER
I called and spoke to the patient. I let her know Dr. Ana Zavaleta response about the scooter and handicap placard. She voiced understanding.

## 2023-08-22 ENCOUNTER — TELEPHONE (OUTPATIENT)
Dept: FAMILY MEDICINE CLINIC | Age: 83
End: 2023-08-22

## 2023-08-22 DIAGNOSIS — E03.9 HYPOTHYROIDISM, UNSPECIFIED TYPE: Primary | ICD-10-CM

## 2023-08-22 RX ORDER — LEVOTHYROXINE SODIUM 0.15 MG/1
150 TABLET ORAL
Qty: 30 TABLET | Refills: 5 | Status: SHIPPED | OUTPATIENT
Start: 2023-08-22

## 2023-08-22 NOTE — TELEPHONE ENCOUNTER
Spoke to patient, informed rx was sent to [address] [address] [place] [address]. Patient voiced understanding.

## 2023-08-22 NOTE — TELEPHONE ENCOUNTER
The patient called to get a refill on her levothyroxine 150 mcg, takes one tablet daily, and wants it sent to [facility] [address] FAISAL DENISE on [facility] in [facility] with a 30 day supply. She can not afford a 90 day supply.

## 2023-08-24 ENCOUNTER — HOSPITAL ENCOUNTER (OUTPATIENT)
Dept: PHYSICAL THERAPY | Age: 83
Setting detail: THERAPIES SERIES
Discharge: HOME | End: 2023-08-24
Payer: MEDICARE

## 2023-08-24 PROCEDURE — 97162 PT EVAL MOD COMPLEX 30 MIN: CPT

## 2023-08-24 NOTE — PROGRESS NOTES
** PLEASE SIGN, DATE AND TIME CERTIFICATION BELOW AND RETURN TO [facility] OUTPATIENT REHABILITATION (FAX #: [phone]). ATTEST/CO-SIGN IF ACCESSING VIA [facility]. THANK YOU.**    I certify that I have examined the patient below and determined that Physical Medicine and Rehabilitation service is necessary and that I approve the established plan of care for up to 90 days or as specifically noted. Attestation, signature or co-signature of physician indicates approval of certification requirements.    ________________________ ____________ __________  Physician Signature   Date   Time  [address] [facility]  PHYSICAL THERAPY  [x] EVALUATION  [] DAILY NOTE (LAND) [] DAILY NOTE (AQUATIC ) [] PROGRESS NOTE [] DISCHARGE NOTE    [x] [address] [facility] [facility] [place]   [] [facility]    [] [facility]   [] [facility]    Date: 2023  Patient Name:  Duane Barre  : 1940  MRN: [id number]  CSN: [id number]    Referring Practitioner Bettina Jacques MD   Diagnosis Physical deconditioning [R53.81]    Treatment Diagnosis Weakness in LE, impaired balance, abnormal gait, decreased functional strength   Date of Evaluation 23    Additional Pertinent History HTN, diabetes, incontinence, CVA       Functional Outcome Measure Used 5 time sit to stand    Functional Outcome Score 27 seconds with B UE support (23)       Insurance: Primary: Payor: Daron Haley /  /  / ,   Secondary:    Authorization Information: PRE CERTIFICATION REQUIRED: YES, [address] @ [phone]. INSURANCE THERAPY BENEFIT:  UNLIMITED BASED ON MEDICAL NECESSITY. AQUATIC THERAPY COVERED: YES. MODALITIES COVERED:  YES. Visit # 1, 1/10 for progress note   Visits Allowed: Precert required, eval only    Recertification Date:    Physician Follow-Up:    Physician Orders:    History of Present Illness: Patient had a CVA in 2019 that caused L sided weakness.  Patient

## 2023-08-29 ENCOUNTER — HOSPITAL ENCOUNTER (OUTPATIENT)
Dept: PHYSICAL THERAPY | Age: 83
Setting detail: THERAPIES SERIES
Discharge: HOME | End: 2023-08-29
Payer: MEDICARE

## 2023-08-29 PROCEDURE — 97110 THERAPEUTIC EXERCISES: CPT

## 2023-08-29 NOTE — PROGRESS NOTES
[address] [facility]  PHYSICAL THERAPY  [] EVALUATION  [x] DAILY NOTE (LAND) [] DAILY NOTE (AQUATIC ) [] PROGRESS NOTE [] DISCHARGE NOTE    [x] [facility] [place]   [] [address] [place] [address]    [] [facility]   [] [facility]    Date: 2023  Patient Name:  Daphnie Estrella  : 1940  MRN: [id number]  CSN: [id number]    Referring Practitioner Kenisha Quintanilla MD   Diagnosis Physical deconditioning [R53.81]    Treatment Diagnosis Weakness in LE, impaired balance, abnormal gait, decreased functional strength   Date of Evaluation 23    Additional Pertinent History HTN, diabetes, incontinence, CVA       Functional Outcome Measure Used 5 time sit to stand    Functional Outcome Score 27 seconds with B UE support (23)       Insurance: Primary: Payor: Jessica Tafoya /  /  / ,   Secondary:    Authorization Information: PRE CERTIFICATION REQUIRED: YES, [address] @ [phone]. INSURANCE THERAPY BENEFIT:  UNLIMITED BASED ON MEDICAL NECESSITY. AQUATIC THERAPY COVERED: YES. MODALITIES COVERED:  YES. Received auth for 10 PT visits from 23 through 23 for CPT codes [phone], [address], [facility], [address] [place] [address], [address] [place] [address] and [id number]. Visit # , 2/10 for progress note   Visits Allowed: 11 visits    Recertification Date:    Physician Follow-Up:    Physician Orders:    History of Present Illness: Patient had a CVA in 2019 that caused L sided weakness. Patient reports that she attends day care 2-3 times per week and she does light exercise there. Patient reports that she feels that her legs are improving some. Patient does use a 4WW for mobility. Patient has had several falls this year. Patient does report that her balance is off. Patient was hospitalized this year due to her thyroid. Patient is getting botox for incontinence. SUBJECTIVE: Patient reports 10/10 pain in L upper arm today.      TREATMENT   Precautions: Fall risk, CVA in

## 2023-08-31 ENCOUNTER — HOSPITAL ENCOUNTER (OUTPATIENT)
Dept: PHYSICAL THERAPY | Age: 83
Setting detail: THERAPIES SERIES
Discharge: HOME | End: 2023-08-31
Payer: MEDICARE

## 2023-08-31 PROCEDURE — 97110 THERAPEUTIC EXERCISES: CPT

## 2023-08-31 PROCEDURE — 97116 GAIT TRAINING THERAPY: CPT

## 2023-09-07 ENCOUNTER — HOSPITAL ENCOUNTER (OUTPATIENT)
Dept: PHYSICAL THERAPY | Age: 83
Setting detail: THERAPIES SERIES
Discharge: HOME | End: 2023-09-07
Payer: MEDICARE

## 2023-09-07 PROCEDURE — 97110 THERAPEUTIC EXERCISES: CPT

## 2023-09-07 NOTE — PROGRESS NOTES
safety with walking while at day care. Long Term Goals: 8 weeks  Patient will improve B hip strength to 4/5 to allow ease of stair negotiation and walking. 2. Patient will improve CADE score from 29/56 to 45/56 to decrease fall risk. 3. Patient will perform 5 time sit to  14 seconds to improve functional strength needed for stair negotiation. 4. Patient will be independent with HEP in order to prevent re-injury and improve functional abilities. Patient Education:   [x]  HEP/Education Completed: Educated patient to bring in AFO with her at next appointment to improve safety of ambulation. [employer] Access Code: provide HEP next session   []  No new Education completed  []  Reviewed Prior HEP      [x]  Patient verbalized and/or demonstrated understanding of education provided. []  Patient unable to verbalize and/or demonstrate understanding of education provided. Will continue education. []  Barriers to learning:     PLAN:  Treatment Recommendations: Strengthening, Range of Motion, Balance Training, Functional Mobility Training, Transfer Training, Endurance Training, Gait Training, Stair Training, Neuromuscular Re-education, Manual Therapy - Soft Tissue Mobilization, Manual Therapy - Joint Manipulation, Home Exercise Program, Patient Education, Safety Education and Training, Aquatics, and Modalities    []  Plan of care initiated. Plan to see patient 2 times per week for 8 weeks to address the treatment planned outlined above.   [x]  Continue with current plan of care  []  Modify plan of care as follows:    []  Hold pending physician visit  []  Discharge    Time In 1345   Time Out 1430   Timed Code Minutes: 45 min   Total Treatment Time: 45 min     Electronically Signed by: Nena Desouza PT

## 2023-09-12 ENCOUNTER — HOSPITAL ENCOUNTER (OUTPATIENT)
Dept: PHYSICAL THERAPY | Age: 83
Setting detail: THERAPIES SERIES
Discharge: HOME | End: 2023-09-12
Payer: MEDICARE

## 2023-09-12 PROCEDURE — 97112 NEUROMUSCULAR REEDUCATION: CPT

## 2023-09-12 PROCEDURE — 97110 THERAPEUTIC EXERCISES: CPT

## 2023-09-12 NOTE — PROGRESS NOTES
[facility]  PHYSICAL THERAPY  [] EVALUATION  [x] DAILY NOTE (LAND) [] DAILY NOTE (AQUATIC ) [] PROGRESS NOTE [] DISCHARGE NOTE    [x] [facility] [place]   [] [address] [place] [address]    [] [facility]   [] Dameon Victoria    Date: 2023  Patient Name:  Zeferino Zhu  : 1940  MRN: [id number]  CSN: [id number]    Referring Practitioner Rakel Orta MD   Diagnosis Physical deconditioning [R53.81]    Treatment Diagnosis Weakness in LE, impaired balance, abnormal gait, decreased functional strength   Date of Evaluation 23    Additional Pertinent History HTN, diabetes, incontinence, CVA       Functional Outcome Measure Used 5 time sit to stand    Functional Outcome Score 27 seconds with B UE support (23)       Insurance: Primary: Payor: Queen Tripp /  /  / ,   Secondary:    Authorization Information: PRE CERTIFICATION REQUIRED: YES, [address] @ [phone]. INSURANCE THERAPY BENEFIT:  UNLIMITED BASED ON MEDICAL NECESSITY. AQUATIC THERAPY COVERED: YES. MODALITIES COVERED:  YES. Received auth for 10 PT visits from 23 through 23 for CPT codes [phone], [address], [facility], [address] [place] [address], [facility] and [id number]. Visit # , 5/10 for progress note   Visits Allowed: 11 visits    Recertification Date:    Physician Follow-Up:    Physician Orders:    History of Present Illness: Patient had a CVA in 2019 that caused L sided weakness. Patient reports that she attends day care 2-3 times per week and she does light exercise there. Patient reports that she feels that her legs are improving some. Patient does use a 4WW for mobility. Patient has had several falls this year. Patient does report that her balance is off. Patient was hospitalized this year due to her thyroid. Patient is getting botox for incontinence. SUBJECTIVE: Patient reports shoulder pain upon arrival due to arthritis.  Patient reports that last week after

## 2023-09-13 ENCOUNTER — TELEPHONE (OUTPATIENT)
Dept: FAMILY MEDICINE CLINIC | Age: 83
End: 2023-09-13

## 2023-09-13 NOTE — TELEPHONE ENCOUNTER
Katie from [address] [place] [address] Drive called requesting the patient's last OV notes. I printed the 8/11/2023 OV note and faxed to the given fax number [phone]. Received a successful fax confirmation.    Katie also gave a phone number of [phone] option #3

## 2023-09-14 ENCOUNTER — HOSPITAL ENCOUNTER (OUTPATIENT)
Dept: PHYSICAL THERAPY | Age: 83
Setting detail: THERAPIES SERIES
Discharge: HOME | End: 2023-09-14
Payer: MEDICARE

## 2023-09-14 ENCOUNTER — HOSPITAL ENCOUNTER (OUTPATIENT)
Dept: OCCUPATIONAL THERAPY | Age: 83
Setting detail: THERAPIES SERIES
Discharge: HOME | End: 2023-09-14
Payer: MEDICARE

## 2023-09-14 PROCEDURE — 97166 OT EVAL MOD COMPLEX 45 MIN: CPT

## 2023-09-14 PROCEDURE — 97110 THERAPEUTIC EXERCISES: CPT

## 2023-09-14 NOTE — DISCHARGE SUMMARY
pt. Wants to remain as independent as possible. Can the mobility deficit be sufficiently resolved with only the use of a cane or walker?        no and due to left side weakness and increased muscle tone on left side from CVA, pt. Walks quite slowly due to fear of falling even with her rollator walker and is not able to fully grasp left side of walker. Pt. Also reports pain in her shoulders due to OA. A cane would not provide adequate support for safe mobility. Does the user's environment support the use of the type of wheelchair being recommended? SEE VENDOR'S NOTE. If a manual wheelchair is recommended, does the user have sufficient function/abilities to use the recommended equipment? no    If a POV is recommended, does the user have sufficient stability and upper extremity function to operate it? No due to LUE function    If a power wheelchair is recommended, does the user have sufficient function/abilities to use the recommended equipment? yes       RECOMMENDATION/GOALS     TYPE OF WHEELCHAIR RECOMMENDED:  Power Wheelchair - Silvana Butler 613 power wheelchair for safe functional mobility for ADL in her home -   POSITIONING SYSTEMS RECOMMENDED: None    COMPONENTS RECOMMENDED:     2 each U-1 AGM battery  -  - new - required to power the wheelchair  1 each right and left memory seat armrests -  - new - required to position and support pt's UE's while seated in chair  1 each swing away inling joystick mount, right side -  - will allow pt. To swing joystick out of the way for transfers and to get closer to tables.

## 2023-09-14 NOTE — PROGRESS NOTES
[facility]  PHYSICAL THERAPY  [] EVALUATION  [x] DAILY NOTE (LAND) [] DAILY NOTE (AQUATIC ) [] PROGRESS NOTE [] DISCHARGE NOTE    [x] OUT[facility]AB[facility] [place]   [] [address] [place] [address]    [] [facility]   [] [facility]    Date: 2023  Patient Name:  Duane Barre  : 1940  MRN: [id number]  CSN: [id number]    Referring Practitioner Bettina Jacques MD   Diagnosis Physical deconditioning [R53.81]    Treatment Diagnosis Weakness in LE, impaired balance, abnormal gait, decreased functional strength   Date of Evaluation 23    Additional Pertinent History HTN, diabetes, incontinence, CVA       Functional Outcome Measure Used 5 time sit to stand    Functional Outcome Score 27 seconds with B UE support (23)       Insurance: Primary: Payor: Daron Haley /  /  / ,   Secondary:    Authorization Information: PRE CERTIFICATION REQUIRED: YES, [address] @ [phone]. INSURANCE THERAPY BENEFIT:  UNLIMITED BASED ON MEDICAL NECESSITY. AQUATIC THERAPY COVERED: YES. MODALITIES COVERED:  YES. Received auth for 10 PT visits from 23 through 23 for CPT codes [phone], [address], [facility], [address] [place] [address], [facility] and [id number]. Visit # , 6/10 for progress note   Visits Allowed: 11 visits    Recertification Date:    Physician Follow-Up:    Physician Orders:    History of Present Illness: Patient had a CVA in 2019 that caused L sided weakness. Patient reports that she attends day care 2-3 times per week and she does light exercise there. Patient reports that she feels that her legs are improving some. Patient does use a 4WW for mobility. Patient has had several falls this year. Patient does report that her balance is off. Patient was hospitalized this year due to her thyroid. Patient is getting botox for incontinence. SUBJECTIVE: Patient states she has less pain today. Some days the L shoulder bothers her more.  Reports

## 2023-09-19 ENCOUNTER — HOSPITAL ENCOUNTER (OUTPATIENT)
Dept: PHYSICAL THERAPY | Age: 83
Setting detail: THERAPIES SERIES
Discharge: HOME | End: 2023-09-19
Payer: MEDICARE

## 2023-09-19 PROCEDURE — 97110 THERAPEUTIC EXERCISES: CPT

## 2023-09-19 NOTE — PROGRESS NOTES
[facility]  PHYSICAL THERAPY  [] EVALUATION  [x] DAILY NOTE (LAND) [] DAILY NOTE (AQUATIC ) [] PROGRESS NOTE [] DISCHARGE NOTE    [x] [facility]   [] [address] [place] [address]    [] [facility]   [] Savage Sterling    Date: 2023  Patient Name:  Daphnie sEtrella  : 1940  MRN: [id number]  CSN: [id number]    Referring Practitioner Kenisha Quintanilla MD   Diagnosis Physical deconditioning [R53.81]    Treatment Diagnosis Weakness in LE, impaired balance, abnormal gait, decreased functional strength   Date of Evaluation 23    Additional Pertinent History HTN, diabetes, incontinence, CVA       Functional Outcome Measure Used 5 time sit to stand    Functional Outcome Score 27 seconds with B UE support (23)       Insurance: Primary: Payor: Jessica Tafoya /  /  / ,   Secondary:    Authorization Information: PRE CERTIFICATION REQUIRED: YES, [address] @ [phone]. INSURANCE THERAPY BENEFIT:  UNLIMITED BASED ON MEDICAL NECESSITY. AQUATIC THERAPY COVERED: YES. MODALITIES COVERED:  YES. Received auth for 10 PT visits from 23 through 23 for CPT codes [phone], [address], [facility], [address] [place] [address], [facility] and [id number]. Visit # , 7/10 for progress note   Visits Allowed: 11 visits    Recertification Date:    Physician Follow-Up:    Physician Orders:    History of Present Illness: Patient had a CVA in 2019 that caused L sided weakness. Patient reports that she attends day care 2-3 times per week and she does light exercise there. Patient reports that she feels that her legs are improving some. Patient does use a 4WW for mobility. Patient has had several falls this year. Patient does report that her balance is off. Patient was hospitalized this year due to her thyroid. Patient is getting botox for incontinence. SUBJECTIVE: Patient states she has increased L shoulder pain today.  Patient thinks she may have slept wrong or

## 2023-09-21 ENCOUNTER — HOSPITAL ENCOUNTER (OUTPATIENT)
Dept: PHYSICAL THERAPY | Age: 83
Setting detail: THERAPIES SERIES
Discharge: HOME | End: 2023-09-21
Payer: MEDICARE

## 2023-09-21 PROCEDURE — 97112 NEUROMUSCULAR REEDUCATION: CPT

## 2023-09-21 PROCEDURE — 97110 THERAPEUTIC EXERCISES: CPT

## 2023-09-21 NOTE — PROGRESS NOTES
[facility]  PHYSICAL THERAPY  [] EVALUATION  [x] DAILY NOTE (LAND) [] DAILY NOTE (AQUATIC ) [] PROGRESS NOTE [] DISCHARGE NOTE    [x] [facility] [place]   [] [address] [place] [address]    [] [facility]   [] Martha Giraldo    Date: 2023  Patient Name:  Jennie Farrell  : 1940  MRN: [id number]  CSN: [id number]    Referring Practitioner Mariano Maguire MD   Diagnosis Physical deconditioning [R53.81]    Treatment Diagnosis Weakness in LE, impaired balance, abnormal gait, decreased functional strength   Date of Evaluation 23    Additional Pertinent History HTN, diabetes, incontinence, CVA       Functional Outcome Measure Used 5 time sit to stand    Functional Outcome Score 27 seconds with B UE support (23)       Insurance: Primary: Payor: Randal Lafleur /  /  / ,   Secondary:    Authorization Information: PRE CERTIFICATION REQUIRED: YES, [address] @ [phone]. INSURANCE THERAPY BENEFIT:  UNLIMITED BASED ON MEDICAL NECESSITY. AQUATIC THERAPY COVERED: YES. MODALITIES COVERED:  YES. Received auth for 10 PT visits from 23 through 23 for CPT codes [phone], [address], [address] [place] [address], [phone], [address] [place] [address] and [id number]. Visit # , 8/10 for progress note   Visits Allowed: 11 visits    Recertification Date:    Physician Follow-Up:    Physician Orders:    History of Present Illness: Patient had a CVA in 2019 that caused L sided weakness. Patient reports that she attends day care 2-3 times per week and she does light exercise there. Patient reports that she feels that her legs are improving some. Patient does use a 4WW for mobility. Patient has had several falls this year. Patient does report that her balance is off. Patient was hospitalized this year due to her thyroid. Patient is getting botox for incontinence. SUBJECTIVE: Patient does arrive 8 minutes late today.  Patient reports continued shoulder pain 5/10 upon

## 2023-09-26 ENCOUNTER — HOSPITAL ENCOUNTER (OUTPATIENT)
Dept: PHYSICAL THERAPY | Age: 83
Setting detail: THERAPIES SERIES
Discharge: HOME | End: 2023-09-26
Payer: MEDICARE

## 2023-09-26 PROCEDURE — 97112 NEUROMUSCULAR REEDUCATION: CPT

## 2023-09-26 PROCEDURE — 97110 THERAPEUTIC EXERCISES: CPT

## 2023-09-26 NOTE — PROGRESS NOTES
Discharge    Time In 1015   Time Out 1100   Timed Code Minutes: 45 min   Total Treatment Time: 45 min     Electronically Signed by: Orion Campbell PT

## 2023-09-28 ENCOUNTER — HOSPITAL ENCOUNTER (OUTPATIENT)
Dept: PHYSICAL THERAPY | Age: 83
Setting detail: THERAPIES SERIES
Discharge: HOME | End: 2023-09-28
Payer: MEDICARE

## 2023-09-28 PROCEDURE — 97110 THERAPEUTIC EXERCISES: CPT

## 2023-09-28 NOTE — PROGRESS NOTES
[facility]  PHYSICAL THERAPY  [] EVALUATION  [x] DAILY NOTE (LAND) [] DAILY NOTE (AQUATIC ) [] PROGRESS NOTE [] DISCHARGE NOTE    [x] [facility] [place]   [] [address] [place] [address]    [] [facility]   [] Warren Calixs    Date: 2023  Patient Name:  Annita Campuzano  : 1940  MRN: [id number]  CSN: [id number]    Referring Practitioner Yelitza José MD   Diagnosis Physical deconditioning [R53.81]    Treatment Diagnosis Weakness in LE, impaired balance, abnormal gait, decreased functional strength   Date of Evaluation 23    Additional Pertinent History HTN, diabetes, incontinence, CVA       Functional Outcome Measure Used 5 time sit to stand    Functional Outcome Score 27 seconds with B UE support (23)       Insurance: Primary: Payor: Hanford Merlin /  /  / ,   Secondary:    Authorization Information: PRE CERTIFICATION REQUIRED: YES, [address] @ [phone]. INSURANCE THERAPY BENEFIT:  UNLIMITED BASED ON MEDICAL NECESSITY. AQUATIC THERAPY COVERED: YES. MODALITIES COVERED:  YES. Received auth for 10 PT visits from 23 through 23 for CPT codes [phone], [address], [address] [place] [address], [address] [place] [address], [address] [place] [address] and [id number]. Visit # 10/11, 10/10 for progress note   Visits Allowed: 11 visits    Recertification Date:    Physician Follow-Up:    Physician Orders:    History of Present Illness: Patient had a CVA in 2019 that caused L sided weakness. Patient reports that she attends day care 2-3 times per week and she does light exercise there. Patient reports that she feels that her legs are improving some. Patient does use a 4WW for mobility. Patient has had several falls this year. Patient does report that her balance is off. Patient was hospitalized this year due to her thyroid. Patient is getting botox for incontinence.        SUBJECTIVE: Pt reports feeling tired and worn out after previous session, \"but it doesn't take much to make

## 2023-10-03 ENCOUNTER — HOSPITAL ENCOUNTER (OUTPATIENT)
Dept: PHYSICAL THERAPY | Age: 83
Setting detail: THERAPIES SERIES
Discharge: HOME | End: 2023-10-03
Payer: MEDICARE

## 2023-10-03 PROCEDURE — 97110 THERAPEUTIC EXERCISES: CPT

## 2023-10-03 PROCEDURE — 97112 NEUROMUSCULAR REEDUCATION: CPT

## 2023-10-03 NOTE — PROGRESS NOTES
Patient limited by medical complications  []  Other:     Assessment: Patient seen for progress report today. Patient has been seen for 11 visits at this time. Patient has made some progress toward goals. Patient is progressing slowly. Patient improved CADE from 29/56 to 37/56 demonstrating improved balance. Patient improved 5 time sit to stand from 27 seconds to 25 seconds with UE support. Patient improved L knee and ankle strength to 5/5 and 4+/5. Patient improved hip strength as noted below. Patient improved gait with patient able to ambulate 93 feet with 4WW with SBA today compared to 50 feet on eval. Patient does continue to have decreased hip strength, impaired balance, and difficulty with gait that limits her ability to perform daily tasks that involve standing, walking and transfers. Patient is still a Fall risk per CADE and 5 time sit to stand scores. Patient would benefit from continued skilled PT to further improve deficits mentioned above to allow improved independence with functional mobility and decrease fall risk. Will continue 2 times per week for an additional 4-6 weeks. Goals    Patient Goal: to improve strength    Short Term Goals: 4 weeks  Patient will improve L knee strength to 5/5 and L ankle strength to 4+/5 to allow ease of walking and transfers. GOAL MET:  L knee extension and flexion 5/5, L ankle DF 4+/5, L ankle PF 4+/5. Discontinue Goal  2. Patient will complete 5 time sit to  18 seconds to improve functional strength needed for ease of household tasks. GOAL NOT MET: 5 time sit to stand 25 seconds with UE support. Continue Goal  3. Patient will improve CADE score from 29/56 to 40/56 to decrease fall risk. GOAL NOT MET: CADE 37/56. Continue Goal  4. Patient will be able to safely ambulate with 4WW with SBA > 100 feet to improve safety with walking while at day care. GOAL NOT MET: Patient able to ambulate 93 feet with 4WW with SBA.   Continue Goal    Long Term Goals: 8

## 2023-10-10 ENCOUNTER — HOSPITAL ENCOUNTER (OUTPATIENT)
Dept: PHYSICAL THERAPY | Age: 83
Setting detail: THERAPIES SERIES
Discharge: HOME | End: 2023-10-10
Payer: MEDICARE

## 2023-10-10 PROCEDURE — 97110 THERAPEUTIC EXERCISES: CPT

## 2023-10-10 NOTE — PROGRESS NOTES
needed for ease of household tasks. 3. Patient will improve CADE score from 29/56 to 40/56 to decrease fall risk. 4. Patient will be able to safely ambulate with 4WW with SBA > 100 feet to improve safety with walking while at day care. Long Term Goals: 8 weeks  Patient will improve B hip strength to 4/5 to allow ease of stair negotiation and walking. 2. Patient will improve CADE score from 29/56 to 45/56 to decrease fall risk. 3. Patient will perform 5 time sit to  14 seconds to improve functional strength needed for stair negotiation. 4. Patient will be independent with HEP in order to prevent re-injury and improve functional abilities. Patient Education:   [x]  HEP/Education Completed: continue HEP    [facility] Access Code: [email or web address]  []  No new Education completed  [x]  Reviewed Prior HEP      [x]  Patient verbalized and/or demonstrated understanding of education provided. []  Patient unable to verbalize and/or demonstrate understanding of education provided. Will continue education. []  Barriers to learning:     PLAN:  Treatment Recommendations: Strengthening, Range of Motion, Balance Training, Functional Mobility Training, Transfer Training, Endurance Training, Gait Training, Stair Training, Neuromuscular Re-education, Manual Therapy - Soft Tissue Mobilization, Manual Therapy - Joint Manipulation, Home Exercise Program, Patient Education, Safety Education and Training, Aquatics, and Modalities    []  Plan of care initiated. Plan to see patient 2 times per week for 8 weeks to address the treatment planned outlined above.   []  Continue with current plan of care  [x]  Modify plan of care as follows: 2 times per week for an additional 4-6 weeks   []  Hold pending physician visit  []  Discharge    Time In 1133   Time Out 1215   Timed Code Minutes: 38   Total Treatment Time: 42     Electronically Signed by: Kush Judd PTA

## 2023-10-12 ENCOUNTER — HOSPITAL ENCOUNTER (OUTPATIENT)
Dept: PHYSICAL THERAPY | Age: 83
Setting detail: THERAPIES SERIES
Discharge: HOME | End: 2023-10-12
Payer: MEDICARE

## 2023-10-12 ENCOUNTER — OFFICE VISIT (OUTPATIENT)
Dept: CARDIOLOGY CLINIC | Age: 83
End: 2023-10-12
Payer: MEDICARE

## 2023-10-12 VITALS
HEIGHT: 63 IN | SYSTOLIC BLOOD PRESSURE: 110 MMHG | BODY MASS INDEX: 25.87 KG/M2 | HEART RATE: 80 BPM | WEIGHT: 146 LBS | DIASTOLIC BLOOD PRESSURE: 62 MMHG

## 2023-10-12 DIAGNOSIS — I35.0 NONRHEUMATIC AORTIC VALVE STENOSIS: Primary | ICD-10-CM

## 2023-10-12 DIAGNOSIS — I10 PRIMARY HYPERTENSION: ICD-10-CM

## 2023-10-12 PROCEDURE — 1036F TOBACCO NON-USER: CPT | Performed by: NUCLEAR MEDICINE

## 2023-10-12 PROCEDURE — 99213 OFFICE O/P EST LOW 20 MIN: CPT | Performed by: NUCLEAR MEDICINE

## 2023-10-12 PROCEDURE — 97112 NEUROMUSCULAR REEDUCATION: CPT

## 2023-10-12 PROCEDURE — G8484 FLU IMMUNIZE NO ADMIN: HCPCS | Performed by: NUCLEAR MEDICINE

## 2023-10-12 PROCEDURE — 1090F PRES/ABSN URINE INCON ASSESS: CPT | Performed by: NUCLEAR MEDICINE

## 2023-10-12 PROCEDURE — 97110 THERAPEUTIC EXERCISES: CPT

## 2023-10-12 PROCEDURE — 3078F DIAST BP <80 MM HG: CPT | Performed by: NUCLEAR MEDICINE

## 2023-10-12 PROCEDURE — 1124F ACP DISCUSS-NO DSCNMKR DOCD: CPT | Performed by: NUCLEAR MEDICINE

## 2023-10-12 PROCEDURE — G8417 CALC BMI ABV UP PARAM F/U: HCPCS | Performed by: NUCLEAR MEDICINE

## 2023-10-12 PROCEDURE — G8400 PT W/DXA NO RESULTS DOC: HCPCS | Performed by: NUCLEAR MEDICINE

## 2023-10-12 PROCEDURE — 3074F SYST BP LT 130 MM HG: CPT | Performed by: NUCLEAR MEDICINE

## 2023-10-12 PROCEDURE — G8427 DOCREV CUR MEDS BY ELIG CLIN: HCPCS | Performed by: NUCLEAR MEDICINE

## 2023-10-12 NOTE — PROGRESS NOTES
[facility].  [address] [place] [address]  Dept: [phone]  Dept Fax: [phone]  Loc: [phone]    Visit Date: 10/12/2023    Chastity Johnson is a 82 y.o. female who presents todayfor:  Chief Complaint   Patient presents with    Check-Up    Hypertension    Valvular Heart Disease    Shortness of Breath   More fatigue   More dyspnea  Known thyroid issues  Looks like the levels have been bad  BP is stable  No dizziness  No syncope  Limited patient  Previous stroke   Limited by that       HPI:  HPI  Past Medical History:   Diagnosis Date    MYRA (acute kidney injury) ([facility]) 12/6/2020    Anxiety 4/21/2020    Arthritis     Arthritis, degenerative, localized, primary, hand 8/11/2023    Bladder spasm     CAD (coronary artery disease) 3/12/2013    Cancer ([facility])     breast    Hyperlipidemia     Hypertension     Hypothyroidism     Obstructive sleep apnea of adult 12/12/2013    SOB (shortness of breath)     Type II or unspecified type diabetes mellitus without mention of complication, not stated as uncontrolled       Past Surgical History:   Procedure Laterality Date    BREAST BIOPSY  1/17/2014    BREAST SURGERY  7/1974    left breast     CARPAL TUNNEL RELEASE Right 1/2013    CHOLECYSTECTOMY  7/1968    COLONOSCOPY  11/2007    FINGER TRIGGER RELEASE      HERNIA REPAIR  6-6-14    Dr. Ju Lora (CERVIX STATUS UNKNOWN)  11/1996    JOINT REPLACEMENT Left     KNEE ARTHROSCOPY      left    KNEE SURGERY Right 2017    LYMPH NODE DISSECTION  12/23/1998    MASTECTOMY  7/2001     Family History   Problem Relation Age of Onset    Cirrhosis Mother     Heart Disease Father     Diabetes Paternal Uncle     Diabetes Paternal Grandmother      Social History     Tobacco Use    Smoking status: Never     Passive exposure: Yes    Smokeless tobacco: Never    Tobacco comments:     worked in a bar for years   Substance Use Topics    Alcohol use:  Yes

## 2023-10-12 NOTE — PROGRESS NOTES
[facility]  PHYSICAL THERAPY  [] EVALUATION  [x] DAILY NOTE (LAND) [] DAILY NOTE (AQUATIC ) [] PROGRESS NOTE [] DISCHARGE NOTE    [x] [facility] [place]   [] [address] [place] [address]    [] [facility]   [] Michelle Treviño    Date: 10/12/2023  Patient Name:  Gokul Chatterjee  : 1940  MRN: [id number]  CSN: [id number]    Referring Practitioner Peter Ruffin MD   Diagnosis Physical deconditioning [R53.81]    Treatment Diagnosis Weakness in LE, impaired balance, abnormal gait, decreased functional strength   Date of Evaluation 23    Additional Pertinent History HTN, diabetes, incontinence, CVA       Functional Outcome Measure Used 5 time sit to stand    Functional Outcome Score 27 seconds with B UE support (23) 25 seconds with UE support (10/3/23)      Insurance: Primary: Payor: Genesis Stinson /  /  / ,   Secondary:    Authorization Information: PRE CERTIFICATION REQUIRED: YES, [address] @ [phone]. INSURANCE THERAPY BENEFIT:  UNLIMITED BASED ON MEDICAL NECESSITY. AQUATIC THERAPY COVERED: YES. MODALITIES COVERED:  YES. Received auth for 10 PT visits from 23 through 23 for CPT codes [phone], [address], [address] [place] [address], [address] [place] [address], [address] [place] [address] and [id number]. Visit # , 2/10 for progress note   Visits Allowed: 11 visits +8 visits   Recertification Date:    Physician Follow-Up:    Physician Orders:    History of Present Illness: Patient had a CVA in 2019 that caused L sided weakness. Patient reports that she attends day care 2-3 times per week and she does light exercise there. Patient reports that she feels that her legs are improving some. Patient does use a 4WW for mobility. Patient has had several falls this year. Patient does report that her balance is off. Patient was hospitalized this year due to her thyroid. Patient is getting botox for incontinence.        SUBJECTIVE: Patient reports that she is tired today, she had a

## 2023-10-19 ENCOUNTER — HOSPITAL ENCOUNTER (OUTPATIENT)
Dept: PHYSICAL THERAPY | Age: 83
Setting detail: THERAPIES SERIES
Discharge: HOME | End: 2023-10-19
Payer: MEDICARE

## 2023-10-19 PROCEDURE — 97112 NEUROMUSCULAR REEDUCATION: CPT

## 2023-10-19 PROCEDURE — 97110 THERAPEUTIC EXERCISES: CPT

## 2023-10-19 NOTE — PROGRESS NOTES
support at times for balance and stability. Patient does require frequent instruction for improved transfer technique and to avoid using momentum to perform sit to stand. Patient is challenged with scooting forward and leaning over toes. Patient will be progressed as able to improve independence with functional mobility. Goals    Patient Goal: to improve strength    Short Term Goals: 4 weeks    2. Patient will complete 5 time sit to  18 seconds to improve functional strength needed for ease of household tasks. 3. Patient will improve CADE score from 29/56 to 40/56 to decrease fall risk. 4. Patient will be able to safely ambulate with 4WW with SBA > 100 feet to improve safety with walking while at day care. Long Term Goals: 8 weeks  Patient will improve B hip strength to 4/5 to allow ease of stair negotiation and walking. 2. Patient will improve CADE score from 29/56 to 45/56 to decrease fall risk. 3. Patient will perform 5 time sit to  14 seconds to improve functional strength needed for stair negotiation. 4. Patient will be independent with HEP in order to prevent re-injury and improve functional abilities. Patient Education:   [x]  HEP/Education Completed: continue HEP, transfer techniques     [facility] Access Code: [email or web address]  []  No new Education completed  [x]  Reviewed Prior HEP      [x]  Patient verbalized and/or demonstrated understanding of education provided. []  Patient unable to verbalize and/or demonstrate understanding of education provided. Will continue education.   []  Barriers to learning:     PLAN:  Treatment Recommendations: Strengthening, Range of Motion, Balance Training, Functional Mobility Training, Transfer Training, Endurance Training, Gait Training, Stair Training, Neuromuscular Re-education, Manual Therapy - Soft Tissue Mobilization, Manual Therapy - Joint Manipulation, Home Exercise Program, Patient Education, Safety Education and Training, Aquatics,

## 2023-10-24 ENCOUNTER — HOSPITAL ENCOUNTER (OUTPATIENT)
Dept: PHYSICAL THERAPY | Age: 83
Setting detail: THERAPIES SERIES
Discharge: HOME | End: 2023-10-24
Payer: MEDICARE

## 2023-10-24 PROCEDURE — 97110 THERAPEUTIC EXERCISES: CPT

## 2023-10-24 NOTE — PROGRESS NOTES
[facility]  PHYSICAL THERAPY  [] EVALUATION  [x] DAILY NOTE (LAND) [] DAILY NOTE (AQUATIC ) [] PROGRESS NOTE [] DISCHARGE NOTE    [x] [facility] [place]   [] [address] [place] [address]    [] [facility]   [] [facility]    Date: 10/24/2023  Patient Name:  David Vivas  : 1940  MRN: [id number]  CSN: [id number]    Referring Practitioner Jaycee Ott MD   Diagnosis Physical deconditioning [R53.81]    Treatment Diagnosis Weakness in LE, impaired balance, abnormal gait, decreased functional strength   Date of Evaluation 23    Additional Pertinent History HTN, diabetes, incontinence, CVA       Functional Outcome Measure Used 5 time sit to stand    Functional Outcome Score 27 seconds with B UE support (23) 25 seconds with UE support (10/3/23)      Insurance: Primary: Payor: [employer] /  /  / ,   Secondary:    Authorization Information: PRE CERTIFICATION REQUIRED: YES, [address] @ [phone]. INSURANCE THERAPY BENEFIT:  UNLIMITED BASED ON MEDICAL NECESSITY. AQUATIC THERAPY COVERED: YES. MODALITIES COVERED:  YES. Received auth for 10 PT visits from 23 through 23 for CPT codes [phone], [address], [address] [place] [address], [address] [place] [address], [address] [place] [address] and [id number]. Visit # , 3/10 for progress note   Visits Allowed: 11 visits +8 visits   Recertification Date: 78   Physician Follow-Up:    Physician Orders:    History of Present Illness: Patient had a CVA in 2019 that caused L sided weakness. Patient reports that she attends day care 2-3 times per week and she does light exercise there. Patient reports that she feels that her legs are improving some. Patient does use a 4WW for mobility. Patient has had several falls this year. Patient does report that her balance is off. Patient was hospitalized this year due to her thyroid. Patient is getting botox for incontinence. SUBJECTIVE: Pt present with scooter.  States she walks when she

## 2023-10-26 ENCOUNTER — HOSPITAL ENCOUNTER (OUTPATIENT)
Dept: PHYSICAL THERAPY | Age: 83
Setting detail: THERAPIES SERIES
Discharge: HOME | End: 2023-10-26
Payer: MEDICARE

## 2023-10-26 PROCEDURE — 97110 THERAPEUTIC EXERCISES: CPT

## 2023-10-26 PROCEDURE — 97112 NEUROMUSCULAR REEDUCATION: CPT

## 2023-10-31 ENCOUNTER — HOSPITAL ENCOUNTER (OUTPATIENT)
Dept: PHYSICAL THERAPY | Age: 83
Setting detail: THERAPIES SERIES
Discharge: HOME | End: 2023-10-31
Payer: MEDICARE

## 2023-10-31 PROCEDURE — 97110 THERAPEUTIC EXERCISES: CPT

## 2023-11-02 ENCOUNTER — HOSPITAL ENCOUNTER (OUTPATIENT)
Dept: PHYSICAL THERAPY | Age: 83
Setting detail: THERAPIES SERIES
Discharge: HOME | End: 2023-11-02
Payer: MEDICARE

## 2023-11-02 PROCEDURE — 97110 THERAPEUTIC EXERCISES: CPT

## 2023-11-02 PROCEDURE — 97112 NEUROMUSCULAR REEDUCATION: CPT

## 2023-11-02 NOTE — PROGRESS NOTES
[facility]  PHYSICAL THERAPY  [] EVALUATION  [x] DAILY NOTE (LAND) [] DAILY NOTE (AQUATIC ) [] PROGRESS NOTE [] DISCHARGE NOTE    [x] [facility] [place]   [] [address] [place] [address]    [] [facility]   [] Adam Sizer    Date: 2023  Patient Name:  Chiqui Suarez  : 1940  MRN: [id number]  CSN: [id number]    Referring Practitioner Lynnette Keenan MD   Diagnosis Physical deconditioning [R53.81]    Treatment Diagnosis Weakness in LE, impaired balance, abnormal gait, decreased functional strength   Date of Evaluation 23    Additional Pertinent History HTN, diabetes, incontinence, CVA       Functional Outcome Measure Used 5 time sit to stand    Functional Outcome Score 27 seconds with B UE support (23) 25 seconds with UE support (10/3/23)      Insurance: Primary: Payor: Manjeet Mcdonald /  /  / ,   Secondary:    Authorization Information: PRE CERTIFICATION REQUIRED: YES, [address] @ [phone]. INSURANCE THERAPY BENEFIT:  UNLIMITED BASED ON MEDICAL NECESSITY. AQUATIC THERAPY COVERED: YES. MODALITIES COVERED:  YES. Received auth for 10 PT visits from 23 through 23 for CPT codes [phone], [address] [place] [address] , [phone], [address], [address] [place] [address] and [id number]. Visit # ,  for progress note   Visits Allowed: 11 visits +8 visits   Recertification Date:    Physician Follow-Up:    Physician Orders:    History of Present Illness: Patient had a CVA in 2019 that caused L sided weakness. Patient reports that she attends day care 2-3 times per week and she does light exercise there. Patient reports that she feels that her legs are improving some. Patient does use a 4WW for mobility. Patient has had several falls this year. Patient does report that her balance is off. Patient was hospitalized this year due to her thyroid. Patient is getting botox for incontinence. SUBJECTIVE: Patient does arrive in her scooter.  Patient reports L

## 2023-11-06 ENCOUNTER — HOSPITAL ENCOUNTER (OUTPATIENT)
Dept: PHYSICAL THERAPY | Age: 83
Setting detail: THERAPIES SERIES
Discharge: HOME | End: 2023-11-06
Payer: MEDICARE

## 2023-11-06 PROCEDURE — 97110 THERAPEUTIC EXERCISES: CPT

## 2023-11-06 PROCEDURE — 97112 NEUROMUSCULAR REEDUCATION: CPT

## 2023-11-06 PROCEDURE — 97116 GAIT TRAINING THERAPY: CPT

## 2023-11-06 NOTE — PROGRESS NOTES
** PLEASE SIGN, DATE AND TIME CERTIFICATION BELOW AND RETURN TO [facility] OUTPATIENT REHABILITATION (FAX #: [phone]). ATTEST/CO-SIGN IF ACCESSING VIA [employer]. THANK YOU.**    I certify that I have examined the patient below and determined that Physical Medicine and Rehabilitation service is necessary and that I approve the established plan of care for up to 90 days or as specifically noted. Attestation, signature or co-signature of physician indicates approval of certification requirements.    ________________________ ____________ __________  Physician Signature   Date   Time     [address]  [] EVALUATION  [] DAILY NOTE (LAND) [] DAILY NOTE (AQUATIC ) [x] PROGRESS NOTE [] DISCHARGE NOTE    [x] [facility]   [] [facility]    [] [facility]   [] Faith Blower    Date: 2023  Patient Name:  Master Agudelo  : 1940  MRN: [id number]  CSN: [id number]    Referring Practitioner Michael Cook MD   Diagnosis Physical deconditioning [R53.81]    Treatment Diagnosis Weakness in LE, impaired balance, abnormal gait, decreased functional strength   Date of Evaluation 23    Additional Pertinent History HTN, diabetes, incontinence, CVA       Functional Outcome Measure Used 5 time sit to stand    Functional Outcome Score 27 seconds with B UE support (23) 25 seconds with UE support (10/3/23) 21 seconds with UE support (23)      Insurance: Primary: Payor: Jael Klein /  /  / ,   Secondary:    Authorization Information: PRE CERTIFICATION REQUIRED: YES, [address] @ [phone]. INSURANCE THERAPY BENEFIT:  UNLIMITED BASED ON MEDICAL NECESSITY. AQUATIC THERAPY COVERED: YES. MODALITIES COVERED:  YES. Received auth for 10 PT visits from 23 through 23 for CPT codes [phone], [id number], [address], [address], [address] and [id number].     Received auth for 8 PT visits for CPT codes [phone], [id number],

## 2023-11-09 ENCOUNTER — HOSPITAL ENCOUNTER (OUTPATIENT)
Dept: PHYSICAL THERAPY | Age: 83
Setting detail: THERAPIES SERIES
Discharge: HOME | End: 2023-11-09
Payer: MEDICARE

## 2023-11-09 PROCEDURE — 97110 THERAPEUTIC EXERCISES: CPT

## 2023-11-09 PROCEDURE — 97112 NEUROMUSCULAR REEDUCATION: CPT

## 2023-11-14 ENCOUNTER — HOSPITAL ENCOUNTER (OUTPATIENT)
Dept: PHYSICAL THERAPY | Age: 83
Setting detail: THERAPIES SERIES
Discharge: HOME | End: 2023-11-14
Payer: MEDICARE

## 2023-11-14 PROCEDURE — 97110 THERAPEUTIC EXERCISES: CPT

## 2023-11-16 ENCOUNTER — HOSPITAL ENCOUNTER (OUTPATIENT)
Dept: PHYSICAL THERAPY | Age: 83
Setting detail: THERAPIES SERIES
Discharge: HOME | End: 2023-11-16
Payer: MEDICARE

## 2023-11-16 PROCEDURE — 97110 THERAPEUTIC EXERCISES: CPT

## 2023-11-16 NOTE — PROGRESS NOTES
[address] [place] [address]  PHYSICAL THERAPY  [] EVALUATION  [x] DAILY NOTE (LAND) [] DAILY NOTE (AQUATIC ) [] PROGRESS NOTE [] DISCHARGE NOTE    [x] [facility] [place]   [] [address] [place] [address]    [] [facility]   [] Bib Husbands    Date: 2023  Patient Name:  Aura Dickson  : 1940  MRN: [id number]  CSN: [id number]    Referring Practitioner Efe Garcia MD   Diagnosis Physical deconditioning [R53.81]    Treatment Diagnosis Weakness in LE, impaired balance, abnormal gait, decreased functional strength   Date of Evaluation 23    Additional Pertinent History HTN, diabetes, incontinence, CVA       Functional Outcome Measure Used 5 time sit to stand    Functional Outcome Score 27 seconds with B UE support (23) 25 seconds with UE support (10/3/23) 21 seconds with UE support (23)      Insurance: Primary: Payor: Siva Brar /  /  / ,   Secondary:    Authorization Information: PRE CERTIFICATION REQUIRED: YES, [address] @ [phone]. INSURANCE THERAPY BENEFIT:  UNLIMITED BASED ON MEDICAL NECESSITY. AQUATIC THERAPY COVERED: YES. MODALITIES COVERED:  YES. Received auth for 10 PT visits from 23 through 23 for CPT codes [phone], [address], [address] [place] [address], [address] [place] [address], [address] [place] [address] and [id number]. Received auth for 8 PT visits for CPT codes [phone], [address], [facility], [address] [place] [address] Road, [address] [place] [address] and [id number] from 10/4/23 through 24. Received auth for 10 PT visits for CPT codes [phone], [address], [facility], [address] [place] [address] Road, [address] [place] [address] and [id number] from 23 through 24. Visit # , 3/10 for progress note   Visits Allowed: 11 visits +8 visits + 10 visits    Recertification Date: 99/25/10   Physician Follow-Up:    Physician Orders:    History of Present Illness: Patient had a CVA in 2019 that caused L sided weakness. Patient reports that she attends day care 2-3 times per week and she does light exercise there.  Patient reports that she feels that her legs are improving

## 2023-11-21 ENCOUNTER — HOSPITAL ENCOUNTER (OUTPATIENT)
Dept: PHYSICAL THERAPY | Age: 83
Setting detail: THERAPIES SERIES
Discharge: HOME | End: 2023-11-21
Payer: MEDICARE

## 2023-11-21 PROCEDURE — 97110 THERAPEUTIC EXERCISES: CPT

## 2023-11-21 PROCEDURE — 97112 NEUROMUSCULAR REEDUCATION: CPT

## 2023-11-21 NOTE — PROGRESS NOTES
[address] [place] [address]  PHYSICAL THERAPY  [] EVALUATION  [x] DAILY NOTE (LAND) [] DAILY NOTE (AQUATIC ) [] PROGRESS NOTE [] DISCHARGE NOTE    [x] [facility] [place]   [] [address] [place] [address]    [] [facility]   [] Christine Bath    Date: 2023  Patient Name:  Nay Chen  : 1940  MRN: [id number]  CSN: [id number]    Referring Practitioner Michael Wilhelm MD   Diagnosis Physical deconditioning [R53.81]    Treatment Diagnosis Weakness in LE, impaired balance, abnormal gait, decreased functional strength   Date of Evaluation 23    Additional Pertinent History HTN, diabetes, incontinence, CVA       Functional Outcome Measure Used 5 time sit to stand    Functional Outcome Score 27 seconds with B UE support (23) 25 seconds with UE support (10/3/23) 21 seconds with UE support (23)      Insurance: Primary: Payor: [place] [employer] /  /  / ,   Secondary:    Authorization Information: PRE CERTIFICATION REQUIRED: YES, [address] [place] [address] [facility] @ [phone]. INSURANCE THERAPY BENEFIT:  UNLIMITED BASED ON MEDICAL NECESSITY. AQUATIC THERAPY COVERED: YES. MODALITIES COVERED:  YES. Received auth for 10 PT visits from 23 through 23 for CPT codes [phone], [address], [address] [place] [address], [address] [place] [address], [address] [place] [address] and [id number]. Received auth for 8 PT visits for CPT codes [phone], [address], [address] [place] [address], [address] [place] [address] Road, [address] [place] [address] and [id number] from 10/4/23 through 24. Received auth for 10 PT visits for CPT codes [phone], [address], [address] [place] [address], [address] [place] [address], [address] [place] [address] and [id number] from 23 through 24. Visit # , 4/10 for progress note   Visits Allowed: 11 visits +8 visits + 10 visits    Recertification Date:    Physician Follow-Up:    Physician Orders:    History of Present Illness: Patient had a CVA in 2019 that caused L sided weakness. Patient reports that she attends day care 2-3 times per week and she does light exercise there.  Patient reports that she feels that her legs are improving

## 2023-11-28 ENCOUNTER — HOSPITAL ENCOUNTER (OUTPATIENT)
Dept: PHYSICAL THERAPY | Age: 83
Setting detail: THERAPIES SERIES
Discharge: HOME | End: 2023-11-28
Payer: MEDICARE

## 2023-11-28 PROCEDURE — 97110 THERAPEUTIC EXERCISES: CPT

## 2023-11-28 NOTE — PROGRESS NOTES
[address] [place] [address]  PHYSICAL THERAPY  [] EVALUATION  [x] DAILY NOTE (LAND) [] DAILY NOTE (AQUATIC ) [] PROGRESS NOTE [] DISCHARGE NOTE    [x] [facility] [place]   [] [address] [place] [address]    [] [facility]   [] Cheryle Ohm    Date: 2023  Patient Name:  Genesis Ray  : 1940  MRN: [id number]  CSN: [id number]    Referring Practitioner Binu Perry MD   Diagnosis Physical deconditioning [R53.81]    Treatment Diagnosis Weakness in LE, impaired balance, abnormal gait, decreased functional strength   Date of Evaluation 23    Additional Pertinent History HTN, diabetes, incontinence, CVA       Functional Outcome Measure Used 5 time sit to stand    Functional Outcome Score 27 seconds with B UE support (23) 25 seconds with UE support (10/3/23) 21 seconds with UE support (23)      Insurance: Primary: Payor: Sobia Smith /  /  / ,   Secondary:    Authorization Information: PRE CERTIFICATION REQUIRED: YES, [address] @ [phone]. INSURANCE THERAPY BENEFIT:  UNLIMITED BASED ON MEDICAL NECESSITY. AQUATIC THERAPY COVERED: YES. MODALITIES COVERED:  YES. Received auth for 10 PT visits from 23 through 23 for CPT codes [id number], [address], [address] [place] [address], [id number], [facility] and [id number]. Received auth for 8 PT visits for CPT codes [id number], [address], [address] [place] [address], [id number], [facility] and [id number] from 10/4/23 through 24. Received auth for 10 PT visits for CPT codes [id number], [address], [address] [place] [address], .[id number], [facility] and [id number] from 23 through 24. Visit # , 5/10 for progress note   Visits Allowed: 11 visits +8 visits + 10 visits    Recertification Date:    Physician Follow-Up:    Physician Orders:    History of Present Illness: Patient had a CVA in 2019 that caused L sided weakness. Patient reports that she attends day care 2-3 times per week and she does light exercise there.  Patient reports that she feels that her legs are improving

## 2023-11-30 ENCOUNTER — HOSPITAL ENCOUNTER (OUTPATIENT)
Dept: PHYSICAL THERAPY | Age: 83
Setting detail: THERAPIES SERIES
Discharge: HOME | End: 2023-11-30
Payer: MEDICARE

## 2023-11-30 PROCEDURE — 97110 THERAPEUTIC EXERCISES: CPT

## 2023-11-30 NOTE — PROGRESS NOTES
[address] [place] [address]  PHYSICAL THERAPY  [] EVALUATION  [x] DAILY NOTE (LAND) [] DAILY NOTE (AQUATIC ) [] PROGRESS NOTE [] DISCHARGE NOTE    [x] [facility]   [] [address] [place] [address]    [] [facility]   [] Orlando Ortaey    Date: 2023  Patient Name:  Beatriz Gann  : 1940  MRN: [id number]  CSN: [id number]    Referring Practitioner Ashish Griggs MD   Diagnosis Physical deconditioning [R53.81]    Treatment Diagnosis Weakness in LE, impaired balance, abnormal gait, decreased functional strength   Date of Evaluation 23    Additional Pertinent History HTN, diabetes, incontinence, CVA       Functional Outcome Measure Used 5 time sit to stand    Functional Outcome Score 27 seconds with B UE support (23) 25 seconds with UE support (10/3/23) 21 seconds with UE support (23)      Insurance: Primary: Payor: Wendi Benites /  /  / ,   Secondary:    Authorization Information: PRE CERTIFICATION REQUIRED: YES, [address] @ [phone]. INSURANCE THERAPY BENEFIT:  UNLIMITED BASED ON MEDICAL NECESSITY. AQUATIC THERAPY COVERED: YES. MODALITIES COVERED:  YES. Received auth for 10 PT visits from 23 through 23 for CPT codes [phone], [address], [address] [place] [address], [address] [place] [address] Road, [address] [place] [address] and [id number]. Received auth for 8 PT visits for CPT codes [phone], [address], 1000 [facility], [address] [place] [address] Road, [address] [place] [address] Street and [id number] from 10/4/23 through 24. Received auth for 10 PT visits for CPT codes [phone], [address], 1000 [facility], [address] [place] [address] Road, [address] [place] [address] and [id number] from 23 through 24. Visit # , 6/10 for progress note   Visits Allowed: 11 visits +8 visits + 10 visits    Recertification Date:    Physician Follow-Up:    Physician Orders:    History of Present Illness: Patient had a CVA in 2019 that caused L sided weakness. Patient reports that she attends day care 2-3 times per week and she does light exercise there.  Patient reports that she feels that her legs are improving

## 2023-12-05 ENCOUNTER — HOSPITAL ENCOUNTER (OUTPATIENT)
Dept: PHYSICAL THERAPY | Age: 83
Setting detail: THERAPIES SERIES
Discharge: HOME | End: 2023-12-05
Payer: MEDICARE

## 2023-12-05 PROCEDURE — 97110 THERAPEUTIC EXERCISES: CPT

## 2023-12-05 NOTE — PROGRESS NOTES
[facility]  PHYSICAL THERAPY  [] EVALUATION  [x] DAILY NOTE (LAND) [] DAILY NOTE (AQUATIC ) [] PROGRESS NOTE [] DISCHARGE NOTE    [x] [facility] [place]   [] [address] [place] [address]    [] [facility]   [] Adam Sizer    Date: 2023  Patient Name:  Chiqui Suarez  : 1940  MRN: [id number]  CSN: [id number]    Referring Practitioner Lynnette Keenan MD   Diagnosis Physical deconditioning [R53.81]    Treatment Diagnosis Weakness in LE, impaired balance, abnormal gait, decreased functional strength   Date of Evaluation 23    Additional Pertinent History HTN, diabetes, incontinence, CVA       Functional Outcome Measure Used 5 time sit to stand    Functional Outcome Score 27 seconds with B UE support (23) 25 seconds with UE support (10/3/23) 21 seconds with UE support (23)      Insurance: Primary: Payor: Manjeet Mcdonald /  /  / ,   Secondary:    Authorization Information: PRE CERTIFICATION REQUIRED: YES, [address] @ [phone]. INSURANCE THERAPY BENEFIT:  UNLIMITED BASED ON MEDICAL NECESSITY. AQUATIC THERAPY COVERED: YES. MODALITIES COVERED:  YES. Received auth for 10 PT visits from 23 through 23 for CPT codes [id number], [id number], [id number], [id number],  and [id number]. Received auth for 8 PT visits for CPT codes [id number], [id number], [id number], [id number],  and [id number] from 10/4/23 through 24. Received auth for 10 PT visits for CPT codes [id number], [id number], [id number], [id number],  and [id number] from 23 through 24. Visit # , 7/10 for progress note   Visits Allowed: 11 visits +8 visits + 10 visits    Recertification Date:    Physician Follow-Up:    Physician Orders:    History of Present Illness: Patient had a CVA in 2019 that caused L sided weakness. Patient reports that she attends day care 2-3 times per week and she does light exercise there. Patient reports that she feels that her legs are improving some.

## 2023-12-07 ENCOUNTER — HOSPITAL ENCOUNTER (OUTPATIENT)
Dept: PHYSICAL THERAPY | Age: 83
Setting detail: THERAPIES SERIES
Discharge: HOME | End: 2023-12-07
Payer: MEDICARE

## 2023-12-07 PROCEDURE — 97110 THERAPEUTIC EXERCISES: CPT

## 2023-12-07 NOTE — PROGRESS NOTES
[address] [place] [address]  PHYSICAL THERAPY  [] EVALUATION  [x] DAILY NOTE (LAND) [] DAILY NOTE (AQUATIC ) [] PROGRESS NOTE [] DISCHARGE NOTE    [x] [facility]   [] [address] [place] [address]    [] [facility]   [] [place] Dandy    Date: 2023  Patient Name:  Rachel Gomez  : 1940  MRN: [id number]  CSN: [id number]    Referring Practitioner Jennifer Esposito MD   Diagnosis Physical deconditioning [R53.81]    Treatment Diagnosis Weakness in LE, impaired balance, abnormal gait, decreased functional strength   Date of Evaluation 23    Additional Pertinent History HTN, diabetes, incontinence, CVA       Functional Outcome Measure Used 5 time sit to stand    Functional Outcome Score 27 seconds with B UE support (23) 25 seconds with UE support (10/3/23) 21 seconds with UE support (23)      Insurance: Primary: Payor: Lucita Rose /  /  / ,   Secondary:    Authorization Information: PRE CERTIFICATION REQUIRED: YES, [address] [place] @ [phone]. INSURANCE THERAPY BENEFIT:  UNLIMITED BASED ON MEDICAL NECESSITY. AQUATIC THERAPY COVERED: YES. MODALITIES COVERED:  YES. Received auth for 10 PT visits from 23 through 23 for CPT codes [phone], [id number], [id number], [id number], [address] and [id number]. Received auth for 8 PT visits for CPT codes [phone] [phone], [id number], [id number], [id number], [address] and [id number] from 10/4/23 through 24. Received auth for 10 PT visits for CPT codes [phone], [id number], [id number], [id number], [address] and [id number] from 23 through 24. Visit # , 8/10 for progress note   Visits Allowed: 11 visits +8 visits + 10 visits    Recertification Date:    Physician Follow-Up:    Physician Orders:    History of Present Illness: Patient had a CVA in 2019 that caused L sided weakness. Patient reports that she attends day care 2-3 times per week and she does light exercise there. Patient reports that she feels that her legs are improving some.

## 2023-12-12 ENCOUNTER — APPOINTMENT (OUTPATIENT)
Dept: PHYSICAL THERAPY | Age: 83
End: 2023-12-12
Payer: MEDICARE

## 2023-12-14 ENCOUNTER — HOSPITAL ENCOUNTER (OUTPATIENT)
Dept: PHYSICAL THERAPY | Age: 83
Setting detail: THERAPIES SERIES
Discharge: HOME | End: 2023-12-14
Payer: MEDICARE

## 2023-12-14 PROCEDURE — 97110 THERAPEUTIC EXERCISES: CPT

## 2023-12-14 NOTE — PROGRESS NOTES
[address] [place] [address]  PHYSICAL THERAPY  [] EVALUATION  [x] DAILY NOTE (LAND) [] DAILY NOTE (AQUATIC ) [] PROGRESS NOTE [] DISCHARGE NOTE    [x] [facility]   [] [address] [place] [address]    [] [facility]   [] Dameon Shah    Date: 2023  Patient Name:  Zeferino Zhu  : 1940  MRN: [id number]  CSN: [id number]    Referring Practitioner Rakel Orta MD   Diagnosis Physical deconditioning [R53.81]    Treatment Diagnosis Weakness in LE, impaired balance, abnormal gait, decreased functional strength   Date of Evaluation 23    Additional Pertinent History HTN, diabetes, incontinence, CVA       Functional Outcome Measure Used 5 time sit to stand    Functional Outcome Score 27 seconds with B UE support (23) 25 seconds with UE support (10/3/23) 21 seconds with UE support (23)      Insurance: Primary: Payor: Queen Tripp /  /  / ,   Secondary:    Authorization Information: PRE CERTIFICATION REQUIRED: YES, [address] @ [phone]. INSURANCE THERAPY BENEFIT:  UNLIMITED BASED ON MEDICAL NECESSITY. AQUATIC THERAPY COVERED: YES. MODALITIES COVERED:  YES. Received auth for 10 PT visits from 23 through 23 for CPT codes [phone], [address], [address] [place] [address], [address] [place] [address], [address] [place] [address] and [id number]. Received auth for 8 PT visits for CPT codes [phone], [address], [facility], [address] [place] [address] Road, [address] [place] [address] and [id number] from 10/4/23 through 24. Received auth for 10 PT visits for CPT codes [phone], [address], [facility], [address] [place] [address], [address] [place] [address] and [id number] from 23 through 24. Visit # , 9/10 for progress note   Visits Allowed: 11 visits +8 visits + 10 visits    Recertification Date:    Physician Follow-Up:    Physician Orders:    History of Present Illness: Patient had a CVA in 2019 that caused L sided weakness. Patient reports that she attends day care 2-3 times per week and she does light exercise there.  Patient reports that she feels that her legs are improving

## 2023-12-19 ENCOUNTER — HOSPITAL ENCOUNTER (OUTPATIENT)
Dept: PHYSICAL THERAPY | Age: 83
Setting detail: THERAPIES SERIES
End: 2023-12-19
Payer: MEDICARE

## 2023-12-26 NOTE — PROGRESS NOTES
[facility]  INPATIENT PHYSICAL THERAPY  DAILY NOTE  STRZ RENAL TELEMETRY 6K - 6K-15/015-A     Time In: 1510  Time Out: 1541  Timed Code Treatment Minutes: 31 Minutes  Minutes: 31          Date: 2023  Patient Name: Aura Dickson,  Gender:  female        MRN: [id number]  : 1940  (82 y.o.)     Referring Practitioner: Cy Severance, APRN - CNP  Diagnosis: Orthostatic hypotension  Additional Pertinent Hx: Aura Dickson is a 82 y.o. female with PMHx of hypothyroid, HTN, IDDM2, CVA, PORFIRIO, and AS, who presented to [facility] with chief complaint of a syncopal episode. The patient was attending her \"adult \" and she said that her nurse told her that she seemed a little off. The patient states that she is incontinent and she felt like she needed to use the bathroom. As the nurse was preparing to help her, the patients states she began to feel very hot, sweaty, and nauseous. She then had syncopal episode and was lowered to ground by nursing staff. Did not hit her head. She reports she has had one episode like this in the past and they believed at that time she had had a seizure so she was put on Keppra. There was no reports of seizure activity during this event. She was sent to [facility] for further workup.      Prior Level of Function:  Lives With: Family, Significant other  Type of Home: Mobile home  Home Layout: One level  Home Access: Ramped entrance  Home Equipment: Pepe Reveles, [facility] [address] [facility], Pepe Reveles, 4 wheeled, Lift chair   Bathroom Shower/Tub: Walk-in shower  Bathroom Toilet: Handicap height  Bathroom Equipment: Shower chair (has a suction grab bar in shower, however doesn't trust it.)  Bathroom Accessibility: Walker accessible    [address] Help From: Family, Friend(s)  ADL Assistance: Needs assistance (for L side of body)  Ambulation Assistance: Independent  Transfer Assistance: Needs assistance  Active : No  Additional Comments: Pt generally has someone at home with Sonia [phone] or [facility]:[phone]      Learning About Pregnancy  7810-7574 [employer].   4882-0911 [employer].   Care instructions adapted under license by your healthcare professional. If you have questions about a medical condition or this instruction, always ask your healthcare professional. [employer] disclaims any warranty or liability for your use of this information.         Managing Morning Sickness (01:55)  Your health professional recommends that you watch this short online health video.  Learn tips for dealing with morning sickness, no matter what time of day you have it.  Purpose:  Gives tips for managing morning sickness, including eating " small low-fat meals and avoiding caffeine and spicy food.  Goal:  The user will learn tips for dealing with morning sickness during pregnancy.     How to watch the video    Scan the QR code   OR Visit the website    [email or web address]   Current as of: July 11, Healthwise, [facility] 6380-2504 [employer].   Care instructions adapted under license by your healthcare professional. If you have questions about a medical condition or this instruction, always ask your healthcare professional. [employer] disclaims any warranty or liability for your use of this information.      Learning About Prenatal Visits  Overview     Regular prenatal visits are very important during any pregnancy. These quick office visits may seem simple and routine. But they can help you have a safe and healthy pregnancy. Your doctor is watching for problems that can only be found through regular checkups. The visits also give you and your doctor time to build a good relationship.  It's common to see your doctor every 4 weeks until week 28 of pregnancy. Then the visits will happen more often. From weeks 28 to 36, it's common to have visits every 2 to 3 weeks. In the final month of pregnancy, you likely will see your doctor every week. Your doctor may want to see you more or less often, depending on your health, your age, and if you've had a normal, full-term pregnancy before.  At different times in your pregnancy, you will have exams and tests. Some are routine. Others are done only when there is a chance of a problem. Everything healthy you do for your body helps you have a healthy pregnancy. Rest when you need it. Eat well, drink plenty of water, and exercise regularly.  What happens during a prenatal visit?  You will have blood pressure checks, along with urine tests. You also may have blood tests. If you need to go to the bathroom while waiting for the doctor, tell the nurse. You will be given a sample cup so your urine can be " tested.  You will be weighed and have your belly measured.  Your doctor may listen to the fetal heartbeat with a special device.  At about 24 weeks, and possibly earlier in your pregnancy, your doctor will check your blood sugar (glucose tolerance test) for diabetes that can occur during pregnancy. This is gestational diabetes, which can be harmful.  You will have tests to check for infections that could harm your . These include group B streptococcus and hepatitis B.  Your doctor may do ultrasounds to check for problems. This also checks the position of the fetus. An ultrasound uses sound waves to produce a picture of the fetus.  You may get your vaccines updated.  Your doctor may ask you questions to check for signs of anxiety or depression. Tell your doctor if you feel sad, anxious, or hopeless for more than a few days.  You may have other tests at any time during your pregnancy.  Use your visits to discuss with your doctor any concerns you have.  How can you care for yourself at home?  Get plenty of rest.  Try to exercise every day, if your doctor says it is okay. If you have not exercised in the past, start out slowly. For example, you can take short walks each day.  Choose healthy foods, such as fruits, vegetables, whole grains, lean proteins, low-fat dairy, and healthy fats.  Drink plenty of fluids. Cut down on drinks with caffeine, such as coffee, tea, and cola. If you have kidney, heart, or liver disease and have to limit fluids, talk with your doctor before you increase the amount of fluids you drink.  Try to avoid chemical fumes, paint fumes, and poisons.  If you smoke, vape, or use alcohol, marijuana, or other drugs, quit or cut back as much as you can. Talk to your doctor if you need help quitting.  Review all of your medicines, including over-the-counter medicines and supplements, with your doctor. Some of your routine medicines may need to be changed. Do not stop or start taking any medicines  "without talking to your doctor first.  Follow-up care is a key part of your treatment and safety. Be sure to make and go to all appointments, and call your doctor if you are having problems. It's also a good idea to know your test results and keep a list of the medicines you take.  Where can you learn more?  Go to https://www.[employer]c.net/patiented  Enter J502 in the search box to learn more about \"Learning About Prenatal Visits.\"  Current as of: July 11, [facility] [place]-] is a lifelong blood disorder. It is caused by mutations (changes) in the genes that make hemoglobin.   Hemoglobin is a protein in red blood cells that carries oxygen. Hemoglobin is made of 4 smaller protein chains: 2 blocks of alpha chains and 2 blocks of beta chains (see Figure 1). Each block has heme (iron) in the center. Oxygen sticks to the heme, allowing your body to carry it through the bloodstream. The oxygen is delivered to your whole body.  When you have alpha thalassemia, " your alpha blocks are smaller or are missing one or both alpha chains. (See Figure 2 for an example.) For beta thalassemia, the beta blocks are smaller or fewer in number.   With either disorder, your body makes smaller hemoglobin and possibly fewer red blood cells to hold hemoglobin (anemia). You may feel very tired or have other problems as a result.  How do you get thalassemia?  There are 4 genes for alpha thalassemia and 2 genes for beta thalassemia. For you to have either alpha or beta thalassemia, both of your parents must carry a gene mutation for the disease and pass it down to you.   It's possible to have more severe or less severe symptoms than your parents. If you get a mutation from only one parent, for example, you won't have symptoms of thalassemia (thalassemia trait)--but you could still pass the mutation to your children.  Types of thalassemias  Some types of thalassemia have fewer symptoms than others. How severe the thalassemia is depends on how many mutated genes you have inherited and how many alpha or beta blocks are affected.   Alpha Thalassemia  Silent carrier (1 alpha mutation): People with this type have no symptoms and often do not know they have thalassemia.  Alpha thalassemia trait (2 alpha mutations): Some people with this trait may have mild anemia, but they often feel well.  Hemoglobin H disease (3 alpha mutations): People with this disorder have anemia more often. They sometimes need blood transfusions, but can have a normal life span.  Hemoglobin Barts (4 alpha mutations):  With this type, no alpha blocks are made. Severe problems occur during the mother's pregnancy and newborns rarely survive.  Beta Thalassemia  Beta thalassemia trait (1 beta mutation): People with this trait (also called beta thalassemia minor) have red blood cells that are small. Mild anemia can occur, but it is rare.  Beta thalassemia intermedia (2 beta mutations): In this type, both beta genes are abnormal, but  these mutations may be mild. More severe types sometimes need blood transfusions to treat anemia and medicine to treat iron buildup. Patients have normal life spans.  Beta thalassemia major (2 severe beta mutations): This is the most severe form of beta thalassemia. Both beta genes are abnormal, causing little to no beta blocks to be made. Patients often need medicine to reduce iron buildup, as well as monthly blood transfusions to stay healthy. The only cure at this time is a bone marrow transplant. More options are still being studied.  Treatment and outcomes  Mild types: People with a mild type of alpha or beta thalassemia rarely need treatment. Some need folic acid to help make more red blood cells. Most have normal life spans.  Moderate to severe types: Patients with these types have a higher risk for reduced growth, early bone thinning and pregnancy problems.  Most severe types: These patients often need regular blood transfusions, even if not sick. It is common to have iron build up in your body, so medicine may be needed to reduce the buildup. If left untreated, too much iron, especially in the liver and heart, can lead to premature death.  Outcomes for patients with alpha or beta thalassemia are usually very good in developed countries like the [place]. Survival rates higher than 90% have been reported for children.   For informational purposes only. Not to replace the advice of your health care provider. Copyright   2021 [facility]. All rights reserved. Clinically reviewed by Jesus Manuel Coburn MD, Hematology. [employer] [id number] - REV 08/21.    Nutrition During Pregnancy: Care Instructions  Overview     Healthy eating when you are pregnant is important for you and your baby. It can help you feel well and have a successful pregnancy and delivery. During pregnancy 5640-4260 [employer].   6960-8493 [employer].   [email or web address]  "

## 2024-01-02 ENCOUNTER — HOSPITAL ENCOUNTER (OUTPATIENT)
Dept: PHYSICAL THERAPY | Age: 84
Setting detail: THERAPIES SERIES
Discharge: HOME | End: 2024-01-02
Payer: MEDICARE

## 2024-01-02 PROCEDURE — 97112 NEUROMUSCULAR REEDUCATION: CPT

## 2024-01-02 PROCEDURE — 97110 THERAPEUTIC EXERCISES: CPT

## 2024-01-02 NOTE — DISCHARGE SUMMARY
** PLEASE SIGN, DATE AND TIME CERTIFICATION BELOW AND RETURN TO [facility] OUTPATIENT REHABILITATION (FAX #: [phone]).  ATTEST/CO-SIGN IF ACCESSING VIA [employer].  THANK YOU.**    I certify that I have examined the patient below and determined that Physical Medicine and Rehabilitation service is necessary and that I approve the established plan of care for up to 90 days or as specifically noted.  Attestation, signature or co-signature of physician indicates approval of certification requirements.    ________________________ ____________ __________  Physician Signature   Date   Time    [facility]  PHYSICAL THERAPY  [] EVALUATION  [] DAILY NOTE (LAND) [] DAILY NOTE (AQUATIC ) [] PROGRESS NOTE [x] DISCHARGE NOTE    [x] OUTPATIENT REHABILITATION [facility]   [] [facility]    [] [facility]   [] [facility]    Date: 2024  Patient Name:  Nanci Bar  : 1940  MRN: [id number]  CSN: [id number]    Referring Practitioner Jennifer Valdez MD   Diagnosis Physical deconditioning [R53.81]    Treatment Diagnosis Weakness in LE, impaired balance, abnormal gait, decreased functional strength   Date of Evaluation 23    Additional Pertinent History HTN, diabetes, incontinence, CVA       Functional Outcome Measure Used 5 time sit to stand    Functional Outcome Score 27 seconds with B UE support (23) 25 seconds with UE support (10/3/23) 21 seconds with UE support (23) 25 seconds with UE support (24)      Insurance: Primary: Payor: HUMANA MEDICARE /  /  / ,   Secondary:    Authorization Information: PRE CERTIFICATION REQUIRED: YES, AFTER EVAL THROUGH [employer] @ [phone].  INSURANCE THERAPY BENEFIT:  UNLIMITED BASED ON MEDICAL NECESSITY.  AQUATIC THERAPY COVERED: YES.  MODALITIES COVERED:  YES.    Received auth for 10 PT visits from 23 through 23 for CPT codes 12382, 83203, 85736, 90662, 47958 and 61886.    Received auth for 8 PT

## 2024-01-11 ENCOUNTER — NURSE ONLY (OUTPATIENT)
Dept: LAB | Age: 84
End: 2024-01-11

## 2024-01-11 ENCOUNTER — OFFICE VISIT (OUTPATIENT)
Dept: FAMILY MEDICINE CLINIC | Age: 84
End: 2024-01-11
Payer: MEDICARE

## 2024-01-11 VITALS
OXYGEN SATURATION: 97 % | HEART RATE: 102 BPM | RESPIRATION RATE: 14 BRPM | SYSTOLIC BLOOD PRESSURE: 112 MMHG | HEIGHT: 63 IN | TEMPERATURE: 97.1 F | WEIGHT: 139.8 LBS | DIASTOLIC BLOOD PRESSURE: 82 MMHG | BODY MASS INDEX: 24.77 KG/M2

## 2024-01-11 DIAGNOSIS — E03.9 HYPOTHYROIDISM, UNSPECIFIED TYPE: ICD-10-CM

## 2024-01-11 DIAGNOSIS — E11.65 TYPE 2 DIABETES MELLITUS WITH HYPERGLYCEMIA, WITH LONG-TERM CURRENT USE OF INSULIN (HCC): ICD-10-CM

## 2024-01-11 DIAGNOSIS — I69.354 HEMIPARESIS AFFECTING LEFT SIDE AS LATE EFFECT OF CEREBROVASCULAR ACCIDENT (CVA) (HCC): ICD-10-CM

## 2024-01-11 DIAGNOSIS — E03.9 HYPOTHYROIDISM, UNSPECIFIED TYPE: Primary | ICD-10-CM

## 2024-01-11 DIAGNOSIS — Z79.4 TYPE 2 DIABETES MELLITUS WITH HYPERGLYCEMIA, WITH LONG-TERM CURRENT USE OF INSULIN (HCC): ICD-10-CM

## 2024-01-11 PROBLEM — S93.602A SPRAIN OF LEFT FOOT: Status: RESOLVED | Noted: 2020-04-10 | Resolved: 2024-01-11

## 2024-01-11 PROBLEM — E78.5 HYPERLIPIDEMIA: Status: RESOLVED | Noted: 2020-03-25 | Resolved: 2024-01-11

## 2024-01-11 PROBLEM — S39.013A STRAIN OF LEFT INGUINAL MUSCLE: Status: RESOLVED | Noted: 2020-04-10 | Resolved: 2024-01-11

## 2024-01-11 PROBLEM — R31.29 MICROSCOPIC HEMATURIA: Status: RESOLVED | Noted: 2022-11-30 | Resolved: 2024-01-11

## 2024-01-11 PROBLEM — E66.3 OVERWEIGHT WITH BODY MASS INDEX (BMI) 25.0-29.9: Status: RESOLVED | Noted: 2022-01-04 | Resolved: 2024-01-11

## 2024-01-11 PROBLEM — N39.41 URGE INCONTINENCE: Chronic | Status: ACTIVE | Noted: 2022-11-30

## 2024-01-11 PROBLEM — S66.912A SPRAIN AND STRAIN OF LEFT WRIST: Status: RESOLVED | Noted: 2020-04-10 | Resolved: 2024-01-11

## 2024-01-11 PROBLEM — S63.502A SPRAIN AND STRAIN OF LEFT WRIST: Status: RESOLVED | Noted: 2020-04-10 | Resolved: 2024-01-11

## 2024-01-11 LAB
T4 FREE SERPL-MCNC: 0.22 NG/DL (ref 0.93–1.76)
TSH SERPL DL<=0.005 MIU/L-ACNC: 207.5 UIU/ML (ref 0.4–4.2)

## 2024-01-11 PROCEDURE — 1124F ACP DISCUSS-NO DSCNMKR DOCD: CPT | Performed by: FAMILY MEDICINE

## 2024-01-11 PROCEDURE — 3074F SYST BP LT 130 MM HG: CPT | Performed by: FAMILY MEDICINE

## 2024-01-11 PROCEDURE — 3079F DIAST BP 80-89 MM HG: CPT | Performed by: FAMILY MEDICINE

## 2024-01-11 PROCEDURE — G8484 FLU IMMUNIZE NO ADMIN: HCPCS | Performed by: FAMILY MEDICINE

## 2024-01-11 PROCEDURE — G8427 DOCREV CUR MEDS BY ELIG CLIN: HCPCS | Performed by: FAMILY MEDICINE

## 2024-01-11 PROCEDURE — 99214 OFFICE O/P EST MOD 30 MIN: CPT | Performed by: FAMILY MEDICINE

## 2024-01-11 PROCEDURE — 1036F TOBACCO NON-USER: CPT | Performed by: FAMILY MEDICINE

## 2024-01-11 PROCEDURE — 1090F PRES/ABSN URINE INCON ASSESS: CPT | Performed by: FAMILY MEDICINE

## 2024-01-11 PROCEDURE — G8420 CALC BMI NORM PARAMETERS: HCPCS | Performed by: FAMILY MEDICINE

## 2024-01-11 PROCEDURE — G8400 PT W/DXA NO RESULTS DOC: HCPCS | Performed by: FAMILY MEDICINE

## 2024-01-11 RX ORDER — LEVOTHYROXINE SODIUM 175 UG/1
175 TABLET ORAL DAILY
Qty: 30 TABLET | Refills: 2 | Status: SHIPPED | OUTPATIENT
Start: 2024-01-11

## 2024-01-11 RX ORDER — EMPAGLIFLOZIN 10 MG/1
10 TABLET, FILM COATED ORAL DAILY
COMMUNITY
Start: 2023-11-24

## 2024-01-11 RX ORDER — AMLODIPINE BESYLATE 10 MG/1
10 TABLET ORAL DAILY
COMMUNITY
Start: 2023-12-28

## 2024-01-11 RX ORDER — LOSARTAN POTASSIUM 25 MG/1
25 TABLET ORAL DAILY
COMMUNITY
Start: 2023-12-28

## 2024-01-11 ASSESSMENT — PATIENT HEALTH QUESTIONNAIRE - PHQ9
2. FEELING DOWN, DEPRESSED OR HOPELESS: 0
1. LITTLE INTEREST OR PLEASURE IN DOING THINGS: 0
SUM OF ALL RESPONSES TO PHQ QUESTIONS 1-9: 0
SUM OF ALL RESPONSES TO PHQ9 QUESTIONS 1 & 2: 0

## 2024-01-11 ASSESSMENT — ENCOUNTER SYMPTOMS
BACK PAIN: 0
WHEEZING: 0
SHORTNESS OF BREATH: 0
COUGH: 0

## 2024-01-11 NOTE — PROGRESS NOTES
Left message for patient that her labwork is still showing that she doesn't have enough thyroid medication.  Levothyroxine 175 sent to [facility], will mail slip to recheck labs in 6 weeks

## 2024-01-11 NOTE — PROGRESS NOTES
Nanci Bar (:  1940) is a 83 y.o. female,Established patient, here for evaluation of the following chief complaint(s):  Follow-up (Get lab order) and Extremity Weakness      Subjective   SUBJECTIVE/OBJECTIVE:  HPI  Blood sugars registers as high in the morning  Taking medications daily mot of the time daily but will miss insulin at bedtime if not eating, misses thyroid medication about 2 times per week  Watching diet and going to  for exercise  HbA1C 11.5 in 2023  K 4.3  Cr 0.8   in 2023  Vitamin D 20 in 2023  Urine microalbumin 2023  .200 2023---no repeat labwork has been done    Review of Systems   Constitutional:  Positive for fatigue (much improved). Negative for activity change and unexpected weight change.   Respiratory:  Negative for cough, shortness of breath and wheezing.    Cardiovascular:  Negative for chest pain, palpitations and leg swelling.   Musculoskeletal:  Positive for gait problem (much improved though, patient is able to rise from chair without helping, walks with L leg stiff but able to move around the room without holding on to chair, etc...) and neck stiffness. Negative for back pain and neck pain.   Neurological:  Positive for weakness (L sided weakness yet from stroke). Negative for dizziness, light-headedness and headaches.          Objective   Physical Exam  Constitutional:       Appearance: Normal appearance.   Cardiovascular:      Rate and Rhythm: Normal rate and regular rhythm.      Heart sounds: No murmur heard.     No gallop.   Pulmonary:      Effort: Pulmonary effort is normal.      Breath sounds: Normal breath sounds. No wheezing, rhonchi or rales.   Abdominal:      General: Abdomen is flat. Bowel sounds are normal. There is no distension.      Palpations: Abdomen is soft.      Tenderness: There is no abdominal tenderness. There is no guarding.   Musculoskeletal:      Right lower leg: No edema.      Left lower leg: No edema.

## 2024-01-12 LAB — T3 TOTAL: 30 NG/DL (ref 80–200)

## 2024-03-18 ENCOUNTER — OFFICE VISIT (OUTPATIENT)
Dept: FAMILY MEDICINE CLINIC | Age: 84
End: 2024-03-18
Payer: MEDICARE

## 2024-03-18 ENCOUNTER — NURSE ONLY (OUTPATIENT)
Dept: LAB | Age: 84
End: 2024-03-18

## 2024-03-18 VITALS
WEIGHT: 142 LBS | DIASTOLIC BLOOD PRESSURE: 72 MMHG | TEMPERATURE: 97.3 F | SYSTOLIC BLOOD PRESSURE: 128 MMHG | RESPIRATION RATE: 16 BRPM | HEART RATE: 87 BPM | BODY MASS INDEX: 25.16 KG/M2 | OXYGEN SATURATION: 98 % | HEIGHT: 63 IN

## 2024-03-18 DIAGNOSIS — E11.59 TYPE 2 DIABETES MELLITUS WITH OTHER CIRCULATORY COMPLICATION, WITH LONG-TERM CURRENT USE OF INSULIN (HCC): Primary | ICD-10-CM

## 2024-03-18 DIAGNOSIS — E03.9 HYPOTHYROIDISM, UNSPECIFIED TYPE: ICD-10-CM

## 2024-03-18 DIAGNOSIS — Z79.4 TYPE 2 DIABETES MELLITUS WITH OTHER CIRCULATORY COMPLICATION, WITH LONG-TERM CURRENT USE OF INSULIN (HCC): Primary | ICD-10-CM

## 2024-03-18 DIAGNOSIS — I25.10 CORONARY ARTERY DISEASE INVOLVING NATIVE CORONARY ARTERY OF NATIVE HEART WITHOUT ANGINA PECTORIS: ICD-10-CM

## 2024-03-18 DIAGNOSIS — N39.3 FEMALE STRESS INCONTINENCE: ICD-10-CM

## 2024-03-18 DIAGNOSIS — I63.30 CEREBRAL INFARCTION DUE TO THROMBOSIS OF CEREBRAL ARTERY (HCC): ICD-10-CM

## 2024-03-18 DIAGNOSIS — E78.5 DYSLIPIDEMIA: ICD-10-CM

## 2024-03-18 DIAGNOSIS — M62.838 MUSCLE SPASM: ICD-10-CM

## 2024-03-18 DIAGNOSIS — G81.94 LEFT HEMIPARESIS (HCC): ICD-10-CM

## 2024-03-18 LAB
T4 FREE SERPL-MCNC: 2.09 NG/DL (ref 0.93–1.68)
TSH SERPL DL<=0.005 MIU/L-ACNC: 0.07 UIU/ML (ref 0.4–4.2)

## 2024-03-18 PROCEDURE — G8400 PT W/DXA NO RESULTS DOC: HCPCS | Performed by: FAMILY MEDICINE

## 2024-03-18 PROCEDURE — 1124F ACP DISCUSS-NO DSCNMKR DOCD: CPT | Performed by: FAMILY MEDICINE

## 2024-03-18 PROCEDURE — 1036F TOBACCO NON-USER: CPT | Performed by: FAMILY MEDICINE

## 2024-03-18 PROCEDURE — 1090F PRES/ABSN URINE INCON ASSESS: CPT | Performed by: FAMILY MEDICINE

## 2024-03-18 PROCEDURE — 3052F HG A1C>EQUAL 8.0%<EQUAL 9.0%: CPT | Performed by: FAMILY MEDICINE

## 2024-03-18 PROCEDURE — G8484 FLU IMMUNIZE NO ADMIN: HCPCS | Performed by: FAMILY MEDICINE

## 2024-03-18 PROCEDURE — 99214 OFFICE O/P EST MOD 30 MIN: CPT | Performed by: FAMILY MEDICINE

## 2024-03-18 PROCEDURE — G8427 DOCREV CUR MEDS BY ELIG CLIN: HCPCS | Performed by: FAMILY MEDICINE

## 2024-03-18 PROCEDURE — 3074F SYST BP LT 130 MM HG: CPT | Performed by: FAMILY MEDICINE

## 2024-03-18 PROCEDURE — 3078F DIAST BP <80 MM HG: CPT | Performed by: FAMILY MEDICINE

## 2024-03-18 PROCEDURE — G8417 CALC BMI ABV UP PARAM F/U: HCPCS | Performed by: FAMILY MEDICINE

## 2024-03-18 PROCEDURE — 0509F URINE INCON PLAN DOCD: CPT | Performed by: FAMILY MEDICINE

## 2024-03-18 RX ORDER — BACLOFEN 5 MG/1
5 TABLET ORAL 3 TIMES DAILY PRN
Qty: 90 TABLET | Refills: 2 | Status: SHIPPED | OUTPATIENT
Start: 2024-03-18

## 2024-03-18 RX ORDER — VIBEGRON 75 MG/1
75 TABLET, FILM COATED ORAL DAILY
Qty: 90 TABLET | Refills: 3 | Status: SHIPPED | OUTPATIENT
Start: 2024-03-18

## 2024-03-18 RX ORDER — ATORVASTATIN CALCIUM 80 MG/1
40 TABLET, FILM COATED ORAL NIGHTLY
Qty: 90 TABLET | Refills: 3 | Status: SHIPPED | OUTPATIENT
Start: 2024-03-18

## 2024-03-18 RX ORDER — LEVOTHYROXINE SODIUM 175 UG/1
175 TABLET ORAL DAILY
Qty: 30 TABLET | Refills: 2 | Status: SHIPPED | OUTPATIENT
Start: 2024-03-18

## 2024-03-18 RX ORDER — CLOPIDOGREL BISULFATE 75 MG/1
75 TABLET ORAL DAILY
Qty: 90 TABLET | Refills: 3 | Status: SHIPPED | OUTPATIENT
Start: 2024-03-18

## 2024-03-18 ASSESSMENT — ENCOUNTER SYMPTOMS
NAUSEA: 0
CONSTIPATION: 0
VOMITING: 0
ABDOMINAL PAIN: 0
WHEEZING: 0
SHORTNESS OF BREATH: 0
COUGH: 0

## 2024-03-18 NOTE — PROGRESS NOTES
Nanci Bar (:  1940) is a 83 y.o. female,Established patient, here for evaluation of the following chief complaint(s):  Medication Refill and Follow-up      Subjective   SUBJECTIVE/OBJECTIVE:  HPI  Blood sugars have been doing much better, sees monisha later this month  Taking medications daily yes, compliant the last 2 months  Not watching diet or exercising  HbA1C 8.8 (was 11.5)  .500 on 2024---increased to 175 mcg in January  LDL 1382023  Urine microalbumin 2023  ACEI losartan  Statin atorvastatin  Checks feet daily yes, most days  Last eye appt scheduled 2024    Review of Systems   Constitutional:  Positive for fatigue (rare, much better since taking medications as directed). Negative for activity change and unexpected weight change.   Respiratory:  Negative for cough, shortness of breath and wheezing.    Cardiovascular:  Negative for chest pain, palpitations and leg swelling.   Gastrointestinal:  Negative for abdominal pain, constipation, diarrhea, nausea and vomiting.   Neurological:  Positive for weakness (continued problems with use of L arm and leg, using power scooter (can walk with assistance but very tentative)). Negative for dizziness and headaches.        Limited ability to extend fingers on the left side even with doing exercises at           Objective   Physical Exam  Constitutional:       Appearance: Normal appearance.   Cardiovascular:      Rate and Rhythm: Normal rate and regular rhythm.      Heart sounds: No murmur heard.     No gallop.   Pulmonary:      Effort: Pulmonary effort is normal.      Breath sounds: Normal breath sounds. No wheezing or rhonchi.   Abdominal:      General: Abdomen is flat. Bowel sounds are normal. There is no distension.      Palpations: Abdomen is soft.      Tenderness: There is no abdominal tenderness. There is no guarding.   Musculoskeletal:      Right lower leg: No edema.      Left lower leg: No edema.   Skin:     General: Skin

## 2024-04-04 ENCOUNTER — HOSPITAL ENCOUNTER (OUTPATIENT)
Dept: OCCUPATIONAL THERAPY | Age: 84
Setting detail: THERAPIES SERIES
Discharge: HOME | End: 2024-04-04
Payer: MEDICARE

## 2024-04-04 PROCEDURE — 97165 OT EVAL LOW COMPLEX 30 MIN: CPT

## 2024-04-04 NOTE — PROGRESS NOTES
** PLEASE SIGN, DATE AND TIME CERTIFICATION BELOW AND RETURN TO [facility] OUTPATIENT REHABILITATION (FAX #: [phone]).  ATTEST/CO-SIGN IF ACCESSING VIA [employer].  THANK YOU.**    I certify that I have examined the patient below and determined that Physical Medicine and Rehabilitation service is necessary and that I approve the established plan of care for up to 90 days or as specifically noted.  Attestation, signature or co-signature of physician indicates approval of certification requirements.    ________________________ ____________ __________  Physician Signature   Date   Time   [facility]  OCCUPATIONAL THERAPY  [x] EVALUATION  [] DAILY NOTE (LAND) [] DAILY NOTE (AQUATIC ) [] PROGRESS NOTE [] DISCHARGE NOTE    [x] OUTPATIENT REHABILITATION [facility]   [] [facility]    [] 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  Hemiplegia, unspecified affecting left side G81.94   Treatment Diagnosis I69.30 Unspecified Sequelae of Cerebral Infarction  G81.90 Hemiplegia Affecting Unspecified Side  R53.1 Weakness  R shoulder pain  R hand pain   Date of Evaluation 24    Additional Pertinent History Nanci  has a past medical history of MYRA (acute kidney injury) ([facility]), Anxiety, Arthritis, Arthritis, degenerative, localized, primary, hand, Bladder spasm, CAD (coronary artery disease), Cancer ([facility]), Cerebrovascular accident (CVA) ([facility]), Hyperlipidemia, Hypertension, Hypothyroidism, Obstructive sleep apnea of adult, SOB (shortness of breath), Type II or unspecified type diabetes mellitus without mention of complication, not stated as uncontrolled, and 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       Functional Outcome Measure Used

## 2024-04-09 ENCOUNTER — PATIENT MESSAGE (OUTPATIENT)
Dept: FAMILY MEDICINE CLINIC | Age: 84
End: 2024-04-09

## 2024-04-09 DIAGNOSIS — I69.30 HISTORY OF CVA WITH RESIDUAL DEFICIT: Primary | ICD-10-CM

## 2024-04-10 NOTE — TELEPHONE ENCOUNTER
From: Dr. Rut Mari  To: Nanci Bar  Sent: 4/9/2024 8:45 AM EDT  Subject: Possible Botox    Nanci---  I received a message from OT that you are continuing to have problems with tight muscles and that they are recommending possible Botox. Would you be willing to do Botox? I can start a referral to see if you are a candidate if that would be okay with you?

## 2024-04-11 ENCOUNTER — TELEPHONE (OUTPATIENT)
Dept: FAMILY MEDICINE CLINIC | Age: 84
End: 2024-04-11

## 2024-04-11 NOTE — TELEPHONE ENCOUNTER
Dr Rebel Garcia office faxed back her referral and states that they do not take patient insurance.

## 2024-04-11 NOTE — TELEPHONE ENCOUNTER
Called Neurology on [address] at [phone] and left message to call and let us now if they do botox for spasticity.

## 2024-04-12 ENCOUNTER — HOSPITAL ENCOUNTER (OUTPATIENT)
Dept: OCCUPATIONAL THERAPY | Age: 84
Setting detail: THERAPIES SERIES
Discharge: HOME | End: 2024-04-12
Payer: MEDICARE

## 2024-04-12 PROCEDURE — 97140 MANUAL THERAPY 1/> REGIONS: CPT

## 2024-04-12 PROCEDURE — 97110 THERAPEUTIC EXERCISES: CPT

## 2024-04-12 NOTE — PROGRESS NOTES
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  Hemiplegia, unspecified affecting left side G81.94   Treatment Diagnosis I69.30 Unspecified Sequelae of Cerebral Infarction  G81.90 Hemiplegia Affecting Unspecified Side  R53.1 Weakness  R shoulder pain  R hand pain   Date of Evaluation 24    Additional Pertinent History Nanci  has a past medical history of MYRA (acute kidney injury) ([facility]), Anxiety, Arthritis, Arthritis, degenerative, localized, primary, hand, Bladder spasm, CAD (coronary artery disease), Cancer ([facility]), Cerebrovascular accident (CVA) ([facility]), Hyperlipidemia, Hypertension, Hypothyroidism, Obstructive sleep apnea of adult, SOB (shortness of breath), Type II or unspecified type diabetes mellitus without mention of complication, not stated as uncontrolled, and 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       Functional Outcome Measure Used Functional Impact Scale   Functional Outcome Score 50 (24)       Insurance: Primary: Payor: HUMANA MEDICARE /  /  / ,   Secondary:    Authorization Information: PRE CERTIFICATION REQUIRED: Precert required after initial evaluation.[employer] @ [phone]  INSURANCE THERAPY BENEFIT: NO LIMIT BASED ON MED NECESSITY. .   AQUATIC THERAPY COVERED: Yes  MODALITIES COVERED:  Yes    Received auth for 10 OT visits for CPT codes 79948, 87556, 75165 and 24111 from 24 through 24.     Auth#  [id number]    Approved Procedure Codes: 76859 - Therapeutic Exercise, 29024 - Therapeutic

## 2024-04-15 ENCOUNTER — HOSPITAL ENCOUNTER (OUTPATIENT)
Dept: OCCUPATIONAL THERAPY | Age: 84
Setting detail: THERAPIES SERIES
Discharge: HOME | End: 2024-04-15
Payer: MEDICARE

## 2024-04-15 ENCOUNTER — TELEPHONE (OUTPATIENT)
Dept: PHYSICAL MEDICINE AND REHAB | Age: 84
End: 2024-04-15

## 2024-04-15 DIAGNOSIS — G81.94 LEFT HEMIPARESIS (HCC): Primary | ICD-10-CM

## 2024-04-15 PROCEDURE — 97110 THERAPEUTIC EXERCISES: CPT

## 2024-04-15 NOTE — TELEPHONE ENCOUNTER
Received call from a therapist from [facility] OUTPT. She says we were given a referral by your office(WING Mari). After review it did not come to our office but to neurology. They denied as do not do botox. Our office, [facility] does do botox for stroke pt.s and if desire for referral to our office will need done. The therapist requested us to contact you and let you know this.

## 2024-04-16 NOTE — PROGRESS NOTES
care  []  Modify plan of care as follows:    []  Hold pending physician visit  []  Discharge    Time In 1345   Time Out 1430   Timed Code Minutes: 45 min   Total Treatment Time: 45 min       Electronically Signed by: Madison Alcantara OTR/L, CHT #[id number]

## 2024-04-17 ENCOUNTER — HOSPITAL ENCOUNTER (OUTPATIENT)
Dept: OCCUPATIONAL THERAPY | Age: 84
Setting detail: THERAPIES SERIES
Discharge: HOME | End: 2024-04-17
Payer: MEDICARE

## 2024-04-17 PROCEDURE — 97110 THERAPEUTIC EXERCISES: CPT

## 2024-04-17 PROCEDURE — 97140 MANUAL THERAPY 1/> REGIONS: CPT

## 2024-04-17 PROCEDURE — 97530 THERAPEUTIC ACTIVITIES: CPT

## 2024-04-17 NOTE — PROGRESS NOTES
Education completed  []  Reviewed Prior HEP      [x]  Patient verbalized and/or demonstrated understanding of education provided.  []  Patient unable to verbalize and/or demonstrate understanding of education provided.  Will continue education.  [x]  Barriers to learning: none    PLAN:  Treatment Recommendations: Range of Motion, Neuromuscular Re-education, Manual Therapy - Soft Tissue Mobilization, Pain Management, Home Exercise Program, Patient Education, Safety Education and Training, Self-Care Education and Training, and Splint Fabrications/Modifications    []  Plan of care initiated.  Plan to see patient 2 times per week for 8 weeks to address the treatment planned outlined above.  [x]  Continue with current plan of care  []  Modify plan of care as follows:    []  Hold pending physician visit  []  Discharge    Time In 0900   Time Out 0943   Timed Code Minutes: 43 min   Total Treatment Time: 43 min       Electronically Signed by: Meenakshi LEIVA #[id number]

## 2024-04-19 ENCOUNTER — HOSPITAL ENCOUNTER (OUTPATIENT)
Dept: ULTRASOUND IMAGING | Age: 84
Discharge: HOME | End: 2024-04-19
Payer: MEDICARE

## 2024-04-19 DIAGNOSIS — D35.01: ICD-10-CM

## 2024-04-19 PROCEDURE — 76770 US EXAM ABDO BACK WALL COMP: CPT

## 2024-04-23 ENCOUNTER — HOSPITAL ENCOUNTER (OUTPATIENT)
Dept: OCCUPATIONAL THERAPY | Age: 84
Setting detail: THERAPIES SERIES
Discharge: HOME | End: 2024-04-23
Payer: MEDICARE

## 2024-04-23 PROCEDURE — 97530 THERAPEUTIC ACTIVITIES: CPT

## 2024-04-23 PROCEDURE — 97110 THERAPEUTIC EXERCISES: CPT

## 2024-04-23 PROCEDURE — 97140 MANUAL THERAPY 1/> REGIONS: CPT

## 2024-04-23 NOTE — PROGRESS NOTES
ability to use her walker safely with mod I, keeping her left hand on the handle.    Patient Education:   []  HEP/Education Completed: Plan of Care, Goals, HEP to be established next session  Access Code: [email or web address]  URL: [email or web address]  Date: 04/12/2024  Exercises  - Supine Shoulder Flexion AAROM with Hands Clasped  - 1 x daily - 7 x weekly - 3 sets - 10 reps  - Supine Shoulder Press AAROM in Abduction with Dowel  - 1 x daily - 7 x weekly - 3 sets - 10 reps  - Seated AAROM Elbow Flexion/Extension with Clasped Hands  - 1 x daily - 7 x weekly - 3 sets - 10 reps  - Seated AAROM Wrist Flexion/Extension with Clasped Hands  - 1 x daily - 7 x weekly - 3 sets - 10 reps  - Finger Spreading  - 1 x daily - 7 x weekly - 3 sets - 10 reps  4/17/24: seated edge of bed and functional reaching alternating (nasreen cake motion) or weight bearing through LUE and only reaching with RUE   []  No new Education completed  [x]  Reviewed Prior HEP      [x]  Patient verbalized and/or demonstrated understanding of education provided.  []  Patient unable to verbalize and/or demonstrate understanding of education provided.  Will continue education.  [x]  Barriers to learning: none    PLAN:  Treatment Recommendations: Range of Motion, Neuromuscular Re-education, Manual Therapy - Soft Tissue Mobilization, Pain Management, Home Exercise Program, Patient Education, Safety Education and Training, Self-Care Education and Training, and Splint Fabrications/Modifications    []  Plan of care initiated.  Plan to see patient 2 times per week for 8 weeks to address the treatment planned outlined above.  [x]  Continue with current plan of care  []  Modify plan of care as follows:    []  Hold pending physician visit  []  Discharge    Time In 0913   Time Out 0958   Timed Code Minutes: 45 min   Total Treatment Time: 45 min       Electronically Signed by: Meenakshi LEIVA #[id number]

## 2024-04-25 ENCOUNTER — HOSPITAL ENCOUNTER (OUTPATIENT)
Dept: OCCUPATIONAL THERAPY | Age: 84
Setting detail: THERAPIES SERIES
Discharge: HOME | End: 2024-04-25
Payer: MEDICARE

## 2024-04-25 PROCEDURE — 97110 THERAPEUTIC EXERCISES: CPT

## 2024-04-25 PROCEDURE — 97112 NEUROMUSCULAR REEDUCATION: CPT

## 2024-04-25 NOTE — PROGRESS NOTES
[facility]  OCCUPATIONAL THERAPY  [] EVALUATION  [x] DAILY NOTE (LAND) [] DAILY NOTE (AQUATIC ) [] PROGRESS NOTE [] DISCHARGE NOTE    [x] OUTPATIENT REHABILITATION CENTER [facility]   [] [facility] [facility]    [] [facility]   [] 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  Hemiplegia, unspecified affecting left side G81.94   Treatment Diagnosis I69.30 Unspecified Sequelae of Cerebral Infarction  G81.90 Hemiplegia Affecting Unspecified Side  R53.1 Weakness  R shoulder pain  R hand pain   Date of Evaluation 24    Additional Pertinent History Nanci  has a past medical history of MYRA (acute kidney injury) ([facility]), Anxiety, Arthritis, Arthritis, degenerative, localized, primary, hand, Bladder spasm, CAD (coronary artery disease), Cancer ([facility]), Cerebrovascular accident (CVA) ([facility]), Hyperlipidemia, Hypertension, Hypothyroidism, Obstructive sleep apnea of adult, SOB (shortness of breath), Type II or unspecified type diabetes mellitus without mention of complication, not stated as uncontrolled, and 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       Functional Outcome Measure Used Functional Impact Scale   Functional Outcome Score 50 (24)       Insurance: Primary: Payor: [employer] MEDICARE /  /  / ,   Secondary:    Authorization Information: PRE CERTIFICATION REQUIRED: Precert required after initial evaluation.THRU PEEWEE @ [phone]  INSURANCE THERAPY BENEFIT: NO LIMIT BASED ON MED NECESSITY. .   AQUATIC THERAPY COVERED: Yes  MODALITIES COVERED:  Yes    Received auth for 10 OT visits for CPT codes 97526, 33282, 72818 and 70503 from 24 through 24.     Auth#  [id number]    Approved Procedure Codes: 67813 - Therapeutic Exercise, 37904 - Therapeutic

## 2024-04-30 ENCOUNTER — HOSPITAL ENCOUNTER (OUTPATIENT)
Dept: OCCUPATIONAL THERAPY | Age: 84
Setting detail: THERAPIES SERIES
Discharge: HOME | End: 2024-04-30
Payer: MEDICARE

## 2024-04-30 PROCEDURE — 97140 MANUAL THERAPY 1/> REGIONS: CPT

## 2024-04-30 PROCEDURE — 97110 THERAPEUTIC EXERCISES: CPT

## 2024-04-30 NOTE — PROGRESS NOTES
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  Hemiplegia, unspecified affecting left side G81.94   Treatment Diagnosis I69.30 Unspecified Sequelae of Cerebral Infarction  G81.90 Hemiplegia Affecting Unspecified Side  R53.1 Weakness  R shoulder pain  R hand pain   Date of Evaluation 24    Additional Pertinent History Nanci  has a past medical history of MYRA (acute kidney injury) ([facility]), Anxiety, Arthritis, Arthritis, degenerative, localized, primary, hand, Bladder spasm, CAD (coronary artery disease), Cancer ([facility]), Cerebrovascular accident (CVA) ([facility]), Hyperlipidemia, Hypertension, Hypothyroidism, Obstructive sleep apnea of adult, SOB (shortness of breath), Type II or unspecified type diabetes mellitus without mention of complication, not stated as uncontrolled, and 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       Functional Outcome Measure Used Functional Impact Scale   Functional Outcome Score 50 (24)       Insurance: Primary: Payor: [employer] MEDICARE /  /  / ,   Secondary:    Authorization Information: PRE CERTIFICATION REQUIRED: Precert required after initial evaluation.THRU PEEWEE @ [phone]  INSURANCE THERAPY BENEFIT: NO LIMIT BASED ON MED NECESSITY. .   AQUATIC THERAPY COVERED: Yes  MODALITIES COVERED:  Yes    Received auth for 10 OT visits for CPT codes 79411, 84191, 77672 and 82254 from 24 through 24.     Auth#  [id number]    Approved Procedure Codes: 91631 - Therapeutic Exercise, 79596 - Therapeutic

## 2024-05-02 ENCOUNTER — HOSPITAL ENCOUNTER (OUTPATIENT)
Dept: OCCUPATIONAL THERAPY | Age: 84
Setting detail: THERAPIES SERIES
Discharge: HOME | End: 2024-05-02
Payer: MEDICARE

## 2024-05-02 PROCEDURE — 97110 THERAPEUTIC EXERCISES: CPT

## 2024-05-02 NOTE — PROGRESS NOTES
10 sec 4 Xx Tolerated well.  Patient remembered and initiated stretch independently   Seated edge of mat R/L individually with peach band (hook at end) row to neutral  1 10 ea Xx    Seated edge of mat hand clasped elbow flex/ext (mouth to alternating knee) 1 10     Hand clasped L wrist extension and hold 3 sec 10     L hand Digit abd/add with hand on lap 1 10  Difficulty with adduction between digit 3/4   Seated edge of mat, stool under patient feet- small ball AROM reaching chest press to therapist hand target  1 10  Initiated this date. Cues at trunk to avoid flexion and focus on elbow extension. Fatigue noted                  Activities Time    Notes/Comments   Functional Reaching B UE   Weightbearing on L hand on 1/2 [place] and ball in R hand, reaching to touch top of cone in different planes on L side   L hand holding cone with dycem, reaching forward to touch ball- limited motion, decreased active shoulder flexion and elbow extension   Playing \"nasreen cake\" motion of alternating reaching and hitting therapist hand cross body- explained this for HEP along with utilizing towel for L hand to weightbearing throughout reaching with RUE   4/23: only 1# this date    [facility] L hand clothespins    X 10 reps yellow and red clothespins 3 point pinch, along with transfers and reaching,   Able to complete 4 yellow clothespins of transfer from horizontal to vertical zoila and 3 red clothespins, min A from R hand to help support at forearm. Denies pain    Trialed trigger finger splint R3 to reduce occurrence of triggering when she attempts to pull her pants up in the back   Patient to trial over the weekend and report next session   Night splint fitting and adjustment   Fit and educated patient and  with comfy functional position splint for nighttime wear.  Patient instucted to trial today for 2 hour continual wear and if comfortable, instructed to wear at nighttime only.   Walker splint modifications    Removed splint and

## 2024-05-07 ENCOUNTER — HOSPITAL ENCOUNTER (OUTPATIENT)
Dept: OCCUPATIONAL THERAPY | Age: 84
Setting detail: THERAPIES SERIES
Discharge: HOME | End: 2024-05-07
Payer: MEDICARE

## 2024-05-07 PROCEDURE — 97140 MANUAL THERAPY 1/> REGIONS: CPT

## 2024-05-07 PROCEDURE — 97110 THERAPEUTIC EXERCISES: CPT

## 2024-05-07 NOTE — PROGRESS NOTES
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  Hemiplegia, unspecified affecting left side G81.94   Treatment Diagnosis I69.30 Unspecified Sequelae of Cerebral Infarction  G81.90 Hemiplegia Affecting Unspecified Side  R53.1 Weakness  R shoulder pain  R hand pain   Date of Evaluation 24    Additional Pertinent History Nanci  has a past medical history of MYRA (acute kidney injury) ([facility]), Anxiety, Arthritis, Arthritis, degenerative, localized, primary, hand, Bladder spasm, CAD (coronary artery disease), Cancer ([facility]), Cerebrovascular accident (CVA) ([facility]), Hyperlipidemia, Hypertension, Hypothyroidism, Obstructive sleep apnea of adult, SOB (shortness of breath), Type II or unspecified type diabetes mellitus without mention of complication, not stated as uncontrolled, and 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       Functional Outcome Measure Used Functional Impact Scale   Functional Outcome Score 50 (24)       Insurance: Primary: Payor: HU[employer] MEDICARE /  /  / ,   Secondary:    Authorization Information: PRE CERTIFICATION REQUIRED: Precert required after initial evaluation.THRU PEEWEE @ [phone]  INSURANCE THERAPY BENEFIT: NO LIMIT BASED ON MED NECESSITY. .   AQUATIC THERAPY COVERED: Yes  MODALITIES COVERED:  Yes    Received auth for 10 OT visits for CPT codes 45903, 82221, 30029 and 06559 from 24 through 24.     Auth#  [id number]    Approved Procedure Codes: 19826 - Therapeutic Exercise, 07257 - Therapeutic

## 2024-05-09 ENCOUNTER — HOSPITAL ENCOUNTER (OUTPATIENT)
Dept: OCCUPATIONAL THERAPY | Age: 84
Setting detail: THERAPIES SERIES
Discharge: HOME | End: 2024-05-09
Payer: MEDICARE

## 2024-05-09 PROCEDURE — 97110 THERAPEUTIC EXERCISES: CPT

## 2024-05-09 NOTE — PROGRESS NOTES
[facility]  OCCUPATIONAL THERAPY  [] EVALUATION  [] DAILY NOTE (LAND) [] DAILY NOTE (AQUATIC ) [x] PROGRESS NOTE [] DISCHARGE NOTE    [x] OUTPATIENT REHABILITATION CENTER [facility]   [] [place] [facility]    [] [facility]   [] 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  Hemiplegia, unspecified affecting left side G81.94   Treatment Diagnosis I69.30 Unspecified Sequelae of Cerebral Infarction  G81.90 Hemiplegia Affecting Unspecified Side  R53.1 Weakness  R shoulder pain  R hand pain   Date of Evaluation 24    Additional Pertinent History Nanci  has a past medical history of MYRA (acute kidney injury) ([facility]), Anxiety, Arthritis, Arthritis, degenerative, localized, primary, hand, Bladder spasm, CAD (coronary artery disease), Cancer ([facility]), Cerebrovascular accident (CVA) ([facility]), Hyperlipidemia, Hypertension, Hypothyroidism, Obstructive sleep apnea of adult, SOB (shortness of breath), Type II or unspecified type diabetes mellitus without mention of complication, not stated as uncontrolled, and 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       Functional Outcome Measure Used Functional Impact Scale   Functional Outcome Score 50 (24) 69 (24)      Insurance: Primary: Payor: HU[employer] MEDICARE /  /  / ,   Secondary:    Authorization Information: PRE CERTIFICATION REQUIRED: Precert required after initial evaluation.THRU PEEWEE @ [phone]  INSURANCE THERAPY BENEFIT: NO LIMIT BASED ON MED NECESSITY. .   AQUATIC THERAPY COVERED: Yes  MODALITIES COVERED:  Yes    Received auth for 10 OT visits for CPT codes 37463, 19465, 79826 and 99107 from 24 through 24.     Auth#  [id number]    Approved Procedure Codes: 14538 - Therapeutic Exercise, 34804 - Therapeutic

## 2024-05-14 ENCOUNTER — HOSPITAL ENCOUNTER (OUTPATIENT)
Dept: OCCUPATIONAL THERAPY | Age: 84
Setting detail: THERAPIES SERIES
Discharge: HOME | End: 2024-05-14
Payer: MEDICARE

## 2024-05-14 PROCEDURE — 97110 THERAPEUTIC EXERCISES: CPT

## 2024-05-14 NOTE — DISCHARGE SUMMARY
reaching with RUE   4/30/24: R shoulder IR stretch behind back   5/7/24: peach band R/L seated row, peach band R/L seated bicep curl   []  No new Education completed  []  Reviewed Prior HEP      [x]  Patient verbalized and/or demonstrated understanding of education provided.  []  Patient unable to verbalize and/or demonstrate understanding of education provided.  Will continue education.  [x]  Barriers to learning: none    PLAN:  Treatment Recommendations: Range of Motion, Neuromuscular Re-education, Manual Therapy - Soft Tissue Mobilization, Pain Management, Home Exercise Program, Patient Education, Safety Education and Training, Self-Care Education and Training, and Splint Fabrications/Modifications    []  Plan of care initiated.    []  Continue with current plan of care  Plan to see patient 1 times per week for 1 week to address the treatment planned outlined above.  []  Modify plan of care as follows:    []  Hold pending physician visit  [x]  Discharge    Time In 0945   Time Out 1025   Timed Code Minutes: 40 min   Total Treatment Time: 40 min       Electronically Signed by: Madison Alcantara OTR/L, CHT #[id number]

## 2024-05-16 ENCOUNTER — OFFICE VISIT (OUTPATIENT)
Dept: FAMILY MEDICINE CLINIC | Age: 84
End: 2024-05-16
Payer: MEDICARE

## 2024-05-16 VITALS
HEIGHT: 63 IN | BODY MASS INDEX: 24.91 KG/M2 | RESPIRATION RATE: 16 BRPM | TEMPERATURE: 97.4 F | WEIGHT: 140.6 LBS | DIASTOLIC BLOOD PRESSURE: 74 MMHG | SYSTOLIC BLOOD PRESSURE: 124 MMHG | HEART RATE: 82 BPM | OXYGEN SATURATION: 98 %

## 2024-05-16 DIAGNOSIS — S60.222A TRAUMATIC ECCHYMOSIS OF LEFT HAND, INITIAL ENCOUNTER: Primary | ICD-10-CM

## 2024-05-16 PROBLEM — E11.65 HYPERGLYCEMIA DUE TO TYPE 2 DIABETES MELLITUS (HCC): Status: RESOLVED | Noted: 2022-01-04 | Resolved: 2024-05-16

## 2024-05-16 PROBLEM — N17.9 AKI (ACUTE KIDNEY INJURY) (HCC): Status: RESOLVED | Noted: 2020-12-06 | Resolved: 2024-05-16

## 2024-05-16 PROBLEM — R93.422 ABNORMAL RADIOLOGIC FINDINGS ON DIAGNOSTIC IMAGING OF LEFT KIDNEY: Status: ACTIVE | Noted: 2024-04-04

## 2024-05-16 PROBLEM — I63.9 CEREBROVASCULAR ACCIDENT (CVA) (HCC): Status: RESOLVED | Noted: 2023-08-11 | Resolved: 2024-05-16

## 2024-05-16 PROBLEM — R73.9 HYPERGLYCEMIA: Status: RESOLVED | Noted: 2020-12-05 | Resolved: 2024-05-16

## 2024-05-16 PROBLEM — R55 NEAR SYNCOPE: Status: RESOLVED | Noted: 2023-07-31 | Resolved: 2024-05-16

## 2024-05-16 PROBLEM — R41.82 ALTERED MENTAL STATUS: Status: RESOLVED | Noted: 2023-08-11 | Resolved: 2024-05-16

## 2024-05-16 PROBLEM — I63.30 CEREBRAL INFARCTION DUE TO THROMBOSIS OF CEREBRAL ARTERY (HCC): Status: RESOLVED | Noted: 2022-01-04 | Resolved: 2024-05-16

## 2024-05-16 PROCEDURE — 1090F PRES/ABSN URINE INCON ASSESS: CPT | Performed by: FAMILY MEDICINE

## 2024-05-16 PROCEDURE — G8400 PT W/DXA NO RESULTS DOC: HCPCS | Performed by: FAMILY MEDICINE

## 2024-05-16 PROCEDURE — 1124F ACP DISCUSS-NO DSCNMKR DOCD: CPT | Performed by: FAMILY MEDICINE

## 2024-05-16 PROCEDURE — 99213 OFFICE O/P EST LOW 20 MIN: CPT | Performed by: FAMILY MEDICINE

## 2024-05-16 PROCEDURE — G8427 DOCREV CUR MEDS BY ELIG CLIN: HCPCS | Performed by: FAMILY MEDICINE

## 2024-05-16 PROCEDURE — G8420 CALC BMI NORM PARAMETERS: HCPCS | Performed by: FAMILY MEDICINE

## 2024-05-16 PROCEDURE — 1036F TOBACCO NON-USER: CPT | Performed by: FAMILY MEDICINE

## 2024-05-16 PROCEDURE — 3078F DIAST BP <80 MM HG: CPT | Performed by: FAMILY MEDICINE

## 2024-05-16 PROCEDURE — 3074F SYST BP LT 130 MM HG: CPT | Performed by: FAMILY MEDICINE

## 2024-05-16 ASSESSMENT — ENCOUNTER SYMPTOMS
WHEEZING: 0
VOMITING: 0
SHORTNESS OF BREATH: 0
DIARRHEA: 0
NAUSEA: 0
ABDOMINAL PAIN: 0
COLOR CHANGE: 1
COUGH: 0

## 2024-05-16 NOTE — PROGRESS NOTES
Nanci Bar (:  1940) is a 84 y.o. female,Established patient, here for evaluation of the following chief complaint(s):  Skin Problem      Subjective   SUBJECTIVE/OBJECTIVE:  HPI  Patient presents with discoloration of hand for several months  Risk factors wears brace on L hand at night, often bumps that arm on table at   Associated symptoms no pain or discomfort, occasional coolness noted   No swelling of the hand, still wearing ring without difficulty  Treatments wears brace on that hand at night    Review of Systems   Constitutional:  Positive for fatigue. Negative for activity change, appetite change and unexpected weight change.   Eyes:  Eye discharge: chronic.   Respiratory:  Negative for cough, shortness of breath and wheezing.    Cardiovascular:  Negative for chest pain, palpitations and leg swelling.   Gastrointestinal:  Negative for abdominal pain, constipation, diarrhea, nausea and vomiting.   Skin:  Positive for color change (bruising of dorsum of L hand, mild on the right hand as well).          Objective   Physical Exam  Constitutional:       Appearance: Normal appearance.   Cardiovascular:      Rate and Rhythm: Normal rate and regular rhythm.      Heart sounds: No murmur heard.     No gallop.   Pulmonary:      Effort: Pulmonary effort is normal.      Breath sounds: Normal breath sounds. No wheezing, rhonchi or rales.   Abdominal:      General: Abdomen is flat. Bowel sounds are normal. There is no distension.      Palpations: Abdomen is soft.      Tenderness: There is no abdominal tenderness. There is no guarding.   Musculoskeletal:      Right lower leg: No edema.      Left lower leg: No edema.   Skin:     General: Skin is warm.      Findings: Bruising (significant bruising on dorsum of L hand, also mild on the right hand, some spots on lower forearm on the left as well, two on L knee from falling last week) present. No erythema or rash.   Neurological:      Mental Status: She is

## 2024-06-04 ENCOUNTER — NURSE ONLY (OUTPATIENT)
Dept: LAB | Age: 84
End: 2024-06-04

## 2024-06-04 DIAGNOSIS — E78.5 DYSLIPIDEMIA: ICD-10-CM

## 2024-06-04 DIAGNOSIS — Z79.4 TYPE 2 DIABETES MELLITUS WITH OTHER CIRCULATORY COMPLICATION, WITH LONG-TERM CURRENT USE OF INSULIN (HCC): ICD-10-CM

## 2024-06-04 DIAGNOSIS — E11.59 TYPE 2 DIABETES MELLITUS WITH OTHER CIRCULATORY COMPLICATION, WITH LONG-TERM CURRENT USE OF INSULIN (HCC): ICD-10-CM

## 2024-06-04 LAB
ALBUMIN SERPL BCG-MCNC: 3.6 G/DL (ref 3.5–5.1)
ALP SERPL-CCNC: 118 U/L (ref 38–126)
ALT SERPL W/O P-5'-P-CCNC: 16 U/L (ref 11–66)
ANION GAP SERPL CALC-SCNC: 14 MEQ/L (ref 8–16)
AST SERPL-CCNC: 20 U/L (ref 5–40)
BASOPHILS ABSOLUTE: 0.1 THOU/MM3 (ref 0–0.1)
BASOPHILS NFR BLD AUTO: 1 %
BILIRUB SERPL-MCNC: 0.5 MG/DL (ref 0.3–1.2)
BUN SERPL-MCNC: 26 MG/DL (ref 7–22)
CALCIUM SERPL-MCNC: 9.5 MG/DL (ref 8.5–10.5)
CHLORIDE SERPL-SCNC: 102 MEQ/L (ref 98–111)
CHOLEST SERPL-MCNC: 131 MG/DL (ref 100–199)
CO2 SERPL-SCNC: 22 MEQ/L (ref 23–33)
CREAT SERPL-MCNC: 1.1 MG/DL (ref 0.4–1.2)
CREAT UR-MCNC: 70.8 MG/DL
DEPRECATED RDW RBC AUTO: 43.2 FL (ref 35–45)
EOSINOPHIL NFR BLD AUTO: 2.2 %
EOSINOPHILS ABSOLUTE: 0.2 THOU/MM3 (ref 0–0.4)
ERYTHROCYTE [DISTWIDTH] IN BLOOD BY AUTOMATED COUNT: 13.5 % (ref 11.5–14.5)
GFR SERPL CREATININE-BSD FRML MDRD: 50 ML/MIN/1.73M2
GLUCOSE SERPL-MCNC: 265 MG/DL (ref 70–108)
HCT VFR BLD AUTO: 37.9 % (ref 37–47)
HDLC SERPL-MCNC: 56 MG/DL
HGB BLD-MCNC: 12.3 GM/DL (ref 12–16)
IMM GRANULOCYTES # BLD AUTO: 0.02 THOU/MM3 (ref 0–0.07)
IMM GRANULOCYTES NFR BLD AUTO: 0.3 %
LDLC SERPL CALC-MCNC: 64 MG/DL
LYMPHOCYTES ABSOLUTE: 2.9 THOU/MM3 (ref 1–4.8)
LYMPHOCYTES NFR BLD AUTO: 37.1 %
MCH RBC QN AUTO: 28.2 PG (ref 26–33)
MCHC RBC AUTO-ENTMCNC: 32.5 GM/DL (ref 32.2–35.5)
MCV RBC AUTO: 86.9 FL (ref 81–99)
MICROALBUMIN UR-MCNC: 1.23 MG/DL
MICROALBUMIN/CREAT RATIO PNL UR: 17 MG/G (ref 0–30)
MONOCYTES ABSOLUTE: 0.5 THOU/MM3 (ref 0.4–1.3)
MONOCYTES NFR BLD AUTO: 6.3 %
NEUTROPHILS ABSOLUTE: 4.1 THOU/MM3 (ref 1.8–7.7)
NEUTROPHILS NFR BLD AUTO: 53.1 %
NRBC BLD AUTO-RTO: 0 /100 WBC
PLATELET # BLD AUTO: 196 THOU/MM3 (ref 130–400)
PMV BLD AUTO: 13.6 FL (ref 9.4–12.4)
POTASSIUM SERPL-SCNC: 4.6 MEQ/L (ref 3.5–5.2)
PROT SERPL-MCNC: 6.8 G/DL (ref 6.1–8)
RBC # BLD AUTO: 4.36 MILL/MM3 (ref 4.2–5.4)
SODIUM SERPL-SCNC: 138 MEQ/L (ref 135–145)
TRIGL SERPL-MCNC: 55 MG/DL (ref 0–199)
WBC # BLD AUTO: 7.8 THOU/MM3 (ref 4.8–10.8)

## 2024-06-25 ENCOUNTER — OFFICE VISIT (OUTPATIENT)
Dept: FAMILY MEDICINE CLINIC | Age: 84
End: 2024-06-25
Payer: MEDICARE

## 2024-06-25 VITALS
WEIGHT: 136.2 LBS | HEIGHT: 63 IN | TEMPERATURE: 97.3 F | HEART RATE: 85 BPM | RESPIRATION RATE: 16 BRPM | DIASTOLIC BLOOD PRESSURE: 68 MMHG | BODY MASS INDEX: 24.13 KG/M2 | SYSTOLIC BLOOD PRESSURE: 124 MMHG | OXYGEN SATURATION: 98 %

## 2024-06-25 DIAGNOSIS — E03.9 HYPOTHYROIDISM, UNSPECIFIED TYPE: ICD-10-CM

## 2024-06-25 DIAGNOSIS — Z00.00 MEDICARE ANNUAL WELLNESS VISIT, SUBSEQUENT: Primary | ICD-10-CM

## 2024-06-25 PROCEDURE — 3078F DIAST BP <80 MM HG: CPT | Performed by: FAMILY MEDICINE

## 2024-06-25 PROCEDURE — G0439 PPPS, SUBSEQ VISIT: HCPCS | Performed by: FAMILY MEDICINE

## 2024-06-25 PROCEDURE — 1124F ACP DISCUSS-NO DSCNMKR DOCD: CPT | Performed by: FAMILY MEDICINE

## 2024-06-25 PROCEDURE — 3074F SYST BP LT 130 MM HG: CPT | Performed by: FAMILY MEDICINE

## 2024-06-25 RX ORDER — LEVOTHYROXINE SODIUM 175 UG/1
175 TABLET ORAL DAILY
Qty: 90 TABLET | Refills: 3 | Status: SHIPPED | OUTPATIENT
Start: 2024-06-25

## 2024-06-25 RX ORDER — FLUCONAZOLE 100 MG/1
100 TABLET ORAL DAILY
Qty: 7 TABLET | Refills: 0 | Status: SHIPPED | OUTPATIENT
Start: 2024-06-25 | End: 2024-07-02

## 2024-06-25 ASSESSMENT — PATIENT HEALTH QUESTIONNAIRE - PHQ9
SUM OF ALL RESPONSES TO PHQ QUESTIONS 1-9: 0
SUM OF ALL RESPONSES TO PHQ9 QUESTIONS 1 & 2: 0
2. FEELING DOWN, DEPRESSED OR HOPELESS: NOT AT ALL
1. LITTLE INTEREST OR PLEASURE IN DOING THINGS: NOT AT ALL
SUM OF ALL RESPONSES TO PHQ QUESTIONS 1-9: 0

## 2024-06-25 ASSESSMENT — LIFESTYLE VARIABLES
HOW OFTEN DO YOU HAVE A DRINK CONTAINING ALCOHOL: 2-4 TIMES A MONTH
HOW MANY STANDARD DRINKS CONTAINING ALCOHOL DO YOU HAVE ON A TYPICAL DAY: 1 OR 2

## 2024-06-25 NOTE — PROGRESS NOTES
Inject into the skin in the morning, at noon, in the evening, and at bedtime Sliding scale along with 18 units AM and qHS, 16 units afternoon      Insulin Syringe-Needle U-100 ([employer] INSULIN SYRINGE) 31G X 5/16\" 0.5 ML MISC 1 each by Does not apply route daily 100 each 0    ferrous sulfate 325 (65 Fe) MG tablet Take 1 tablet by mouth daily (with breakfast)      Insulin Glargine (TOUJEO MAX SOLOSTAR SC) Inject 32 Units into the skin nightly      fluconazole (DIFLUCAN) 100 MG tablet Take 1 tablet by mouth daily for 7 days (Patient not taking: Reported on 6/26/2024) 7 tablet 0    sertraline (ZOLOFT) 50 MG tablet 1 tablet Orally Once a day for 30 day(s) (Patient not taking: Reported on 6/26/2024)      ferrous gluconate (FERGON) 240 (27 Fe) MG tablet Take 1 tablet by mouth (Patient not taking: Reported on 6/26/2024)       No current facility-administered medications for this visit.        Review of Systems:  CONSTITUTIONAL:  negative for  fevers and chills  EYES:  positive for  glasses; negative for diplopia or blurred vision   HEENT:  negative for  hearing loss  RESPIRATORY:  negative for  dyspnea and wheezing  CARDIOVASCULAR:  negative for  chest pain, palpitations  GASTROINTESTINAL:  negative for nausea and vomiting; rare bowel incontinence   GENITOURINARY:  positive for urinary leaking- has had botox in bladder (Dr. Jiang at [facility]). Uses external catheter at night; yeast infeciton   SKIN:  negative for rash/ wound  MUSCULOSKELETAL:  positive for  muscle weakness  NEUROLOGICAL:  positive for coordination problems, gait problems, weakness, and spasticity  BEHAVIOR/PSYCH:  negative for depressed mood and anxiety  System review otherwise negative    Objective:  BP (!) 140/70 (Site: Right Upper Arm, Position: Sitting)   Ht 1.6 m (5' 2.99\")   Wt 63 kg (139 lb)   BMI 24.63 kg/m²   Physical Exam   Patient is awake and alert. She presents to clinic with her significant other in a motorized scooter.  Orientation:

## 2024-06-25 NOTE — PROGRESS NOTES
Medicare Annual Wellness Visit    Nanci Bar is here for Medicare AWV    Assessment & Plan   Medicare annual wellness visit, subsequent  Hypothyroidism, unspecified type  -     levothyroxine (SYNTHROID) 175 MCG tablet; Take 1 tablet by mouth daily, Disp-90 tablet, R-3Normal  -     T4, Free; Future  -     TSH; Future    Recommendations for Preventive Services Due: see orders and patient instructions/AVS.  Recommended screening schedule for the next 5-10 years is provided to the patient in written form: see Patient Instructions/AVS.     Return in 6 months (on 12/25/2024).     Subjective       Patient's complete Health Risk Assessment and screening values have been reviewed and are found in Flowsheets. The following problems were reviewed today and where indicated follow up appointments were made and/or referrals ordered.    Positive Risk Factor Screenings with Interventions:                Activity, Diet, and Weight:  On average, how many days per week do you engage in moderate to strenuous exercise (like a brisk walk)?: 2 days  On average, how many minutes do you engage in exercise at this level?: 20 min    Do you eat balanced/healthy meals regularly?: (!) No    Body mass index is 24.13 kg/m².    Do you eat balanced/healthy meals regularly Interventions:  Patient comments: very picky eater, has always been stubborn about feeding, likes oatmeal        Dentist Screen:  Have you seen the dentist within the past year?: (!) No    Intervention:  Not applicable - dentures       ADL's:   Patient reports needing help with:  Select all that apply: (!) Grooming, Dressing  Interventions:  Patient comments: doing therapy, needs some help with getting pants, overall pretty independent                  Objective   Vitals:    06/25/24 1307   BP: 124/68   Pulse: 85   Resp: 16   Temp: 97.3 °F (36.3 °C)   TempSrc: Temporal   SpO2: 98%   Weight: 61.8 kg (136 lb 3.2 oz)   Height: 1.6 m (5' 3\")      Body mass index is 24.13 kg/m².

## 2024-06-25 NOTE — PATIENT INSTRUCTIONS

## 2024-06-26 ENCOUNTER — OFFICE VISIT (OUTPATIENT)
Dept: PHYSICAL MEDICINE AND REHAB | Age: 84
End: 2024-06-26
Payer: MEDICARE

## 2024-06-26 VITALS
WEIGHT: 139 LBS | HEIGHT: 63 IN | BODY MASS INDEX: 24.63 KG/M2 | SYSTOLIC BLOOD PRESSURE: 140 MMHG | DIASTOLIC BLOOD PRESSURE: 70 MMHG

## 2024-06-26 DIAGNOSIS — G81.14 LEFT SPASTIC HEMIPARESIS (HCC): ICD-10-CM

## 2024-06-26 DIAGNOSIS — I69.30 HISTORY OF CVA WITH RESIDUAL DEFICIT: Primary | ICD-10-CM

## 2024-06-26 PROCEDURE — G8427 DOCREV CUR MEDS BY ELIG CLIN: HCPCS | Performed by: STUDENT IN AN ORGANIZED HEALTH CARE EDUCATION/TRAINING PROGRAM

## 2024-06-26 PROCEDURE — 3078F DIAST BP <80 MM HG: CPT | Performed by: STUDENT IN AN ORGANIZED HEALTH CARE EDUCATION/TRAINING PROGRAM

## 2024-06-26 PROCEDURE — G8400 PT W/DXA NO RESULTS DOC: HCPCS | Performed by: STUDENT IN AN ORGANIZED HEALTH CARE EDUCATION/TRAINING PROGRAM

## 2024-06-26 PROCEDURE — 1124F ACP DISCUSS-NO DSCNMKR DOCD: CPT | Performed by: STUDENT IN AN ORGANIZED HEALTH CARE EDUCATION/TRAINING PROGRAM

## 2024-06-26 PROCEDURE — 3077F SYST BP >= 140 MM HG: CPT | Performed by: STUDENT IN AN ORGANIZED HEALTH CARE EDUCATION/TRAINING PROGRAM

## 2024-06-26 PROCEDURE — 1036F TOBACCO NON-USER: CPT | Performed by: STUDENT IN AN ORGANIZED HEALTH CARE EDUCATION/TRAINING PROGRAM

## 2024-06-26 PROCEDURE — 99203 OFFICE O/P NEW LOW 30 MIN: CPT | Performed by: STUDENT IN AN ORGANIZED HEALTH CARE EDUCATION/TRAINING PROGRAM

## 2024-06-26 PROCEDURE — 1090F PRES/ABSN URINE INCON ASSESS: CPT | Performed by: STUDENT IN AN ORGANIZED HEALTH CARE EDUCATION/TRAINING PROGRAM

## 2024-06-26 PROCEDURE — G8420 CALC BMI NORM PARAMETERS: HCPCS | Performed by: STUDENT IN AN ORGANIZED HEALTH CARE EDUCATION/TRAINING PROGRAM

## 2024-08-02 ENCOUNTER — OFFICE VISIT (OUTPATIENT)
Dept: FAMILY MEDICINE CLINIC | Age: 84
End: 2024-08-02
Payer: MEDICARE

## 2024-08-02 VITALS
BODY MASS INDEX: 24.63 KG/M2 | DIASTOLIC BLOOD PRESSURE: 84 MMHG | OXYGEN SATURATION: 97 % | WEIGHT: 139 LBS | SYSTOLIC BLOOD PRESSURE: 132 MMHG | HEART RATE: 96 BPM | RESPIRATION RATE: 16 BRPM | TEMPERATURE: 97.3 F | HEIGHT: 63 IN

## 2024-08-02 DIAGNOSIS — S09.90XD CLOSED HEAD INJURY, SUBSEQUENT ENCOUNTER: Primary | ICD-10-CM

## 2024-08-02 DIAGNOSIS — E11.65 TYPE 2 DIABETES MELLITUS WITH HYPERGLYCEMIA, WITH LONG-TERM CURRENT USE OF INSULIN (HCC): ICD-10-CM

## 2024-08-02 DIAGNOSIS — Z79.4 TYPE 2 DIABETES MELLITUS WITH HYPERGLYCEMIA, WITH LONG-TERM CURRENT USE OF INSULIN (HCC): ICD-10-CM

## 2024-08-02 DIAGNOSIS — I25.10 CORONARY ARTERY DISEASE INVOLVING NATIVE CORONARY ARTERY OF NATIVE HEART WITHOUT ANGINA PECTORIS: ICD-10-CM

## 2024-08-02 DIAGNOSIS — I10 ESSENTIAL HYPERTENSION: ICD-10-CM

## 2024-08-02 PROCEDURE — 1090F PRES/ABSN URINE INCON ASSESS: CPT | Performed by: FAMILY MEDICINE

## 2024-08-02 PROCEDURE — G8427 DOCREV CUR MEDS BY ELIG CLIN: HCPCS | Performed by: FAMILY MEDICINE

## 2024-08-02 PROCEDURE — 3046F HEMOGLOBIN A1C LEVEL >9.0%: CPT | Performed by: FAMILY MEDICINE

## 2024-08-02 PROCEDURE — 1036F TOBACCO NON-USER: CPT | Performed by: FAMILY MEDICINE

## 2024-08-02 PROCEDURE — 3075F SYST BP GE 130 - 139MM HG: CPT | Performed by: FAMILY MEDICINE

## 2024-08-02 PROCEDURE — 1124F ACP DISCUSS-NO DSCNMKR DOCD: CPT | Performed by: FAMILY MEDICINE

## 2024-08-02 PROCEDURE — 99213 OFFICE O/P EST LOW 20 MIN: CPT | Performed by: FAMILY MEDICINE

## 2024-08-02 PROCEDURE — G8400 PT W/DXA NO RESULTS DOC: HCPCS | Performed by: FAMILY MEDICINE

## 2024-08-02 PROCEDURE — 3079F DIAST BP 80-89 MM HG: CPT | Performed by: FAMILY MEDICINE

## 2024-08-02 PROCEDURE — G8420 CALC BMI NORM PARAMETERS: HCPCS | Performed by: FAMILY MEDICINE

## 2024-08-02 ASSESSMENT — ENCOUNTER SYMPTOMS
ABDOMINAL PAIN: 0
COUGH: 0
CONSTIPATION: 0
NAUSEA: 0
DIARRHEA: 0
SHORTNESS OF BREATH: 0
WHEEZING: 0
VOMITING: 0

## 2024-08-02 NOTE — PROGRESS NOTES
Nanci Bar (:  1940) is a 84 y.o. female,Established patient, here for evaluation of the following chief complaint(s):  Follow-up      Subjective   SUBJECTIVE/OBJECTIVE:  HPI  Was at [facility] when episode happened.  Fell and hit her head on 2024, had sugar above 500 so she gave herself some insulin about 45 minutes before she became lightheaded (was also very hot outside) and fell back of picnic table hitting her head.  Immediately woke up apologizing.   in EMS  Unremarkable CT head  BPs were fine in ER, no concerns about dehydration    Review of Systems   Constitutional:  Positive for fatigue (normal). Negative for activity change, appetite change and unexpected weight change.   Respiratory:  Negative for cough, shortness of breath and wheezing.    Cardiovascular:  Negative for chest pain, palpitations and leg swelling.   Gastrointestinal:  Negative for abdominal pain, constipation, diarrhea, nausea and vomiting.   Musculoskeletal:  Negative for neck pain and neck stiffness.   Neurological:  Positive for syncope (only once, out for about 2-3 seconds) and light-headedness (just before passing out, no residual lightheadedness). Negative for dizziness, seizures and headaches.          Objective   Physical Exam  Vitals reviewed.   Constitutional:       General: She is not in acute distress.     Appearance: Normal appearance. She is normal weight. She is not ill-appearing.   HENT:      Head: Normocephalic and atraumatic.      Comments: No irritation of the scalp even though she landed on gravel  Cardiovascular:      Rate and Rhythm: Normal rate and regular rhythm.      Heart sounds: No murmur heard.     No gallop.   Pulmonary:      Effort: Pulmonary effort is normal.      Breath sounds: Normal breath sounds. No wheezing, rhonchi or rales.   Musculoskeletal:      Right lower leg: No edema.      Left lower leg: No edema.   Skin:     General: Skin is warm.      Comments: Resolving

## 2024-08-13 NOTE — PROGRESS NOTES
Physical Medicine and Rehabilitation  Botox Note      Nanci Bar is an 85 yo female with history of left spastic hemiparesis following CVA. Patient last seen in PM&R clinic  on 6/26/2024. Patient determined to be a good candidate for botox injections to the [facility] for management of her spasticity. We sent insurance prior auth and received insurance approval. No significant medical or functional changes since last visit. Discussed botox injections in depth including mechanism of action, indications, risks, and benefits. Discussed the timeline for onset, peak efficacy, and total duration.       Both verbal and written consent obtained for botulinum toxin injections after risks versus benefits discussed.  100 Units of Botox were reconstituted using preservative-free normal saline in a 100 unit to 2 mL solution.  Alcohol swabs were used to cleanse the skin before injections were performed.  Back pressure was placed on the syringe during injections to avoid any intravascular injection.  EMG guidance was not used during procedure.       Botulinum toxin was injected in the following muscles of the left upper extremity:      Left upper extremity:  Biceps                      25 units  Pronator Teres         25 units  FCR                         25 units  FDS                         25 units      Total Units Injected: 100 units  Unavoidable waste: 0 units     The patient tolerated the procedure well with no immediate complications.  The patient should call with concerns or questions over the coming days.     Will have patient return to clinic in approximately 3 weeks to evaluate efficacy of botox injections and to help with planning for future injections

## 2024-08-14 ENCOUNTER — OFFICE VISIT (OUTPATIENT)
Dept: PHYSICAL MEDICINE AND REHAB | Age: 84
End: 2024-08-14
Payer: MEDICARE

## 2024-08-14 VITALS
HEIGHT: 63 IN | WEIGHT: 139 LBS | BODY MASS INDEX: 24.63 KG/M2 | DIASTOLIC BLOOD PRESSURE: 60 MMHG | SYSTOLIC BLOOD PRESSURE: 136 MMHG

## 2024-08-14 DIAGNOSIS — G81.14 LEFT SPASTIC HEMIPARESIS (HCC): ICD-10-CM

## 2024-08-14 DIAGNOSIS — I69.30 HISTORY OF CVA WITH RESIDUAL DEFICIT: Primary | ICD-10-CM

## 2024-08-14 PROCEDURE — 64642 CHEMODENERV 1 EXTREMITY 1-4: CPT | Performed by: STUDENT IN AN ORGANIZED HEALTH CARE EDUCATION/TRAINING PROGRAM

## 2024-09-05 ENCOUNTER — OFFICE VISIT (OUTPATIENT)
Dept: PHYSICAL MEDICINE AND REHAB | Age: 84
End: 2024-09-05
Payer: MEDICARE

## 2024-09-05 VITALS
SYSTOLIC BLOOD PRESSURE: 128 MMHG | WEIGHT: 139 LBS | HEIGHT: 63 IN | BODY MASS INDEX: 24.63 KG/M2 | DIASTOLIC BLOOD PRESSURE: 54 MMHG

## 2024-09-05 DIAGNOSIS — G81.14 LEFT SPASTIC HEMIPARESIS (HCC): ICD-10-CM

## 2024-09-05 DIAGNOSIS — I69.30 HISTORY OF CVA WITH RESIDUAL DEFICIT: Primary | ICD-10-CM

## 2024-09-05 PROCEDURE — 1124F ACP DISCUSS-NO DSCNMKR DOCD: CPT | Performed by: STUDENT IN AN ORGANIZED HEALTH CARE EDUCATION/TRAINING PROGRAM

## 2024-09-05 PROCEDURE — 1090F PRES/ABSN URINE INCON ASSESS: CPT | Performed by: STUDENT IN AN ORGANIZED HEALTH CARE EDUCATION/TRAINING PROGRAM

## 2024-09-05 PROCEDURE — 99212 OFFICE O/P EST SF 10 MIN: CPT | Performed by: STUDENT IN AN ORGANIZED HEALTH CARE EDUCATION/TRAINING PROGRAM

## 2024-09-05 PROCEDURE — G8420 CALC BMI NORM PARAMETERS: HCPCS | Performed by: STUDENT IN AN ORGANIZED HEALTH CARE EDUCATION/TRAINING PROGRAM

## 2024-09-05 PROCEDURE — G8427 DOCREV CUR MEDS BY ELIG CLIN: HCPCS | Performed by: STUDENT IN AN ORGANIZED HEALTH CARE EDUCATION/TRAINING PROGRAM

## 2024-09-05 PROCEDURE — 3074F SYST BP LT 130 MM HG: CPT | Performed by: STUDENT IN AN ORGANIZED HEALTH CARE EDUCATION/TRAINING PROGRAM

## 2024-09-05 PROCEDURE — 1036F TOBACCO NON-USER: CPT | Performed by: STUDENT IN AN ORGANIZED HEALTH CARE EDUCATION/TRAINING PROGRAM

## 2024-09-05 PROCEDURE — 3078F DIAST BP <80 MM HG: CPT | Performed by: STUDENT IN AN ORGANIZED HEALTH CARE EDUCATION/TRAINING PROGRAM

## 2024-09-05 PROCEDURE — G8400 PT W/DXA NO RESULTS DOC: HCPCS | Performed by: STUDENT IN AN ORGANIZED HEALTH CARE EDUCATION/TRAINING PROGRAM

## 2024-09-05 NOTE — PROGRESS NOTES
Physical Medicine & Rehabilitation Outpatient New Patient Note    Chief Complaint:    Chief Complaint   Patient presents with    Follow-up     FU appt, botox has helped some, arm feels looser     Subjective:    Nanci Bar is a 84 y.o.   female with PMH significant for CAD, breast cancer, hyperlipidemia, hypertension, hypothyroidism, PORFIRIO, type 2 diabetes, and CVA who presents to clinic upon referral from PCP for evaluation of spasticity following CVA.    Per chart review, patient has history of CVA with resultant left hemiparesis in 2019.  Per report, patient made significant recovery following her stroke, however, in 2020 patient with COVID resulted in exacerbation of her prior CVA symptoms.  Patient recently in outpatient OT.  Patient discharged from OT on 5/14/2024 with plan to resume following Botox if patient determined to be a candidate. Patient was initially seen in PM&R clinic on 6/26/2024 and she was found to have left spastic hemiparesis. She was determined to be a good candidate for Botox injections and after getting insurance prior auth, patient underwent botox injections to the [facility] on 8/14/2024.     Left upper extremity:  Biceps                      25 units  Pronator Teres         25 units  FCR                         25 units  FDS                         25 units      She presents to clinic today to assess efficacy of botox injections. She present to  clinic with her . Overall, she reports that she has noticed some slight improvement. She was able to raise her arm to turn on a light switch which she has been unable to do previously.  She reports tolerating injections well without any significant soreness afterwards.  She continues to have tightness at her left elbow and wrist.  We discussed plans to increase Botox at next visit which patient is agreeable to.  She denies any significant medical or functional changes since her last visit.  No recent falls.      Social: Patient lives with

## 2024-09-30 ENCOUNTER — TELEPHONE (OUTPATIENT)
Dept: CARDIOLOGY CLINIC | Age: 84
End: 2024-09-30

## 2024-09-30 NOTE — TELEPHONE ENCOUNTER
Pre op Risk Assessment    Procedure Botox  Physician Dr. Jiang  Date of surgery/procedure 11/26/2024    Last OV 10/12/2023  Last Stress 05/15/2018  Last Echo 08/02/2023  Last Cath 11/26/2013  Last Stent ???  Is patient on blood thinners Plavix  Hold Meds/how many days Plavix for 7 days        Fax: [phone]  Phone: [phone]

## 2024-10-18 ENCOUNTER — OFFICE VISIT (OUTPATIENT)
Dept: CARDIOLOGY CLINIC | Age: 84
End: 2024-10-18
Payer: MEDICARE

## 2024-10-18 VITALS
HEIGHT: 63 IN | HEART RATE: 82 BPM | WEIGHT: 146.4 LBS | DIASTOLIC BLOOD PRESSURE: 81 MMHG | BODY MASS INDEX: 25.94 KG/M2 | SYSTOLIC BLOOD PRESSURE: 159 MMHG

## 2024-10-18 DIAGNOSIS — I27.20 PULMONARY HYPERTENSION (HCC): ICD-10-CM

## 2024-10-18 DIAGNOSIS — I35.0 NONRHEUMATIC AORTIC VALVE STENOSIS: ICD-10-CM

## 2024-10-18 DIAGNOSIS — E78.01 FAMILIAL HYPERCHOLESTEROLEMIA: ICD-10-CM

## 2024-10-18 DIAGNOSIS — R06.09 DYSPNEA ON EXERTION: ICD-10-CM

## 2024-10-18 DIAGNOSIS — I10 PRIMARY HYPERTENSION: ICD-10-CM

## 2024-10-18 DIAGNOSIS — Z01.818 PREOPERATIVE CLEARANCE: Primary | ICD-10-CM

## 2024-10-18 PROCEDURE — 1090F PRES/ABSN URINE INCON ASSESS: CPT | Performed by: NUCLEAR MEDICINE

## 2024-10-18 PROCEDURE — G8427 DOCREV CUR MEDS BY ELIG CLIN: HCPCS | Performed by: NUCLEAR MEDICINE

## 2024-10-18 PROCEDURE — 99214 OFFICE O/P EST MOD 30 MIN: CPT | Performed by: NUCLEAR MEDICINE

## 2024-10-18 PROCEDURE — 1036F TOBACCO NON-USER: CPT | Performed by: NUCLEAR MEDICINE

## 2024-10-18 PROCEDURE — 93000 ELECTROCARDIOGRAM COMPLETE: CPT | Performed by: NUCLEAR MEDICINE

## 2024-10-18 PROCEDURE — G8484 FLU IMMUNIZE NO ADMIN: HCPCS | Performed by: NUCLEAR MEDICINE

## 2024-10-18 PROCEDURE — 3077F SYST BP >= 140 MM HG: CPT | Performed by: NUCLEAR MEDICINE

## 2024-10-18 PROCEDURE — G8417 CALC BMI ABV UP PARAM F/U: HCPCS | Performed by: NUCLEAR MEDICINE

## 2024-10-18 PROCEDURE — 3079F DIAST BP 80-89 MM HG: CPT | Performed by: NUCLEAR MEDICINE

## 2024-10-18 PROCEDURE — G8400 PT W/DXA NO RESULTS DOC: HCPCS | Performed by: NUCLEAR MEDICINE

## 2024-10-18 PROCEDURE — 1124F ACP DISCUSS-NO DSCNMKR DOCD: CPT | Performed by: NUCLEAR MEDICINE

## 2024-10-18 NOTE — PROGRESS NOTES
1 year follow up.    EKG done today.    Denies chest pain, palpitations, dizziness, shortness of breath, and edema.     No cardiac concerns at this time.   
vaccine  Aged Out    Polio vaccine  Aged Out    Meningococcal (ACWY) vaccine  Aged Out       Subjective:  General:   No fever, no chills, some fatigue or weight loss  Pulmonary:    Some more dyspnea, no wheezing  Cardiac:    Denies recent chest pain,   GI:     No nausea or vomiting, no abdominal pain  Neuro:    some dizziness or light headedness,   Musculoskeletal:  No recent active issues  Extremities:   No edema, no obvious claudication       Objective:  General:   Well developed, well nourished  Lungs:   Clear to auscultation  Heart:    Normal S1 S2, Slight murmur. no rubs, no gallops  Abdomen:   Soft, non tender, no organomegalies, positive bowel sounds  Extremities:   No edema, no cyanosis, good peripheral pulses  Neurological:   Awake, alert, oriented. No obvious focal deficits  Musculoskelatal:  No obvious deformities   BP (!) 159/81   Pulse 82   Ht 1.6 m (5' 3\")   Wt 66.4 kg (146 lb 6.4 oz)   BMI 25.93 kg/m²     Assessment:  Assessment & Plan    Diagnosis Orders   1. Primary hypertension  EKG 12 lead      2. Familial hypercholesterolemia        3. Pulmonary hypertension (HCC)        4. Nonrheumatic aortic valve stenosis        As above  Higher risk patient   Symptoms as above  ECG in office was done today. I reviewed the ECG. No acute findings      Plan:  No follow-ups on file.  As above  Non invasive cardiac testing will be ordered to further evaluate for any ischemic or structural heart disease as a cause of the patient symptoms. We will proceed with a Stress Cardiolite test and echo soon.  Continue risk factor modification and medical management  Thank you for allowing me to participate in the care of your patient. Please don't hesitate to contact me regarding any further issues related to the patient care    Orders Placed:  Orders Placed This Encounter   Procedures    EKG 12 lead     Order Specific Question:   Reason for Exam?     Answer:   Other       Prescribed:  No orders of the defined types were

## 2024-10-30 ENCOUNTER — HOSPITAL ENCOUNTER (OUTPATIENT)
Age: 84
Discharge: HOME | End: 2024-11-01
Attending: NUCLEAR MEDICINE
Payer: MEDICARE

## 2024-10-30 ENCOUNTER — HOSPITAL ENCOUNTER (OUTPATIENT)
Dept: NUCLEAR MEDICINE | Age: 84
Discharge: HOME | End: 2024-10-30
Attending: NUCLEAR MEDICINE
Payer: MEDICARE

## 2024-10-30 VITALS
SYSTOLIC BLOOD PRESSURE: 159 MMHG | WEIGHT: 146 LBS | HEIGHT: 63 IN | DIASTOLIC BLOOD PRESSURE: 81 MMHG | BODY MASS INDEX: 25.87 KG/M2

## 2024-10-30 DIAGNOSIS — R06.09 DYSPNEA ON EXERTION: ICD-10-CM

## 2024-10-30 DIAGNOSIS — I35.0 NONRHEUMATIC AORTIC VALVE STENOSIS: ICD-10-CM

## 2024-10-30 DIAGNOSIS — E78.01 FAMILIAL HYPERCHOLESTEROLEMIA: ICD-10-CM

## 2024-10-30 DIAGNOSIS — I10 PRIMARY HYPERTENSION: ICD-10-CM

## 2024-10-30 DIAGNOSIS — Z01.818 PREOPERATIVE CLEARANCE: ICD-10-CM

## 2024-10-30 DIAGNOSIS — I27.20 PULMONARY HYPERTENSION (HCC): ICD-10-CM

## 2024-10-30 LAB
ECHO AO ASC DIAM: 2.9 CM
ECHO AO ASCENDING AORTA INDEX: 1.72 CM/M2
ECHO AV AREA PEAK VELOCITY: 0.8 CM2
ECHO AV AREA VTI: 1.2 CM2
ECHO AV AREA/BSA PEAK VELOCITY: 0.5 CM2/M2
ECHO AV AREA/BSA VTI: 0.7 CM2/M2
ECHO AV CUSP MM: 0.9 CM
ECHO AV MEAN GRADIENT: 12 MMHG
ECHO AV MEAN VELOCITY: 1.6 M/S
ECHO AV PEAK GRADIENT: 23 MMHG
ECHO AV PEAK VELOCITY: 2.4 M/S
ECHO AV VELOCITY RATIO: 0.29
ECHO AV VTI: 51.4 CM
ECHO BSA: 1.72 M2
ECHO BSA: 1.72 M2
ECHO EST RA PRESSURE: 5 MMHG
ECHO LA AREA 2C: 9 CM2
ECHO LA AREA 4C: 9.1 CM2
ECHO LA DIAMETER INDEX: 1.54 CM/M2
ECHO LA DIAMETER: 2.6 CM
ECHO LA MAJOR AXIS: 3.8 CM
ECHO LA MINOR AXIS: 3.8 CM
ECHO LA VOL BP: 16 ML (ref 22–52)
ECHO LA VOL MOD A2C: 15 ML (ref 22–52)
ECHO LA VOL MOD A4C: 18 ML (ref 22–52)
ECHO LA VOL/BSA BIPLANE: 9 ML/M2 (ref 16–34)
ECHO LA VOLUME INDEX MOD A2C: 9 ML/M2 (ref 16–34)
ECHO LA VOLUME INDEX MOD A4C: 11 ML/M2 (ref 16–34)
ECHO LV E' LATERAL VELOCITY: 5.4 CM/S
ECHO LV E' SEPTAL VELOCITY: 4.8 CM/S
ECHO LV EJECTION FRACTION BIPLANE: 61 % (ref 55–100)
ECHO LV FRACTIONAL SHORTENING: 32 % (ref 28–44)
ECHO LV INTERNAL DIMENSION DIASTOLE INDEX: 2.19 CM/M2
ECHO LV INTERNAL DIMENSION DIASTOLIC: 3.7 CM (ref 3.9–5.3)
ECHO LV INTERNAL DIMENSION SYSTOLIC INDEX: 1.48 CM/M2
ECHO LV INTERNAL DIMENSION SYSTOLIC: 2.5 CM
ECHO LV ISOVOLUMETRIC RELAXATION TIME (IVRT): 81 MS
ECHO LV IVSD: 0.9 CM (ref 0.6–0.9)
ECHO LV MASS 2D: 96.9 G (ref 67–162)
ECHO LV MASS INDEX 2D: 57.3 G/M2 (ref 43–95)
ECHO LV POSTERIOR WALL DIASTOLIC: 0.9 CM (ref 0.6–0.9)
ECHO LV RELATIVE WALL THICKNESS RATIO: 0.49
ECHO LVOT AREA: 2.8 CM2
ECHO LVOT AV VTI INDEX: 0.42
ECHO LVOT DIAM: 1.9 CM
ECHO LVOT MEAN GRADIENT: 1 MMHG
ECHO LVOT PEAK GRADIENT: 2 MMHG
ECHO LVOT PEAK VELOCITY: 0.7 M/S
ECHO LVOT STROKE VOLUME INDEX: 36.1 ML/M2
ECHO LVOT SV: 60.9 ML
ECHO LVOT VTI: 21.5 CM
ECHO MV A VELOCITY: 1.46 M/S
ECHO MV E DECELERATION TIME (DT): 165 MS
ECHO MV E VELOCITY: 0.91 M/S
ECHO MV E/A RATIO: 0.62
ECHO MV E/E' LATERAL: 16.85
ECHO MV E/E' RATIO (AVERAGED): 17.91
ECHO MV E/E' SEPTAL: 18.96
ECHO MV REGURGITANT PEAK GRADIENT: 85 MMHG
ECHO MV REGURGITANT PEAK VELOCITY: 4.6 M/S
ECHO PULMONARY ARTERY END DIASTOLIC PRESSURE: 5 MMHG
ECHO PV MAX VELOCITY: 0.8 M/S
ECHO PV PEAK GRADIENT: 2 MMHG
ECHO PV REGURGITANT MAX VELOCITY: 1.1 M/S
ECHO RIGHT VENTRICULAR SYSTOLIC PRESSURE (RVSP): 30 MMHG
ECHO RV INTERNAL DIMENSION: 3 CM
ECHO RV TAPSE: 2 CM (ref 1.7–?)
ECHO TV E WAVE: 0.5 M/S
ECHO TV REGURGITANT MAX VELOCITY: 2.49 M/S
ECHO TV REGURGITANT PEAK GRADIENT: 25 MMHG
NUC STRESS EJECTION FRACTION: 85 %
STRESS BASELINE DIAS BP: 103 MMHG
STRESS BASELINE HR: 83 BPM
STRESS BASELINE SYS BP: 142 MMHG
STRESS ESTIMATED WORKLOAD: 1 METS
STRESS PEAK DIAS BP: 69 MMHG
STRESS PEAK SYS BP: 146 MMHG
STRESS PERCENT HR ACHIEVED: 71 %
STRESS POST PEAK HR: 98 BPM
STRESS RATE PRESSURE PRODUCT: NORMAL BPM*MMHG
STRESS STAGE 1 BP: NORMAL MMHG
STRESS STAGE 1 DURATION: 1 MIN:SEC
STRESS STAGE 1 HR: 85 BPM
STRESS STAGE 2 BP: NORMAL MMHG
STRESS STAGE 2 DURATION: 1 MIN:SEC
STRESS STAGE 2 HR: 96 BPM
STRESS STAGE 3 BP: NORMAL MMHG
STRESS STAGE 3 DURATION: 1 MIN:SEC
STRESS STAGE 3 HR: 95 BPM
STRESS STAGE RECOVERY 1 BP: NORMAL MMHG
STRESS STAGE RECOVERY 1 DURATION: 1 MIN:SEC
STRESS STAGE RECOVERY 1 HR: 93 BPM
STRESS STAGE RECOVERY 2 BP: NORMAL MMHG
STRESS STAGE RECOVERY 2 DURATION: 1 MIN:SEC
STRESS STAGE RECOVERY 2 HR: 96 BPM
STRESS STAGE RECOVERY 3 BP: NORMAL MMHG
STRESS STAGE RECOVERY 3 DURATION: 1 MIN:SEC
STRESS STAGE RECOVERY 3 HR: 94 BPM
STRESS STAGE RECOVERY 4 BP: NORMAL MMHG
STRESS STAGE RECOVERY 4 DURATION: 1 MIN:SEC
STRESS STAGE RECOVERY 4 HR: 90 BPM
STRESS TARGET HR: 138 BPM
TID: 1.43

## 2024-10-30 PROCEDURE — 3430000000 HC RX DIAGNOSTIC RADIOPHARMACEUTICAL: Performed by: NUCLEAR MEDICINE

## 2024-10-30 PROCEDURE — 78452 HT MUSCLE IMAGE SPECT MULT: CPT

## 2024-10-30 PROCEDURE — 6360000002 HC RX W HCPCS: Performed by: NUCLEAR MEDICINE

## 2024-10-30 PROCEDURE — 93017 CV STRESS TEST TRACING ONLY: CPT

## 2024-10-30 PROCEDURE — 93306 TTE W/DOPPLER COMPLETE: CPT

## 2024-10-30 PROCEDURE — A9500 TC99M SESTAMIBI: HCPCS | Performed by: NUCLEAR MEDICINE

## 2024-10-30 RX ORDER — TETRAKIS(2-METHOXYISOBUTYLISOCYANIDE)COPPER(I) TETRAFLUOROBORATE 1 MG/ML
22.7 INJECTION, POWDER, LYOPHILIZED, FOR SOLUTION INTRAVENOUS
Status: COMPLETED | OUTPATIENT
Start: 2024-10-30 | End: 2024-10-30

## 2024-10-30 RX ORDER — REGADENOSON 0.08 MG/ML
0.4 INJECTION, SOLUTION INTRAVENOUS
Status: COMPLETED | OUTPATIENT
Start: 2024-10-30 | End: 2024-10-30

## 2024-10-30 RX ORDER — TETRAKIS(2-METHOXYISOBUTYLISOCYANIDE)COPPER(I) TETRAFLUOROBORATE 1 MG/ML
7.1 INJECTION, POWDER, LYOPHILIZED, FOR SOLUTION INTRAVENOUS
Status: COMPLETED | OUTPATIENT
Start: 2024-10-30 | End: 2024-10-30

## 2024-10-30 RX ADMIN — Medication 22.7 MILLICURIE: at 13:15

## 2024-10-30 RX ADMIN — Medication 7.1 MILLICURIE: at 12:15

## 2024-10-30 RX ADMIN — REGADENOSON 0.4 MG: 0.08 INJECTION, SOLUTION INTRAVENOUS at 13:15

## 2024-11-20 ENCOUNTER — OFFICE VISIT (OUTPATIENT)
Dept: PHYSICAL MEDICINE AND REHAB | Age: 84
End: 2024-11-20

## 2024-11-20 VITALS
DIASTOLIC BLOOD PRESSURE: 70 MMHG | BODY MASS INDEX: 25.86 KG/M2 | WEIGHT: 145.94 LBS | SYSTOLIC BLOOD PRESSURE: 126 MMHG | HEIGHT: 63 IN

## 2024-11-20 DIAGNOSIS — I69.30 HISTORY OF CVA WITH RESIDUAL DEFICIT: Primary | ICD-10-CM

## 2024-11-20 DIAGNOSIS — G81.14 LEFT SPASTIC HEMIPARESIS (HCC): ICD-10-CM

## 2024-11-20 DIAGNOSIS — G81.94 LEFT HEMIPARESIS (HCC): ICD-10-CM

## 2024-11-20 NOTE — PROGRESS NOTES
Physical Medicine and Rehabilitation  Botox Note     Nanci Bar is a 84 y.o. female with PMH significant for CAD, breast cancer, hyperlipidemia, hypertension, hypothyroidism, PORFIRIO, type 2 diabetes, and CVA who presents today for repeat Botox injections. She received her first round of Botox injections on 8/14/2024 with some improvement in tone in her LUE. Patient evaluated following Botox and decision made to increase Botox to her LUE.  Today, she presents to clinic with her .  She reports that she got the flu/COVID-vaccine yesterday.  We discussed that there have been some recommendations regarding holding off on Botox injections for approximately 2 weeks after flu vaccine as both can potentially cause flulike symptoms and additionally potential for decreased efficacy of Botox injections.  However, patient accepting of these potential risks and wants to pursue injections today.    Verbal consent obtained for botulinum toxin injections after risks versus benefits discussed.  200 Units of Botox were reconstituted using preservative-free normal saline in a 100 unit to 2 mL solution.  Alcohol swabs were used to cleanse the skin before injections were performed.  Back pressure was placed on the syringe during injections to avoid any intravascular injection.  EMG guidance was not used during procedure.       Botulinum toxin was injected in the following muscles of the left upper extremity:      Left upper extremity:  Biceps                      50 units  Pronator Teres         50 units  FCR                         50 units  FDS                         25 units    Total Units Injected:175 units  Unavoidable waste 0 units     The patient tolerated the procedure well with no immediate complications.  The patient should call with concerns or questions over the coming days.      RTC in 3 months for follow up and repeat Botox injections

## 2024-12-27 ENCOUNTER — LAB (OUTPATIENT)
Dept: LAB | Age: 84
End: 2024-12-27

## 2024-12-27 DIAGNOSIS — E03.9 HYPOTHYROIDISM, UNSPECIFIED TYPE: ICD-10-CM

## 2024-12-27 LAB
T4 FREE SERPL-MCNC: 0.82 NG/DL (ref 0.93–1.68)
TSH SERPL DL<=0.005 MIU/L-ACNC: 20.67 UIU/ML (ref 0.4–4.2)

## 2024-12-30 ENCOUNTER — OFFICE VISIT (OUTPATIENT)
Dept: FAMILY MEDICINE CLINIC | Age: 84
End: 2024-12-30

## 2024-12-30 ENCOUNTER — LAB (OUTPATIENT)
Dept: LAB | Age: 84
End: 2024-12-30

## 2024-12-30 ENCOUNTER — TELEPHONE (OUTPATIENT)
Dept: FAMILY MEDICINE CLINIC | Age: 84
End: 2024-12-30

## 2024-12-30 VITALS
OXYGEN SATURATION: 97 % | HEART RATE: 86 BPM | TEMPERATURE: 96.9 F | BODY MASS INDEX: 25.56 KG/M2 | DIASTOLIC BLOOD PRESSURE: 66 MMHG | SYSTOLIC BLOOD PRESSURE: 136 MMHG | WEIGHT: 144.25 LBS

## 2024-12-30 DIAGNOSIS — E78.5 DYSLIPIDEMIA: ICD-10-CM

## 2024-12-30 DIAGNOSIS — Z79.4 TYPE 2 DIABETES MELLITUS WITH OTHER CIRCULATORY COMPLICATION, WITH LONG-TERM CURRENT USE OF INSULIN (HCC): ICD-10-CM

## 2024-12-30 DIAGNOSIS — G89.29 CHRONIC RIGHT SHOULDER PAIN: ICD-10-CM

## 2024-12-30 DIAGNOSIS — M25.511 CHRONIC RIGHT SHOULDER PAIN: ICD-10-CM

## 2024-12-30 DIAGNOSIS — E03.9 HYPOTHYROIDISM, UNSPECIFIED TYPE: ICD-10-CM

## 2024-12-30 DIAGNOSIS — N18.31 STAGE 3A CHRONIC KIDNEY DISEASE (HCC): ICD-10-CM

## 2024-12-30 DIAGNOSIS — Z79.4 TYPE 2 DIABETES MELLITUS WITH HYPERGLYCEMIA, WITH LONG-TERM CURRENT USE OF INSULIN (HCC): ICD-10-CM

## 2024-12-30 DIAGNOSIS — I10 ESSENTIAL HYPERTENSION: Primary | ICD-10-CM

## 2024-12-30 DIAGNOSIS — E11.65 TYPE 2 DIABETES MELLITUS WITH HYPERGLYCEMIA, WITH LONG-TERM CURRENT USE OF INSULIN (HCC): ICD-10-CM

## 2024-12-30 DIAGNOSIS — E11.59 TYPE 2 DIABETES MELLITUS WITH OTHER CIRCULATORY COMPLICATION, WITH LONG-TERM CURRENT USE OF INSULIN (HCC): ICD-10-CM

## 2024-12-30 LAB
ALT SERPL W/O P-5'-P-CCNC: 106 U/L (ref 11–66)
AST SERPL-CCNC: 66 U/L (ref 5–40)
BACTERIA URNS QL MICRO: ABNORMAL /HPF
BILIRUB UR QL STRIP.AUTO: NEGATIVE
CASTS #/AREA URNS LPF: ABNORMAL /LPF
CASTS 2: ABNORMAL /LPF
CHARACTER UR: CLEAR
COLOR, UA: YELLOW
CREAT SERPL-MCNC: 1 MG/DL (ref 0.4–1.2)
CRYSTALS URNS MICRO: ABNORMAL
EPITHELIAL CELLS, UA: ABNORMAL /HPF
GFR SERPL CREATININE-BSD FRML MDRD: 56 ML/MIN/1.73M2
GLUCOSE UR QL STRIP.AUTO: >= 1000 MG/DL
HGB UR QL STRIP.AUTO: NEGATIVE
KETONES UR QL STRIP.AUTO: ABNORMAL
MISCELLANEOUS 2: ABNORMAL
NITRITE UR QL STRIP: NEGATIVE
PH UR STRIP.AUTO: 7 [PH] (ref 5–9)
PROT UR STRIP.AUTO-MCNC: 30 MG/DL
RBC URINE: ABNORMAL /HPF
RENAL EPI CELLS #/AREA URNS HPF: ABNORMAL /[HPF]
SP GR UR REFRACT.AUTO: > 1.03 (ref 1–1.03)
UROBILINOGEN, URINE: 0.2 EU/DL (ref 0–1)
WBC #/AREA URNS HPF: ABNORMAL /HPF
WBC #/AREA URNS HPF: NEGATIVE /[HPF]
YEAST LIKE FUNGI URNS QL MICRO: ABNORMAL

## 2024-12-30 RX ORDER — LIDOCAINE 4 G/G
1 PATCH TOPICAL DAILY
Qty: 30 PATCH | Refills: 0 | Status: SHIPPED | OUTPATIENT
Start: 2024-12-30 | End: 2025-01-29

## 2024-12-30 RX ORDER — NITROFURANTOIN 25; 75 MG/1; MG/1
CAPSULE ORAL
COMMUNITY
Start: 2024-12-16

## 2024-12-30 SDOH — ECONOMIC STABILITY: FOOD INSECURITY: WITHIN THE PAST 12 MONTHS, THE FOOD YOU BOUGHT JUST DIDN'T LAST AND YOU DIDN'T HAVE MONEY TO GET MORE.: NEVER TRUE

## 2024-12-30 SDOH — ECONOMIC STABILITY: FOOD INSECURITY: WITHIN THE PAST 12 MONTHS, YOU WORRIED THAT YOUR FOOD WOULD RUN OUT BEFORE YOU GOT MONEY TO BUY MORE.: NEVER TRUE

## 2024-12-30 SDOH — ECONOMIC STABILITY: INCOME INSECURITY: HOW HARD IS IT FOR YOU TO PAY FOR THE VERY BASICS LIKE FOOD, HOUSING, MEDICAL CARE, AND HEATING?: NOT HARD AT ALL

## 2024-12-30 ASSESSMENT — ENCOUNTER SYMPTOMS
SHORTNESS OF BREATH: 0
NAUSEA: 0
DIARRHEA: 0
COUGH: 0
CONSTIPATION: 0
ABDOMINAL PAIN: 0
BACK PAIN: 1
WHEEZING: 0
VOMITING: 0

## 2024-12-30 NOTE — TELEPHONE ENCOUNTER
Called and spoke with Nanci. She was provided her lab results per Dr. George note. She is aware that she should have her liver tests recheck in a few weeks to make sure they're returning to normal. Nanci didn't have any questions or concerns at this time.

## 2024-12-30 NOTE — TELEPHONE ENCOUNTER
----- Message from Dr. Rut Mari, DO sent at 12/30/2024  4:23 PM EST -----  Please let patient know that her kidney function is a little better than her previous.  However, her liver tests have gone up (likely from the Tylenol use).  Try to eliminate the Tylenol use and just do the lidocaine patches.  I would recommend rechecking her liver tests in a few weeks to make sure they have returned to normal

## 2024-12-30 NOTE — PROGRESS NOTES
Nanci Bar (:  1940) is a 84 y.o. female,Established patient, here for evaluation of the following chief complaint(s):  Follow-up (Back/kidney pain that is making it hard to sleep. Patient has been \"living\" on Tylenol. Symptoms started about a week ago. )      Subjective   SUBJECTIVE/OBJECTIVE:  HPI  Blood sugars have been fluctuating, nothing above 400, no recent lows, dropped into the 50s last month (took glucose tablets)  Taking medications daily yes, compliant  Not watching diet or exercising  HbA1C 9.6 in 2024  TSH 20.670 with low T4 at 0.82  LDL 64  Urine microalbumin 2024  Statin atorvastatin  R shoulder pain has been more severe lately, taking 2 Tylenol up to every 2 hours (doesn't think she takes more than 8 per day though)    Review of Systems   Constitutional:  Positive for fatigue. Negative for activity change, appetite change and unexpected weight change.   Respiratory:  Negative for cough, shortness of breath and wheezing.    Cardiovascular:  Negative for chest pain, palpitations and leg swelling.   Gastrointestinal:  Negative for abdominal pain, constipation, diarrhea, nausea and vomiting.   Genitourinary:  Negative for dysuria and hematuria.   Musculoskeletal:  Positive for back pain (left flank pain when lying down at night (doesn't bother her during the day)). Negative for neck pain and neck stiffness.        Right shoulder pain   Neurological:  Negative for dizziness, light-headedness, numbness and headaches.        Still with loss of motion through L arm and leg from stroke   Psychiatric/Behavioral:  Positive for sleep disturbance (not sleeping well due to shoulder and back pain).           Objective   Physical Exam  Vitals reviewed.   Constitutional:       General: She is not in acute distress.     Appearance: Normal appearance. She is normal weight. She is not ill-appearing.   Neck:      Thyroid: No thyromegaly.   Cardiovascular:      Rate and Rhythm: Normal rate and

## 2024-12-30 NOTE — RESULT ENCOUNTER NOTE
Please let patient know that her kidney function is a little better than her previous.  However, her liver tests have gone up (likely from the Tylenol use).  Try to eliminate the Tylenol use and just do the lidocaine patches.  I would recommend rechecking her liver tests in a few weeks to make sure they have returned to normal

## 2025-01-05 ENCOUNTER — HOSPITAL ENCOUNTER (EMERGENCY)
Age: 85
Discharge: HOME | End: 2025-01-05
Payer: MEDICARE

## 2025-01-05 ENCOUNTER — APPOINTMENT (OUTPATIENT)
Dept: GENERAL RADIOLOGY | Age: 85
End: 2025-01-05
Payer: MEDICARE

## 2025-01-05 VITALS
DIASTOLIC BLOOD PRESSURE: 65 MMHG | HEART RATE: 92 BPM | RESPIRATION RATE: 18 BRPM | TEMPERATURE: 98.1 F | OXYGEN SATURATION: 93 % | SYSTOLIC BLOOD PRESSURE: 119 MMHG

## 2025-01-05 DIAGNOSIS — R05.1 ACUTE COUGH: Primary | ICD-10-CM

## 2025-01-05 LAB
FLUAV RNA RESP QL NAA+PROBE: NOT DETECTED
FLUBV RNA RESP QL NAA+PROBE: NOT DETECTED
SARS-COV-2 RNA RESP QL NAA+PROBE: NOT DETECTED

## 2025-01-05 PROCEDURE — 94640 AIRWAY INHALATION TREATMENT: CPT

## 2025-01-05 PROCEDURE — 87636 SARSCOV2 & INF A&B AMP PRB: CPT

## 2025-01-05 PROCEDURE — 71046 X-RAY EXAM CHEST 2 VIEWS: CPT

## 2025-01-05 PROCEDURE — 99284 EMERGENCY DEPT VISIT MOD MDM: CPT

## 2025-01-05 PROCEDURE — 94761 N-INVAS EAR/PLS OXIMETRY MLT: CPT

## 2025-01-05 PROCEDURE — 6370000000 HC RX 637 (ALT 250 FOR IP): Performed by: FAMILY MEDICINE

## 2025-01-05 RX ORDER — ALBUTEROL SULFATE 90 UG/1
2 INHALANT RESPIRATORY (INHALATION) 4 TIMES DAILY PRN
Qty: 18 G | Refills: 1 | Status: SHIPPED | OUTPATIENT
Start: 2025-01-05 | End: 2025-01-15

## 2025-01-05 RX ORDER — AZITHROMYCIN 250 MG/1
TABLET, FILM COATED ORAL
Qty: 1 PACKET | Refills: 0 | Status: SHIPPED | OUTPATIENT
Start: 2025-01-05 | End: 2025-01-09

## 2025-01-05 RX ORDER — BENZONATATE 200 MG/1
200 CAPSULE ORAL 3 TIMES DAILY PRN
Qty: 30 CAPSULE | Refills: 0 | Status: SHIPPED | OUTPATIENT
Start: 2025-01-05 | End: 2025-01-15

## 2025-01-05 RX ORDER — IPRATROPIUM BROMIDE AND ALBUTEROL SULFATE 2.5; .5 MG/3ML; MG/3ML
1 SOLUTION RESPIRATORY (INHALATION) ONCE
Status: COMPLETED | OUTPATIENT
Start: 2025-01-05 | End: 2025-01-05

## 2025-01-05 RX ADMIN — IPRATROPIUM BROMIDE AND ALBUTEROL SULFATE 1 DOSE: .5; 3 SOLUTION RESPIRATORY (INHALATION) at 06:34

## 2025-01-05 NOTE — ED PROVIDER NOTES
[facility] EMERGENCY DEPT      EMERGENCY MEDICINE     Pt Name: Nanci Bar  MRN: [id number]  Birthdate 1940  Date of evaluation: 1/5/2025  Provider: SAMAN Gillette NP    CHIEF COMPLAINT       Chief Complaint   Patient presents with    Cough     HISTORY OF PRESENT ILLNESS   Nanci Bar is a pleasant 84 y.o. female who presents to the emergency department f for cough for the last 3 days.  Patient reports he is also had a runny nose.  Patient denies fever, body aches, chills, nausea, vomiting or dysuria.  Patient also denies chest pain or shortness of breath.  PASTMEDICAL HISTORY     Past Medical History:   Diagnosis Date    MYRA (acute kidney injury) ([facility]) 12/06/2020    Anxiety 04/21/2020    Arthritis     Arthritis, degenerative, localized, primary, hand 08/11/2023    Bladder spasm     CAD (coronary artery disease) 03/12/2013    Cancer ([facility])     breast    Cerebrovascular accident (CVA) ([facility]) 08/11/2023    Hyperlipidemia     Hypertension     Hypothyroidism     Obstructive sleep apnea of adult 12/12/2013    SOB (shortness of breath)     Type II or unspecified type diabetes mellitus without mention of complication, not stated as uncontrolled     Urinary incontinence due to urethral sphincter incompetence 11/30/2022       Patient Active Problem List   Diagnosis Code    Hypothyroidism E03.9    Bladder spasm N32.89    Diastolic dysfunction I51.89    Pulmonary hypertension ([facility]) I27.20    Type 2 diabetes mellitus with hyperglycemia, with long-term current use of insulin ([facility]) E11.65, Z79.4    AMBROCIO (dyspnea on exertion) R06.09    Dyslipidemia, goal LDL below 100 E78.5    Moderate tricuspid regurgitation I07.1    Fatigue R53.83    Deviated septum J34.2    PORFIRIO on CPAP G47.33    Essential hypertension I10    Coronary artery disease involving native coronary artery of native heart without angina pectoris I25.10    COVID-19 U07.1    Anxiety disorder F41.9    Generalized weakness R53.1    Hypothyroid myopathy

## 2025-01-05 NOTE — ED NOTES
Patient updated on POC. Family at bedside. Patient resting in bed. Respirations easy and unlabored. No distress noted. Side rails up 2. Call light within reach.

## 2025-01-05 NOTE — DISCHARGE INSTRUCTIONS
May continue over-the-counter cough medication as needed.  Take antibiotic as prescribed.  Albuterol inhaler 1 to 2 puffs every 4 hours as needed for cough.  Also prescribed Tessalon Perles if needed for cough.  Follow-up with primary care in 3 to 5 days for reevaluation.

## 2025-02-10 ENCOUNTER — LAB (OUTPATIENT)
Dept: LAB | Age: 85
End: 2025-02-10

## 2025-02-10 DIAGNOSIS — E03.9 HYPOTHYROIDISM, UNSPECIFIED TYPE: ICD-10-CM

## 2025-02-10 LAB
T4 FREE SERPL-MCNC: 0.9 NG/DL (ref 0.92–1.68)
TSH SERPL DL<=0.005 MIU/L-ACNC: 13.76 UIU/ML (ref 0.4–4.2)

## 2025-02-11 DIAGNOSIS — E03.9 HYPOTHYROIDISM, UNSPECIFIED TYPE: ICD-10-CM

## 2025-02-11 RX ORDER — LEVOTHYROXINE SODIUM 175 UG/1
175 TABLET ORAL EVERY OTHER DAY
Qty: 45 TABLET | Refills: 3 | Status: SHIPPED | OUTPATIENT
Start: 2025-02-11

## 2025-02-11 RX ORDER — LEVOTHYROXINE SODIUM 200 UG/1
200 TABLET ORAL EVERY OTHER DAY
Qty: 45 TABLET | Refills: 5 | Status: SHIPPED | OUTPATIENT
Start: 2025-02-11

## 2025-02-15 ENCOUNTER — HOSPITAL ENCOUNTER (EMERGENCY)
Age: 85
Discharge: HOME | End: 2025-02-15
Payer: MEDICARE

## 2025-02-15 ENCOUNTER — APPOINTMENT (OUTPATIENT)
Dept: CT IMAGING | Age: 85
End: 2025-02-15
Payer: MEDICARE

## 2025-02-15 VITALS
RESPIRATION RATE: 22 BRPM | DIASTOLIC BLOOD PRESSURE: 87 MMHG | TEMPERATURE: 98.1 F | HEART RATE: 101 BPM | SYSTOLIC BLOOD PRESSURE: 202 MMHG | OXYGEN SATURATION: 95 %

## 2025-02-15 DIAGNOSIS — R03.0 ELEVATED BLOOD PRESSURE READING: ICD-10-CM

## 2025-02-15 DIAGNOSIS — G50.0 TRIGEMINAL NEURALGIA OF RIGHT SIDE OF FACE: Primary | ICD-10-CM

## 2025-02-15 LAB
ANION GAP SERPL CALC-SCNC: 16 MEQ/L (ref 8–16)
BASOPHILS ABSOLUTE: 0.1 THOU/MM3 (ref 0–0.1)
BASOPHILS NFR BLD AUTO: 0.8 %
BUN SERPL-MCNC: 16 MG/DL (ref 7–22)
CALCIUM SERPL-MCNC: 9.4 MG/DL (ref 8.5–10.5)
CHLORIDE SERPL-SCNC: 97 MEQ/L (ref 98–111)
CO2 SERPL-SCNC: 21 MEQ/L (ref 23–33)
CREAT SERPL-MCNC: 0.8 MG/DL (ref 0.4–1.2)
DEPRECATED RDW RBC AUTO: 44.8 FL (ref 35–45)
EOSINOPHIL NFR BLD AUTO: 0.8 %
EOSINOPHILS ABSOLUTE: 0.1 THOU/MM3 (ref 0–0.4)
ERYTHROCYTE [DISTWIDTH] IN BLOOD BY AUTOMATED COUNT: 13.9 % (ref 11.5–14.5)
GFR SERPL CREATININE-BSD FRML MDRD: 73 ML/MIN/1.73M2
GLUCOSE SERPL-MCNC: 217 MG/DL (ref 70–108)
HCT VFR BLD AUTO: 39.2 % (ref 37–47)
HGB BLD-MCNC: 12.5 GM/DL (ref 12–16)
IMM GRANULOCYTES # BLD AUTO: 0.04 THOU/MM3 (ref 0–0.07)
IMM GRANULOCYTES NFR BLD AUTO: 0.3 %
LYMPHOCYTES ABSOLUTE: 3.5 THOU/MM3 (ref 1–4.8)
LYMPHOCYTES NFR BLD AUTO: 29.8 %
MCH RBC QN AUTO: 27.7 PG (ref 26–33)
MCHC RBC AUTO-ENTMCNC: 31.9 GM/DL (ref 32.2–35.5)
MCV RBC AUTO: 86.9 FL (ref 81–99)
MONOCYTES ABSOLUTE: 0.6 THOU/MM3 (ref 0.4–1.3)
MONOCYTES NFR BLD AUTO: 4.9 %
NEUTROPHILS ABSOLUTE: 7.5 THOU/MM3 (ref 1.8–7.7)
NEUTROPHILS NFR BLD AUTO: 63.4 %
NRBC BLD AUTO-RTO: 0 /100 WBC
OSMOLALITY SERPL CALC.SUM OF ELEC: 276 MOSMOL/KG (ref 275–300)
PLATELET # BLD AUTO: 232 THOU/MM3 (ref 130–400)
PMV BLD AUTO: 12.2 FL (ref 9.4–12.4)
POTASSIUM SERPL-SCNC: 4.2 MEQ/L (ref 3.5–5.2)
RBC # BLD AUTO: 4.51 MILL/MM3 (ref 4.2–5.4)
SODIUM SERPL-SCNC: 134 MEQ/L (ref 135–145)
WBC # BLD AUTO: 11.9 THOU/MM3 (ref 4.8–10.8)

## 2025-02-15 PROCEDURE — 80048 BASIC METABOLIC PNL TOTAL CA: CPT

## 2025-02-15 PROCEDURE — 96374 THER/PROPH/DIAG INJ IV PUSH: CPT

## 2025-02-15 PROCEDURE — 99284 EMERGENCY DEPT VISIT MOD MDM: CPT

## 2025-02-15 PROCEDURE — 70450 CT HEAD/BRAIN W/O DYE: CPT

## 2025-02-15 PROCEDURE — 96375 TX/PRO/DX INJ NEW DRUG ADDON: CPT

## 2025-02-15 PROCEDURE — 85025 COMPLETE CBC W/AUTO DIFF WBC: CPT

## 2025-02-15 PROCEDURE — 70486 CT MAXILLOFACIAL W/O DYE: CPT

## 2025-02-15 PROCEDURE — 6360000002 HC RX W HCPCS: Performed by: PHYSICIAN ASSISTANT

## 2025-02-15 PROCEDURE — 36415 COLL VENOUS BLD VENIPUNCTURE: CPT

## 2025-02-15 PROCEDURE — 6370000000 HC RX 637 (ALT 250 FOR IP): Performed by: PHYSICIAN ASSISTANT

## 2025-02-15 RX ORDER — ONDANSETRON 2 MG/ML
4 INJECTION INTRAMUSCULAR; INTRAVENOUS ONCE
Status: COMPLETED | OUTPATIENT
Start: 2025-02-15 | End: 2025-02-15

## 2025-02-15 RX ORDER — MORPHINE SULFATE 4 MG/ML
4 INJECTION, SOLUTION INTRAMUSCULAR; INTRAVENOUS
Status: COMPLETED | OUTPATIENT
Start: 2025-02-15 | End: 2025-02-15

## 2025-02-15 RX ORDER — FENTANYL CITRATE 50 UG/ML
37.5 INJECTION, SOLUTION INTRAMUSCULAR; INTRAVENOUS ONCE
Status: COMPLETED | OUTPATIENT
Start: 2025-02-15 | End: 2025-02-15

## 2025-02-15 RX ORDER — CARBAMAZEPINE 200 MG/1
200 TABLET ORAL 2 TIMES DAILY
Qty: 28 TABLET | Refills: 0 | Status: ON HOLD | OUTPATIENT
Start: 2025-02-15 | End: 2025-02-21

## 2025-02-15 RX ORDER — DOCUSATE SODIUM 100 MG/1
100 CAPSULE, LIQUID FILLED ORAL 2 TIMES DAILY
Qty: 10 CAPSULE | Refills: 0 | Status: ON HOLD | OUTPATIENT
Start: 2025-02-15 | End: 2025-02-22 | Stop reason: HOSPADM

## 2025-02-15 RX ORDER — HYDROCODONE BITARTRATE AND ACETAMINOPHEN 5; 325 MG/1; MG/1
1 TABLET ORAL EVERY 6 HOURS PRN
Qty: 12 TABLET | Refills: 0 | Status: SHIPPED | OUTPATIENT
Start: 2025-02-15 | End: 2025-02-18

## 2025-02-15 RX ORDER — ACETAMINOPHEN 500 MG
1000 TABLET ORAL
Status: COMPLETED | OUTPATIENT
Start: 2025-02-15 | End: 2025-02-15

## 2025-02-15 RX ADMIN — MORPHINE SULFATE 4 MG: 4 INJECTION, SOLUTION INTRAMUSCULAR; INTRAVENOUS at 10:40

## 2025-02-15 RX ADMIN — ACETAMINOPHEN 1000 MG: 500 TABLET ORAL at 10:46

## 2025-02-15 RX ADMIN — ONDANSETRON 4 MG: 2 INJECTION, SOLUTION INTRAMUSCULAR; INTRAVENOUS at 10:39

## 2025-02-15 RX ADMIN — FENTANYL CITRATE 37.5 MCG: 50 INJECTION, SOLUTION INTRAMUSCULAR; INTRAVENOUS at 11:33

## 2025-02-15 ASSESSMENT — PAIN SCALES - GENERAL
PAINLEVEL_OUTOF10: 10

## 2025-02-15 ASSESSMENT — PAIN DESCRIPTION - LOCATION: LOCATION: FACE

## 2025-02-15 NOTE — ED PROVIDER NOTES
[facility] EMERGENCY DEPARTMENT      EMERGENCY MEDICINE     Pt Name: Nanci Bar  MRN: [id number]  Birthdate 1940  Date of evaluation: 2/15/2025  Provider: CONSTANZA Villanueva    CHIEF COMPLAINT       Chief Complaint   Patient presents with    Facial Pain     HISTORY OF PRESENT ILLNESS   Nanci Bar is a pleasant 84 y.o. female who presents to the emergency department from from home, by private vehicle for evaluation of right sided facial pain.  Patient reports the pain started this morning and woke her up from her sleep.  The pain is located on her right cheek, inner ear, and chin.  Denies any forehead pain.  Denies any pain on the left side of her face.  Patient denies ever experiencing anything like this before.  Patient has had dentures for 3 years.  Patient reports it hurts to swallow and talk.  States she was unable to put in her dentures or right hearing aid due to pain.  Patient has not taken any medications prior to arrival.  Denies any fever, abdominal pain, chest pain, or shortness of breath.  History of stroke.  In 2023.    PASTMEDICAL HISTORY     Past Medical History:   Diagnosis Date    MYRA (acute kidney injury) ([facility]) 12/06/2020    Anxiety 04/21/2020    Arthritis     Arthritis, degenerative, localized, primary, hand 08/11/2023    Bladder spasm     CAD (coronary artery disease) 03/12/2013    Cancer ([facility])     breast    Cerebrovascular accident (CVA) ([facility]) 08/11/2023    Hyperlipidemia     Hypertension     Hypothyroidism     Obstructive sleep apnea of adult 12/12/2013    SOB (shortness of breath)     Type II or unspecified type diabetes mellitus without mention of complication, not stated as uncontrolled     Urinary incontinence due to urethral sphincter incompetence 11/30/2022       Patient Active Problem List   Diagnosis Code    Hypothyroidism E03.9    Bladder spasm N32.89    Diastolic dysfunction I51.89    Pulmonary hypertension ([facility]) I27.20    Type 2 diabetes mellitus with  to):  Patient neuralgia, shingles, [place]        Diagnoses Considered but I have low suspicion of:   Acute MI             Pertinent Comorbid Conditions:    history of CVA and breast cancer    2)  Data Reviewed (none if left blank)          My Independent interpretations:     EKG:      None    Imaging: Independent or potation of CT head and facial bones by me show no evidence of acute pathology.    Labs:      None                 Decision Rules/Clinical Scores utilized: None            External Documentation Reviewed:         Previous patient encounter documents & history available on EMR was reviewed               See Formal Diagnostic Results above for the lab and radiology tests and orders.    3)  Treatment and Disposition         ED Reassessment: Patient reassessed, blood pressure down to reasonable level after pain medications.  172/70.  Patient improved.         Case discussed with consulting clinician: None         Shared Decision-Making was performed and disposition discussed with the        Patient/Family and questions answered           Social determinants of health impacting treatment or disposition: Elevated blood pressure         Code Status: Full    Patient's  also in the room      Summary of Patient Presentation:      MDM  Number of Diagnoses or Management Options  Elevated blood pressure reading  Trigeminal neuralgia of right side of face  Diagnosis management comments: Patient was hypertensive.  I suspect this was due to pain blood pressure had improved somewhat at discharge at 172/70.  However patient was told to follow-up with her regular physician.  Short course of Norco was given as I believe this is likely due to trigeminal neuralgia.  Discussed the possibility of early shingles or early Bell's palsy as well.  Patient had no focal neurologic deficits.  Patient alert and oriented at discharge, improved, patient agrees with this treatment plan.  Also started the patient on

## 2025-02-15 NOTE — ED TRIAGE NOTES
Patient presents with right sided facial pain. States she woke up with it this morning. Denies new dental pain or any issues with her teeth. States her pain radiates to the ear and down into her face.

## 2025-02-15 NOTE — DISCHARGE INSTRUCTIONS
You likely have irritation of your trigeminal nerve.    Your blood pressure is likely elevated due to the pain.  Follow-up with your regular physician regarding this as well.    Do not drive or operate machinery while taking the pain medication.  This can cause sedation and constipation.  Take a stool softener when you take the medication.    Start the carbamazepine as directed.    Go to your regular doctor on Monday for a recheck appointment.  Call at 8 AM.    Discharge warning    Please remember that examination and testing performed in the emergency department is not a comprehensive evaluation of all medical conditions and does not replace the need to follow up with your primary care provider.  In the emergency department, we are only able to evaluate your symptoms in the current condition, but symptoms may change or worsen.  Although you are felt safe to be discharged today, if your symptoms persist or change, you need to be re-evaluated by your regular/primary care doctor as soon as possible.  If you are unable to make appointment with your regular doctor, please come back to the ER to be re-evaluated.

## 2025-02-18 ENCOUNTER — OFFICE VISIT (OUTPATIENT)
Dept: FAMILY MEDICINE CLINIC | Age: 85
End: 2025-02-18
Payer: MEDICARE

## 2025-02-18 VITALS
DIASTOLIC BLOOD PRESSURE: 52 MMHG | WEIGHT: 141 LBS | OXYGEN SATURATION: 96 % | SYSTOLIC BLOOD PRESSURE: 140 MMHG | BODY MASS INDEX: 24.98 KG/M2 | HEART RATE: 69 BPM | TEMPERATURE: 97.1 F

## 2025-02-18 DIAGNOSIS — Z79.4 TYPE 2 DIABETES MELLITUS WITH OTHER CIRCULATORY COMPLICATION, WITH LONG-TERM CURRENT USE OF INSULIN (HCC): ICD-10-CM

## 2025-02-18 DIAGNOSIS — G81.94 LEFT HEMIPARESIS (HCC): ICD-10-CM

## 2025-02-18 DIAGNOSIS — E03.9 HYPOTHYROIDISM, UNSPECIFIED TYPE: ICD-10-CM

## 2025-02-18 DIAGNOSIS — E11.59 TYPE 2 DIABETES MELLITUS WITH OTHER CIRCULATORY COMPLICATION, WITH LONG-TERM CURRENT USE OF INSULIN (HCC): ICD-10-CM

## 2025-02-18 DIAGNOSIS — G50.0 TRIGEMINAL NEURALGIA OF RIGHT SIDE OF FACE: Primary | ICD-10-CM

## 2025-02-18 PROBLEM — E11.22 TYPE 2 DIABETES MELLITUS WITH STAGE 3A CHRONIC KIDNEY DISEASE, WITH LONG-TERM CURRENT USE OF INSULIN (HCC): Status: ACTIVE | Noted: 2024-05-16

## 2025-02-18 PROBLEM — N18.31 TYPE 2 DIABETES MELLITUS WITH STAGE 3A CHRONIC KIDNEY DISEASE, WITH LONG-TERM CURRENT USE OF INSULIN (HCC): Status: ACTIVE | Noted: 2024-05-16

## 2025-02-18 PROCEDURE — 1124F ACP DISCUSS-NO DSCNMKR DOCD: CPT | Performed by: FAMILY MEDICINE

## 2025-02-18 PROCEDURE — G8400 PT W/DXA NO RESULTS DOC: HCPCS | Performed by: FAMILY MEDICINE

## 2025-02-18 PROCEDURE — 3046F HEMOGLOBIN A1C LEVEL >9.0%: CPT | Performed by: FAMILY MEDICINE

## 2025-02-18 PROCEDURE — G8420 CALC BMI NORM PARAMETERS: HCPCS | Performed by: FAMILY MEDICINE

## 2025-02-18 PROCEDURE — G8427 DOCREV CUR MEDS BY ELIG CLIN: HCPCS | Performed by: FAMILY MEDICINE

## 2025-02-18 PROCEDURE — 1036F TOBACCO NON-USER: CPT | Performed by: FAMILY MEDICINE

## 2025-02-18 PROCEDURE — 3078F DIAST BP <80 MM HG: CPT | Performed by: FAMILY MEDICINE

## 2025-02-18 PROCEDURE — 1159F MED LIST DOCD IN RCRD: CPT | Performed by: FAMILY MEDICINE

## 2025-02-18 PROCEDURE — 99213 OFFICE O/P EST LOW 20 MIN: CPT | Performed by: FAMILY MEDICINE

## 2025-02-18 PROCEDURE — 1090F PRES/ABSN URINE INCON ASSESS: CPT | Performed by: FAMILY MEDICINE

## 2025-02-18 PROCEDURE — 3077F SYST BP >= 140 MM HG: CPT | Performed by: FAMILY MEDICINE

## 2025-02-18 SDOH — ECONOMIC STABILITY: FOOD INSECURITY: WITHIN THE PAST 12 MONTHS, THE FOOD YOU BOUGHT JUST DIDN'T LAST AND YOU DIDN'T HAVE MONEY TO GET MORE.: NEVER TRUE

## 2025-02-18 SDOH — ECONOMIC STABILITY: FOOD INSECURITY: WITHIN THE PAST 12 MONTHS, YOU WORRIED THAT YOUR FOOD WOULD RUN OUT BEFORE YOU GOT MONEY TO BUY MORE.: NEVER TRUE

## 2025-02-18 ASSESSMENT — ENCOUNTER SYMPTOMS
CONSTIPATION: 1
NAUSEA: 1
BACK PAIN: 1

## 2025-02-18 ASSESSMENT — PATIENT HEALTH QUESTIONNAIRE - PHQ9
SUM OF ALL RESPONSES TO PHQ9 QUESTIONS 1 & 2: 0
SUM OF ALL RESPONSES TO PHQ QUESTIONS 1-9: 0
2. FEELING DOWN, DEPRESSED OR HOPELESS: NOT AT ALL
SUM OF ALL RESPONSES TO PHQ QUESTIONS 1-9: 0
SUM OF ALL RESPONSES TO PHQ QUESTIONS 1-9: 0
1. LITTLE INTEREST OR PLEASURE IN DOING THINGS: NOT AT ALL
SUM OF ALL RESPONSES TO PHQ QUESTIONS 1-9: 0

## 2025-02-18 NOTE — PROGRESS NOTES
or frontal sinus tenderness.      Left Sinus: No maxillary sinus tenderness or frontal sinus tenderness.      Mouth/Throat:      Pharynx: No posterior oropharyngeal erythema.   Cardiovascular:      Rate and Rhythm: Normal rate and regular rhythm.      Heart sounds: No murmur heard.     No gallop.   Pulmonary:      Effort: Pulmonary effort is normal.      Breath sounds: Normal breath sounds. No wheezing, rhonchi or rales.   Musculoskeletal:      Comments: In wheelchair due to residual damage to her left side from previous stroke   Skin:     General: Skin is warm and dry.      Findings: No erythema or rash.      Comments: No pain with palpation of ears or cheeks, couldn't open her mouth to talk the other day without pain   Neurological:      Mental Status: She is alert.            Assessment & Plan   ASSESSMENT/PLAN:  1. Trigeminal neuralgia of right side of face---discussed with patient that if her symptoms have improved that she can hold the pain medications, may be causing her other symptoms  2. Type 2 diabetes mellitus with other circulatory complication, with long-term current use of insulin (HCC)---discussed with patient that she needs to take her other medications as prescribed even when given new medications, elevated sugars could also be leading to some of her symptoms  3. Hypothyroidism, unspecified type---discussed with patient that she needs to take her other medications as prescribed even when given new medications  4. Left hemiparesis (HCC), stable but higher fall risk with use of the pain medications due to weakness and fatigue      Return if symptoms worsen or fail to improve.               An electronic signature was used to authenticate this note.    --Rut Mari,

## 2025-02-19 ENCOUNTER — APPOINTMENT (OUTPATIENT)
Dept: GENERAL RADIOLOGY | Age: 85
End: 2025-02-19
Payer: MEDICARE

## 2025-02-19 ENCOUNTER — TELEPHONE (OUTPATIENT)
Dept: FAMILY MEDICINE CLINIC | Age: 85
End: 2025-02-19

## 2025-02-19 ENCOUNTER — HOSPITAL ENCOUNTER (INPATIENT)
Age: 85
LOS: 3 days | Discharge: HOME | End: 2025-02-22
Attending: STUDENT IN AN ORGANIZED HEALTH CARE EDUCATION/TRAINING PROGRAM | Admitting: STUDENT IN AN ORGANIZED HEALTH CARE EDUCATION/TRAINING PROGRAM
Payer: MEDICARE

## 2025-02-19 DIAGNOSIS — E86.0 DEHYDRATION: ICD-10-CM

## 2025-02-19 DIAGNOSIS — E78.5 DYSLIPIDEMIA: ICD-10-CM

## 2025-02-19 DIAGNOSIS — R00.1 SYMPTOMATIC BRADYCARDIA: Primary | ICD-10-CM

## 2025-02-19 DIAGNOSIS — I44.2 COMPLETE HEART BLOCK (HCC): ICD-10-CM

## 2025-02-19 DIAGNOSIS — I35.0 MODERATE AORTIC STENOSIS: ICD-10-CM

## 2025-02-19 DIAGNOSIS — R94.31 PROLONGED QT INTERVAL: ICD-10-CM

## 2025-02-19 LAB
ALBUMIN SERPL BCG-MCNC: 4 G/DL (ref 3.5–5.1)
ALP SERPL-CCNC: 101 U/L (ref 38–126)
ALT SERPL W/O P-5'-P-CCNC: 15 U/L (ref 11–66)
ANION GAP SERPL CALC-SCNC: 21 MEQ/L (ref 8–16)
APTT PPP: 23.1 SECONDS (ref 22–38)
AST SERPL-CCNC: 14 U/L (ref 5–40)
BACTERIA URNS QL MICRO: ABNORMAL /HPF
BASOPHILS ABSOLUTE: 0.1 THOU/MM3 (ref 0–0.1)
BASOPHILS NFR BLD AUTO: 0.7 %
BILIRUB CONJ SERPL-MCNC: 0.2 MG/DL (ref 0–0.2)
BILIRUB SERPL-MCNC: 0.5 MG/DL (ref 0.3–1.2)
BILIRUB UR QL STRIP.AUTO: NEGATIVE
BUN SERPL-MCNC: 30 MG/DL (ref 7–22)
CALCIUM SERPL-MCNC: 9.8 MG/DL (ref 8.2–9.6)
CARBAMAZEPINE SERPL-MCNC: 5.6 MCG/ML (ref 2–10)
CASTS #/AREA URNS LPF: ABNORMAL /LPF
CASTS 2: ABNORMAL /LPF
CHARACTER UR: ABNORMAL
CHLORIDE SERPL-SCNC: 97 MEQ/L (ref 98–111)
CO2 SERPL-SCNC: 21 MEQ/L (ref 23–33)
COLOR, UA: YELLOW
CREAT SERPL-MCNC: 1.2 MG/DL (ref 0.4–1.2)
CRYSTALS URNS MICRO: ABNORMAL
DEPRECATED RDW RBC AUTO: 44 FL (ref 35–45)
EKG ATRIAL RATE: 100 BPM
EKG ATRIAL RATE: 44 BPM
EKG P AXIS: 60 DEGREES
EKG P AXIS: 62 DEGREES
EKG P-R INTERVAL: 192 MS
EKG Q-T INTERVAL: 550 MS
EKG Q-T INTERVAL: 648 MS
EKG QRS DURATION: 122 MS
EKG QRS DURATION: 124 MS
EKG QTC CALCULATION (BAZETT): 554 MS
EKG QTC CALCULATION (BAZETT): 584 MS
EKG R AXIS: -60 DEGREES
EKG R AXIS: -72 DEGREES
EKG T AXIS: 36 DEGREES
EKG T AXIS: 64 DEGREES
EKG VENTRICULAR RATE: 44 BPM
EKG VENTRICULAR RATE: 68 BPM
EOSINOPHIL NFR BLD AUTO: 0.2 %
EOSINOPHILS ABSOLUTE: 0 THOU/MM3 (ref 0–0.4)
EPITHELIAL CELLS, UA: ABNORMAL /HPF
ERYTHROCYTE [DISTWIDTH] IN BLOOD BY AUTOMATED COUNT: 14 % (ref 11.5–14.5)
FLUAV RNA RESP QL NAA+PROBE: NOT DETECTED
FLUBV RNA RESP QL NAA+PROBE: NOT DETECTED
GFR SERPL CREATININE-BSD FRML MDRD: 45 ML/MIN/1.73M2
GLUCOSE BLD STRIP.AUTO-MCNC: 214 MG/DL (ref 70–108)
GLUCOSE SERPL-MCNC: 260 MG/DL (ref 70–108)
GLUCOSE UR QL STRIP.AUTO: >= 1000 MG/DL
HCT VFR BLD AUTO: 39.8 % (ref 37–47)
HGB BLD-MCNC: 13 GM/DL (ref 12–16)
HGB UR QL STRIP.AUTO: ABNORMAL
IMM GRANULOCYTES # BLD AUTO: 0.04 THOU/MM3 (ref 0–0.07)
IMM GRANULOCYTES NFR BLD AUTO: 0.3 %
INR PPP: 0.89 (ref 0.85–1.13)
KETONES UR QL STRIP.AUTO: 15
LACTIC ACID, SEPSIS: 1.9 MMOL/L (ref 0.5–1.9)
LACTIC ACID, SEPSIS: 2.1 MMOL/L (ref 0.5–1.9)
LIPASE SERPL-CCNC: 10.1 U/L (ref 5.6–51.3)
LYMPHOCYTES ABSOLUTE: 2.1 THOU/MM3 (ref 1–4.8)
LYMPHOCYTES NFR BLD AUTO: 18.4 %
MAGNESIUM SERPL-MCNC: 2.2 MG/DL (ref 1.6–2.4)
MCH RBC QN AUTO: 28.1 PG (ref 26–33)
MCHC RBC AUTO-ENTMCNC: 32.7 GM/DL (ref 32.2–35.5)
MCV RBC AUTO: 86.1 FL (ref 81–99)
MISCELLANEOUS 2: ABNORMAL
MONOCYTES ABSOLUTE: 0.3 THOU/MM3 (ref 0.4–1.3)
MONOCYTES NFR BLD AUTO: 3 %
NEUTROPHILS ABSOLUTE: 8.8 THOU/MM3 (ref 1.8–7.7)
NEUTROPHILS NFR BLD AUTO: 77.4 %
NITRITE UR QL STRIP: NEGATIVE
NRBC BLD AUTO-RTO: 0 /100 WBC
OSMOLALITY SERPL CALC.SUM OF ELEC: 292.7 MOSMOL/KG (ref 275–300)
PH UR STRIP.AUTO: 5 [PH] (ref 5–9)
PLATELET # BLD AUTO: 264 THOU/MM3 (ref 130–400)
PMV BLD AUTO: 12.3 FL (ref 9.4–12.4)
POTASSIUM SERPL-SCNC: 4.3 MEQ/L (ref 3.5–5.2)
PROCALCITONIN SERPL IA-MCNC: 0.34 NG/ML (ref 0.01–0.09)
PROT SERPL-MCNC: 7.3 G/DL (ref 6.1–8)
PROT UR STRIP.AUTO-MCNC: 100 MG/DL
RBC # BLD AUTO: 4.62 MILL/MM3 (ref 4.2–5.4)
RBC URINE: ABNORMAL /HPF
RENAL EPI CELLS #/AREA URNS HPF: ABNORMAL /[HPF]
SARS-COV-2 RNA RESP QL NAA+PROBE: NOT DETECTED
SODIUM SERPL-SCNC: 139 MEQ/L (ref 135–145)
SP GR UR REFRACT.AUTO: > 1.03 (ref 1–1.03)
UROBILINOGEN, URINE: 0.2 EU/DL (ref 0–1)
WBC # BLD AUTO: 11.4 THOU/MM3 (ref 4.8–10.8)
WBC #/AREA URNS HPF: > 200 /HPF
WBC #/AREA URNS HPF: ABNORMAL /[HPF]
YEAST LIKE FUNGI URNS QL MICRO: ABNORMAL

## 2025-02-19 PROCEDURE — 99291 CRITICAL CARE FIRST HOUR: CPT | Performed by: STUDENT IN AN ORGANIZED HEALTH CARE EDUCATION/TRAINING PROGRAM

## 2025-02-19 PROCEDURE — 93005 ELECTROCARDIOGRAM TRACING: CPT | Performed by: EMERGENCY MEDICINE

## 2025-02-19 PROCEDURE — 87641 MR-STAPH DNA AMP PROBE: CPT

## 2025-02-19 PROCEDURE — 96367 TX/PROPH/DG ADDL SEQ IV INF: CPT

## 2025-02-19 PROCEDURE — 71045 X-RAY EXAM CHEST 1 VIEW: CPT

## 2025-02-19 PROCEDURE — 85730 THROMBOPLASTIN TIME PARTIAL: CPT

## 2025-02-19 PROCEDURE — 81001 URINALYSIS AUTO W/SCOPE: CPT

## 2025-02-19 PROCEDURE — 82248 BILIRUBIN DIRECT: CPT

## 2025-02-19 PROCEDURE — 87636 SARSCOV2 & INF A&B AMP PRB: CPT

## 2025-02-19 PROCEDURE — 93010 ELECTROCARDIOGRAM REPORT: CPT | Performed by: INTERNAL MEDICINE

## 2025-02-19 PROCEDURE — 6360000002 HC RX W HCPCS: Performed by: STUDENT IN AN ORGANIZED HEALTH CARE EDUCATION/TRAINING PROGRAM

## 2025-02-19 PROCEDURE — 36415 COLL VENOUS BLD VENIPUNCTURE: CPT

## 2025-02-19 PROCEDURE — 96361 HYDRATE IV INFUSION ADD-ON: CPT

## 2025-02-19 PROCEDURE — 93005 ELECTROCARDIOGRAM TRACING: CPT | Performed by: STUDENT IN AN ORGANIZED HEALTH CARE EDUCATION/TRAINING PROGRAM

## 2025-02-19 PROCEDURE — 85610 PROTHROMBIN TIME: CPT

## 2025-02-19 PROCEDURE — 84145 PROCALCITONIN (PCT): CPT

## 2025-02-19 PROCEDURE — 2580000003 HC RX 258: Performed by: STUDENT IN AN ORGANIZED HEALTH CARE EDUCATION/TRAINING PROGRAM

## 2025-02-19 PROCEDURE — 96375 TX/PRO/DX INJ NEW DRUG ADDON: CPT

## 2025-02-19 PROCEDURE — 2500000003 HC RX 250 WO HCPCS: Performed by: STUDENT IN AN ORGANIZED HEALTH CARE EDUCATION/TRAINING PROGRAM

## 2025-02-19 PROCEDURE — 96365 THER/PROPH/DIAG IV INF INIT: CPT

## 2025-02-19 PROCEDURE — 83690 ASSAY OF LIPASE: CPT

## 2025-02-19 PROCEDURE — 83605 ASSAY OF LACTIC ACID: CPT

## 2025-02-19 PROCEDURE — 80053 COMPREHEN METABOLIC PANEL: CPT

## 2025-02-19 PROCEDURE — 82948 REAGENT STRIP/BLOOD GLUCOSE: CPT

## 2025-02-19 PROCEDURE — 85025 COMPLETE CBC W/AUTO DIFF WBC: CPT

## 2025-02-19 PROCEDURE — 6360000002 HC RX W HCPCS

## 2025-02-19 PROCEDURE — 99285 EMERGENCY DEPT VISIT HI MDM: CPT

## 2025-02-19 PROCEDURE — 80156 ASSAY CARBAMAZEPINE TOTAL: CPT

## 2025-02-19 PROCEDURE — 6370000000 HC RX 637 (ALT 250 FOR IP): Performed by: STUDENT IN AN ORGANIZED HEALTH CARE EDUCATION/TRAINING PROGRAM

## 2025-02-19 PROCEDURE — 87086 URINE CULTURE/COLONY COUNT: CPT

## 2025-02-19 PROCEDURE — 6360000002 HC RX W HCPCS: Performed by: EMERGENCY MEDICINE

## 2025-02-19 PROCEDURE — 2000000000 HC ICU R&B

## 2025-02-19 PROCEDURE — 2500000003 HC RX 250 WO HCPCS

## 2025-02-19 PROCEDURE — 83735 ASSAY OF MAGNESIUM: CPT

## 2025-02-19 RX ORDER — SODIUM CHLORIDE 9 MG/ML
INJECTION, SOLUTION INTRAVENOUS CONTINUOUS
Status: DISCONTINUED | OUTPATIENT
Start: 2025-02-19 | End: 2025-02-20

## 2025-02-19 RX ORDER — PROCHLORPERAZINE EDISYLATE 5 MG/ML
INJECTION INTRAMUSCULAR; INTRAVENOUS
Status: COMPLETED
Start: 2025-02-19 | End: 2025-02-19

## 2025-02-19 RX ORDER — SODIUM CHLORIDE, SODIUM LACTATE, POTASSIUM CHLORIDE, AND CALCIUM CHLORIDE .6; .31; .03; .02 G/100ML; G/100ML; G/100ML; G/100ML
1000 INJECTION, SOLUTION INTRAVENOUS ONCE
Status: COMPLETED | OUTPATIENT
Start: 2025-02-19 | End: 2025-02-19

## 2025-02-19 RX ORDER — ONDANSETRON 2 MG/ML
4 INJECTION INTRAMUSCULAR; INTRAVENOUS EVERY 6 HOURS PRN
Status: DISCONTINUED | OUTPATIENT
Start: 2025-02-19 | End: 2025-02-22 | Stop reason: HOSPADM

## 2025-02-19 RX ORDER — ENOXAPARIN SODIUM 100 MG/ML
40 INJECTION SUBCUTANEOUS EVERY 24 HOURS
Status: DISCONTINUED | OUTPATIENT
Start: 2025-02-19 | End: 2025-02-22 | Stop reason: HOSPADM

## 2025-02-19 RX ORDER — ACETAMINOPHEN 325 MG/1
650 TABLET ORAL EVERY 6 HOURS PRN
Status: DISCONTINUED | OUTPATIENT
Start: 2025-02-19 | End: 2025-02-22 | Stop reason: HOSPADM

## 2025-02-19 RX ORDER — GLUCAGON 1 MG/ML
1 KIT INJECTION PRN
Status: DISCONTINUED | OUTPATIENT
Start: 2025-02-19 | End: 2025-02-22 | Stop reason: HOSPADM

## 2025-02-19 RX ORDER — MAGNESIUM SULFATE IN WATER 40 MG/ML
2000 INJECTION, SOLUTION INTRAVENOUS ONCE
Status: COMPLETED | OUTPATIENT
Start: 2025-02-19 | End: 2025-02-19

## 2025-02-19 RX ORDER — ALBUTEROL SULFATE 90 UG/1
2 INHALANT RESPIRATORY (INHALATION) 4 TIMES DAILY PRN
Status: DISCONTINUED | OUTPATIENT
Start: 2025-02-19 | End: 2025-02-22 | Stop reason: HOSPADM

## 2025-02-19 RX ORDER — PROCHLORPERAZINE EDISYLATE 5 MG/ML
10 INJECTION INTRAMUSCULAR; INTRAVENOUS ONCE
Status: COMPLETED | OUTPATIENT
Start: 2025-02-19 | End: 2025-02-19

## 2025-02-19 RX ORDER — INSULIN GLARGINE 100 [IU]/ML
24 INJECTION, SOLUTION SUBCUTANEOUS NIGHTLY
Status: DISCONTINUED | OUTPATIENT
Start: 2025-02-20 | End: 2025-02-22 | Stop reason: HOSPADM

## 2025-02-19 RX ORDER — DOPAMINE HYDROCHLORIDE 160 MG/100ML
1-20 INJECTION, SOLUTION INTRAVENOUS CONTINUOUS
Status: DISCONTINUED | OUTPATIENT
Start: 2025-02-19 | End: 2025-02-20

## 2025-02-19 RX ORDER — SODIUM CHLORIDE 0.9 % (FLUSH) 0.9 %
5-40 SYRINGE (ML) INJECTION PRN
Status: DISCONTINUED | OUTPATIENT
Start: 2025-02-19 | End: 2025-02-22 | Stop reason: HOSPADM

## 2025-02-19 RX ORDER — ONDANSETRON 4 MG/1
4 TABLET, ORALLY DISINTEGRATING ORAL EVERY 8 HOURS PRN
Status: DISCONTINUED | OUTPATIENT
Start: 2025-02-19 | End: 2025-02-22 | Stop reason: HOSPADM

## 2025-02-19 RX ORDER — ACETAMINOPHEN 650 MG/1
650 SUPPOSITORY RECTAL EVERY 6 HOURS PRN
Status: DISCONTINUED | OUTPATIENT
Start: 2025-02-19 | End: 2025-02-22 | Stop reason: HOSPADM

## 2025-02-19 RX ORDER — MAGNESIUM SULFATE IN WATER 40 MG/ML
2000 INJECTION, SOLUTION INTRAVENOUS PRN
Status: DISCONTINUED | OUTPATIENT
Start: 2025-02-19 | End: 2025-02-22 | Stop reason: HOSPADM

## 2025-02-19 RX ORDER — POLYETHYLENE GLYCOL 3350 17 G/17G
17 POWDER, FOR SOLUTION ORAL DAILY PRN
Status: DISCONTINUED | OUTPATIENT
Start: 2025-02-19 | End: 2025-02-22 | Stop reason: HOSPADM

## 2025-02-19 RX ORDER — SODIUM CHLORIDE 9 MG/ML
INJECTION, SOLUTION INTRAVENOUS PRN
Status: DISCONTINUED | OUTPATIENT
Start: 2025-02-19 | End: 2025-02-22 | Stop reason: HOSPADM

## 2025-02-19 RX ORDER — DEXTROSE MONOHYDRATE 100 MG/ML
INJECTION, SOLUTION INTRAVENOUS CONTINUOUS PRN
Status: DISCONTINUED | OUTPATIENT
Start: 2025-02-19 | End: 2025-02-22 | Stop reason: HOSPADM

## 2025-02-19 RX ORDER — EPINEPHRINE IN SOD CHLOR,ISO 1 MG/10 ML
SYRINGE (ML) INTRAVENOUS DAILY PRN
Status: DISCONTINUED | OUTPATIENT
Start: 2025-02-19 | End: 2025-02-22 | Stop reason: HOSPADM

## 2025-02-19 RX ORDER — ATROPINE SULFATE 0.1 MG/ML
INJECTION INTRAVENOUS DAILY PRN
Status: DISCONTINUED | OUTPATIENT
Start: 2025-02-19 | End: 2025-02-22 | Stop reason: HOSPADM

## 2025-02-19 RX ORDER — SODIUM CHLORIDE 0.9 % (FLUSH) 0.9 %
5-40 SYRINGE (ML) INJECTION EVERY 12 HOURS SCHEDULED
Status: DISCONTINUED | OUTPATIENT
Start: 2025-02-19 | End: 2025-02-22 | Stop reason: HOSPADM

## 2025-02-19 RX ORDER — ATORVASTATIN CALCIUM 40 MG/1
40 TABLET, FILM COATED ORAL NIGHTLY
Status: DISCONTINUED | OUTPATIENT
Start: 2025-02-19 | End: 2025-02-22 | Stop reason: HOSPADM

## 2025-02-19 RX ORDER — LEVOTHYROXINE SODIUM 100 UG/1
200 TABLET ORAL EVERY OTHER DAY
Status: DISCONTINUED | OUTPATIENT
Start: 2025-02-21 | End: 2025-02-22 | Stop reason: HOSPADM

## 2025-02-19 RX ORDER — INSULIN LISPRO 100 [IU]/ML
0-8 INJECTION, SOLUTION INTRAVENOUS; SUBCUTANEOUS
Status: DISCONTINUED | OUTPATIENT
Start: 2025-02-19 | End: 2025-02-22

## 2025-02-19 RX ORDER — CLOPIDOGREL BISULFATE 75 MG/1
75 TABLET ORAL DAILY
Status: DISCONTINUED | OUTPATIENT
Start: 2025-02-20 | End: 2025-02-22 | Stop reason: HOSPADM

## 2025-02-19 RX ORDER — DOPAMINE HYDROCHLORIDE 160 MG/100ML
INJECTION, SOLUTION INTRAVENOUS
Status: COMPLETED
Start: 2025-02-19 | End: 2025-02-19

## 2025-02-19 RX ADMIN — Medication 2 MCG/MIN: at 17:07

## 2025-02-19 RX ADMIN — DOPAMINE HYDROCHLORIDE IN DEXTROSE 15 MCG/KG/MIN: 1.6 INJECTION, SOLUTION INTRAVENOUS at 16:55

## 2025-02-19 RX ADMIN — ATROPINE SULFATE 1 MG: 0.1 INJECTION, SOLUTION ENDOTRACHEAL; INTRAMUSCULAR; INTRAVENOUS; SUBCUTANEOUS at 16:44

## 2025-02-19 RX ADMIN — PROCHLORPERAZINE EDISYLATE 10 MG: 5 INJECTION INTRAMUSCULAR; INTRAVENOUS at 17:05

## 2025-02-19 RX ADMIN — Medication 10 MCG: at 16:47

## 2025-02-19 RX ADMIN — ENOXAPARIN SODIUM 40 MG: 100 INJECTION SUBCUTANEOUS at 21:22

## 2025-02-19 RX ADMIN — Medication 10 MCG: at 16:45

## 2025-02-19 RX ADMIN — SODIUM CHLORIDE, PRESERVATIVE FREE 10 ML: 5 INJECTION INTRAVENOUS at 21:32

## 2025-02-19 RX ADMIN — INSULIN LISPRO 2 UNITS: 100 INJECTION, SOLUTION INTRAVENOUS; SUBCUTANEOUS at 21:30

## 2025-02-19 RX ADMIN — ACETAMINOPHEN 650 MG: 325 TABLET ORAL at 21:23

## 2025-02-19 RX ADMIN — SODIUM CHLORIDE: 0.9 INJECTION, SOLUTION INTRAVENOUS at 21:22

## 2025-02-19 RX ADMIN — SODIUM CHLORIDE, POTASSIUM CHLORIDE, SODIUM LACTATE AND CALCIUM CHLORIDE 1000 ML: 600; 310; 30; 20 INJECTION, SOLUTION INTRAVENOUS at 13:47

## 2025-02-19 RX ADMIN — DOPAMINE HYDROCHLORIDE IN DEXTROSE 5 MCG/KG/MIN: 1.6 INJECTION, SOLUTION INTRAVENOUS at 23:05

## 2025-02-19 RX ADMIN — Medication 10 MCG: at 16:46

## 2025-02-19 RX ADMIN — MAGNESIUM SULFATE HEPTAHYDRATE 2000 MG: 40 INJECTION, SOLUTION INTRAVENOUS at 13:44

## 2025-02-19 RX ADMIN — Medication 20 MCG: at 17:00

## 2025-02-19 ASSESSMENT — PAIN - FUNCTIONAL ASSESSMENT: PAIN_FUNCTIONAL_ASSESSMENT: NONE - DENIES PAIN

## 2025-02-19 ASSESSMENT — PAIN DESCRIPTION - LOCATION: LOCATION: EAR;FACE

## 2025-02-19 NOTE — ED NOTES
This RN called to room, patient had bradycardic episode with hospitalist in room. When I entered room patient awake and talking with a heartrate of 29bpm. Pacer pads placed. Dr. Estrella and Dr. Brandon called to bedside.

## 2025-02-19 NOTE — ED PROVIDER NOTES
Transvenous Pacemaker Placement Note    Performed by: James Estrella MD and Jose Jain DO     Indication: symptomatic bradycardia with hypotension     Universal Protocol: Time-out was performed and the correct patient and site were verified    Consent: Obtained    Procedure: Right neck was prepped with Chlorphed and draped in a standard surgical way. Allowed to dry for 3 minutes. The medial and lateral heads of the sternocleidomastoid  muscle were identified as was the carotid pulse. The Internal jugular vein was identified using the ultrasound. Anesthesia was achieved over  the vein using 1% lidocaine. Using real-time out of plane guidance, the  introducer needle was inserted into the Internal Jugular vein under  direct ultrasound visualization. Venous blood was withdrawn. The syringe  was removed and a guidewire was advanced into the introducer needle.  The guidewire was visualized in the Internal Jugular Vein by ultrasound. A small incision was made at the skin surface with a scalpel and the dilator with A 7 Fr Cordis catheter together was pushed gently over the guidewire.  The wire was removed and the Cordis sheath was sutured in place at its maximum length. Easy blood return was noticed from the Cordis sheath. A bipolar pacing catheter protected with sterile sheath (white end facing the Cordis) was advanced into the Cordis. The catheter was advanced to approximately 15 cm whereupon the balloon was inflated. It was further advanced slowly into the right atrium and then the right ventricle to a depth of 35 cm at which point pacing was achieved at 20 mA at first. The balloon was deflated and capture achieved steadily with minimum current at 18 mAmp. The distal end of the sheath (blue end) was locked tight to prevent lead displacement.  The patient tolerated the procedure well.    Post procedure X-ray showed appropriated pacer lead position w/o complications.     Post Procedure Diagnosis: Same as

## 2025-02-19 NOTE — ED PROVIDER NOTES
[facility]  EMERGENCY DEPARTMENT ENCOUNTER      Patient Name: Nanci Bar  MRN: [id number]  YOB: 1940  Date of Evaluation: 2/19/2025  Attending Physician: Marquis Brandon MD    CHIEF COMPLAINT       Chief Complaint   Patient presents with    Fatigue    Nausea       HISTORY OF PRESENT ILLNESS    HPI    History obtained from chart review and the patient.    Nanci is a 84 y.o. old female who presents to the emergency department by Ambulance here for evaluation of fatigue nausea dizziness feeling unwell.  Symptoms been getting worse over the past few days.  She was recent seen in the emergency department for predental neuralgia and started on carbamazepine.  She last took this yesterday morning she saw her family medicine physician yesterday who advised her to stop taking this given her symptoms.  Called EMS today as she was feeling worse.  Not really eating very much last had some to eat yesterday has an elevated kidney thing down since then.    Chart reviewed, relevant history summarized in HPI above.      REVIEW OF SYSTEMS   Review of Systems  Negative unless documented in HPI    PAST MEDICAL AND SURGICAL HISTORY   Nanci  has a past medical history of MYRA (acute kidney injury) (12/06/2020), Anxiety (04/21/2020), Arthritis, Arthritis, degenerative, localized, primary, hand (08/11/2023), Bladder spasm, CAD (coronary artery disease) (03/12/2013), Cancer ([facility]), Cerebrovascular accident (CVA) ([facility]) (08/11/2023), Hyperlipidemia, Hypertension, Hypothyroidism, Obstructive sleep apnea of adult (12/12/2013), SOB (shortness of breath), Type II or unspecified type diabetes mellitus without mention of complication, not stated as uncontrolled, and 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    Nanci  has a past surgical history that includes Cholecystectomy (7/1968); Mastectomy (7/2001); Hysterectomy (11/1996); Knee arthroscopy; Breast surgery  Abnormal; Notable for the following components:    Est, Glom Filt Rate 45 (*)     All other components within normal limits   PROCALCITONIN - Abnormal; Notable for the following components:    Procalcitonin 0.34 (*)     All other components within normal limits   COVID-19 & INFLUENZA COMBO   HEPATIC FUNCTION PANEL   LACTATE, SEPSIS   LIPASE   MAGNESIUM   PROTIME-INR   APTT   OSMOLALITY   CARBAMAZEPINE LEVEL, TOTAL   URINALYSIS WITH REFLEX TO CULTURE     (Any cultures that may have been sent were not resulted at the time of this patient visit. A negative COVID-19 test should be interpreted as COVID no longer suspected unless otherwise noted in this encounter documentation note)    MEDICAL DECISION MAKING   Initial Differential: Includes but is not limited to symptomatic bradycardia, electrolyte disturbance, adverse event of carbamazepine, acute coronary syndrome    Discrepancies:  None    Summary: This is a 84 y.o. old female here for evaluation of nausea fatigue dizziness for the past few days not really eating or drinking very much.  Had a recent diagnosis of trigeminal neuralgia and has been on carbamazepine but this was not by her primary care physician.  On arrival here noted to have a prolonged QT interval as well as appearing dehydrated.  We started her on IV fluids,magnesium.     Hospitalist service was consulted for consideration of admission.    Addendum 1700  Called emergently to room.  The hospitalist team was at bedside to assess patient while they were assessing patient became more bradycardic with a heart rate in the 20s feeling unwell.  I gave 1 mg of atropine IV this had no response.  Her telemetry showed what appeared to be a complete heart block.  Therefore placed transcutaneous pacer pads and event we needed to give this.  I also mixed up a dilute epinephrine and a syringe and gave multiple aliquots of this.  Patient's heart rate responded to the epinephrine.  As this was the end of my shift I had

## 2025-02-19 NOTE — ED TRIAGE NOTES
Pt to ED from home via [facility] with complaints of fatigue and nausea. Pt states she has felt these symptoms for 5 days. Denies pain. EKG completed. Telemetry applied. HR 44. Other VS stable. IV access established. Labs collected. Pt placed on external catheter. Pt aware of the need for urine.

## 2025-02-19 NOTE — ED PROVIDER NOTES
Signed out to me at shift change 4:30 PM EST    This patient has been seen and evaluated by previous shift physician, Dr. Brandon.     Please refer to his/her note for detailed history of present illness, physical exam and medical decision making.      I was signed out to follow up symptomatic bradycardia requiring TVP.     I have personally seen and evaluated this patient at time of sign-out.     ED COURSE / MEDICAL DECISION MAKING:       Nanci Bar is a 84 y.o. female who presents to Emergency Department with Fatigue and Nausea    Patient was admitted by Dr. Brandon because of fatigue and nausea.    I was called into the room because patient developed symptomatic bradycardia with ventricular rate down to 20s.min and she had a brief LOC.    Patient looks tired on my evaluation. She is AAO x 4.     Medication review shows she was started carbamazepine recently.  Not on beta-blocker or calcium channel blocker at present.    Atropine 1 mg was given which did not improve her bradycardia.  Patient's bradycardia improved nicely with 10 mcg of epinephrine IV push transiently.  Patient is started on cardiac dose of dopamine drip which did not improve her ventricular rate, subsequently, she was started on epinephrine drip which improved her ventricular rate    Patient is consented for a TVP placement    TVP is placed by resident physician with my assistance and see her separate procedure note.  No complications following the TVP placement.  Satisfactory capturing.  Postprocedure chest x-ray shows pacer lead in right ventricle.  No pneumothorax.  Settings of pacemaker: Rate 80/minute, current: 16 mA.     VITALS  Vitals:    02/19/25 1836 02/19/25 1922 02/19/25 1955 02/19/25 2015   BP: 98/72 (!) 127/58 (!) 131/48 (!) 152/58   Pulse: 63 69 75 74   Resp: 24 20 20 25   Temp:    98.1 °F (36.7 °C)   TempSrc:       SpO2: 97% 97% 97% 100%   Weight:    64 kg (141 lb 1.5 oz)   Height:         LABS  Results for orders placed or performed

## 2025-02-19 NOTE — H&P
CRITICAL CARE- History & Physical      Patient: Nanci Bar    [facility]  YOB: 1940  MRN: [id number]   Acct: [id number]   PCP: Rut Mari DO    Date of Service: Pt seen/examined on 02/19/25    Chief Complaint:  Fatigue / Nausea     Assessment and Plan:-    Symptomatic Bradycardia s/p TVP placement: 2/2 Carbamezapine? Seems to have started around initiation of medication, but overall unwell feeling was prior to starting; HR 40's w/lowest 20's w/sxs; unresponsive to Atropine, s/p Epi pushes -> TVP eventually placed in ED; notable HTN w/low HR; s/p Epi / Dop gtt. Chronic RBBB w/LAFB (noted on multiple EKGs in chart); Carbamezapine held - would continuing holding until other inherent causes r/o. Pt arrived w/no IVF running and pacer at 80 Rate/18mA/2mV -> output / sensitivity adjusted -> had capture issues   Maintain TVP -> mode VVI -> currently 80 Rate / 16mA / 2mV  Underlying rhythm at bedside appears to show HG-AVB (Mobitz 2 vs CHB) -> EKG rhythm strip ran during pacer pause to capture -> SB w/1st deg AVB and chronic BF-block   Will continue to monitor underlying rhythm daily along with adjust settings as tolerated   BP maintained on initial evaluation in ICU, has dipped down a little -> Dopamine restarted   Need to r/o structural / ischemic related causes   Carbamezapine-induced Cardiac dysfunction is known to happen in toxic and even therapeutic levels, especially in the elderly   Cardiology consulted for Sx Reji   Chronic Diastolic CHF, not in exacerbation w/h/o Mod AS: Echo 10/24 w/EF 60% w/G1DD, mild LAD) / Moderate AS (OLGA LIDIA 1-1.1, pV 2.8); no s/sxs of CHFe  ECHO complete ordered cardiac architecture and valvular issues given hx   ?UTI: Fatigue, and chronic frequency / incontinence, difficult to ascertain sxs at this time; UA shows >200 WBCs, (+) LE, and Moderate Bacteria  Given sxs / age, RF, will start on Rocephin 1g daily until cultures come back -> de-escalate as

## 2025-02-19 NOTE — TELEPHONE ENCOUNTER
Called and spoke with Todd. He will call the squad to transport Carol to the ER as he can't lift her by himself and the neighbor is sick at the moment. He will have her taken to [facility].

## 2025-02-19 NOTE — TELEPHONE ENCOUNTER
Todd Clemons, patients EC called stating that Nanci's condition is declining since she was seen in the office yesterday. She tried to climb the stairs from the garage to the house (3 stairs) and fell. They had to have a neighbor help get her up and into the house. She had to use the restroom, Todd had to help get her to the toilet to which she fell into the wall. She hasn't got out of bed since yesterday. Patient requested for pills to be brought to her in bed, but she couldn't sit up to take her pills, which usually isn't an issue. Nanci keeps stating that \"she's dying.\" Todd doesn't know if he should bring her to the hospital and request to have her admitted to be monitored. Possible admission to assistant living center? Nanci's son Todd and EC Todd have been discussing assistant living as she can't be left alone. Todd would like guidance on which direction to proceed in.

## 2025-02-19 NOTE — ED NOTES
RN and Dr Brandon called to bedside. Laura APODACA at bedside states pt eduardo down to the 30s and went unresponsive for about 15 seconds.   English

## 2025-02-19 NOTE — ED NOTES
Patient resting in bed. Respirations easy and unlabored. No distress noted. Call light within reach. Pt remains with pacer pacing.

## 2025-02-19 NOTE — ED NOTES
Dr Estrella and Dr Jain at bedside prepping patient for temporary pacemaker. Pt being placed on 1L nasal cannula at this time for draping.

## 2025-02-20 ENCOUNTER — APPOINTMENT (OUTPATIENT)
Dept: GENERAL RADIOLOGY | Age: 85
End: 2025-02-20
Payer: MEDICARE

## 2025-02-20 ENCOUNTER — APPOINTMENT (OUTPATIENT)
Age: 85
End: 2025-02-20
Attending: STUDENT IN AN ORGANIZED HEALTH CARE EDUCATION/TRAINING PROGRAM
Payer: MEDICARE

## 2025-02-20 PROBLEM — I44.2 COMPLETE HEART BLOCK (HCC): Status: ACTIVE | Noted: 2025-02-20

## 2025-02-20 PROBLEM — I50.32 CHRONIC DIASTOLIC HEART FAILURE (HCC): Status: ACTIVE | Noted: 2025-02-20

## 2025-02-20 LAB
ALBUMIN SERPL BCG-MCNC: 3.4 G/DL (ref 3.5–5.1)
ALP SERPL-CCNC: 77 U/L (ref 38–126)
ALT SERPL W/O P-5'-P-CCNC: 10 U/L (ref 11–66)
ANION GAP SERPL CALC-SCNC: 21 MEQ/L (ref 8–16)
AST SERPL-CCNC: 13 U/L (ref 5–40)
B-OH-BUTYR SERPL-MSCNC: 38.06 MG/DL (ref 0.2–2.81)
BASOPHILS ABSOLUTE: 0.1 THOU/MM3 (ref 0–0.1)
BASOPHILS NFR BLD AUTO: 0.6 %
BILIRUB CONJ SERPL-MCNC: < 0.1 MG/DL (ref 0–0.2)
BILIRUB SERPL-MCNC: 0.3 MG/DL (ref 0.3–1.2)
BUN SERPL-MCNC: 27 MG/DL (ref 7–22)
CA-I BLD ISE-SCNC: 1.23 MMOL/L (ref 1.12–1.32)
CALCIUM SERPL-MCNC: 8.5 MG/DL (ref 8.2–9.6)
CHLORIDE SERPL-SCNC: 106 MEQ/L (ref 98–111)
CO2 SERPL-SCNC: 18 MEQ/L (ref 23–33)
CREAT SERPL-MCNC: 0.9 MG/DL (ref 0.4–1.2)
DEPRECATED RDW RBC AUTO: 45.7 FL (ref 35–45)
ECHO AO ASC DIAM: 3 CM
ECHO AO ASCENDING AORTA INDEX: 1.8 CM/M2
ECHO AO SINUS VALSALVA DIAM: 2.5 CM
ECHO AO SINUS VALSALVA INDEX: 1.5 CM/M2
ECHO AO ST JNCT DIAM: 2.2 CM
ECHO AV AREA PEAK VELOCITY: 1 CM2
ECHO AV AREA VTI: 0.9 CM2
ECHO AV AREA/BSA PEAK VELOCITY: 0.6 CM2/M2
ECHO AV AREA/BSA VTI: 0.5 CM2/M2
ECHO AV CUSP MM: 0.8 CM
ECHO AV MEAN GRADIENT: 19 MMHG
ECHO AV MEAN VELOCITY: 1.8 M/S
ECHO AV PEAK GRADIENT: 33 MMHG
ECHO AV PEAK VELOCITY: 2.9 M/S
ECHO AV VELOCITY RATIO: 0.34
ECHO AV VTI: 57.4 CM
ECHO BSA: 1.69 M2
ECHO EST RA PRESSURE: 3 MMHG
ECHO LA AREA 4C: 11.4 CM2
ECHO LA DIAMETER INDEX: 1.8 CM/M2
ECHO LA DIAMETER: 3 CM
ECHO LA MAJOR AXIS: 4.4 CM
ECHO LA VOL MOD A4C: 23 ML (ref 22–52)
ECHO LA VOLUME INDEX MOD A4C: 14 ML/M2 (ref 16–34)
ECHO LV E' LATERAL VELOCITY: 8.1 CM/S
ECHO LV E' SEPTAL VELOCITY: 4.4 CM/S
ECHO LV EF PHYSICIAN: 55 %
ECHO LV EJECTION FRACTION BIPLANE: 55 % (ref 55–100)
ECHO LV FRACTIONAL SHORTENING: 33 % (ref 28–44)
ECHO LV INTERNAL DIMENSION DIASTOLE INDEX: 2.4 CM/M2
ECHO LV INTERNAL DIMENSION DIASTOLIC: 4 CM (ref 3.9–5.3)
ECHO LV INTERNAL DIMENSION SYSTOLIC INDEX: 1.62 CM/M2
ECHO LV INTERNAL DIMENSION SYSTOLIC: 2.7 CM
ECHO LV ISOVOLUMETRIC RELAXATION TIME (IVRT): 81 MS
ECHO LV IVSD: 1.1 CM (ref 0.6–0.9)
ECHO LV MASS 2D: 145.6 G (ref 67–162)
ECHO LV MASS INDEX 2D: 87.2 G/M2 (ref 43–95)
ECHO LV POSTERIOR WALL DIASTOLIC: 1.1 CM (ref 0.6–0.9)
ECHO LV RELATIVE WALL THICKNESS RATIO: 0.55
ECHO LVOT AREA: 2.8 CM2
ECHO LVOT AV VTI INDEX: 0.32
ECHO LVOT DIAM: 1.9 CM
ECHO LVOT MEAN GRADIENT: 2 MMHG
ECHO LVOT PEAK GRADIENT: 4 MMHG
ECHO LVOT PEAK VELOCITY: 1 M/S
ECHO LVOT STROKE VOLUME INDEX: 30.9 ML/M2
ECHO LVOT SV: 51.6 ML
ECHO LVOT VTI: 18.2 CM
ECHO MV A VELOCITY: 1.22 M/S
ECHO MV AREA VTI: 1.2 CM2
ECHO MV E DECELERATION TIME (DT): 198 MS
ECHO MV E VELOCITY: 1.43 M/S
ECHO MV E/A RATIO: 1.17
ECHO MV E/E' LATERAL: 17.65
ECHO MV E/E' RATIO (AVERAGED): 25.08
ECHO MV E/E' SEPTAL: 32.5
ECHO MV LVOT VTI INDEX: 2.32
ECHO MV MAX VELOCITY: 2 M/S
ECHO MV MEAN GRADIENT: 6 MMHG
ECHO MV MEAN VELOCITY: 1.2 M/S
ECHO MV PEAK GRADIENT: 16 MMHG
ECHO MV REGURGITANT PEAK GRADIENT: 125 MMHG
ECHO MV REGURGITANT PEAK VELOCITY: 5.6 M/S
ECHO MV VTI: 42.3 CM
ECHO PULMONARY ARTERY END DIASTOLIC PRESSURE: 6 MMHG
ECHO PV MAX VELOCITY: 0.8 M/S
ECHO PV PEAK GRADIENT: 3 MMHG
ECHO PV REGURGITANT MAX VELOCITY: 1.2 M/S
ECHO RIGHT VENTRICULAR SYSTOLIC PRESSURE (RVSP): 37 MMHG
ECHO RV FREE WALL PEAK S': 12.1 CM/S
ECHO RV INTERNAL DIMENSION: 2.6 CM
ECHO RV TAPSE: 1.7 CM (ref 1.7–?)
ECHO TV E WAVE: 0.9 M/S
ECHO TV REGURGITANT MAX VELOCITY: 2.93 M/S
ECHO TV REGURGITANT PEAK GRADIENT: 34 MMHG
EKG ATRIAL RATE: 104 BPM
EKG ATRIAL RATE: 44 BPM
EKG ATRIAL RATE: 45 BPM
EKG ATRIAL RATE: 85 BPM
EKG P AXIS: 71 DEGREES
EKG P AXIS: 80 DEGREES
EKG P AXIS: 81 DEGREES
EKG P-R INTERVAL: 190 MS
EKG P-R INTERVAL: 224 MS
EKG P-R INTERVAL: 280 MS
EKG Q-T INTERVAL: 506 MS
EKG Q-T INTERVAL: 540 MS
EKG Q-T INTERVAL: 564 MS
EKG Q-T INTERVAL: 610 MS
EKG QRS DURATION: 114 MS
EKG QRS DURATION: 122 MS
EKG QRS DURATION: 128 MS
EKG QRS DURATION: 172 MS
EKG QTC CALCULATION (BAZETT): 467 MS
EKG QTC CALCULATION (BAZETT): 521 MS
EKG QTC CALCULATION (BAZETT): 602 MS
EKG QTC CALCULATION (BAZETT): 650 MS
EKG R AXIS: -61 DEGREES
EKG R AXIS: -62 DEGREES
EKG R AXIS: -66 DEGREES
EKG R AXIS: -67 DEGREES
EKG T AXIS: 100 DEGREES
EKG T AXIS: 36 DEGREES
EKG T AXIS: 37 DEGREES
EKG T AXIS: 60 DEGREES
EKG VENTRICULAR RATE: 44 BPM
EKG VENTRICULAR RATE: 45 BPM
EKG VENTRICULAR RATE: 80 BPM
EKG VENTRICULAR RATE: 85 BPM
EOSINOPHIL NFR BLD AUTO: 0.2 %
EOSINOPHILS ABSOLUTE: 0 THOU/MM3 (ref 0–0.4)
ERYTHROCYTE [DISTWIDTH] IN BLOOD BY AUTOMATED COUNT: 14.2 % (ref 11.5–14.5)
GFR SERPL CREATININE-BSD FRML MDRD: 64 ML/MIN/1.73M2
GLUCOSE BLD STRIP.AUTO-MCNC: 154 MG/DL (ref 70–108)
GLUCOSE BLD STRIP.AUTO-MCNC: 226 MG/DL (ref 70–108)
GLUCOSE BLD STRIP.AUTO-MCNC: 233 MG/DL (ref 70–108)
GLUCOSE BLD STRIP.AUTO-MCNC: 293 MG/DL (ref 70–108)
GLUCOSE SERPL-MCNC: 146 MG/DL (ref 70–108)
HCT VFR BLD AUTO: 34.1 % (ref 37–47)
HGB BLD-MCNC: 10.9 GM/DL (ref 12–16)
IMM GRANULOCYTES # BLD AUTO: 0.04 THOU/MM3 (ref 0–0.07)
IMM GRANULOCYTES NFR BLD AUTO: 0.4 %
LACTATE SERPL-SCNC: 0.9 MMOL/L (ref 0.5–2)
LYMPHOCYTES ABSOLUTE: 2.8 THOU/MM3 (ref 1–4.8)
LYMPHOCYTES NFR BLD AUTO: 24.4 %
MCH RBC QN AUTO: 28.4 PG (ref 26–33)
MCHC RBC AUTO-ENTMCNC: 32 GM/DL (ref 32.2–35.5)
MCV RBC AUTO: 88.8 FL (ref 81–99)
MONOCYTES ABSOLUTE: 0.7 THOU/MM3 (ref 0.4–1.3)
MONOCYTES NFR BLD AUTO: 5.9 %
MRSA DNA SPEC QL NAA+PROBE: NEGATIVE
NEUTROPHILS ABSOLUTE: 7.7 THOU/MM3 (ref 1.8–7.7)
NEUTROPHILS NFR BLD AUTO: 68.5 %
NRBC BLD AUTO-RTO: 0 /100 WBC
PHOSPHATE SERPL-MCNC: 3.3 MG/DL (ref 2.4–4.7)
PLATELET # BLD AUTO: 216 THOU/MM3 (ref 130–400)
PMV BLD AUTO: 12.7 FL (ref 9.4–12.4)
POTASSIUM SERPL-SCNC: 4 MEQ/L (ref 3.5–5.2)
PROT SERPL-MCNC: 6 G/DL (ref 6.1–8)
RBC # BLD AUTO: 3.84 MILL/MM3 (ref 4.2–5.4)
SODIUM SERPL-SCNC: 145 MEQ/L (ref 135–145)
TROPONIN, HIGH SENSITIVITY: 39 NG/L (ref 0–12)
TROPONIN, HIGH SENSITIVITY: 50 NG/L (ref 0–12)
TROPONIN, HIGH SENSITIVITY: 73 NG/L (ref 0–12)
WBC # BLD AUTO: 11.3 THOU/MM3 (ref 4.8–10.8)

## 2025-02-20 PROCEDURE — 33210 INSERT ELECTRD/PM CATH SNGL: CPT | Performed by: INTERNAL MEDICINE

## 2025-02-20 PROCEDURE — 2500000003 HC RX 250 WO HCPCS: Performed by: STUDENT IN AN ORGANIZED HEALTH CARE EDUCATION/TRAINING PROGRAM

## 2025-02-20 PROCEDURE — 93010 ELECTROCARDIOGRAM REPORT: CPT | Performed by: INTERNAL MEDICINE

## 2025-02-20 PROCEDURE — 82948 REAGENT STRIP/BLOOD GLUCOSE: CPT

## 2025-02-20 PROCEDURE — 82010 KETONE BODYS QUAN: CPT

## 2025-02-20 PROCEDURE — 80048 BASIC METABOLIC PNL TOTAL CA: CPT

## 2025-02-20 PROCEDURE — 71045 X-RAY EXAM CHEST 1 VIEW: CPT

## 2025-02-20 PROCEDURE — 93306 TTE W/DOPPLER COMPLETE: CPT

## 2025-02-20 PROCEDURE — 6360000002 HC RX W HCPCS: Performed by: STUDENT IN AN ORGANIZED HEALTH CARE EDUCATION/TRAINING PROGRAM

## 2025-02-20 PROCEDURE — 80076 HEPATIC FUNCTION PANEL: CPT

## 2025-02-20 PROCEDURE — 2700000000 HC OXYGEN THERAPY PER DAY

## 2025-02-20 PROCEDURE — 82330 ASSAY OF CALCIUM: CPT

## 2025-02-20 PROCEDURE — 84484 ASSAY OF TROPONIN QUANT: CPT

## 2025-02-20 PROCEDURE — 85025 COMPLETE CBC W/AUTO DIFF WBC: CPT

## 2025-02-20 PROCEDURE — 93306 TTE W/DOPPLER COMPLETE: CPT | Performed by: INTERNAL MEDICINE

## 2025-02-20 PROCEDURE — 36415 COLL VENOUS BLD VENIPUNCTURE: CPT

## 2025-02-20 PROCEDURE — 2000000000 HC ICU R&B

## 2025-02-20 PROCEDURE — 93005 ELECTROCARDIOGRAM TRACING: CPT | Performed by: STUDENT IN AN ORGANIZED HEALTH CARE EDUCATION/TRAINING PROGRAM

## 2025-02-20 PROCEDURE — 83605 ASSAY OF LACTIC ACID: CPT

## 2025-02-20 PROCEDURE — 99291 CRITICAL CARE FIRST HOUR: CPT | Performed by: INTERNAL MEDICINE

## 2025-02-20 PROCEDURE — 6370000000 HC RX 637 (ALT 250 FOR IP): Performed by: STUDENT IN AN ORGANIZED HEALTH CARE EDUCATION/TRAINING PROGRAM

## 2025-02-20 PROCEDURE — 2580000003 HC RX 258: Performed by: STUDENT IN AN ORGANIZED HEALTH CARE EDUCATION/TRAINING PROGRAM

## 2025-02-20 PROCEDURE — 84100 ASSAY OF PHOSPHORUS: CPT

## 2025-02-20 RX ORDER — FAMOTIDINE 20 MG/1
20 TABLET, FILM COATED ORAL DAILY
Status: DISCONTINUED | OUTPATIENT
Start: 2025-02-20 | End: 2025-02-22 | Stop reason: HOSPADM

## 2025-02-20 RX ORDER — DOPAMINE HYDROCHLORIDE 160 MG/100ML
1-20 INJECTION, SOLUTION INTRAVENOUS CONTINUOUS
Status: DISCONTINUED | OUTPATIENT
Start: 2025-02-20 | End: 2025-02-21

## 2025-02-20 RX ADMIN — INSULIN LISPRO 2 UNITS: 100 INJECTION, SOLUTION INTRAVENOUS; SUBCUTANEOUS at 20:45

## 2025-02-20 RX ADMIN — SODIUM CHLORIDE, PRESERVATIVE FREE 10 ML: 5 INJECTION INTRAVENOUS at 20:46

## 2025-02-20 RX ADMIN — INSULIN LISPRO 4 UNITS: 100 INJECTION, SOLUTION INTRAVENOUS; SUBCUTANEOUS at 17:08

## 2025-02-20 RX ADMIN — LEVOTHYROXINE SODIUM 175 MCG: 0.15 TABLET ORAL at 08:17

## 2025-02-20 RX ADMIN — ENOXAPARIN SODIUM 40 MG: 100 INJECTION SUBCUTANEOUS at 20:45

## 2025-02-20 RX ADMIN — SODIUM CHLORIDE, PRESERVATIVE FREE 10 ML: 5 INJECTION INTRAVENOUS at 08:31

## 2025-02-20 RX ADMIN — WATER 1000 MG: 1 INJECTION INTRAMUSCULAR; INTRAVENOUS; SUBCUTANEOUS at 08:22

## 2025-02-20 RX ADMIN — SODIUM CHLORIDE: 0.9 INJECTION, SOLUTION INTRAVENOUS at 07:36

## 2025-02-20 RX ADMIN — CLOPIDOGREL BISULFATE 75 MG: 75 TABLET ORAL at 09:16

## 2025-02-20 RX ADMIN — FAMOTIDINE 20 MG: 20 TABLET, FILM COATED ORAL at 09:16

## 2025-02-20 RX ADMIN — INSULIN LISPRO 2 UNITS: 100 INJECTION, SOLUTION INTRAVENOUS; SUBCUTANEOUS at 11:45

## 2025-02-20 RX ADMIN — ATORVASTATIN CALCIUM 40 MG: 40 TABLET, FILM COATED ORAL at 20:45

## 2025-02-20 RX ADMIN — ACETAMINOPHEN 650 MG: 325 TABLET ORAL at 12:20

## 2025-02-20 RX ADMIN — INSULIN GLARGINE 24 UNITS: 100 INJECTION, SOLUTION SUBCUTANEOUS at 20:45

## 2025-02-20 ASSESSMENT — PAIN DESCRIPTION - ORIENTATION
ORIENTATION: RIGHT
ORIENTATION: RIGHT

## 2025-02-20 ASSESSMENT — PAIN SCALES - GENERAL
PAINLEVEL_OUTOF10: 3
PAINLEVEL_OUTOF10: 0
PAINLEVEL_OUTOF10: 8
PAINLEVEL_OUTOF10: 0

## 2025-02-20 ASSESSMENT — PAIN DESCRIPTION - FREQUENCY: FREQUENCY: INTERMITTENT

## 2025-02-20 ASSESSMENT — PAIN - FUNCTIONAL ASSESSMENT
PAIN_FUNCTIONAL_ASSESSMENT: ACTIVITIES ARE NOT PREVENTED
PAIN_FUNCTIONAL_ASSESSMENT: ACTIVITIES ARE NOT PREVENTED

## 2025-02-20 ASSESSMENT — PAIN DESCRIPTION - DESCRIPTORS
DESCRIPTORS: DULL;ACHING
DESCRIPTORS: DULL

## 2025-02-20 ASSESSMENT — PAIN DESCRIPTION - LOCATION
LOCATION: EAR
LOCATION: EAR

## 2025-02-20 NOTE — PROGRESS NOTES
Pharmacy Medication History Note    List of current medications patient is taking is complete.    Source of information: Talked with Pt    Changes made to medication list:  Medications removed (include reason, ex. therapy complete or physician discontinued):  Cyanocobalamin 100 mcg (No longer take per Pt)  Macrobid (Completed per Pt)  Docusate 100 mg 1 cap BID for 5 days (Completed by Pt, Last dispense 2/15/2025)    Medications added/doses adjusted:  Changed Lantus dose from 32 units to 24 units     Other notes (ex. Recent course of antibiotics, Coumadin dosing):  Completed Macrobid 100 mg 1 cap BID for 7 days (12/16/24-12/22/24)  Denies use of other OTC or herbal medications.      Electronically signed by Mariely Curtis on 2/20/2025 at 11:28 AM

## 2025-02-20 NOTE — PROCEDURES
PROCEDURE NOTE  Date: 2/20/2025   Name: Nanci Bar  YOB: 1940    Procedures    TRANSVENOUS PACER:  CONSENT:  Medical necessity.  INDICATION:  Malpositioned TVP.  COMPLICATIONS:  none.  EBL:  none.    PROCEDURE: Pacer remanis hooked up to the pacing battery and control panel which are functional.  Balloon was inflated.  Pacer was advanced until ventricular capture was obtained.  Pacer was secured in place.  Balloon was deflated.  Electronically signed by Alberto Hi MD on 2/20/25 at 6:14 PM EST

## 2025-02-20 NOTE — CARE COORDINATION
Case Management Assessment Initial Evaluation    Date/Time of Evaluation: 2/20/2025 2:06 PM  Assessment Completed by: Anne Carrillo, RN    If patient is discharged prior to next notation, then this note serves as note for discharge by case management.    Patient Name: Nanci Bar                   YOB: 1940  Diagnosis: Dehydration [E86.0]  Complete heart block (HCC) [I44.2]  Prolonged QT interval [R94.31]  Symptomatic bradycardia [R00.1]                   Date / Time: 2/19/2025 12:36 PM  Location: [address] [place] [address]     Patient Admission Status: Inpatient   Readmission Risk Low 0-14, Mod 15-19), High > 20: Readmission Risk Score: 14.3    Current PCP: Rut Mari DO  Health Care Decision Makers:   Primary Decision Maker: Todd Reese - Child - [phone]    Secondary Decision Maker: Cynthia Bar - Other - [phone]    Secondary Decision Maker: Todd Clemons - Other - [phone]    Additional Case Management Notes: Patient presented to the ED with fatigue, nausea, and dizziness x 2-3 days. Notably, started and stopped carbamazepine recently. While in the ED patient developed symptomatic bradycardia w HR in the 20's. Was given atropine and epinephrine pushes in the ED. Dopamine gtt was started but did not help. Placed on epinephrine gtt, and eventually had TVP placed. TVP remains in place. Await cardio note. Urine cultures sent, IV Rocephin while awaiting results. Pending echo.    Procedures:   2/19 TVP placement  2/20 Echo done, results pending.    Imaging: no acute    Patient Goals/Plan/Treatment Preferences: Nanci wishes to return home with her S/O Todd Clemons (she also has a son named Todd). She has extensive DME as noted below, and goes to adult  through the [facility]. Todd is her main caregiver. She wishes to set up HH at discharge. Discussed meeting again closer to discharge to decide appropriate dc needs.      02/20/25 1162   Service Assessment   Patient Orientation Alert and Oriented

## 2025-02-20 NOTE — PROGRESS NOTES
CRITICAL CARE PROGRESS NOTE      Patient:  Nanci Bar    Unit/Bed:4D-11/011-A  YOB: 1940  MRN: [id number]   PCP: Rut Mari DO  Date of Admission: 2/19/2025  Chief Complaint:-Fatigue/nausea    Assessment and Plan:    Symptomatic bradycardia: S/p TVP placement on 02/19.  Suspect to be secondary age related conduction system degenerative given bradycardia refractory to atropine. There was concern for carbamazepine related given the timeline of symptom onset and initiation of this medication. Pt reports only took two days of Carbamezapine. Pt was given epinephrine infusions which resolved the eduardo before the placement of TVP. Current HR > 70, stable  On dopamine infusion, wean down as tolerated  NSR currently  Would need further work up for evaluation of conduction system fibrosis  Carbamazepine on hold- known to cause   Cont tele.   Chronic diastolic heart failure: Less likely to be in acute exacerbation. Last echo from 10/2024 w EF 60%.  Normal wall motion. G1DD. Moderate AS. Mild MR and Mild TR. Repeat ECHO pending. On Jardiance.   HAGMA: suspect to be secondary to starvation ketoacidosis vs dehydration as lactic acid is normal, and renal function is normal. Pt stated she had days of not keep down food/liquid due to nausea. Will obtain BHB. Cont hydration with fluids.  Fatigue and nausea:  presented with progressively worsening fatigue and nausea. Unclear etiology. Will obtain troponin. 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   Non-obstructive CAD:  noted  PORFIRIO:  reports not using CPAP at home.   HTN:  historical. Not any medication  HLD:  on Lipitor  hypothyroidism:  on synthroid  IDDM2:  on Jardiance at home. Last A1c 9.4 on 2/7/25. Hold home Jardiance. Start Lantus 24 U. And medium sliding scale. POC glucose. Hypoglycemia protocol  Urgency incontinence:   on vibegron      INITIAL H AND P AND ICU COURSE:  84-year-old female with past medical history

## 2025-02-20 NOTE — PROCEDURES
PROCEDURE NOTE  Date: 2/20/2025   Name: Nanci Bar  YOB: 1940    Procedures        EKG was completed and handed to RN

## 2025-02-20 NOTE — PROCEDURES
PROCEDURE NOTE  Date: 2/19/2025   Name: Nanci Bar  YOB: 1940    Procedures        EKG was completed and handed to RN

## 2025-02-20 NOTE — CONSULTS
[facility]'s  Consult    Patient's Name/Date of Birth: Nanci Bar / 1940 (84 y.o.)    Date: February 20, 2025     Referring Provider: Everardo Parra MD    CHIEF COMPLAINT: Symptomatic bradycardia      HPI: This is a pleasant 84 y.o. female presents with lightheadedness and dizziness.  To note that patient was recently seen in the ED for trigeminal neuralgia and given carbamazepine. She started feeling lightheaded and dizzy and nauseaous afterwards prompting her to come to the ED. While in the ED, patient developed bradycardia reaching HR of 20s to which she was a TVP inserted. Patient was admitted to ICU afterwards.  No history of arrhythmia. No prior stenting. Has suffered a stroke in 2023. No prior open heart surgery. Not a smoker. No family history of cardiac disease.    Echo: EF 60 % in 10/2024    EKG: Sinus bradycardia. No ST or T wave abnormality concerning for ischemia. RBBB and LAFB noted and were present on previous tracings.        All labs, EKG's, diagnostic testing and images as well as cardiac cath, stress testing were reviewed during this encounter    Past Medical History:   Diagnosis Date    MYRA (acute kidney injury) 12/06/2020    Anxiety 04/21/2020    Arthritis     Arthritis, degenerative, localized, primary, hand 08/11/2023    Bladder spasm     CAD (coronary artery disease) 03/12/2013    Cancer ([facility])     breast    Cerebrovascular accident (CVA) ([facility]) 08/11/2023    Hyperlipidemia     Hypertension     Hypothyroidism     Obstructive sleep apnea of adult 12/12/2013    SOB (shortness of breath)     Type II or unspecified type diabetes mellitus without mention of complication, not stated as uncontrolled     Urinary incontinence due to urethral sphincter incompetence 11/30/2022     Past Surgical History:   Procedure Laterality Date    BREAST BIOPSY  1/17/2014    BREAST SURGERY  7/1974    left breast     CARPAL TUNNEL RELEASE Right 1/2013    CHOLECYSTECTOMY   7/1968    COLONOSCOPY  11/2007    FINGER TRIGGER RELEASE      HERNIA REPAIR  6-6-14    Dr. Salgado    HYSTERECTOMY (CERVIX STATUS UNKNOWN)  11/1996    JOINT REPLACEMENT Left     KNEE ARTHROSCOPY      left    KNEE SURGERY Right 2017    LYMPH NODE DISSECTION  12/23/1998    MASTECTOMY  7/2001     Current Facility-Administered Medications   Medication Dose Route Frequency Provider Last Rate Last Admin    famotidine (PEPCID) tablet 20 mg  20 mg Oral Daily Everardo Parra MD   20 mg at 02/20/25 0916    DOPamine (INTROPIN) 1600 mcg/mL in dextrose 5% infusion  1-20 mcg/kg/min IntraVENous Continuous Everardo Parra MD 12 mL/hr at 02/20/25 0025 5 mcg/kg/min at 02/20/25 0025    cefTRIAXone (ROCEPHIN) 1,000 mg in sterile water 10 mL IV syringe  1,000 mg IntraVENous Q24H Everardo Parra MD   1,000 mg at 02/20/25 0822    EPINEPHrine 5 mg in sodium chloride 0.9 % 250 ml infusion  1-20 mcg/min IntraVENous Continuous James Estrella MD   Paused at 02/19/25 1726    EPINEPHrine 1 MG/10ML injection   IntraVENous Daily PRN Marquis Brandon MD   20 mcg at 02/19/25 1700    atropine injection   IntraVENous Daily PRN Marquis Brandon MD   1 mg at 02/19/25 1644    sodium chloride flush 0.9 % injection 5-40 mL  5-40 mL IntraVENous 2 times per day Everardo Parra MD   10 mL at 02/20/25 0831    sodium chloride flush 0.9 % injection 5-40 mL  5-40 mL IntraVENous PRN Everardo Parra MD        0.9 % sodium chloride infusion   IntraVENous PRN Everardo Parra  mL/hr at 02/20/25 0736 New Bag at 02/20/25 0736    magnesium sulfate 2000 mg in 50 mL IVPB premix  2,000 mg IntraVENous PRN Everardo Parra MD        enoxaparin (LOVENOX) injection 40 mg  40 mg SubCUTAneous Q24H Everardo Parra MD   40 mg at 02/19/25 2122    ondansetron (ZOFRAN-ODT) disintegrating tablet 4 mg  4 mg Oral Q8H PRN Everardo Parra MD        Or    ondansetron (ZOFRAN) injection 4 mg  4 mg IntraVENous Q6H PRN Everardo Parra MD

## 2025-02-20 NOTE — PROGRESS NOTES
Patient received sacramental anointing by a . If you are in need of  support, please call [phone]. If you are in need of a  after 6:30pm, please call the house supervisor for the on-call .      Ashleigh Mckinney  [facility] Health Coordinator  [phone]

## 2025-02-20 NOTE — FLOWSHEET NOTE
Patient arrived to unit from ER via bed. Patient transferred to ICU bed and placed on continuous ICU bedside monitor. Patient admitted for Dehydration [E86.0]  Complete heart block (HCC) [I44.2]  Prolonged QT interval [R94.31]  Symptomatic bradycardia [R00.1]. Vitals obtained. See flowsheets. Patient's IV access includes 22g IV R wrist, 20g IV R AC. Current infusions and rates of infusion include none. Assessment completed by Delfino NAVA. Two nurse skin assessment completed by Delfino NAVA and Korina NAVA. See flowsheets for assessment details. Policies and procedures of ICU able to be explained to patient at this time. Family member(s)/representative(s) present at time of admission include Rambo . Patient rights explained to family member(s)/representatives and patient, as able. Patient/patient's family member(s)/representative(s) Declined to have physician notified of their admission. All questions posed by patient's family member(s)/representative(s) and patient answered at this time.

## 2025-02-21 LAB
ANION GAP SERPL CALC-SCNC: 17 MEQ/L (ref 8–16)
BACTERIA UR CULT: ABNORMAL
BASOPHILS ABSOLUTE: 0.1 THOU/MM3 (ref 0–0.1)
BASOPHILS NFR BLD AUTO: 1.1 %
BUN SERPL-MCNC: 20 MG/DL (ref 7–22)
CALCIUM SERPL-MCNC: 8.3 MG/DL (ref 8.2–9.6)
CHLORIDE SERPL-SCNC: 104 MEQ/L (ref 98–111)
CO2 SERPL-SCNC: 20 MEQ/L (ref 23–33)
CREAT SERPL-MCNC: 0.7 MG/DL (ref 0.4–1.2)
DEPRECATED RDW RBC AUTO: 45.1 FL (ref 35–45)
EOSINOPHIL NFR BLD AUTO: 1.8 %
EOSINOPHILS ABSOLUTE: 0.2 THOU/MM3 (ref 0–0.4)
ERYTHROCYTE [DISTWIDTH] IN BLOOD BY AUTOMATED COUNT: 14.3 % (ref 11.5–14.5)
GFR SERPL CREATININE-BSD FRML MDRD: 86 ML/MIN/1.73M2
GLUCOSE BLD STRIP.AUTO-MCNC: 120 MG/DL (ref 70–108)
GLUCOSE BLD STRIP.AUTO-MCNC: 179 MG/DL (ref 70–108)
GLUCOSE BLD STRIP.AUTO-MCNC: 229 MG/DL (ref 70–108)
GLUCOSE BLD STRIP.AUTO-MCNC: 68 MG/DL (ref 70–108)
GLUCOSE BLD STRIP.AUTO-MCNC: 98 MG/DL (ref 70–108)
GLUCOSE SERPL-MCNC: 68 MG/DL (ref 70–108)
HCT VFR BLD AUTO: 33.6 % (ref 37–47)
HGB BLD-MCNC: 11 GM/DL (ref 12–16)
IMM GRANULOCYTES # BLD AUTO: 0.03 THOU/MM3 (ref 0–0.07)
IMM GRANULOCYTES NFR BLD AUTO: 0.3 %
LYMPHOCYTES ABSOLUTE: 3.3 THOU/MM3 (ref 1–4.8)
LYMPHOCYTES NFR BLD AUTO: 34.9 %
MCH RBC QN AUTO: 28.3 PG (ref 26–33)
MCHC RBC AUTO-ENTMCNC: 32.7 GM/DL (ref 32.2–35.5)
MCV RBC AUTO: 86.4 FL (ref 81–99)
MONOCYTES ABSOLUTE: 0.5 THOU/MM3 (ref 0.4–1.3)
MONOCYTES NFR BLD AUTO: 5.5 %
NEUTROPHILS ABSOLUTE: 5.3 THOU/MM3 (ref 1.8–7.7)
NEUTROPHILS NFR BLD AUTO: 56.4 %
NRBC BLD AUTO-RTO: 0 /100 WBC
ORGANISM: ABNORMAL
PLATELET # BLD AUTO: 196 THOU/MM3 (ref 130–400)
PMV BLD AUTO: 12 FL (ref 9.4–12.4)
POTASSIUM SERPL-SCNC: 3.8 MEQ/L (ref 3.5–5.2)
RBC # BLD AUTO: 3.89 MILL/MM3 (ref 4.2–5.4)
SODIUM SERPL-SCNC: 141 MEQ/L (ref 135–145)
WBC # BLD AUTO: 9.4 THOU/MM3 (ref 4.8–10.8)

## 2025-02-21 PROCEDURE — 2140000000 HC CCU INTERMEDIATE R&B

## 2025-02-21 PROCEDURE — 2500000003 HC RX 250 WO HCPCS: Performed by: STUDENT IN AN ORGANIZED HEALTH CARE EDUCATION/TRAINING PROGRAM

## 2025-02-21 PROCEDURE — 36415 COLL VENOUS BLD VENIPUNCTURE: CPT

## 2025-02-21 PROCEDURE — 99232 SBSQ HOSP IP/OBS MODERATE 35: CPT | Performed by: INTERNAL MEDICINE

## 2025-02-21 PROCEDURE — 85025 COMPLETE CBC W/AUTO DIFF WBC: CPT

## 2025-02-21 PROCEDURE — 6370000000 HC RX 637 (ALT 250 FOR IP): Performed by: NURSE PRACTITIONER

## 2025-02-21 PROCEDURE — 6370000000 HC RX 637 (ALT 250 FOR IP): Performed by: STUDENT IN AN ORGANIZED HEALTH CARE EDUCATION/TRAINING PROGRAM

## 2025-02-21 PROCEDURE — 82948 REAGENT STRIP/BLOOD GLUCOSE: CPT

## 2025-02-21 PROCEDURE — 80048 BASIC METABOLIC PNL TOTAL CA: CPT

## 2025-02-21 PROCEDURE — 6360000002 HC RX W HCPCS: Performed by: STUDENT IN AN ORGANIZED HEALTH CARE EDUCATION/TRAINING PROGRAM

## 2025-02-21 PROCEDURE — 99232 SBSQ HOSP IP/OBS MODERATE 35: CPT | Performed by: NURSE PRACTITIONER

## 2025-02-21 PROCEDURE — 2580000003 HC RX 258: Performed by: STUDENT IN AN ORGANIZED HEALTH CARE EDUCATION/TRAINING PROGRAM

## 2025-02-21 RX ORDER — VALSARTAN 40 MG/1
40 TABLET ORAL DAILY
Status: DISCONTINUED | OUTPATIENT
Start: 2025-02-21 | End: 2025-02-22

## 2025-02-21 RX ADMIN — SODIUM CHLORIDE, PRESERVATIVE FREE 10 ML: 5 INJECTION INTRAVENOUS at 20:50

## 2025-02-21 RX ADMIN — INSULIN GLARGINE 20 UNITS: 100 INJECTION, SOLUTION SUBCUTANEOUS at 20:47

## 2025-02-21 RX ADMIN — ATORVASTATIN CALCIUM 40 MG: 40 TABLET, FILM COATED ORAL at 20:47

## 2025-02-21 RX ADMIN — FAMOTIDINE 20 MG: 20 TABLET, FILM COATED ORAL at 09:08

## 2025-02-21 RX ADMIN — INSULIN LISPRO 2 UNITS: 100 INJECTION, SOLUTION INTRAVENOUS; SUBCUTANEOUS at 16:56

## 2025-02-21 RX ADMIN — ENOXAPARIN SODIUM 40 MG: 100 INJECTION SUBCUTANEOUS at 20:49

## 2025-02-21 RX ADMIN — VALSARTAN 40 MG: 40 TABLET ORAL at 14:18

## 2025-02-21 RX ADMIN — WATER 1000 MG: 1 INJECTION INTRAMUSCULAR; INTRAVENOUS; SUBCUTANEOUS at 04:37

## 2025-02-21 RX ADMIN — LEVOTHYROXINE SODIUM 200 MCG: 0.1 TABLET ORAL at 05:41

## 2025-02-21 RX ADMIN — CLOPIDOGREL BISULFATE 75 MG: 75 TABLET ORAL at 09:08

## 2025-02-21 RX ADMIN — SODIUM CHLORIDE, PRESERVATIVE FREE 10 ML: 5 INJECTION INTRAVENOUS at 09:08

## 2025-02-21 RX ADMIN — ACETAMINOPHEN 650 MG: 325 TABLET ORAL at 15:40

## 2025-02-21 RX ADMIN — ACETAMINOPHEN 650 MG: 325 TABLET ORAL at 04:41

## 2025-02-21 RX ADMIN — DEXTROSE 125 ML: 10 SOLUTION INTRAVENOUS at 05:40

## 2025-02-21 ASSESSMENT — PAIN DESCRIPTION - ORIENTATION: ORIENTATION: RIGHT

## 2025-02-21 ASSESSMENT — PAIN SCALES - GENERAL
PAINLEVEL_OUTOF10: 3
PAINLEVEL_OUTOF10: 0
PAINLEVEL_OUTOF10: 3
PAINLEVEL_OUTOF10: 0

## 2025-02-21 ASSESSMENT — PAIN DESCRIPTION - LOCATION
LOCATION: FACE
LOCATION: HEAD

## 2025-02-21 ASSESSMENT — PAIN DESCRIPTION - DESCRIPTORS: DESCRIPTORS: ACHING

## 2025-02-21 NOTE — PLAN OF CARE
Problem: Chronic Conditions and Co-morbidities  Goal: Patient's chronic conditions and co-morbidity symptoms are monitored and maintained or improved  2/21/2025 1621 by Sherita Hernandez RN  Outcome: Progressing  Flowsheets  Taken 2/21/2025 1600 by Sherita Hernandez RN  Care Plan - Patient's Chronic Conditions and Co-Morbidity Symptoms are Monitored and Maintained or Improved:   Monitor and assess patient's chronic conditions and comorbid symptoms for stability, deterioration, or improvement   Collaborate with multidisciplinary team to address chronic and comorbid conditions and prevent exacerbation or deterioration   Update acute care plan with appropriate goals if chronic or comorbid symptoms are exacerbated and prevent overall improvement and discharge  Taken 2/21/2025 0755 by Janene Humphreys RN  Care Plan - Patient's Chronic Conditions and Co-Morbidity Symptoms are Monitored and Maintained or Improved: Monitor and assess patient's chronic conditions and comorbid symptoms for stability, deterioration, or improvement  2/21/2025 0459 by Luis Antonio Quiroga RN  Outcome: Progressing  Flowsheets (Taken 2/20/2025 2030)  Care Plan - Patient's Chronic Conditions and Co-Morbidity Symptoms are Monitored and Maintained or Improved:   Monitor and assess patient's chronic conditions and comorbid symptoms for stability, deterioration, or improvement   Collaborate with multidisciplinary team to address chronic and comorbid conditions and prevent exacerbation or deterioration   Update acute care plan with appropriate goals if chronic or comorbid symptoms are exacerbated and prevent overall improvement and discharge     Problem: Discharge Planning  Goal: Discharge to home or other facility with appropriate resources  2/21/2025 1621 by Sherita Hernandez RN  Outcome: Progressing  Flowsheets  Taken 2/21/2025 1600 by Sherita Hernandez RN  Discharge to home or other facility with appropriate resources:   Identify barriers to discharge

## 2025-02-21 NOTE — PLAN OF CARE
Problem: Chronic Conditions and Co-morbidities  Goal: Patient's chronic conditions and co-morbidity symptoms are monitored and maintained or improved  Outcome: Progressing  Flowsheets (Taken 2/20/2025 2030)  Care Plan - Patient's Chronic Conditions and Co-Morbidity Symptoms are Monitored and Maintained or Improved:   Monitor and assess patient's chronic conditions and comorbid symptoms for stability, deterioration, or improvement   Collaborate with multidisciplinary team to address chronic and comorbid conditions and prevent exacerbation or deterioration   Update acute care plan with appropriate goals if chronic or comorbid symptoms are exacerbated and prevent overall improvement and discharge     Problem: Discharge Planning  Goal: Discharge to home or other facility with appropriate resources  Outcome: Progressing  Flowsheets (Taken 2/20/2025 2030)  Discharge to home or other facility with appropriate resources:   Identify barriers to discharge with patient and caregiver   Arrange for needed discharge resources and transportation as appropriate   Identify discharge learning needs (meds, wound care, etc)     Problem: Pain  Goal: Verbalizes/displays adequate comfort level or baseline comfort level  Outcome: Progressing  Flowsheets (Taken 2/20/2025 2030)  Verbalizes/displays adequate comfort level or baseline comfort level:   Encourage patient to monitor pain and request assistance   Administer analgesics based on type and severity of pain and evaluate response   Assess pain using appropriate pain scale   Implement non-pharmacological measures as appropriate and evaluate response   Consider cultural and social influences on pain and pain management   Notify Licensed Independent Practitioner if interventions unsuccessful or patient reports new pain     Problem: Safety - Adult  Goal: Free from fall injury  Outcome: Progressing

## 2025-02-21 NOTE — CONSULTS
[facility]  Cardiac Electrophysiology Consult    Patient's Name/Date of Birth: Nanci Bar / 1940 (84 y.o.)    Date: February 20, 2025     Referring Provider: Everardo Parra MD    CHIEF COMPLAINT:    Assessment:  Symptomatic bradycardia  2:1 AV block, w symptomatic bradycardia in setting of carbamazepine initiation a few days ago. Last dose Yesterday morning. Now conducting 1:1. AV block iatrogenic secondary to carbamazepine use. I have reduced her LRL on TVP to 45bpm to see if she has any pacing needs overnight. She is currently SR with intact AV conduction at 85 bpm. Does have baseline conduction disease with RBBB.  LVEF is preserved.  If her AV conduction remains intact can remove temporary venous pacemaker and avoid any further use of carbamazepine and any AV lea blockers.    2.  Mild to moderate aortic stenosis and severe annular calcification of the mitral leaflets.    HPI: This is a pleasant 84 y.o. female who was in the hospital recently with trigeminal neuralgia and started on carbamazepine.  She presented yesterday with fatigue and generally feeling unwell with lightheadedness and dizziness.  She was noted to be in 2-1 AV block and had bradycardia in the high 20s and had near syncope.  Her last dose of carbamazepine was yesterday was restarted a few days ago.  Her baseline ECG does show sinus rhythm with right bundle branch block.  She feels markedly better she has a temporary venous pacemaker in place which was placed yesterday.  She is currently going in 80 bpm with intact AV conduction.  She has no prior history of syncope.            Echo: No results found for this or any previous visit.       All labs, EKG's, diagnostic testing and images as well as cardiac cath, stress testing were reviewed during this encounter    Past Medical History:   Diagnosis Date    MYRA (acute kidney injury) 12/06/2020    Anxiety 04/21/2020    Arthritis     Arthritis,      BMP:   Recent Labs     02/19/25  1305 02/20/25  0340    145   K 4.3 4.0   CL 97* 106   CO2 21* 18*   PHOS  --  3.3   BUN 30* 27*   CREATININE 1.2 0.9   MG 2.2  --      Accucheck Glucoses:   Recent Labs     02/19/25  2124 02/20/25  0830 02/20/25  1113 02/20/25  1705   POCGLU 214* 154* 233* 293*     Cardiac Enzymes: No results for input(s): \"CKTOTAL\", \"CKMB\", \"CKMBINDEX\", \"TROPONINI\" in the last 72 hours.  PT/INR:   Recent Labs     02/19/25  1305   INR 0.89     APTT:   Recent Labs     02/19/25  1305   APTT 23.1     Liver Profile:  Lab Results   Component Value Date/Time    AST 13 02/20/2025 03:40 AM    ALT 10 02/20/2025 03:40 AM    BILIDIR <0.1 02/20/2025 03:40 AM    BILITOT 0.3 02/20/2025 03:40 AM    ALKPHOS 77 02/20/2025 03:40 AM    PROTEIN 6.0 02/20/2025 03:40 AM     Lab Results   Component Value Date/Time    CHOL 131 06/04/2024 08:57 AM    HDL 56 06/04/2024 08:57 AM    TRIG 55 06/04/2024 08:57 AM     TSH:   Lab Results   Component Value Date/Time    TSH 13.760 02/10/2025 01:53 PM     UA:   Lab Results   Component Value Date/Time    NITRITE neg 08/25/2015 01:03 PM    COLORU YELLOW 02/19/2025 03:25 PM    PHUR 5.0 02/19/2025 03:25 PM    PHUR 5.0 02/24/2023 06:05 PM    WBCUA > 200 02/19/2025 03:25 PM    RBCUA 0-2 02/19/2025 03:25 PM    YEAST MOD BUDDING 02/19/2025 03:25 PM    BACTERIA MODERATE 02/19/2025 03:25 PM    LEUKOCYTESUR SMALL 02/19/2025 03:25 PM    UROBILINOGEN 0.2 02/19/2025 03:25 PM    BILIRUBINUR NEGATIVE 02/19/2025 03:25 PM    BLOODU TRACE 02/19/2025 03:25 PM    GLUCOSEU >= 1000 02/19/2025 03:25 PM         Physical Exam:  Vitals:    02/20/25 1000   BP: (!) 150/54   Pulse: 84   Resp: 17   Temp:    SpO2: 100%      Intake/Output Summary (Last 24 hours) at 2/20/2025 1947  Last data filed at 2/20/2025 0715  Gross per 24 hour   Intake 1045.12 ml   Output 100 ml   Net 945.12 ml      General:  No acute distress  Neck: Supple, no JVD, no carotid bruits  Heart: 3/6 Crescendo decrescendo systolic murmur

## 2025-02-21 NOTE — PROGRESS NOTES
Cardiology Progress Note      Patient:  Nanci Bar  YOB: 1940  MRN: [id number]   Acct: [id number]  Admit Date:  2/19/2025  Primary Cardiologist:   Seen by Dr. Kelly    Per prior cardiology consult note-  CHIEF COMPLAINT: Symptomatic bradycardia        HPI: This is a pleasant 84 y.o. female presents with lightheadedness and dizziness.  To note that patient was recently seen in the ED for trigeminal neuralgia and given carbamazepine. She started feeling lightheaded and dizzy and nauseaous afterwards prompting her to come to the ED. While in the ED, patient developed bradycardia reaching HR of 20s to which she was a TVP inserted. Patient was admitted to ICU afterwards.  No history of arrhythmia. No prior stenting. Has suffered a stroke in 2023. No prior open heart surgery. Not a smoker. No family history of cardiac disease.      Subjective (Events in last 24 hours):   Pt in bed   No co dizziness - chest pain or SOB   No use of PPM last 24 hrs     BP elevated     Tele reviewed - no AVB or SB --> hr has been SR 70's    TVP removed at bedside - occlusive dressing applied         Objective:   BP (!) 162/78   Pulse 73   Temp 98 °F (36.7 °C) (Oral)   Resp 20   Ht 1.6 m (5' 3\")   Wt 70.4 kg (155 lb 3.3 oz)   SpO2 100%   BMI 27.49 kg/m²        TELEMETRY: SR no ectopy     Physical Exam:  General Appearance: alert and oriented to person, place and time, in no acute distress  Cardiovascular: normal rate, regular rhythm, normal S1 and S2, no murmurs, rubs, clicks, or gallops, distal pulses intact  Pulmonary/Chest: clear to auscultation bilaterally- no wheezes, rales or rhonchi, normal air movement, no respiratory distress  Abdomen: soft, non-tender, non-distended, normal bowel sounds, no masses Extremities: no cyanosis, clubbing or edema, pulses present   Skin: warm and dry, no rash or erythema  Musculoskeletal: normal range of motion, no joint swelling, deformity or tenderness  Neurological: alert,

## 2025-02-21 NOTE — PROGRESS NOTES
CRITICAL CARE PROGRESS NOTE      Patient:  Nanci Bar    Unit/Bed:4D-11/011-A  YOB: 1940  MRN: [id number]   PCP: Rut Mari DO  Date of Admission: 2/19/2025  Chief Complaint:-Fatigue/nausea    Assessment and Plan:    Symptomatic bradycardia: S/p TVP placement on 02/19.  Suspect to be secondary age related conduction system degenerative given bradycardia refractory to atropine. There was concern for carbamazepine related given the timeline of symptom onset and initiation of this medication. Pt reports only took two days of Carbamezapine. Pt was given epinephrine infusions which resolved the eduardo before the placement of TVP. Current HR > 70, stable.   TVP removed. Per cardiology pt ok for D/C tomorrow  Stop dopamine  NSR currently  Would need further work up for evaluation of conduction system fibrosis  Carbamazepine on hold- known to cause   Cont tele.   Chronic diastolic heart failure: Less likely to be in acute exacerbation. Last echo from 10/2024 w EF 60%.  Normal wall motion. G1DD. Moderate AS. Mild MR and Mild TR. Repeat ECHO pending. On Jardiance.   HAGMA: improved. suspect to be secondary to starvation ketoacidosis vs dehydration as lactic acid is normal, and renal function is normal. Pt stated she had days of not keep down food/liquid due to nausea. Will obtain BHB. Cont hydration with fluids.  Fatigue and nausea:  improved. presented with progressively worsening fatigue and nausea. Unclear etiology. Will obtain troponin. 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   Non-obstructive CAD:  noted  PORFIRIO:  reports not using CPAP at home.   HTN:  historical. Not any medication  HLD:  on Lipitor  hypothyroidism:  on synthroid  IDDM2:  on Jardiance at home. Last A1c 9.4 on 2/7/25. Hold home Jardiance. Start Lantus 24 U. And medium sliding scale. POC glucose. Hypoglycemia protocol  Urgency incontinence:   on vibegron      INITIAL H AND P AND ICU

## 2025-02-22 VITALS
DIASTOLIC BLOOD PRESSURE: 79 MMHG | WEIGHT: 143.3 LBS | RESPIRATION RATE: 22 BRPM | TEMPERATURE: 97.3 F | HEIGHT: 63 IN | BODY MASS INDEX: 25.39 KG/M2 | HEART RATE: 88 BPM | SYSTOLIC BLOOD PRESSURE: 139 MMHG | OXYGEN SATURATION: 100 %

## 2025-02-22 LAB
ANION GAP SERPL CALC-SCNC: 11 MEQ/L (ref 8–16)
B-OH-BUTYR SERPL-MSCNC: 3.38 MG/DL (ref 0.2–2.81)
BASOPHILS ABSOLUTE: 0.1 THOU/MM3 (ref 0–0.1)
BASOPHILS NFR BLD AUTO: 0.9 %
BUN SERPL-MCNC: 12 MG/DL (ref 7–22)
CALCIUM SERPL-MCNC: 8.6 MG/DL (ref 8.2–9.6)
CHLORIDE SERPL-SCNC: 103 MEQ/L (ref 98–111)
CO2 SERPL-SCNC: 23 MEQ/L (ref 23–33)
CREAT SERPL-MCNC: 0.6 MG/DL (ref 0.4–1.2)
DEPRECATED RDW RBC AUTO: 44 FL (ref 35–45)
EOSINOPHIL NFR BLD AUTO: 1.5 %
EOSINOPHILS ABSOLUTE: 0.1 THOU/MM3 (ref 0–0.4)
ERYTHROCYTE [DISTWIDTH] IN BLOOD BY AUTOMATED COUNT: 14 % (ref 11.5–14.5)
GFR SERPL CREATININE-BSD FRML MDRD: 89 ML/MIN/1.73M2
GLUCOSE BLD STRIP.AUTO-MCNC: 169 MG/DL (ref 70–108)
GLUCOSE BLD STRIP.AUTO-MCNC: 265 MG/DL (ref 70–108)
GLUCOSE SERPL-MCNC: 122 MG/DL (ref 70–108)
HCT VFR BLD AUTO: 37.5 % (ref 37–47)
HGB BLD-MCNC: 12.3 GM/DL (ref 12–16)
IMM GRANULOCYTES # BLD AUTO: 0.05 THOU/MM3 (ref 0–0.07)
IMM GRANULOCYTES NFR BLD AUTO: 0.6 %
LYMPHOCYTES ABSOLUTE: 2.9 THOU/MM3 (ref 1–4.8)
LYMPHOCYTES NFR BLD AUTO: 32.2 %
MAGNESIUM SERPL-MCNC: 1.9 MG/DL (ref 1.6–2.4)
MCH RBC QN AUTO: 28.1 PG (ref 26–33)
MCHC RBC AUTO-ENTMCNC: 32.8 GM/DL (ref 32.2–35.5)
MCV RBC AUTO: 85.8 FL (ref 81–99)
MONOCYTES ABSOLUTE: 0.5 THOU/MM3 (ref 0.4–1.3)
MONOCYTES NFR BLD AUTO: 5.2 %
NEUTROPHILS ABSOLUTE: 5.3 THOU/MM3 (ref 1.8–7.7)
NEUTROPHILS NFR BLD AUTO: 59.6 %
NRBC BLD AUTO-RTO: 0 /100 WBC
PLATELET # BLD AUTO: 200 THOU/MM3 (ref 130–400)
PMV BLD AUTO: 12.6 FL (ref 9.4–12.4)
POTASSIUM SERPL-SCNC: 3.2 MEQ/L (ref 3.5–5.2)
POTASSIUM SERPL-SCNC: 4 MEQ/L (ref 3.5–5.2)
RBC # BLD AUTO: 4.37 MILL/MM3 (ref 4.2–5.4)
SODIUM SERPL-SCNC: 137 MEQ/L (ref 135–145)
T4 FREE SERPL-MCNC: 0.97 NG/DL (ref 0.92–1.68)
TSH SERPL DL<=0.005 MIU/L-ACNC: 24.63 UIU/ML (ref 0.4–4.2)
WBC # BLD AUTO: 8.9 THOU/MM3 (ref 4.8–10.8)

## 2025-02-22 PROCEDURE — 84132 ASSAY OF SERUM POTASSIUM: CPT

## 2025-02-22 PROCEDURE — 80048 BASIC METABOLIC PNL TOTAL CA: CPT

## 2025-02-22 PROCEDURE — 2500000003 HC RX 250 WO HCPCS: Performed by: STUDENT IN AN ORGANIZED HEALTH CARE EDUCATION/TRAINING PROGRAM

## 2025-02-22 PROCEDURE — 82948 REAGENT STRIP/BLOOD GLUCOSE: CPT

## 2025-02-22 PROCEDURE — 6370000000 HC RX 637 (ALT 250 FOR IP): Performed by: STUDENT IN AN ORGANIZED HEALTH CARE EDUCATION/TRAINING PROGRAM

## 2025-02-22 PROCEDURE — 84443 ASSAY THYROID STIM HORMONE: CPT

## 2025-02-22 PROCEDURE — 84439 ASSAY OF FREE THYROXINE: CPT

## 2025-02-22 PROCEDURE — 6370000000 HC RX 637 (ALT 250 FOR IP)

## 2025-02-22 PROCEDURE — 99232 SBSQ HOSP IP/OBS MODERATE 35: CPT | Performed by: NURSE PRACTITIONER

## 2025-02-22 PROCEDURE — 6360000002 HC RX W HCPCS: Performed by: STUDENT IN AN ORGANIZED HEALTH CARE EDUCATION/TRAINING PROGRAM

## 2025-02-22 PROCEDURE — 83735 ASSAY OF MAGNESIUM: CPT

## 2025-02-22 PROCEDURE — 82010 KETONE BODYS QUAN: CPT

## 2025-02-22 PROCEDURE — 6370000000 HC RX 637 (ALT 250 FOR IP): Performed by: INTERNAL MEDICINE

## 2025-02-22 PROCEDURE — 36415 COLL VENOUS BLD VENIPUNCTURE: CPT

## 2025-02-22 PROCEDURE — 85025 COMPLETE CBC W/AUTO DIFF WBC: CPT

## 2025-02-22 RX ORDER — EMPAGLIFLOZIN 10 MG/1
10 TABLET, FILM COATED ORAL DAILY
Qty: 30 TABLET | Refills: 0 | Status: SHIPPED | OUTPATIENT
Start: 2025-02-22

## 2025-02-22 RX ORDER — POTASSIUM CHLORIDE 1500 MG/1
40 TABLET, EXTENDED RELEASE ORAL 2 TIMES DAILY WITH MEALS
Status: DISCONTINUED | OUTPATIENT
Start: 2025-02-22 | End: 2025-02-22

## 2025-02-22 RX ORDER — VALSARTAN 40 MG/1
40 TABLET ORAL EVERY 12 HOURS SCHEDULED
Status: DISCONTINUED | OUTPATIENT
Start: 2025-02-22 | End: 2025-02-22 | Stop reason: HOSPADM

## 2025-02-22 RX ORDER — ATORVASTATIN CALCIUM 40 MG/1
40 TABLET, FILM COATED ORAL NIGHTLY
Qty: 30 TABLET | Refills: 0 | Status: SHIPPED | OUTPATIENT
Start: 2025-02-22

## 2025-02-22 RX ORDER — VALSARTAN 40 MG/1
40 TABLET ORAL EVERY 12 HOURS SCHEDULED
Qty: 30 TABLET | Refills: 0 | Status: SHIPPED | OUTPATIENT
Start: 2025-02-22 | End: 2025-02-27

## 2025-02-22 RX ORDER — INSULIN LISPRO 100 [IU]/ML
0-16 INJECTION, SOLUTION INTRAVENOUS; SUBCUTANEOUS
Status: DISCONTINUED | OUTPATIENT
Start: 2025-02-22 | End: 2025-02-22 | Stop reason: HOSPADM

## 2025-02-22 RX ADMIN — WATER 1000 MG: 1 INJECTION INTRAMUSCULAR; INTRAVENOUS; SUBCUTANEOUS at 05:14

## 2025-02-22 RX ADMIN — INSULIN LISPRO 4 UNITS: 100 INJECTION, SOLUTION INTRAVENOUS; SUBCUTANEOUS at 12:42

## 2025-02-22 RX ADMIN — POTASSIUM CHLORIDE 40 MEQ: 1500 TABLET, EXTENDED RELEASE ORAL at 09:18

## 2025-02-22 RX ADMIN — SODIUM CHLORIDE, PRESERVATIVE FREE 10 ML: 5 INJECTION INTRAVENOUS at 09:22

## 2025-02-22 RX ADMIN — LEVOTHYROXINE SODIUM 175 MCG: 0.15 TABLET ORAL at 05:58

## 2025-02-22 RX ADMIN — CLOPIDOGREL BISULFATE 75 MG: 75 TABLET ORAL at 09:18

## 2025-02-22 RX ADMIN — VALSARTAN 40 MG: 40 TABLET ORAL at 15:06

## 2025-02-22 RX ADMIN — FAMOTIDINE 20 MG: 20 TABLET, FILM COATED ORAL at 09:18

## 2025-02-22 ASSESSMENT — PAIN SCALES - GENERAL: PAINLEVEL_OUTOF10: 0

## 2025-02-22 NOTE — PROGRESS NOTES
PROGRESS NOTE      Patient:  Nanci Bar  Unit/Bed:3B-31/031-A  YOB: 1940  MRN: [id number]   Acct: [id number]    PCP: Rut Mari DO    Date of Admission: 2/19/2025 LOS: 3    Date of Evaluation:  2/22/2025    Anticipated Discharge: Pending stable hospital course      Assessment/Plan:    Symptomatic bradycardia: patient was noted with 2-1 AV block and had bradycardia in the high 20s and had a near syncopal event. Suspect age-related conduction degenerative vs carbamazepine induced conduction dysfunction. Patient was started on carbamazepine for trigeminal neuralgia 2 days prior to admission for symptomatic bradycardia. Bradycardia was refractory to atropine, and Pt received epinephrine/Dopamine infusions which resolved the bradycardia prior to TVP placement. TVP was placed on 2/19 and Removed on 2/21 d/t stable HR >70   Continue to hold carbamazepine and AV lea blockers  Stable since 2/21, E/P signed off since 2/21 and cardio signed off 2/22  Cont monitor telemetry  Chronic diastolic heart failure: not in acute exacerbation. echo 10/2024 EF 60%.  Normal wall motion. G1DD. Moderate AS. Mild MR and Mild TR. Repeat  ECHO 2/20/2025 unchanged from prior.  Continue Diovan 40 mg BID.  HAGMA improved. Suspect starvation ketoacidosis vs dehydration unremarkable Lactic, renal function labs. Pt poor p.o intake prior to admission d/t nausea/weakness. Initial BHB 38.06 improved 3.38 s/p IVF  Encourage p.o intake.   Nausea: Admitted with progressively worsening fatigue and nausea. Unclear etiology, since not related to chest pain/ meals/activity. Troponin noted flat trend, EKG absent for evidence for ischemic disease. Improving since 2/21  Continue Zofran and famotidine as needed.   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   Non-obstructive CAD  Acute on chronic elevated Troponin:   Troponin Trend 73-->39, likely demand ischemia   Last stress test 10/2024 negative for

## 2025-02-22 NOTE — PLAN OF CARE
Problem: Chronic Conditions and Co-morbidities  Goal: Patient's chronic conditions and co-morbidity symptoms are monitored and maintained or improved  2/22/2025 1730 by Aristides Goff RN  Outcome: Completed     Problem: Discharge Planning  Goal: Discharge to home or other facility with appropriate resources  2/22/2025 1730 by Aristides Goff RN  Outcome: Completed     Problem: Pain  Goal: Verbalizes/displays adequate comfort level or baseline comfort level  2/22/2025 1730 by Aristides Goff RN  Outcome: Completed     Problem: Safety - Adult  Goal: Free from fall injury  2/22/2025 1730 by Aristides Goff RN  Outcome: Completed

## 2025-02-22 NOTE — DISCHARGE SUMMARY
F [phone] [address] [address]      Phone: [phone]   atorvastatin 40 MG tablet  Jardiance 10 MG tablet  valsartan 40 MG tablet         Time Spent on discharge is more than 1 hour in the examination, evaluation, counseling and review of medications and discharge plan.        Signed:    Thank you Rut Mari DO for the opportunity to be involved in this patient's care.    Electronically signed by Maria Eugenia Myers MD on 2/22/2025 at 6:47 PM

## 2025-02-22 NOTE — PROGRESS NOTES
Discussed home health with pt and  for discharge and they declined home health said they did  not need it at this time. They will discuss with PCP if they need it in the future.

## 2025-02-22 NOTE — PLAN OF CARE
Problem: Chronic Conditions and Co-morbidities  Goal: Patient's chronic conditions and co-morbidity symptoms are monitored and maintained or improved  2/22/2025 0410 by Iván Brown RN  Outcome: Progressing  Flowsheets (Taken 2/22/2025 0410)  Care Plan - Patient's Chronic Conditions and Co-Morbidity Symptoms are Monitored and Maintained or Improved:   Monitor and assess patient's chronic conditions and comorbid symptoms for stability, deterioration, or improvement   Update acute care plan with appropriate goals if chronic or comorbid symptoms are exacerbated and prevent overall improvement and discharge   Collaborate with multidisciplinary team to address chronic and comorbid conditions and prevent exacerbation or deterioration     Problem: Discharge Planning  Goal: Discharge to home or other facility with appropriate resources  2/22/2025 0410 by Iván Brown RN  Outcome: Progressing  Flowsheets (Taken 2/22/2025 0410)  Discharge to home or other facility with appropriate resources:   Identify barriers to discharge with patient and caregiver   Identify discharge learning needs (meds, wound care, etc)     Problem: Pain  Goal: Verbalizes/displays adequate comfort level or baseline comfort level  2/22/2025 0410 by Iván Brown RN  Outcome: Progressing  Flowsheets (Taken 2/22/2025 0410)  Verbalizes/displays adequate comfort level or baseline comfort level:   Encourage patient to monitor pain and request assistance   Assess pain using appropriate pain scale     Problem: Safety - Adult  Goal: Free from fall injury  2/22/2025 0410 by Iván Brown RN  Outcome: Progressing  Note: Bed locked & in low position, call light in reach, side-rails up x2, bed/chair alarm utilized, non-slip socks on when ambulating, reminded patient to use call light to call for assistance.    Care plan reviewed with patient.  Patient verbalizes understanding of the care plan and contributed to goal setting.

## 2025-02-22 NOTE — PROGRESS NOTES
Cardiology Progress Note      Patient:  Nanci Bar  YOB: 1940  MRN: [id number]   Acct: [id number]  Admit Date:  2/19/2025  Primary Cardiologist:   Seen by Dr. Kelly    Per prior cardiology consult note-  CHIEF COMPLAINT: Symptomatic bradycardia        HPI: This is a pleasant 84 y.o. female presents with lightheadedness and dizziness.  To note that patient was recently seen in the ED for trigeminal neuralgia and given carbamazepine. She started feeling lightheaded and dizzy and nauseaous afterwards prompting her to come to the ED. While in the ED, patient developed bradycardia reaching HR of 20s to which she was a TVP inserted. Patient was admitted to ICU afterwards.  No history of arrhythmia. No prior stenting. Has suffered a stroke in 2023. No prior open heart surgery. Not a smoker. No family history of cardiac disease.      Subjective (Events in last 24 hours):   Pt in bed   No co dizziness - chest pain or SOB   No use of PPM last 24 hrs     BP elevated     Tele reviewed - no AVB or SB --> hr has been SR 70's    TVP removed at bedside - occlusive dressing applied       2/22/25  Pt up in chair   She states her legs feel shaky when she gets up   Slightly dizzy - hr ok     Tele SR no ectopy   BP elevated     She wants to know when she is going home     Objective:   BP (!) 145/87   Pulse 89   Temp 97.5 °F (36.4 °C) (Oral)   Resp 19   Ht 1.6 m (5' 3\")   Wt 65 kg (143 lb 4.8 oz)   SpO2 98%   BMI 25.38 kg/m²        TELEMETRY: SR no ectopy     Physical Exam:  General Appearance: alert and oriented to person, place and time, in no acute distress  Cardiovascular: normal rate, regular rhythm, normal S1 and S2, no murmurs, rubs, clicks, or gallops, distal pulses intact  Pulmonary/Chest: clear to auscultation bilaterally- no wheezes, rales or rhonchi, normal air movement, no respiratory distress  Abdomen: soft, non-tender, non-distended, normal bowel sounds, no masses Extremities: no cyanosis,

## 2025-02-24 ENCOUNTER — TELEPHONE (OUTPATIENT)
Dept: FAMILY MEDICINE CLINIC | Age: 85
End: 2025-02-24

## 2025-02-24 NOTE — PROGRESS NOTES
Physical Medicine and Rehabilitation  Botox Note     Written consent obtained for botulinum toxin injections after risks versus benefits discussed.  200 Units of Botox were reconstituted using preservative-free normal saline in a 100 unit to 2 mL solution.  Alcohol swabs were used to cleanse the skin before injections were performed.  Back pressure was placed on the syringe during injections to avoid any intravascular injection.  EMG guidance was used during procedure.       Botulinum toxin was injected in the following muscles of the left upper extremity:      Left upper extremity:  Biceps                      60 units  Pronator Teres         50 units  FCR                         60 units  FDS                         25 units    Total Units Injected:195 units  Unavoidable waste: 5 units     The patient tolerated the procedure well with no immediate complications.  The patient should call with concerns or questions over the coming days.       Standard Progress Note


Progress Notes/Assess & Plan


Date Seen by a Provider:  Nov 30, 2022


Time Seen by a Provider:  09:15


Progress/Assessment & Plan


post op check


no complaints


radographs--Hw well positioned without fracture


LLE--2 plus DP pulse with brisk refill


sensation intact to light touch throughout


intact DF and PF of toes and ankle


s/p LTKA 


mobilize as able











GUSTAVO WOODWARD MD            Nov 30, 2022 10:45

## 2025-02-24 NOTE — PROGRESS NOTES
Physical Medicine & Rehabilitation Outpatient New Patient Note    Chief Complaint:    Chief Complaint   Patient presents with    Botox Injection     Botox 200 units     Subjective:    Nanci Bar is a 84 y.o.   female with PMH significant for CAD, breast cancer, hyperlipidemia, hypertension, hypothyroidism, PORFIRIO, type 2 diabetes, and CVA who presents today for follow up and repeat Botox injections.    Patient was last seen in PM&R clinic on 11/20/2024 at which time botox injections were performed to her LUE. Since last visit. Since last visit, patient was admitted to [facility] from 2/19/2025-2/22/2025 due to symptomatic bradycardia. Patient reportedly with 2-1 AV block, RBBB with bradycardia in the high 20s. Patient required TVP placement. There was concern for carbamazepine induced conduction dysfunction due to new onset symptomatic bradycardia upon beginning medication. Patient was DC home on 2/22/2025 with cardiology follow up. Today, patient presents to clinic with her  in a [facility]. She states that since her hospitalization she has been feeling more weak since discharge and additionally, she is anxious regarding her health. She states that she gets nervous to go to bed that she may not wake up. She has an appointment to see her PCP tomorrow and states she needs to make an appointment with cardiology. She is needing more assistance with ADLs and feels like she has noticed changes in her mobility.     She continues to endorse improved tone with use of botox but still feels pretty tight in her upper extremity. It's hard for her to assess what parts of her arm are the worst.       Initial history:  Per chart review, patient has history of CVA with resultant left hemiparesis in 2019.  Per report, patient made significant recovery following her stroke, however, in 2020 patient with COVID resulted in exacerbation of her prior CVA symptoms.  Patient recently in outpatient OT.  Patient discharged from OT on

## 2025-02-24 NOTE — TELEPHONE ENCOUNTER
Care Transitions Initial Follow Up Call    Outreach made within 2 business days of discharge: Yes    Patient: Nanci Bar Patient : 1940   MRN: [id number]  Reason for Admission: Facial Pain  Discharge Date: 25       Spoke with: Patient    Discharge department/facility: [facility] in [place], [place].    TCM Interactive Patient Contact:  Was patient able to fill all prescriptions: Yes  Was patient instructed to bring all medications to the follow-up visit: Yes  Is patient taking all medications as directed in the discharge summary? Yes  Does patient understand their discharge instructions: Yes  Does patient have questions or concerns that need addressed prior to 7-14 day follow up office visit: no    Additional needs identified to be addressed with provider  No needs identified.             Scheduled appointment with PCP within 7-14 days    Follow Up  Future Appointments   Date Time Provider Department Center   2025  1:00 PM Adelina Nascimento DO N [facility] Pain [facility]   2025 10:00 AM Rut Mari DO [facility] FM [facility]   10/20/2025 11:00 AM Yudi Ayala MD N [facility] Heart [facility]       Sherley Gale CMA ([facility])

## 2025-02-26 ENCOUNTER — OFFICE VISIT (OUTPATIENT)
Dept: PHYSICAL MEDICINE AND REHAB | Age: 85
End: 2025-02-26
Payer: MEDICARE

## 2025-02-26 VITALS
SYSTOLIC BLOOD PRESSURE: 120 MMHG | DIASTOLIC BLOOD PRESSURE: 80 MMHG | BODY MASS INDEX: 25.39 KG/M2 | HEIGHT: 63 IN | WEIGHT: 143.3 LBS

## 2025-02-26 DIAGNOSIS — G81.94 LEFT HEMIPARESIS (HCC): ICD-10-CM

## 2025-02-26 DIAGNOSIS — I69.30 HISTORY OF CVA WITH RESIDUAL DEFICIT: Primary | ICD-10-CM

## 2025-02-26 DIAGNOSIS — G81.14 LEFT SPASTIC HEMIPARESIS (HCC): ICD-10-CM

## 2025-02-26 PROCEDURE — 1159F MED LIST DOCD IN RCRD: CPT | Performed by: STUDENT IN AN ORGANIZED HEALTH CARE EDUCATION/TRAINING PROGRAM

## 2025-02-26 PROCEDURE — 3074F SYST BP LT 130 MM HG: CPT | Performed by: STUDENT IN AN ORGANIZED HEALTH CARE EDUCATION/TRAINING PROGRAM

## 2025-02-26 PROCEDURE — 1111F DSCHRG MED/CURRENT MED MERGE: CPT | Performed by: STUDENT IN AN ORGANIZED HEALTH CARE EDUCATION/TRAINING PROGRAM

## 2025-02-26 PROCEDURE — G8400 PT W/DXA NO RESULTS DOC: HCPCS | Performed by: STUDENT IN AN ORGANIZED HEALTH CARE EDUCATION/TRAINING PROGRAM

## 2025-02-26 PROCEDURE — G8427 DOCREV CUR MEDS BY ELIG CLIN: HCPCS | Performed by: STUDENT IN AN ORGANIZED HEALTH CARE EDUCATION/TRAINING PROGRAM

## 2025-02-26 PROCEDURE — 1090F PRES/ABSN URINE INCON ASSESS: CPT | Performed by: STUDENT IN AN ORGANIZED HEALTH CARE EDUCATION/TRAINING PROGRAM

## 2025-02-26 PROCEDURE — 64642 CHEMODENERV 1 EXTREMITY 1-4: CPT | Performed by: STUDENT IN AN ORGANIZED HEALTH CARE EDUCATION/TRAINING PROGRAM

## 2025-02-26 PROCEDURE — 1123F ACP DISCUSS/DSCN MKR DOCD: CPT | Performed by: STUDENT IN AN ORGANIZED HEALTH CARE EDUCATION/TRAINING PROGRAM

## 2025-02-26 PROCEDURE — G8417 CALC BMI ABV UP PARAM F/U: HCPCS | Performed by: STUDENT IN AN ORGANIZED HEALTH CARE EDUCATION/TRAINING PROGRAM

## 2025-02-26 PROCEDURE — 95874 GUIDE NERV DESTR NEEDLE EMG: CPT | Performed by: STUDENT IN AN ORGANIZED HEALTH CARE EDUCATION/TRAINING PROGRAM

## 2025-02-26 PROCEDURE — 3079F DIAST BP 80-89 MM HG: CPT | Performed by: STUDENT IN AN ORGANIZED HEALTH CARE EDUCATION/TRAINING PROGRAM

## 2025-02-26 PROCEDURE — 99213 OFFICE O/P EST LOW 20 MIN: CPT | Performed by: STUDENT IN AN ORGANIZED HEALTH CARE EDUCATION/TRAINING PROGRAM

## 2025-02-26 PROCEDURE — 1036F TOBACCO NON-USER: CPT | Performed by: STUDENT IN AN ORGANIZED HEALTH CARE EDUCATION/TRAINING PROGRAM

## 2025-02-27 ENCOUNTER — OFFICE VISIT (OUTPATIENT)
Dept: FAMILY MEDICINE CLINIC | Age: 85
End: 2025-02-27

## 2025-02-27 VITALS
BODY MASS INDEX: 25.25 KG/M2 | DIASTOLIC BLOOD PRESSURE: 76 MMHG | HEART RATE: 88 BPM | OXYGEN SATURATION: 99 % | WEIGHT: 142.5 LBS | SYSTOLIC BLOOD PRESSURE: 146 MMHG | TEMPERATURE: 96.8 F

## 2025-02-27 DIAGNOSIS — G81.94 LEFT HEMIPARESIS (HCC): ICD-10-CM

## 2025-02-27 DIAGNOSIS — I10 PRIMARY HYPERTENSION: ICD-10-CM

## 2025-02-27 DIAGNOSIS — I44.2 COMPLETE HEART BLOCK (HCC): ICD-10-CM

## 2025-02-27 DIAGNOSIS — Z09 HOSPITAL DISCHARGE FOLLOW-UP: Primary | ICD-10-CM

## 2025-02-27 DIAGNOSIS — R00.1 SYMPTOMATIC BRADYCARDIA: ICD-10-CM

## 2025-02-27 RX ORDER — VALSARTAN 40 MG/1
40 TABLET ORAL DAILY
Qty: 90 TABLET | Refills: 1 | Status: SHIPPED | OUTPATIENT
Start: 2025-02-27

## 2025-02-27 NOTE — PROGRESS NOTES
Post-Discharge Transitional Care  Follow Up      Nanci Bar   YOB: 1940    Date of Office Visit:  2/27/2025  Date of Hospital Admission: 2/19/25  Date of Hospital Discharge: 2/22/25  Risk of hospital readmission (high >=14%. Medium >=10%) :Readmission Risk Score: 15.1      Care management risk score Rising risk (score 2-5) and Complex Care (Scores >=6): No Risk Score On File     Non face to face  following discharge, date last encounter closed (first attempt may have been earlier): 02/24/2025    Call initiated 2 business days of discharge: Yes    ASSESSMENT/PLAN:   Hospital discharge follow-up  -     UT DISCHARGE MEDS RECONCILED W/ CURRENT OUTPATIENT MED LIST  Primary hypertension  -     valsartan (DIOVAN) 40 MG tablet; Take 1 tablet by mouth daily, Disp-90 tablet, R-1Normal  Complete heart block (HCC)  Symptomatic bradycardia  Left hemiparesis (HCC)    Medical Decision Making: moderate complexity  Return in 3 months (on 5/27/2025) for Medicare Wellness.           Subjective:   HPI:  Follow up of Hospital problems/diagnosis(es): BPs at home have been better 120s/80s, slightly higher today with activity, has only been taking the valsartan once a day   Temporary pacemaker while in the hospital   No [facility] pacemakers or heart arrthymias, hasn't made an appointment yet with Dr Kirby  Lidocaine patches have been helping with her shoulder pain, doesn't have to use them every day, didn't need them in the hospital    Inpatient course: Discharge summary reviewed- see chart.    Interval history/Current status: continue with current dosing of valsartan since blood pressures have been doing better at [facility], make an appt with Dr Kirby, discussed possible EP studies in the future  Has also been struggling to sleep due to mild anxiety, no desire to do additional medication, discussed possible benefit with melatonin or Valerio sleep gummies    Patient Active Problem List   Diagnosis    Type 2 diabetes mellitus with

## 2025-03-06 ENCOUNTER — HOSPITAL ENCOUNTER (OUTPATIENT)
Dept: OCCUPATIONAL THERAPY | Age: 85
Setting detail: THERAPIES SERIES
Discharge: HOME | End: 2025-03-06
Payer: MEDICARE

## 2025-03-06 PROCEDURE — 97166 OT EVAL MOD COMPLEX 45 MIN: CPT

## 2025-03-06 NOTE — PROGRESS NOTES
progress note (Reporting Period: 3/6/25 to     )   Recertification Date 05/01/25   Physician Follow-Up NT   Physician Orders OT Eval and Tx   History of Present Illness Hospital Course:   Pt had been to the ED 2/15/25 with facial neuralgia that was very painful for her.  She was treated and released.     Per ED notes:  Nanci Bar is a 84 y.o. female admitted to [facility] on 2/19/2025 for symptomatic bradycardia with a near syncopal episode. While in the ED, patient developed bradycardia reaching HR of 20s to which she was a TVP inserted. Patient was required ICU admission for EKG showed 2-1 AV block, RBBB with bradycardia in the high 20s with a near syncopal events.  Bradycardia was refractory to Atropine and improved with epinephrine/dopamine infusion.  Patient had a TVP placed on 2/19/2025, and he was removed 2/21/2025 due to stable heart rate.  There was concern for carbamazepine induced conduction dysfunction due new onset symptomatic bradycardia upon beginning medication. Recommendation to discontinue carbamazepine and any AV lea blocking medication.  Patient's medical status stabilized with IV fluids, and medication optimization per cardiology's recommendation  Patient remained stable, and plans for discharge were made.      Pt discharged 2/23/25.  She has been home for almost 2 weeks.   She goes to \"\" 2x/week- at [facility], Mondays and Wednesdays.  She participates in  exercises, games, discussion groups, corn hole, bat balloons with noodles, golf, bowl, bingo. She reports she does what she can within her limits of L UE/LE hemiplegia.     Currently, pt feels weak and decreased energy and endurance.  She also has a fear of falling.     Botox done to L UE 2/26/25 and pt reports she feels that this has improved her ROM but continues to have pain in L UE with ROM.      SUBJECTIVE: Pt reports no pain currently. She does have occasional R shoulder pain but uses

## 2025-03-09 DIAGNOSIS — I25.10 CORONARY ARTERY DISEASE INVOLVING NATIVE CORONARY ARTERY OF NATIVE HEART WITHOUT ANGINA PECTORIS: ICD-10-CM

## 2025-03-10 RX ORDER — CLOPIDOGREL BISULFATE 75 MG/1
75 TABLET ORAL DAILY
Qty: 90 TABLET | Refills: 3 | Status: SHIPPED | OUTPATIENT
Start: 2025-03-10

## 2025-03-10 NOTE — TELEPHONE ENCOUNTER
Nanci Bar needs refill of   Requested Prescriptions     Pending Prescriptions Disp Refills    clopidogrel (PLAVIX) 75 MG tablet [Pharmacy Med Name: Clopidogrel Bisulfate 75 MG Oral Tablet] 90 tablet 0     Sig: Take 1 tablet by mouth once daily       Last Filled on:  03/18/2024 #90 R-3 sent to [facility] on [facility] by Rut Mari DO.     Last Visit Date:  2/27/2025    Next Visit Date:  6/10/2025

## 2025-03-11 ENCOUNTER — OFFICE VISIT (OUTPATIENT)
Dept: CARDIOLOGY CLINIC | Age: 85
End: 2025-03-11
Payer: MEDICARE

## 2025-03-11 ENCOUNTER — HOSPITAL ENCOUNTER (OUTPATIENT)
Dept: PHYSICAL THERAPY | Age: 85
Setting detail: THERAPIES SERIES
Discharge: HOME | End: 2025-03-11
Payer: MEDICARE

## 2025-03-11 VITALS
SYSTOLIC BLOOD PRESSURE: 120 MMHG | HEART RATE: 74 BPM | BODY MASS INDEX: 25.16 KG/M2 | DIASTOLIC BLOOD PRESSURE: 58 MMHG | HEIGHT: 63 IN | WEIGHT: 142 LBS

## 2025-03-11 DIAGNOSIS — I35.0 NONRHEUMATIC AORTIC VALVE STENOSIS: ICD-10-CM

## 2025-03-11 DIAGNOSIS — I10 PRIMARY HYPERTENSION: Primary | ICD-10-CM

## 2025-03-11 PROCEDURE — 97162 PT EVAL MOD COMPLEX 30 MIN: CPT

## 2025-03-11 PROCEDURE — G8417 CALC BMI ABV UP PARAM F/U: HCPCS | Performed by: PHYSICIAN ASSISTANT

## 2025-03-11 PROCEDURE — 99214 OFFICE O/P EST MOD 30 MIN: CPT | Performed by: PHYSICIAN ASSISTANT

## 2025-03-11 PROCEDURE — 3074F SYST BP LT 130 MM HG: CPT | Performed by: PHYSICIAN ASSISTANT

## 2025-03-11 PROCEDURE — 3078F DIAST BP <80 MM HG: CPT | Performed by: PHYSICIAN ASSISTANT

## 2025-03-11 PROCEDURE — 1090F PRES/ABSN URINE INCON ASSESS: CPT | Performed by: PHYSICIAN ASSISTANT

## 2025-03-11 PROCEDURE — 1111F DSCHRG MED/CURRENT MED MERGE: CPT | Performed by: PHYSICIAN ASSISTANT

## 2025-03-11 PROCEDURE — 1036F TOBACCO NON-USER: CPT | Performed by: PHYSICIAN ASSISTANT

## 2025-03-11 PROCEDURE — 1123F ACP DISCUSS/DSCN MKR DOCD: CPT | Performed by: PHYSICIAN ASSISTANT

## 2025-03-11 PROCEDURE — G8427 DOCREV CUR MEDS BY ELIG CLIN: HCPCS | Performed by: PHYSICIAN ASSISTANT

## 2025-03-11 PROCEDURE — G8400 PT W/DXA NO RESULTS DOC: HCPCS | Performed by: PHYSICIAN ASSISTANT

## 2025-03-11 PROCEDURE — 1159F MED LIST DOCD IN RCRD: CPT | Performed by: PHYSICIAN ASSISTANT

## 2025-03-11 NOTE — PROGRESS NOTES
[facility]  PHYSICAL THERAPY  [x] EVALUATION  [] DAILY NOTE (LAND) [] DAILY NOTE (AQUATIC ) [] PROGRESS NOTE [] DISCHARGE NOTE    [x] OUTPATIENT REHABILITATION CENTER [facility]   [] [place] AMBULATORY CARE [place]    [] [facility]   [] M[facility]    Date: 3/11/2025  Patient Name:  Nanci Bar  : 1940  MRN: [id number]  CSN: [id number]    Referring Practitioner Adelina Nascimento DO [id number]      Diagnosis  Diagnoses       I69.30 (ICD-10-CM) - Unspecified sequelae of cerebral infarction    G81.14 (ICD-10-CM) - Spastic hemiplegia affecting left nondominant side (HCC)           Treatment Diagnosis R26.81  Unsteadiness on feet  R26.89  Abnormalities of gait and mobility  R53.1 Weakness   Date of Evaluation 3/11/25   Additional Pertinent History Nanci Bar has a past medical history of MYRA (acute kidney injury), Anxiety, Arthritis, Arthritis, degenerative, localized, primary, hand, Bladder spasm, CAD (coronary artery disease), Cancer ([facility]), Cerebrovascular accident (CVA) ([facility]), Hyperlipidemia, Hypertension, Hypothyroidism, Obstructive sleep apnea of adult, SOB (shortness of breath), Type II or unspecified type diabetes mellitus without mention of complication, not stated as uncontrolled, and 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  she has a past surgical history that includes Cholecystectomy (1968); Mastectomy (2001); Hysterectomy (1996); Knee arthroscopy; Breast surgery (1974); Carpal tunnel release (Right, 2013); hernia repair (14); Colonoscopy (2007); lymph node dissection (1998); Breast biopsy (2014); joint replacement (Left); Finger trigger release; and knee surgery (Right, 2017).     Allergies Allergies   Allergen Reactions    Carbamazepine Other (See Comments)     Severe bradycardia re: TVP       Medications   Current Outpatient Medications:     clopidogrel (PLAVIX) 75 MG tablet, Take 1 tablet by mouth once daily,

## 2025-03-11 NOTE — PROGRESS NOTES
!  +--------+-----+------+-------+-----------+--+----------+------+-----------+  !Recovery!1.000!      !89     !142/65     !  !          !      !           !  +--------+-----+------+-------+-----------+--+----------+------+-----------+  !Recovery!2.000!      !86     !131/58     !  !          !      !           !  +--------+-----+------+-------+-----------+--+----------+------+-----------+  !Recovery!3.000!      !85     !137/64     !  !          !      !           !  +--------+-----+------+-------+-----------+--+----------+------+-----------+  !Recovery!4.000!      !85     !131/64     !  !          !      !           !  +--------+-----+------+-------+-----------+--+----------+------+-----------+  !Recovery!5.000!      !86     !143/65     !  !          !      !           !  +--------+-----+------+-------+-----------+--+----------+------+-----------+     Peak HR:93 bpm                              HR/BP product:13276   Peak BP:175/82 mmHg   Predicted HR: 145 bpm   % of predicted HR: 64   Test duration: 5 min   Reason for termination:Protocol completed      ECG Findings   No ischemic EKG changes.      Arrhythmias   No stress induced arrhythmia.      Complications   Procedure complication was none.      Stress Interpretation   Lexiscan EKG stress test is not suggestive for ischemia.      Imaging Protocols      Rest                                Stress      Isotope:Tc-99m Sestamibi            Isotope: Tc-99m Sestamibi   Isotope dose:8.3 mCi                Isotope dose:29.6 mCi   Date:05/15/2018 10:05               Date:05/15/2018 10:52      Technique:           SPECT          Technique:           Gated                                                            SPECT     Imaging Results:Calculated gated LVEF 76 %.  The T.I.D. ratio was 0.97     Conclusions      Summary   Lexiscan EKG stress test is not suggestive for ischemia.   This Nuclear Medicine study was negative for ischemia.      Recommendation   Clinical

## 2025-03-11 NOTE — PROGRESS NOTES
[facility]  Therapy Contact Note      Date: 3/11/2025  Patient Name: Nanci Bar        MRN: [id number]    Account Number: [id number]  : 1940  (84 y.o.)  Gender: female       Patient express difficulty showering and not showering as often due to afraid of falling during OT eval. This Rehab Tech met with patient to see if additional services would be helpful to have. Assisted patient with filling out a Medicaid application. Patient has to be approved for Medicaid to receive passport services. [employer] will contact patient directly to met with patient to see if she qualifies for any other services besides passport. Patient's significant other did state he could assist patient with showering. He was unaware that she needed any assistance.     Jaida Rodney, Rehab Tech

## 2025-03-18 ENCOUNTER — HOSPITAL ENCOUNTER (OUTPATIENT)
Dept: PHYSICAL THERAPY | Age: 85
Setting detail: THERAPIES SERIES
Discharge: HOME | End: 2025-03-18
Payer: MEDICARE

## 2025-03-18 ENCOUNTER — HOSPITAL ENCOUNTER (OUTPATIENT)
Dept: OCCUPATIONAL THERAPY | Age: 85
Setting detail: THERAPIES SERIES
Discharge: HOME | End: 2025-03-18
Payer: MEDICARE

## 2025-03-18 PROCEDURE — 97110 THERAPEUTIC EXERCISES: CPT

## 2025-03-18 PROCEDURE — 97140 MANUAL THERAPY 1/> REGIONS: CPT

## 2025-03-18 NOTE — PROGRESS NOTES
ease of curb negotiation and walking.  2. Patient will improve CADE score from 32/56 to 45/56 to decrease fall risk.  3. Patient will perform 5 time sit to stand test to improve functional strength needed for getting on/off commode easily.  4. Patient will ambulate 250 feet with 4WW and SBA safely to improve community mobility.   5. Patient will be I with HEP to reduce risk of re-injury and improve mobility.       Patient Education:   [x]  HEP/Education Completed: exercise technique, upright posture, slow controlled movement  Medbridge Access Code:  []  No new Education completed  []  Reviewed Prior HEP      [x]  Patient verbalized and/or demonstrated understanding of education provided.  []  Patient unable to verbalize and/or demonstrate understanding of education provided.  Will continue education.  []  Barriers to learning:     PLAN:  Treatment Recommendations: Strengthening, Range of Motion, Balance Training, Functional Mobility Training, Transfer Training, Endurance Training, Gait Training, Stair Training, Neuromuscular Re-education, Manual Therapy - Soft Tissue Mobilization, Home Exercise Program, Patient Education, and Aquatics    []  Plan of care initiated.  Plan to see patient 2 times per week for 8 weeks to address the treatment planned outlined above.  [x]  Continue with current plan of care  []  Modify plan of care as follows:    []  Hold pending physician visit  []  Discharge    Time In 1331   Time Out 1415   Timed Code Minutes: 44 min   Total Treatment Time: 44 min       Electronically Signed by: Zahira Shah PT

## 2025-03-18 NOTE — PROGRESS NOTES
independence with ADL/IADL.  Pt will report mild to no pain with PROM.   Pt will be mod I for showering with use of AE for safety.     Patient Education:   [x]  HEP/Education Completed: Plan of Care, Goals, multi schedule   3/18: supine hand clasped chest press, flexion, horizontal abduction, ER touching top of head, supine or seated wrist extension stretch to L hand   []  No new Education completed  []  Reviewed Prior HEP      []  Patient verbalized and/or demonstrated understanding of education provided.  []  Patient unable to verbalize and/or demonstrate understanding of education provided.  Will continue education.  [x]  Barriers to learning: none    PLAN:  Treatment Recommendations: Range of Motion, Functional Mobility Training, Transfer Training, Endurance Training, Neuromuscular Re-education, Manual Therapy - Soft Tissue Mobilization, Manual Therapy - Joint Manipulation, Pain Management, Home Exercise Program, Patient Education, Safety Education and Training, Self-Care Education and Training, Modalities, and Splint Fabrications/Modifications    []  Plan of care initiated.  Plan to see patient 2 times per week for 8 weeks to address the treatment planned outlined above.  [x]  Continue with current plan of care  []  Modify plan of care as follows:    []  Hold pending physician visit  []  Discharge    Time In 1432   Time Out 1514   Timed Code Minutes: 42 min   Total Treatment Time: 42 min       Electronically Signed by: Meenakshi LEIVA #[id number]

## 2025-03-25 ENCOUNTER — HOSPITAL ENCOUNTER (OUTPATIENT)
Dept: OCCUPATIONAL THERAPY | Age: 85
Setting detail: THERAPIES SERIES
Discharge: HOME | End: 2025-03-25
Payer: MEDICARE

## 2025-03-25 ENCOUNTER — HOSPITAL ENCOUNTER (OUTPATIENT)
Dept: PHYSICAL THERAPY | Age: 85
Setting detail: THERAPIES SERIES
Discharge: HOME | End: 2025-03-25
Payer: MEDICARE

## 2025-03-25 PROCEDURE — 97530 THERAPEUTIC ACTIVITIES: CPT

## 2025-03-25 PROCEDURE — 97110 THERAPEUTIC EXERCISES: CPT

## 2025-03-25 NOTE — PROGRESS NOTES
learning: none    PLAN:  Treatment Recommendations: Range of Motion, Functional Mobility Training, Transfer Training, Endurance Training, Neuromuscular Re-education, Manual Therapy - Soft Tissue Mobilization, Manual Therapy - Joint Manipulation, Pain Management, Home Exercise Program, Patient Education, Safety Education and Training, Self-Care Education and Training, Modalities, and Splint Fabrications/Modifications    []  Plan of care initiated.  Plan to see patient 2 times per week for 8 weeks to address the treatment planned outlined above.  [x]  Continue with current plan of care  []  Modify plan of care as follows:    []  Hold pending physician visit  []  Discharge    Time In 1245   Time Out 1330   Timed Code Minutes: 45 min   Total Treatment Time: 45 min       Electronically Signed by: Jaylyn KIMBROUGH OTR/L [id number]

## 2025-03-25 NOTE — PROGRESS NOTES
[facility]  PHYSICAL THERAPY  [] EVALUATION  [x] DAILY NOTE (LAND) [] DAILY NOTE (AQUATIC ) [] PROGRESS NOTE [] DISCHARGE NOTE    [x] OUTPATIENT REHABILITATION [facility]   [] [place] AMBULATORY CARE [place]    [] [facility]   [] [facility]    Date: 3/25/2025  Patient Name:  Nanci Bar  : 1940  MRN: [id number]  CSN: [id number]    Referring Practitioner Adelina Nascimneto DO [id number]      Diagnosis  Diagnoses       I69.30 (ICD-10-CM) - Unspecified sequelae of cerebral infarction    G81.14 (ICD-10-CM) - Spastic hemiplegia affecting left nondominant side (HCC)           Treatment Diagnosis R26.81  Unsteadiness on feet  R26.89  Abnormalities of gait and mobility  R53.1 Weakness   Date of Evaluation 3/11/25   Additional Pertinent History Nanci Bar has a past medical history of MYRA (acute kidney injury), Anxiety, Arthritis, Arthritis, degenerative, localized, primary, hand, Bladder spasm, CAD (coronary artery disease), Cancer ([facility]), Cerebrovascular accident (CVA) ([facility]), Hyperlipidemia, Hypertension, Hypothyroidism, Obstructive sleep apnea of adult, SOB (shortness of breath), Type II or unspecified type diabetes mellitus without mention of complication, not stated as uncontrolled, and 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  she has a past surgical history that includes Cholecystectomy (1968); Mastectomy (2001); Hysterectomy (1996); Knee arthroscopy; Breast surgery (1974); Carpal tunnel release (Right, 2013); hernia repair (14); Colonoscopy (2007); lymph node dissection (1998); Breast biopsy (2014); joint replacement (Left); Finger trigger release; and knee surgery (Right, 2017).     Allergies Allergies   Allergen Reactions    Carbamazepine Other (See Comments)     Severe bradycardia re: TVP       Medications   Current Outpatient Medications:     clopidogrel (PLAVIX) 75 MG tablet, Take 1 tablet by mouth once daily,

## 2025-03-27 ENCOUNTER — HOSPITAL ENCOUNTER (OUTPATIENT)
Dept: OCCUPATIONAL THERAPY | Age: 85
Setting detail: THERAPIES SERIES
Discharge: HOME | End: 2025-03-27
Payer: MEDICARE

## 2025-03-27 ENCOUNTER — HOSPITAL ENCOUNTER (OUTPATIENT)
Dept: PHYSICAL THERAPY | Age: 85
Setting detail: THERAPIES SERIES
Discharge: HOME | End: 2025-03-27
Payer: MEDICARE

## 2025-03-27 PROCEDURE — 97110 THERAPEUTIC EXERCISES: CPT

## 2025-03-27 NOTE — PROGRESS NOTES
progress note (Reporting Period: 3/6/25 to     )   Recertification Date 05/01/25   Physician Follow-Up NT   Physician Orders OT Eval and Tx   History of Present Illness Hospital Course:   Pt had been to the ED 2/15/25 with facial neuralgia that was very painful for her.  She was treated and released.     Per ED notes:  Nanci Bar is a 84 y.o. female admitted to [facility] on 2/19/2025 for symptomatic bradycardia with a near syncopal episode. While in the ED, patient developed bradycardia reaching HR of 20s to which she was a TVP inserted. Patient was required ICU admission for EKG showed 2-1 AV block, RBBB with bradycardia in the high 20s with a near syncopal events.  Bradycardia was refractory to Atropine and improved with epinephrine/dopamine infusion.  Patient had a TVP placed on 2/19/2025, and he was removed 2/21/2025 due to stable heart rate.  There was concern for carbamazepine induced conduction dysfunction due new onset symptomatic bradycardia upon beginning medication. Recommendation to discontinue carbamazepine and any AV lea blocking medication.  Patient's medical status stabilized with IV fluids, and medication optimization per cardiology's recommendation  Patient remained stable, and plans for discharge were made.      Pt discharged 2/23/25.  She has been home for almost 2 weeks.   She goes to \"\" 2x/week- at [facility] on [facility], Mondays and Wednesdays.  She participates in  exercises, games, discussion groups, corn Delectable, bat balloons with noodles, golf, bowl, bingo. She reports she does what she can within her limits of L UE/LE hemiplegia.     Currently, pt feels weak and decreased energy and endurance.  She also has a fear of falling.     Botox done to L UE 2/26/25 and pt reports she feels that this has improved her ROM but continues to have pain in L UE with ROM.      SUBJECTIVE:  Patient reports she and her friend is still having difficulty putting on her

## 2025-03-27 NOTE — PROGRESS NOTES
[facility]  PHYSICAL THERAPY  [] EVALUATION  [x] DAILY NOTE (LAND) [] DAILY NOTE (AQUATIC ) [] PROGRESS NOTE [] DISCHARGE NOTE    [x] OUTPATIENT REHABILITATION CENTER [facility]   [] [place] AMBULATORY CARE [place]    [] [facility]   [] [facility]    Date: 3/27/2025  Patient Name:  Nanci Bar  : 1940  MRN: [id number]  CSN: [id number]    Referring Practitioner Adelina Nascimento DO [id number]      Diagnosis  Diagnoses       I69.30 (ICD-10-CM) - Unspecified sequelae of cerebral infarction    G81.14 (ICD-10-CM) - Spastic hemiplegia affecting left nondominant side (HCC)           Treatment Diagnosis R26.81  Unsteadiness on feet  R26.89  Abnormalities of gait and mobility  R53.1 Weakness   Date of Evaluation 3/11/25   Additional Pertinent History Nanci Bar has a past medical history of MYRA (acute kidney injury), Anxiety, Arthritis, Arthritis, degenerative, localized, primary, hand, Bladder spasm, CAD (coronary artery disease), Cancer ([facility]), Cerebrovascular accident (CVA) ([facility]), Hyperlipidemia, Hypertension, Hypothyroidism, Obstructive sleep apnea of adult, SOB (shortness of breath), Type II or unspecified type diabetes mellitus without mention of complication, not stated as uncontrolled, and 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  she has a past surgical history that includes Cholecystectomy (1968); Mastectomy (2001); Hysterectomy (1996); Knee arthroscopy; Breast surgery (1974); Carpal tunnel release (Right, 2013); hernia repair (14); Colonoscopy (2007); lymph node dissection (1998); Breast biopsy (2014); joint replacement (Left); Finger trigger release; and knee surgery (Right, 2017).     Allergies Allergies   Allergen Reactions    Carbamazepine Other (See Comments)     Severe bradycardia re: TVP       Medications   Current Outpatient Medications:     clopidogrel (PLAVIX) 75 MG tablet, Take 1 tablet by mouth once daily,

## 2025-04-01 ENCOUNTER — HOSPITAL ENCOUNTER (OUTPATIENT)
Dept: PHYSICAL THERAPY | Age: 85
Setting detail: THERAPIES SERIES
Discharge: HOME | End: 2025-04-01
Payer: MEDICARE

## 2025-04-01 ENCOUNTER — HOSPITAL ENCOUNTER (OUTPATIENT)
Dept: OCCUPATIONAL THERAPY | Age: 85
Setting detail: THERAPIES SERIES
Discharge: HOME | End: 2025-04-01
Payer: MEDICARE

## 2025-04-01 PROCEDURE — 97140 MANUAL THERAPY 1/> REGIONS: CPT

## 2025-04-01 PROCEDURE — 97110 THERAPEUTIC EXERCISES: CPT

## 2025-04-01 NOTE — PROGRESS NOTES
[facility]  PHYSICAL THERAPY  [] EVALUATION  [x] DAILY NOTE (LAND) [] DAILY NOTE (AQUATIC ) [] PROGRESS NOTE [] DISCHARGE NOTE    [x] OUTPATIENT REHABILITATION [facility]   [] [place] AMBULATORY CARE [place]    [] [facility]   [] M[facility]    Date: 2025  Patient Name:  Nanci Bar  : 1940  MRN: [id number]  CSN: [id number]    Referring Practitioner Adelina Nascimento DO [id number]      Diagnosis  Diagnoses       I69.30 (ICD-10-CM) - Unspecified sequelae of cerebral infarction    G81.14 (ICD-10-CM) - Spastic hemiplegia affecting left nondominant side (HCC)           Treatment Diagnosis R26.81  Unsteadiness on feet  R26.89  Abnormalities of gait and mobility  R53.1 Weakness   Date of Evaluation 3/11/25   Additional Pertinent History Nanci Bar has a past medical history of MYRA (acute kidney injury), Anxiety, Arthritis, Arthritis, degenerative, localized, primary, hand, Bladder spasm, CAD (coronary artery disease), Cancer ([facility]), Cerebrovascular accident (CVA) ([facility]), Hyperlipidemia, Hypertension, Hypothyroidism, Obstructive sleep apnea of adult, SOB (shortness of breath), Type II or unspecified type diabetes mellitus without mention of complication, not stated as uncontrolled, and 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  she has a past surgical history that includes Cholecystectomy (1968); Mastectomy (2001); Hysterectomy (1996); Knee arthroscopy; Breast surgery (1974); Carpal tunnel release (Right, 2013); hernia repair (14); Colonoscopy (2007); lymph node dissection (1998); Breast biopsy (2014); joint replacement (Left); Finger trigger release; and knee surgery (Right, 2017).     Allergies Allergies   Allergen Reactions    Carbamazepine Other (See Comments)     Severe bradycardia re: TVP       Medications   Current Outpatient Medications:     clopidogrel (PLAVIX) 75 MG tablet, Take 1 tablet by mouth once daily,

## 2025-04-01 NOTE — PROGRESS NOTES
[facility]  OCCUPATIONAL THERAPY  [] EVALUATION  [x] DAILY NOTE (LAND) [] DAILY NOTE (AQUATIC ) [] PROGRESS NOTE [] DISCHARGE NOTE    [x] OUTPATIENT REHABILITATION CENTER [facility]   [] [place] AMBULATORY CARE [place]    [] [facility]   [] [facility]    Date: 2025  Patient Name:  Nanci Bar  : 1940  MRN: [id number]  CSN: [id number]    Referring Practitioner Adelina Nascimento DO [id number]      Diagnosis  Diagnoses       I69.30 (ICD-10-CM) - Unspecified sequelae of cerebral infarction    G81.14 (ICD-10-CM) - Spastic hemiplegia affecting left nondominant side (HCC)           Treatment Diagnosis M62.81 Generalized Weakness  Z73.6 Limitation of Activities Due to Disability   Date of Evaluation 3/6/25   Additional Pertinent History Nanci Bar has a past medical history of MYRA (acute kidney injury), Anxiety, Arthritis, Arthritis, degenerative, localized, primary, hand, Bladder spasm, CAD (coronary artery disease), Cancer (HCC), Cerebrovascular accident (CVA) ([facility]), Hyperlipidemia, Hypertension, Hypothyroidism, Obstructive sleep apnea of adult, SOB (shortness of breath), Type II or unspecified type diabetes mellitus without mention of complication, not stated as uncontrolled, and 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  she has a past surgical history that includes Cholecystectomy (1968); Mastectomy (2001); Hysterectomy (1996); Knee arthroscopy; Breast surgery (1974); Carpal tunnel release (Right, 2013); hernia repair (14); Colonoscopy (2007); lymph node dissection (1998); Breast biopsy (2014); joint replacement (Left); Finger trigger release; and knee surgery (Right, 2017).    Pt had stroke in 2019 with residual L UE hemiplegia.    Allergies Allergies   Allergen Reactions    Carbamazepine Other (See Comments)     Severe bradycardia re: TVP       Medications   Current Outpatient Medications:     clopidogrel

## 2025-04-03 ENCOUNTER — HOSPITAL ENCOUNTER (OUTPATIENT)
Dept: PHYSICAL THERAPY | Age: 85
Setting detail: THERAPIES SERIES
Discharge: HOME | End: 2025-04-03
Payer: MEDICARE

## 2025-04-03 ENCOUNTER — HOSPITAL ENCOUNTER (OUTPATIENT)
Dept: OCCUPATIONAL THERAPY | Age: 85
Setting detail: THERAPIES SERIES
Discharge: HOME | End: 2025-04-03
Payer: MEDICARE

## 2025-04-03 PROCEDURE — 97110 THERAPEUTIC EXERCISES: CPT

## 2025-04-03 PROCEDURE — 97112 NEUROMUSCULAR REEDUCATION: CPT

## 2025-04-03 NOTE — PROGRESS NOTES
year. One fall going up the steps. Patient does have an AFO for L foot.      SUBJECTIVE: \"I usually get really tired after occupational therapy.  I was moving objects into containers.\"        TREATMENT   Precautions: Fall risk, history of CVA with L sided weakness   Pain: Denies     \"X” in shaded column indicates activity completed today    “*\" next to exercise/intervention indicates progression   Modalities Parameters/  Location  Notes                     Manual Therapy Time/Technique  Notes                     Exercise/Intervention   Notes   Nu step, seat 8 5 minutes L1 X           Seated:       LAQ, march  10 x 2 sec ea  X LAQ only today          Standing:       HR 10 x  X    Simulating reach and lift: EO and EC  - lift glove from middle of chair in front of her 8x  X    March 10 x  X    3 way hip kicks 10 x ea   X    Step ups forward B 10 x ea  4* inch X    Alt step taps  2 x 30s 4* inch  X    Side stepping  3 laps // bars X    Walking marches  2 laps  // bars  X           Step stance balance- B lead  30s ea   X             Specific Interventions Next Treatment: nu step, LE strengthening, sit to stands, gait with 4WW, balance tasks, dynamic gait, standing tolerance     Activity/Treatment Tolerance:  [x]  Patient tolerated treatment well  [x]  Patient limited by fatigue  []  Patient limited by pain   []  Patient limited by medical complications  []  Other:     Assessment:  Patient reports her fatigue has been less with therapy and feels that both OT and PT are making her stronger although she remains fatigued after sessions.  With verbal cues, patient was able to do the 3 way hip kicks with higher quality of motion.        GOALS:  Patient Goal: to walk a little better     Short Term Goals: 4 weeks  Patient will improve L knee strength to 4+/5 to allow ease of curb negotiation and walking.  2. Patient will improve CADE score from 32/56 to 40/56 to decrease fall risk.  3. Patient will ambulate 200 feet with 4WW and

## 2025-04-03 NOTE — PROGRESS NOTES
[facility]  OCCUPATIONAL THERAPY  [] EVALUATION  [x] DAILY NOTE (LAND) [] DAILY NOTE (AQUATIC ) [] PROGRESS NOTE [] DISCHARGE NOTE    [x] OUTPATIENT REHABILITATION CENTER [facility]   [] [place] AMBULATORY CARE [place]    [] [facility]   [] [facility]    Date: 4/3/2025  Patient Name:  Nanci Bar  : 1940  MRN: [id number]  CSN: [id number]    Referring Practitioner Adelina Nascimento DO [id number]      Diagnosis  Diagnoses       I69.30 (ICD-10-CM) - Unspecified sequelae of cerebral infarction    G81.14 (ICD-10-CM) - Spastic hemiplegia affecting left nondominant side (HCC)           Treatment Diagnosis M62.81 Generalized Weakness  Z73.6 Limitation of Activities Due to Disability   Date of Evaluation 3/6/25   Additional Pertinent History Nanci Bar has a past medical history of MYRA (acute kidney injury), Anxiety, Arthritis, Arthritis, degenerative, localized, primary, hand, Bladder spasm, CAD (coronary artery disease), Cancer ([facility]), Cerebrovascular accident (CVA) ([facility]), Hyperlipidemia, Hypertension, Hypothyroidism, Obstructive sleep apnea of adult, SOB (shortness of breath), Type II or unspecified type diabetes mellitus without mention of complication, not stated as uncontrolled, and 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  she has a past surgical history that includes Cholecystectomy (1968); Mastectomy (2001); Hysterectomy (1996); Knee arthroscopy; Breast surgery (1974); Carpal tunnel release (Right, 2013); hernia repair (14); Colonoscopy (2007); lymph node dissection (1998); Breast biopsy (2014); joint replacement (Left); Finger trigger release; and knee surgery (Right, 2017).    Pt had stroke in 2019 with residual L UE hemiplegia.    Allergies Allergies   Allergen Reactions    Carbamazepine Other (See Comments)     Severe bradycardia re: TVP       Medications   Current Outpatient Medications:     clopidogrel

## 2025-04-08 ENCOUNTER — HOSPITAL ENCOUNTER (OUTPATIENT)
Dept: OCCUPATIONAL THERAPY | Age: 85
Setting detail: THERAPIES SERIES
Discharge: HOME | End: 2025-04-08
Payer: MEDICARE

## 2025-04-08 ENCOUNTER — HOSPITAL ENCOUNTER (OUTPATIENT)
Dept: PHYSICAL THERAPY | Age: 85
Setting detail: THERAPIES SERIES
Discharge: HOME | End: 2025-04-08
Payer: MEDICARE

## 2025-04-08 PROCEDURE — 97112 NEUROMUSCULAR REEDUCATION: CPT

## 2025-04-08 PROCEDURE — 97110 THERAPEUTIC EXERCISES: CPT

## 2025-04-08 NOTE — PROGRESS NOTES
[place]  PHYSICAL THERAPY  [] EVALUATION  [x] DAILY NOTE (LAND) [] DAILY NOTE (AQUATIC ) [] PROGRESS NOTE [] DISCHARGE NOTE    [x] OUTPATIENT REHABILITATION [facility]   [] [place] [facility] [place]    [] [facility]   [] MICK [facility]    Date: 2025  Patient Name:  Nanci Bar  : 1940  MRN: [id number]  CSN: [id number]    Referring Practitioner Adelina Nascimento DO [id number]      Diagnosis  Diagnoses       I69.30 (ICD-10-CM) - Unspecified sequelae of cerebral infarction    G81.14 (ICD-10-CM) - Spastic hemiplegia affecting left nondominant side (HCC)           Treatment Diagnosis R26.81  Unsteadiness on feet  R26.89  Abnormalities of gait and mobility  R53.1 Weakness   Date of Evaluation 3/11/25   Additional Pertinent History Nanci Bar has a past medical history of MYRA (acute kidney injury), Anxiety, Arthritis, Arthritis, degenerative, localized, primary, hand, Bladder spasm, CAD (coronary artery disease), Cancer ([facility]), Cerebrovascular accident (CVA) ([facility]), Hyperlipidemia, Hypertension, Hypothyroidism, Obstructive sleep apnea of adult, SOB (shortness of breath), Type II or unspecified type diabetes mellitus without mention of complication, not stated as uncontrolled, and 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  she has a past surgical history that includes Cholecystectomy (1968); Mastectomy (2001); Hysterectomy (1996); Knee arthroscopy; Breast surgery (1974); Carpal tunnel release (Right, 2013); hernia repair (14); Colonoscopy (2007); lymph node dissection (1998); Breast biopsy (2014); joint replacement (Left); Finger trigger release; and knee surgery (Right, 2017).     Allergies Allergies   Allergen Reactions    Carbamazepine Other (See Comments)     Severe bradycardia re: TVP       Medications   Current Outpatient Medications:     clopidogrel (PLAVIX) 75 MG tablet, Take 1 tablet by mouth once daily,

## 2025-04-08 NOTE — PROGRESS NOTES
X            Specific Interventions Next Treatment: ADL: pulling pants up and down for toileting; L UE A/AA/PROM; HEP; L hand orthosis for nighttime wear (currently has one but does not wear due to discomfort and inability to jennifer)    Activity/Treatment Tolerance:  [x]  Patient tolerated treatment well  []  Patient limited by fatigue  []  Patient limited by pain   []  Patient limited by other medical complications  []  Other:     Assessment:  Patient is progressing towards goals.   Areas for Improvement: impaired activity tolerance, impaired balance, impaired coordination, impaired endurance, impaired motor control, impaired muscle tone, impaired ROM, and impaired skin integrity  Prognosis: good    GOALS:  Patient Goal: to be able to get my pants pulled up after toileting    Short Term Goals:  Time Frame: 4 weeks   *  Patient will increase left shoulder AROM greater than 60degrees flexion, 45 degrees abduction, -- for IR and 55 degrees ER with the arm at the side to increase ease and ability to put on a shirt.    *  Patient will increase left shoulder PROM greater than 85 degrees flexion, 75 degrees abduction, --- degrees IR and 65 degrees ER in abduction to increase ability to pull pants up for toileting.    *  Pt will be able to use L hand to assist with pulling pants up after toileting.      Long Term Goals:  Time Frame: 8 weeks   *  Pt will be fit with L UE orthosis for nighttime wear to decrease contracture.  Pt will be I with HEP for L UE for improved independence with ADL/IADL.  Pt will report mild to no pain with PROM.   Pt will be mod I for showering with use of AE for safety.     Patient Education:   []  HEP/Education Completed: Plan of Care, Goals, multi schedule   3/18: supine hand clasped chest press, flexion, horizontal abduction, ER touching top of head, supine or seated wrist extension stretch to L hand   3/25: donning hand splint  []  No new Education completed  [x]  Reviewed Prior HEP      []

## 2025-04-10 ENCOUNTER — HOSPITAL ENCOUNTER (OUTPATIENT)
Dept: OCCUPATIONAL THERAPY | Age: 85
Setting detail: THERAPIES SERIES
Discharge: HOME | End: 2025-04-10
Payer: MEDICARE

## 2025-04-10 ENCOUNTER — HOSPITAL ENCOUNTER (OUTPATIENT)
Dept: PHYSICAL THERAPY | Age: 85
Setting detail: THERAPIES SERIES
Discharge: HOME | End: 2025-04-10
Payer: MEDICARE

## 2025-04-10 PROCEDURE — 97110 THERAPEUTIC EXERCISES: CPT

## 2025-04-10 PROCEDURE — 97112 NEUROMUSCULAR REEDUCATION: CPT

## 2025-04-10 NOTE — PROGRESS NOTES
feet with 4WW and SBA safely to improve community mobility.     Long Term Goals: 8 weeks  Patient will improve L hip strength to 4/5 and R hip strength to 4+/5 to allow ease of curb negotiation and walking.  2. Patient will improve CADE score from 32/56 to 45/56 to decrease fall risk.  3. Patient will perform 5 time sit to stand test to improve functional strength needed for getting on/off commode easily.  4. Patient will ambulate 250 feet with 4WW and SBA safely to improve community mobility.   5. Patient will be I with HEP to reduce risk of re-injury and improve mobility.       Patient Education:   [x]  HEP/Education Completed: Exercise techniques, posture   Medbridge Access Code:  []  No new Education completed  []  Reviewed Prior HEP      [x]  Patient verbalized and/or demonstrated understanding of education provided.  []  Patient unable to verbalize and/or demonstrate understanding of education provided.  Will continue education.  []  Barriers to learning:     PLAN:  Treatment Recommendations: Strengthening, Range of Motion, Balance Training, Functional Mobility Training, Transfer Training, Endurance Training, Gait Training, Stair Training, Neuromuscular Re-education, Manual Therapy - Soft Tissue Mobilization, Home Exercise Program, Patient Education, and Aquatics    []  Plan of care initiated.  Plan to see patient 2 times per week for 8 weeks to address the treatment planned outlined above.  [x]  Continue with current plan of care  []  Modify plan of care as follows:    []  Hold pending physician visit  []  Discharge    Time In 1345   Time Out 1423   Timed Code Minutes: 38   Total Treatment Time: 38       Electronically Signed by: Juanita Fung PTA

## 2025-04-10 NOTE — PROGRESS NOTES
97140-Manual Therapy, 97530-Therapeutic Activities, and 22466-EV ADL Training   Progress Note Counter 8/10 for progress note (Reporting Period: 3/6/25 to     )   Recertification Date 05/01/25   Physician Follow-Up NT   Physician Orders OT Eval and Tx   History of Present Illness Hospital Course:   Pt had been to the ED 2/15/25 with facial neuralgia that was very painful for her.  She was treated and released.     Per ED notes:  Nanci Bar is a 84 y.o. female admitted to [facility] on 2/19/2025 for symptomatic bradycardia with a near syncopal episode. While in the ED, patient developed bradycardia reaching HR of 20s to which she was a TVP inserted. Patient was required ICU admission for EKG showed 2-1 AV block, RBBB with bradycardia in the high 20s with a near syncopal events.  Bradycardia was refractory to Atropine and improved with epinephrine/dopamine infusion.  Patient had a TVP placed on 2/19/2025, and he was removed 2/21/2025 due to stable heart rate.  There was concern for carbamazepine induced conduction dysfunction due new onset symptomatic bradycardia upon beginning medication. Recommendation to discontinue carbamazepine and any AV lea blocking medication.  Patient's medical status stabilized with IV fluids, and medication optimization per cardiology's recommendation  Patient remained stable, and plans for discharge were made.      Pt discharged 2/23/25.  She has been home for almost 2 weeks.   She goes to \"\" 2x/week- at [facility] on [address], Mondays and Wednesdays.  She participates in  exercises, games, discussion groups, corn [employer], bat balloons with noodles, golf, bowl, bingo. She reports she does what she can within her limits of L UE/LE hemiplegia.     Currently, pt feels weak and decreased energy and endurance.  She also has a fear of falling.     Botox done to L UE 2/26/25 and pt reports she feels that this has improved her ROM but continues to have pain in L UE

## 2025-04-15 ENCOUNTER — HOSPITAL ENCOUNTER (OUTPATIENT)
Dept: OCCUPATIONAL THERAPY | Age: 85
Setting detail: THERAPIES SERIES
Discharge: HOME | End: 2025-04-15
Payer: MEDICARE

## 2025-04-15 ENCOUNTER — HOSPITAL ENCOUNTER (OUTPATIENT)
Dept: PHYSICAL THERAPY | Age: 85
Setting detail: THERAPIES SERIES
Discharge: HOME | End: 2025-04-15
Payer: MEDICARE

## 2025-04-15 PROCEDURE — 97112 NEUROMUSCULAR REEDUCATION: CPT

## 2025-04-15 PROCEDURE — 97110 THERAPEUTIC EXERCISES: CPT

## 2025-04-15 NOTE — PROGRESS NOTES
[facility]  PHYSICAL THERAPY  [] EVALUATION  [x] DAILY NOTE (LAND) [] DAILY NOTE (AQUATIC ) [] PROGRESS NOTE [] DISCHARGE NOTE    [x] OUTPATIENT REHABILITATION CENTER [facility]   [] [place] AMBULATORY CARE [place]    [] [facility]   [] MICK [facility]    Date: 4/15/2025  Patient Name:  Nanci Bar  : 1940  MRN: [id number]  CSN: [id number]    Referring Practitioner Adelina Nascimento DO [id number]      Diagnosis  Diagnoses       I69.30 (ICD-10-CM) - Unspecified sequelae of cerebral infarction    G81.14 (ICD-10-CM) - Spastic hemiplegia affecting left nondominant side (HCC)           Treatment Diagnosis R26.81  Unsteadiness on feet  R26.89  Abnormalities of gait and mobility  R53.1 Weakness   Date of Evaluation 3/11/25   Additional Pertinent History Nanci Bar has a past medical history of MYRA (acute kidney injury), Anxiety, Arthritis, Arthritis, degenerative, localized, primary, hand, Bladder spasm, CAD (coronary artery disease), Cancer ([facility]), Cerebrovascular accident (CVA) ([facility]), Hyperlipidemia, Hypertension, Hypothyroidism, Obstructive sleep apnea of adult, SOB (shortness of breath), Type II or unspecified type diabetes mellitus without mention of complication, not stated as uncontrolled, and 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  she has a past surgical history that includes Cholecystectomy (1968); Mastectomy (2001); Hysterectomy (1996); Knee arthroscopy; Breast surgery (1974); Carpal tunnel release (Right, 2013); hernia repair (14); Colonoscopy (2007); lymph node dissection (1998); Breast biopsy (2014); joint replacement (Left); Finger trigger release; and knee surgery (Right, 2017).     Allergies Allergies   Allergen Reactions    Carbamazepine Other (See Comments)     Severe bradycardia re: TVP       Medications   Current Outpatient Medications:     clopidogrel (PLAVIX) 75 MG tablet, Take 1 tablet by mouth once daily,

## 2025-04-15 NOTE — PROGRESS NOTES
with ROM.      SUBJECTIVE:      OBJECTIVE:    TREATMENT   Precautions: fall; L UE AFO    Pain:  Denies pain today L shoulder pain      “X” in shaded column indicates Activity Completed Today   Modalities Parameters/  Location  Notes/Comments   MHP L Shoulder in supine elevated mat table    Completed PROM to elbow, wrist and hand while MHP was applied to shoulder               Manual Therapy Time/  Technique  Notes/Comments   PROM L hand/digits/wrist/forearm/elbow/shoulder    Completed distal PROM while MHP on proximal shoulder- pain noted at shoulder all planes    Gentle STM L shoulder anterior, posterior, upper trap, and deltoid   X Used massager wand           Exercises   Sets/  Sec Reps  Notes/Comments   Seated edge of mat AAROM   Chest press used dowel  Flexion from lap to tolerance used dowel today  Reaching to floor and back to lap  Elbow curl hands pronated 1 10 ea X Good stretching noted    supine AAROM ER beach chair reaching for top of head, CGA/Min assist from therapist to help get pt's hand to top of head 1; 5 sec hold  10  Good challenge for patient    ^Supine cross body posterior  capsule stretch 15 sec 4  ^new   AROM sup/pro holding end of red flex bar 1 10     Hand exercises with blue band 1 10 X Able to demonstrate full flexion/extension                 Activities Time    Notes/Comments   WB on L hand on towel roll (mimic at home what patient could complete with) and AROM with R hand in different planes/directions to give therapist a high five  X 10 reps- 2 sets of 5     Education to pt and significant other on donning splint as well as wearing schedule      Seated grasp and release of 1\" blocks in variety of planes reaching to patient ability      AROM sup/pro, UD, elbow extension and shoulder flexion by holding plastic cone upside-down, receiving plastic golf ball and turning cone over to drop into a bucket.      Active reaching in PNF D1 flexion/extension pattern to patient ability with cones,

## 2025-04-17 ENCOUNTER — HOSPITAL ENCOUNTER (OUTPATIENT)
Dept: OCCUPATIONAL THERAPY | Age: 85
Setting detail: THERAPIES SERIES
Discharge: HOME | End: 2025-04-17
Payer: MEDICARE

## 2025-04-17 ENCOUNTER — HOSPITAL ENCOUNTER (OUTPATIENT)
Dept: PHYSICAL THERAPY | Age: 85
Setting detail: THERAPIES SERIES
Discharge: HOME | End: 2025-04-17
Payer: MEDICARE

## 2025-04-17 PROCEDURE — 97112 NEUROMUSCULAR REEDUCATION: CPT

## 2025-04-17 PROCEDURE — 97110 THERAPEUTIC EXERCISES: CPT

## 2025-04-17 NOTE — PROGRESS NOTES
* PLEASE SIGN, DATE AND TIME CERTIFICATION BELOW AND RETURN TO [facility] OUTPATIENT REHABILITATION (FAX #: [phone]).  ATTEST/CO-SIGN IF ACCESSING VIA INdatapineET.  THANK YOU.**    I certify that I have examined the patient below and determined that Physical Medicine and Rehabilitation service is necessary and that I approve the established plan of care for up to 90 days or as specifically noted.  Attestation, signature or co-signature of physician indicates approval of certification requirements.    ________________________ ____________  Physician Signature   Date     [facility]  PHYSICAL THERAPY  [] EVALUATION  [] DAILY NOTE (LAND) [] DAILY NOTE (AQUATIC ) [x] PROGRESS NOTE [] DISCHARGE NOTE    [x] OUTPATIENT REHABILITATION CENTER [facility]   [] [facility]    [] [facility]   [] [facility]    Date: 2025  Patient Name:  Nanci aBr  : 1940  MRN: [id number]  CSN: [id number]    Referring Practitioner Adelina Nascimento DO [id number]      Diagnosis  Diagnoses       I69.30 (ICD-10-CM) - Unspecified sequelae of cerebral infarction    G81.14 (ICD-10-CM) - Spastic hemiplegia affecting left nondominant side (HCC)           Treatment Diagnosis R26.81  Unsteadiness on feet  R26.89  Abnormalities of gait and mobility  R53.1 Weakness   Date of Evaluation 3/11/25   Additional Pertinent History Nanci Bar has a past medical history of MYRA (acute kidney injury), Anxiety, Arthritis, Arthritis, degenerative, localized, primary, hand, Bladder spasm, CAD (coronary artery disease), Cancer (HCC), Cerebrovascular accident (CVA) ([facility]), Hyperlipidemia, Hypertension, Hypothyroidism, Obstructive sleep apnea of adult, SOB (shortness of breath), Type II or unspecified type diabetes mellitus without mention of complication, not stated as uncontrolled, and 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  she has a past surgical history that includes

## 2025-04-17 NOTE — THERAPY DISCHARGE
with ROM.      SUBJECTIVE:      OBJECTIVE:    TREATMENT   Precautions: fall; L UE AFO    Pain:  Denies pain today L shoulder pain      “X” in shaded column indicates Activity Completed Today   Modalities Parameters/  Location  Notes/Comments   MHP L Shoulder in supine elevated mat table    Completed PROM to elbow, wrist and hand while MHP was applied to shoulder               Manual Therapy Time/  Technique  Notes/Comments   PROM L hand/digits/wrist/forearm/elbow/shoulder    Completed distal PROM while MHP on proximal shoulder- pain noted at shoulder all planes    Gentle STM L shoulder anterior, posterior, upper trap, and deltoid    Used massager wand           Exercises   Sets/  Sec Reps  Notes/Comments   Seated edge of mat AAROM   Chest press used dowel  Flexion from lap to tolerance used dowel today  Reaching to floor and back to lap  Elbow curl hands pronated 1 10 ea X Good stretching noted    supine AAROM ER beach chair reaching for top of head, CGA/Min assist from therapist to help get pt's hand to top of head 1; 5 sec hold  10  Good challenge for patient    ^Supine cross body posterior  capsule stretch 15 sec 4  ^new   AROM sup/pro holding end of red flex bar 1 10     Hand exercises with blue band 1 10  Able to demonstrate full flexion/extension   Table slides 1 10 X           Activities Time    Notes/Comments   WB on L hand on towel roll (mimic at home what patient could complete with) and AROM with R hand in different planes/directions to give therapist a high five  X 10 reps- 2 sets of 5     Education to pt and significant other on donning splint as well as wearing schedule      Seated grasp and release of 1\" blocks in variety of planes reaching to patient ability  X    AROM sup/pro, UD, elbow extension and shoulder flexion by holding plastic cone upside-down, receiving plastic golf ball and turning cone over to drop into a bucket.  X    Active reaching in PNF D1 flexion/extension pattern to patient

## 2025-04-23 ENCOUNTER — OFFICE VISIT (OUTPATIENT)
Dept: FAMILY MEDICINE CLINIC | Age: 85
End: 2025-04-23
Payer: MEDICARE

## 2025-04-23 VITALS
WEIGHT: 133.2 LBS | OXYGEN SATURATION: 97 % | HEART RATE: 90 BPM | HEIGHT: 63 IN | BODY MASS INDEX: 23.6 KG/M2 | TEMPERATURE: 97.5 F | SYSTOLIC BLOOD PRESSURE: 144 MMHG | RESPIRATION RATE: 16 BRPM | DIASTOLIC BLOOD PRESSURE: 92 MMHG

## 2025-04-23 DIAGNOSIS — B37.31 VAGINAL CANDIDIASIS: Primary | ICD-10-CM

## 2025-04-23 PROCEDURE — 1123F ACP DISCUSS/DSCN MKR DOCD: CPT | Performed by: FAMILY MEDICINE

## 2025-04-23 PROCEDURE — 1090F PRES/ABSN URINE INCON ASSESS: CPT | Performed by: FAMILY MEDICINE

## 2025-04-23 PROCEDURE — G8427 DOCREV CUR MEDS BY ELIG CLIN: HCPCS | Performed by: FAMILY MEDICINE

## 2025-04-23 PROCEDURE — G8400 PT W/DXA NO RESULTS DOC: HCPCS | Performed by: FAMILY MEDICINE

## 2025-04-23 PROCEDURE — 3080F DIAST BP >= 90 MM HG: CPT | Performed by: FAMILY MEDICINE

## 2025-04-23 PROCEDURE — 3077F SYST BP >= 140 MM HG: CPT | Performed by: FAMILY MEDICINE

## 2025-04-23 PROCEDURE — G8420 CALC BMI NORM PARAMETERS: HCPCS | Performed by: FAMILY MEDICINE

## 2025-04-23 PROCEDURE — 99213 OFFICE O/P EST LOW 20 MIN: CPT | Performed by: FAMILY MEDICINE

## 2025-04-23 PROCEDURE — 1036F TOBACCO NON-USER: CPT | Performed by: FAMILY MEDICINE

## 2025-04-23 PROCEDURE — 1159F MED LIST DOCD IN RCRD: CPT | Performed by: FAMILY MEDICINE

## 2025-04-23 RX ORDER — FLUCONAZOLE 100 MG/1
100 TABLET ORAL DAILY
Qty: 30 TABLET | Refills: 0 | Status: SHIPPED | OUTPATIENT
Start: 2025-04-23

## 2025-04-23 RX ORDER — INSULIN GLARGINE 100 [IU]/ML
25 INJECTION, SOLUTION SUBCUTANEOUS NIGHTLY
COMMUNITY
Start: 2025-03-08

## 2025-04-23 RX ORDER — NYSTATIN 100000 U/G
CREAM TOPICAL
Qty: 30 G | Refills: 1 | Status: SHIPPED | OUTPATIENT
Start: 2025-04-23

## 2025-04-23 RX ORDER — TRIAMCINOLONE ACETONIDE 1 MG/G
CREAM TOPICAL
Qty: 45 G | Refills: 1 | Status: SHIPPED | OUTPATIENT
Start: 2025-04-23

## 2025-04-23 ASSESSMENT — ENCOUNTER SYMPTOMS
COUGH: 0
CONSTIPATION: 0
ABDOMINAL PAIN: 0
SHORTNESS OF BREATH: 0
DIARRHEA: 0
NAUSEA: 0
VOMITING: 0
WHEEZING: 0

## 2025-04-23 NOTE — PROGRESS NOTES
Nanci Bar (:  1940) is a 84 y.o. female,Established patient, here for evaluation of the following chief complaint(s):  Vaginitis (Yeast infection) and Medication Refill      Subjective   SUBJECTIVE/OBJECTIVE:  Medication Refill  Associated symptoms include fatigue. Pertinent negatives include no abdominal pain, chest pain, coughing, headaches, nausea or vomiting.     Blood sugars staying above 200 at all time  Taking medications daily yes, compliant  Not watching diet or exercising  HbA1C 9.4 on 2025  LDL 64  Having vaginal problems---Botox last done 5 months ago, using catheter, sugars have been out of control  Was doing well with PRN use of fluconazole but ran out last month    Review of Systems   Constitutional:  Positive for fatigue. Negative for activity change, appetite change and unexpected weight change.   Respiratory:  Negative for cough, shortness of breath and wheezing.    Cardiovascular:  Negative for chest pain and palpitations.   Gastrointestinal:  Negative for abdominal pain, constipation, diarrhea, nausea and vomiting.   Genitourinary:  Positive for frequency, vaginal discharge and vaginal pain (pressure, itching). Negative for urgency and vaginal bleeding.   Neurological:  Negative for dizziness and headaches.          Objective   Physical Exam  Vitals reviewed.   Constitutional:       General: She is not in acute distress.     Appearance: Normal appearance. She is not ill-appearing.   Cardiovascular:      Rate and Rhythm: Normal rate and regular rhythm.      Heart sounds: No murmur heard.     No gallop.   Pulmonary:      Effort: Pulmonary effort is normal.      Breath sounds: Normal breath sounds. No wheezing, rhonchi or rales.   Abdominal:      General: Abdomen is flat. Bowel sounds are normal. There is no distension.      Palpations: Abdomen is soft.      Tenderness: There is no abdominal tenderness. There is no guarding.   Musculoskeletal:      Right lower leg: No edema.

## 2025-04-24 ENCOUNTER — HOSPITAL ENCOUNTER (OUTPATIENT)
Dept: PHYSICAL THERAPY | Age: 85
Setting detail: THERAPIES SERIES
Discharge: HOME | End: 2025-04-24
Payer: MEDICARE

## 2025-04-24 PROCEDURE — 97110 THERAPEUTIC EXERCISES: CPT

## 2025-04-24 NOTE — PROGRESS NOTES
[facility]  PHYSICAL THERAPY  [] EVALUATION  [] DAILY NOTE (LAND) [x] DAILY NOTE (AQUATIC ) [] PROGRESS NOTE [] DISCHARGE NOTE    [x] OUTPATIENT REHABILITATION CENTER [facility]   [] [place] AMBULATORY CARE [place]    [] [facility]   [] MICK [facility]    Date: 2025  Patient Name:  Nanci Bar  : 1940  MRN: [id number]  CSN: [id number]    Referring Practitioner Adelina Nascimento DO [id number]      Diagnosis  Diagnoses       I69.30 (ICD-10-CM) - Unspecified sequelae of cerebral infarction    G81.14 (ICD-10-CM) - Spastic hemiplegia affecting left nondominant side (HCC)           Treatment Diagnosis R26.81  Unsteadiness on feet  R26.89  Abnormalities of gait and mobility  R53.1 Weakness   Date of Evaluation 3/11/25   Additional Pertinent History Nanci Bar has a past medical history of MYRA (acute kidney injury), Anxiety, Arthritis, Arthritis, degenerative, localized, primary, hand, Bladder spasm, CAD (coronary artery disease), Cancer ([facility]), Cerebrovascular accident (CVA) ([facility]), Hyperlipidemia, Hypertension, Hypothyroidism, Obstructive sleep apnea of adult, SOB (shortness of breath), Type II or unspecified type diabetes mellitus without mention of complication, not stated as uncontrolled, and 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  she has a past surgical history that includes Cholecystectomy (1968); Mastectomy (2001); Hysterectomy (1996); Knee arthroscopy; Breast surgery (1974); Carpal tunnel release (Right, 2013); hernia repair (14); Colonoscopy (2007); lymph node dissection (1998); Breast biopsy (2014); joint replacement (Left); Finger trigger release; and knee surgery (Right, 2017).     Allergies Allergies   Allergen Reactions    Carbamazepine Other (See Comments)     Severe bradycardia re: TVP       Medications   Current Outpatient Medications:     LANTUS SOLOSTAR 100 UNIT/ML injection pen, Inject 25 Units into the

## 2025-05-01 ENCOUNTER — HOSPITAL ENCOUNTER (OUTPATIENT)
Dept: PHYSICAL THERAPY | Age: 85
Setting detail: THERAPIES SERIES
Discharge: HOME | End: 2025-05-01
Payer: MEDICARE

## 2025-05-01 PROCEDURE — 97110 THERAPEUTIC EXERCISES: CPT

## 2025-05-01 NOTE — PROGRESS NOTES
[facility]  PHYSICAL THERAPY  [] EVALUATION  [] DAILY NOTE (LAND) [x] DAILY NOTE (AQUATIC ) [] PROGRESS NOTE [] DISCHARGE NOTE    [x] OUTPATIENT REHABILITATION CENTER [facility]   [] [place] AMBULATORY CARE [place]    [] [facility]   [] M[facility]    Date: 2025  Patient Name:  Nanci Bar  : 1940  MRN: [id number]  CSN: [id number]    Referring Practitioner Adelina Nascimento DO [id number]      Diagnosis  Diagnoses       I69.30 (ICD-10-CM) - Unspecified sequelae of cerebral infarction    G81.14 (ICD-10-CM) - Spastic hemiplegia affecting left nondominant side (HCC)           Treatment Diagnosis R26.81  Unsteadiness on feet  R26.89  Abnormalities of gait and mobility  R53.1 Weakness   Date of Evaluation 3/11/25   Additional Pertinent History Nanci Bar has a past medical history of MYRA (acute kidney injury), Anxiety, Arthritis, Arthritis, degenerative, localized, primary, hand, Bladder spasm, CAD (coronary artery disease), Cancer ([facility]), Cerebrovascular accident (CVA) ([facility]), Hyperlipidemia, Hypertension, Hypothyroidism, Obstructive sleep apnea of adult, SOB (shortness of breath), Type II or unspecified type diabetes mellitus without mention of complication, not stated as uncontrolled, and 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  she has a past surgical history that includes Cholecystectomy (1968); Mastectomy (2001); Hysterectomy (1996); Knee arthroscopy; Breast surgery (1974); Carpal tunnel release (Right, 2013); hernia repair (14); Colonoscopy (2007); lymph node dissection (1998); Breast biopsy (2014); joint replacement (Left); Finger trigger release; and knee surgery (Right, 2017).     Allergies Allergies   Allergen Reactions    Carbamazepine Other (See Comments)     Severe bradycardia re: TVP       Medications   Current Outpatient Medications:     LANTUS SOLOSTAR 100 UNIT/ML injection pen, Inject 25 Units into the

## 2025-05-05 ENCOUNTER — HOSPITAL ENCOUNTER (OUTPATIENT)
Dept: PHYSICAL THERAPY | Age: 85
Setting detail: THERAPIES SERIES
Discharge: HOME | End: 2025-05-05
Payer: MEDICARE

## 2025-05-05 PROCEDURE — 97110 THERAPEUTIC EXERCISES: CPT

## 2025-05-05 PROCEDURE — 97112 NEUROMUSCULAR REEDUCATION: CPT

## 2025-05-05 NOTE — PROGRESS NOTES
[facility]  PHYSICAL THERAPY  [] EVALUATION  [] DAILY NOTE (LAND) [x] DAILY NOTE (AQUATIC ) [] PROGRESS NOTE [] DISCHARGE NOTE    [x] OUTPATIENT REHABILITATION CENTER [facility]   [] [place] AMBULATORY CARE [place]    [] [facility]   [] MICK [facility]    Date: 2025  Patient Name:  Nanci Bar  : 1940  MRN: [id number]  CSN: [id number]    Referring Practitioner Adelina Nascimento DO [id number]      Diagnosis  Diagnoses       I69.30 (ICD-10-CM) - Unspecified sequelae of cerebral infarction    G81.14 (ICD-10-CM) - Spastic hemiplegia affecting left nondominant side (HCC)           Treatment Diagnosis R26.81  Unsteadiness on feet  R26.89  Abnormalities of gait and mobility  R53.1 Weakness   Date of Evaluation 3/11/25   Additional Pertinent History Nanci Bar has a past medical history of MYRA (acute kidney injury), Anxiety, Arthritis, Arthritis, degenerative, localized, primary, hand, Bladder spasm, CAD (coronary artery disease), Cancer ([facility]), Cerebrovascular accident (CVA) ([facility]), Hyperlipidemia, Hypertension, Hypothyroidism, Obstructive sleep apnea of adult, SOB (shortness of breath), Type II or unspecified type diabetes mellitus without mention of complication, not stated as uncontrolled, and 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  she has a past surgical history that includes Cholecystectomy (1968); Mastectomy (2001); Hysterectomy (1996); Knee arthroscopy; Breast surgery (1974); Carpal tunnel release (Right, 2013); hernia repair (14); Colonoscopy (2007); lymph node dissection (1998); Breast biopsy (2014); joint replacement (Left); Finger trigger release; and knee surgery (Right, 2017).     Allergies Allergies   Allergen Reactions    Carbamazepine Other (See Comments)     Severe bradycardia re: TVP       Medications   Current Outpatient Medications:     LANTUS SOLOSTAR 100 UNIT/ML injection pen, Inject 25 Units into the

## 2025-05-07 ENCOUNTER — APPOINTMENT (OUTPATIENT)
Dept: PHYSICAL THERAPY | Age: 85
End: 2025-05-07
Payer: MEDICARE

## 2025-05-12 ENCOUNTER — HOSPITAL ENCOUNTER (OUTPATIENT)
Dept: PHYSICAL THERAPY | Age: 85
Setting detail: THERAPIES SERIES
Discharge: HOME | End: 2025-05-12
Payer: MEDICARE

## 2025-05-12 PROCEDURE — 97110 THERAPEUTIC EXERCISES: CPT

## 2025-05-12 PROCEDURE — 97112 NEUROMUSCULAR REEDUCATION: CPT

## 2025-05-12 NOTE — PROGRESS NOTES
[facility]  PHYSICAL THERAPY  [] EVALUATION  [x] DAILY NOTE (LAND) [] DAILY NOTE (AQUATIC ) [] PROGRESS NOTE [] DISCHARGE NOTE    [x] OUTPATIENT REHABILITATION CENTER [facility]   [] [place] [facility] [place]    [] [facility]   [] [facility]    Date: 2025  Patient Name:  Nanci Bar  : 1940  MRN: [id number]  CSN: [id number]    Referring Practitioner Adelina Nascimento DO [id number]      Diagnosis  Diagnoses       I69.30 (ICD-10-CM) - Unspecified sequelae of cerebral infarction    G81.14 (ICD-10-CM) - Spastic hemiplegia affecting left nondominant side (HCC)           Treatment Diagnosis R26.81  Unsteadiness on feet  R26.89  Abnormalities of gait and mobility  R53.1 Weakness   Date of Evaluation 3/11/25   Additional Pertinent History Nanci Bar has a past medical history of MYRA (acute kidney injury), Anxiety, Arthritis, Arthritis, degenerative, localized, primary, hand, Bladder spasm, CAD (coronary artery disease), Cancer ([facility]), Cerebrovascular accident (CVA) ([facility]), Hyperlipidemia, Hypertension, Hypothyroidism, Obstructive sleep apnea of adult, SOB (shortness of breath), Type II or unspecified type diabetes mellitus without mention of complication, not stated as uncontrolled, and 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  she has a past surgical history that includes Cholecystectomy (1968); Mastectomy (2001); Hysterectomy (1996); Knee arthroscopy; Breast surgery (1974); Carpal tunnel release (Right, 2013); hernia repair (14); Colonoscopy (2007); lymph node dissection (1998); Breast biopsy (2014); joint replacement (Left); Finger trigger release; and knee surgery (Right, 2017).     Allergies Allergies   Allergen Reactions    Carbamazepine Other (See Comments)     Severe bradycardia re: TVP       Medications   Current Outpatient Medications:     LANTUS SOLOSTAR 100 UNIT/ML injection pen, Inject 25 Units into the

## 2025-05-14 ENCOUNTER — HOSPITAL ENCOUNTER (OUTPATIENT)
Dept: PHYSICAL THERAPY | Age: 85
Setting detail: THERAPIES SERIES
Discharge: HOME | End: 2025-05-14
Payer: MEDICARE

## 2025-05-14 PROCEDURE — 97110 THERAPEUTIC EXERCISES: CPT

## 2025-05-14 PROCEDURE — 97116 GAIT TRAINING THERAPY: CPT

## 2025-05-14 NOTE — PROGRESS NOTES
[facility]  PHYSICAL THERAPY  [] EVALUATION  [x] DAILY NOTE (LAND) [] DAILY NOTE (AQUATIC ) [] PROGRESS NOTE [] DISCHARGE NOTE    [x] OUTPATIENT REHABILITATION CENTER [facility]   [] [place] [facility] [place]    [] [facility]   [] [facility]    Date: 2025  Patient Name:  Nanci Bar  : 1940  MRN: [id number]  CSN: [id number]    Referring Practitioner Adelina Nascimento DO [id number]      Diagnosis  Diagnoses       I69.30 (ICD-10-CM) - Unspecified sequelae of cerebral infarction    G81.14 (ICD-10-CM) - Spastic hemiplegia affecting left nondominant side (HCC)           Treatment Diagnosis R26.81  Unsteadiness on feet  R26.89  Abnormalities of gait and mobility  R53.1 Weakness   Date of Evaluation 3/11/25   Additional Pertinent History Nanci Bar has a past medical history of MYRA (acute kidney injury), Anxiety, Arthritis, Arthritis, degenerative, localized, primary, hand, Bladder spasm, CAD (coronary artery disease), Cancer ([facility]), Cerebrovascular accident (CVA) ([facility]), Hyperlipidemia, Hypertension, Hypothyroidism, Obstructive sleep apnea of adult, SOB (shortness of breath), Type II or unspecified type diabetes mellitus without mention of complication, not stated as uncontrolled, and 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  she has a past surgical history that includes Cholecystectomy (1968); Mastectomy (2001); Hysterectomy (1996); Knee arthroscopy; Breast surgery (1974); Carpal tunnel release (Right, 2013); hernia repair (14); Colonoscopy (2007); lymph node dissection (1998); Breast biopsy (2014); joint replacement (Left); Finger trigger release; and knee surgery (Right, 2017).     Allergies Allergies   Allergen Reactions    Carbamazepine Other (See Comments)     Severe bradycardia re: TVP       Medications   Current Outpatient Medications:     LANTUS SOLOSTAR 100 UNIT/ML injection pen, Inject 25 Units into the

## 2025-05-19 ENCOUNTER — APPOINTMENT (OUTPATIENT)
Dept: PHYSICAL THERAPY | Age: 85
End: 2025-05-19
Payer: MEDICARE

## 2025-05-21 ENCOUNTER — HOSPITAL ENCOUNTER (OUTPATIENT)
Dept: PHYSICAL THERAPY | Age: 85
Setting detail: THERAPIES SERIES
Discharge: HOME | End: 2025-05-21
Payer: MEDICARE

## 2025-05-21 PROCEDURE — 97110 THERAPEUTIC EXERCISES: CPT

## 2025-05-21 NOTE — PROGRESS NOTES
[facility]  PHYSICAL THERAPY  [] EVALUATION  [x] DAILY NOTE (LAND) [] DAILY NOTE (AQUATIC ) [] PROGRESS NOTE [] DISCHARGE NOTE    [x] OUTPATIENT REHABILITATION CENTER [facility]   [] [place] [facility] [place]    [] [facility]   [] [facility]    Date: 2025  Patient Name:  Nanci Bar  : 1940  MRN: [id number]  CSN: [id number]    Referring Practitioner Adelina Nascimento DO [id number]      Diagnosis  Diagnoses       I69.30 (ICD-10-CM) - Unspecified sequelae of cerebral infarction    G81.14 (ICD-10-CM) - Spastic hemiplegia affecting left nondominant side (HCC)           Treatment Diagnosis R26.81  Unsteadiness on feet  R26.89  Abnormalities of gait and mobility  R53.1 Weakness   Date of Evaluation 3/11/25   Additional Pertinent History Nanci Bar has a past medical history of MYRA (acute kidney injury), Anxiety, Arthritis, Arthritis, degenerative, localized, primary, hand, Bladder spasm, CAD (coronary artery disease), Cancer ([facility]), Cerebrovascular accident (CVA) ([facility]), Hyperlipidemia, Hypertension, Hypothyroidism, Obstructive sleep apnea of adult, SOB (shortness of breath), Type II or unspecified type diabetes mellitus without mention of complication, not stated as uncontrolled, and 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  she has a past surgical history that includes Cholecystectomy (1968); Mastectomy (2001); Hysterectomy (1996); Knee arthroscopy; Breast surgery (1974); Carpal tunnel release (Right, 2013); hernia repair (14); Colonoscopy (2007); lymph node dissection (1998); Breast biopsy (2014); joint replacement (Left); Finger trigger release; and knee surgery (Right, 2017).     Allergies Allergies   Allergen Reactions    Carbamazepine Other (See Comments)     Severe bradycardia re: TVP       Medications   Current Outpatient Medications:     LANTUS SOLOSTAR 100 UNIT/ML injection pen, Inject 25 Units into the

## 2025-05-27 ENCOUNTER — HOSPITAL ENCOUNTER (OUTPATIENT)
Dept: PHYSICAL THERAPY | Age: 85
Setting detail: THERAPIES SERIES
Discharge: HOME | End: 2025-05-27
Payer: MEDICARE

## 2025-05-27 PROCEDURE — 97110 THERAPEUTIC EXERCISES: CPT

## 2025-05-27 NOTE — PROGRESS NOTES
[facility]  PHYSICAL THERAPY  [] EVALUATION  [x] DAILY NOTE (LAND) [] DAILY NOTE (AQUATIC ) [] PROGRESS NOTE [] DISCHARGE NOTE    [x] OUTPATIENT REHABILITATION CENTER [facility]   [] [place] [facility] [place]    [] [facility]   [] [facility]    Date: 2025  Patient Name:  Nanci Bar  : 1940  MRN: [id number]  CSN: [id number]    Referring Practitioner Adelina Nascimento DO [id number]      Diagnosis  Diagnoses       I69.30 (ICD-10-CM) - Unspecified sequelae of cerebral infarction    G81.14 (ICD-10-CM) - Spastic hemiplegia affecting left nondominant side (HCC)           Treatment Diagnosis R26.81  Unsteadiness on feet  R26.89  Abnormalities of gait and mobility  R53.1 Weakness   Date of Evaluation 3/11/25   Additional Pertinent History Nanci Bar has a past medical history of MYRA (acute kidney injury), Anxiety, Arthritis, Arthritis, degenerative, localized, primary, hand, Bladder spasm, CAD (coronary artery disease), Cancer ([facility]), Cerebrovascular accident (CVA) ([facility]), Hyperlipidemia, Hypertension, Hypothyroidism, Obstructive sleep apnea of adult, SOB (shortness of breath), Type II or unspecified type diabetes mellitus without mention of complication, not stated as uncontrolled, and 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  she has a past surgical history that includes Cholecystectomy (1968); Mastectomy (2001); Hysterectomy (1996); Knee arthroscopy; Breast surgery (1974); Carpal tunnel release (Right, 2013); hernia repair (14); Colonoscopy (2007); lymph node dissection (1998); Breast biopsy (2014); joint replacement (Left); Finger trigger release; and knee surgery (Right, 2017).     Allergies Allergies   Allergen Reactions    Carbamazepine Other (See Comments)     Severe bradycardia re: TVP       Medications   Current Outpatient Medications:     LANTUS SOLOSTAR 100 UNIT/ML injection pen, Inject 25 Units into the

## 2025-05-27 NOTE — PROGRESS NOTES
Physical Medicine and Rehabilitation  Botox Note      Patient is an 85-year-old female with PMH significant for CAD, breast cancer, HLD, HTN, hypothyroidism, PORFIRIO, diabetes, and CVA who presents today for Botox injections.  Patient with left spastic hemiparesis as result of her CVA.  She continues to report improvement in tone with use of Botox injections. She notices a lot of improvement in her arm but continues with tightness in the fingers. She reports that she completed OT. She continues with PT 2x a week but will likely decrease to weekly. She is consistent with HEP. Reports improved LBD.     Written consent obtained for botulinum toxin injections after risks versus benefits discussed.  200 Units of Botox were reconstituted using preservative-free normal saline in a 100 unit to 2 mL solution.  Alcohol swabs were used to cleanse the skin before injections were performed.  Back pressure was placed on the syringe during injections to avoid any intravascular injection.  EMG guidance was used during procedure.       Botulinum toxin was injected in the following muscles of the left upper extremity:      Left upper extremity:  Biceps                      60 units  Pronator Teres         50 units  FCR                         60 units  FDS                         30 units    Total Units Injected:200 units  Unavoidable waste: 0 units     The patient tolerated the procedure well with no immediate complications.  The patient should call with concerns or questions over the coming days.

## 2025-05-28 ENCOUNTER — OFFICE VISIT (OUTPATIENT)
Dept: PHYSICAL MEDICINE AND REHAB | Age: 85
End: 2025-05-28
Payer: MEDICARE

## 2025-05-28 VITALS
DIASTOLIC BLOOD PRESSURE: 60 MMHG | WEIGHT: 133.16 LBS | HEIGHT: 63 IN | SYSTOLIC BLOOD PRESSURE: 132 MMHG | BODY MASS INDEX: 23.59 KG/M2

## 2025-05-28 DIAGNOSIS — G81.14 LEFT SPASTIC HEMIPARESIS (HCC): ICD-10-CM

## 2025-05-28 DIAGNOSIS — I69.30 HISTORY OF CVA WITH RESIDUAL DEFICIT: Primary | ICD-10-CM

## 2025-05-28 PROCEDURE — 64642 CHEMODENERV 1 EXTREMITY 1-4: CPT | Performed by: STUDENT IN AN ORGANIZED HEALTH CARE EDUCATION/TRAINING PROGRAM

## 2025-05-29 ENCOUNTER — HOSPITAL ENCOUNTER (OUTPATIENT)
Dept: PHYSICAL THERAPY | Age: 85
Setting detail: THERAPIES SERIES
Discharge: HOME | End: 2025-05-29
Payer: MEDICARE

## 2025-05-29 PROCEDURE — 97110 THERAPEUTIC EXERCISES: CPT

## 2025-05-29 NOTE — PROGRESS NOTES
* PLEASE SIGN, DATE AND TIME CERTIFICATION BELOW AND RETURN TO [facility] OUTPATIENT REHABILITATION (FAX #: [phone]).  ATTEST/CO-SIGN IF ACCESSING VIA [employer].  THANK YOU.**    I certify that I have examined the patient below and determined that Physical Medicine and Rehabilitation service is necessary and that I approve the established plan of care for up to 90 days or as specifically noted.  Attestation, signature or co-signature of physician indicates approval of certification requirements.    ________________________ ____________  Physician Signature   Date       [facility]  PHYSICAL THERAPY  [] EVALUATION  [] DAILY NOTE (LAND) [] DAILY NOTE (AQUATIC ) [x] PROGRESS NOTE (RE-CERT) [] DISCHARGE NOTE    [x] OUTPATIENT REHABILITATION [facility]   [] [facility]    [] [facility]   [] [facility]    Date: 2025  Patient Name:  Nanci Bar  : 1940  MRN: [id number]  CSN: [id number]    Referring Practitioner Adelina Nascimento DO [id number]      Diagnosis  Diagnoses       I69.30 (ICD-10-CM) - Unspecified sequelae of cerebral infarction    G81.14 (ICD-10-CM) - Spastic hemiplegia affecting left nondominant side (HCC)           Treatment Diagnosis R26.81  Unsteadiness on feet  R26.89  Abnormalities of gait and mobility  R53.1 Weakness   Date of Evaluation 3/11/25   Additional Pertinent History     Nanci Bar has a past medical history of MYRA (acute kidney injury), Anxiety, Arthritis, Arthritis, degenerative, localized, primary, hand, Bladder spasm, CAD (coronary artery disease), Cancer ([facility]), Cerebrovascular accident (CVA) ([facility]), Hyperlipidemia, Hypertension, Hypothyroidism, Obstructive sleep apnea of adult, SOB (shortness of breath), Type II or unspecified type diabetes mellitus without mention of complication, not stated as uncontrolled, and 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  she has a past surgical history that

## 2025-06-23 DIAGNOSIS — E03.9 HYPOTHYROIDISM, UNSPECIFIED TYPE: ICD-10-CM

## 2025-06-23 RX ORDER — LEVOTHYROXINE SODIUM 175 UG/1
175 TABLET ORAL DAILY
Qty: 90 TABLET | Refills: 0 | Status: SHIPPED | OUTPATIENT
Start: 2025-06-23

## 2025-06-23 NOTE — TELEPHONE ENCOUNTER
Nanci Bar needs refill of   Requested Prescriptions     Pending Prescriptions Disp Refills    levothyroxine (SYNTHROID) 175 MCG tablet [Pharmacy Med Name: Levothyroxine Sodium 175 MCG Oral Tablet] 90 tablet 0     Sig: Take 1 tablet by mouth once daily       Last Filled on:  2/11/2025    Last Visit Date:  4/23/2025    Next Visit Date:  6/26/2025

## 2025-06-24 ENCOUNTER — LAB (OUTPATIENT)
Dept: LAB | Age: 85
End: 2025-06-24

## 2025-06-24 DIAGNOSIS — E78.5 DYSLIPIDEMIA: ICD-10-CM

## 2025-06-24 DIAGNOSIS — Z79.4 TYPE 2 DIABETES MELLITUS WITH HYPERGLYCEMIA, WITH LONG-TERM CURRENT USE OF INSULIN (HCC): ICD-10-CM

## 2025-06-24 DIAGNOSIS — E11.65 TYPE 2 DIABETES MELLITUS WITH HYPERGLYCEMIA, WITH LONG-TERM CURRENT USE OF INSULIN (HCC): ICD-10-CM

## 2025-06-24 DIAGNOSIS — I10 ESSENTIAL HYPERTENSION: ICD-10-CM

## 2025-06-24 LAB
ALBUMIN SERPL BCG-MCNC: 3.9 G/DL (ref 3.4–4.9)
ALP SERPL-CCNC: 101 U/L (ref 38–126)
ALT SERPL W/O P-5'-P-CCNC: 31 U/L (ref 10–35)
ANION GAP SERPL CALC-SCNC: 14 MEQ/L (ref 8–16)
AST SERPL-CCNC: 31 U/L (ref 10–35)
BASOPHILS ABSOLUTE: 0.1 THOU/MM3 (ref 0–0.1)
BASOPHILS NFR BLD AUTO: 1.1 %
BILIRUB SERPL-MCNC: 0.7 MG/DL (ref 0.3–1.2)
BUN SERPL-MCNC: 27 MG/DL (ref 8–23)
CALCIUM SERPL-MCNC: 10.1 MG/DL (ref 8.8–10.2)
CHLORIDE SERPL-SCNC: 99 MEQ/L (ref 98–111)
CHOLEST SERPL-MCNC: 204 MG/DL (ref 100–199)
CO2 SERPL-SCNC: 22 MEQ/L (ref 22–29)
CREAT SERPL-MCNC: 1.4 MG/DL (ref 0.5–0.9)
CREAT UR-MCNC: 49.8 MG/DL
DEPRECATED RDW RBC AUTO: 45 FL (ref 35–45)
EOSINOPHIL NFR BLD AUTO: 1.5 %
EOSINOPHILS ABSOLUTE: 0.1 THOU/MM3 (ref 0–0.4)
ERYTHROCYTE [DISTWIDTH] IN BLOOD BY AUTOMATED COUNT: 13.9 % (ref 11.5–14.5)
GFR SERPL CREATININE-BSD FRML MDRD: 37 ML/MIN/1.73M2
GLUCOSE SERPL-MCNC: 308 MG/DL (ref 74–109)
HCT VFR BLD AUTO: 39.1 % (ref 37–47)
HDLC SERPL-MCNC: 58 MG/DL
HGB BLD-MCNC: 12.5 GM/DL (ref 12–16)
IMM GRANULOCYTES # BLD AUTO: 0.03 THOU/MM3 (ref 0–0.07)
IMM GRANULOCYTES NFR BLD AUTO: 0.4 %
LDLC SERPL CALC-MCNC: 129 MG/DL
LYMPHOCYTES ABSOLUTE: 3.1 THOU/MM3 (ref 1–4.8)
LYMPHOCYTES NFR BLD AUTO: 43.6 %
MCH RBC QN AUTO: 28.3 PG (ref 26–33)
MCHC RBC AUTO-ENTMCNC: 32 GM/DL (ref 32.2–35.5)
MCV RBC AUTO: 88.5 FL (ref 81–99)
MICROALBUMIN UR-MCNC: 4.91 MG/DL
MICROALBUMIN/CREAT RATIO PNL UR: 99 MG/G (ref 0–30)
MONOCYTES ABSOLUTE: 0.3 THOU/MM3 (ref 0.4–1.3)
MONOCYTES NFR BLD AUTO: 4.8 %
NEUTROPHILS ABSOLUTE: 3.5 THOU/MM3 (ref 1.8–7.7)
NEUTROPHILS NFR BLD AUTO: 48.6 %
NRBC BLD AUTO-RTO: 0 /100 WBC
PLATELET # BLD AUTO: 211 THOU/MM3 (ref 130–400)
PMV BLD AUTO: 13 FL (ref 9.4–12.4)
POTASSIUM SERPL-SCNC: 4.1 MEQ/L (ref 3.5–5.2)
PROT SERPL-MCNC: 6.9 G/DL (ref 6.4–8.3)
RBC # BLD AUTO: 4.42 MILL/MM3 (ref 4.2–5.4)
SODIUM SERPL-SCNC: 135 MEQ/L (ref 135–145)
TRIGL SERPL-MCNC: 85 MG/DL (ref 0–199)
WBC # BLD AUTO: 7.2 THOU/MM3 (ref 4.8–10.8)

## 2025-06-25 NOTE — PROGRESS NOTES
Medicare Annual Wellness Visit    Nanci Bar is here for Medicare AWV    Assessment & Plan   Medicare annual wellness visit, subsequent  Hemiparesis affecting left side as late effect of cerebrovascular accident (CVA) (HCC)  Type 2 diabetes mellitus with hyperglycemia, with long-term current use of insulin (HCC)  Female stress incontinence     Return in 6 months (on 12/26/2025).     Subjective   The following acute and/or chronic problems were also addressed today:  Blood sugars fluctuates from   Taking medications daily yes, compliant  Not watching diet as well as she should, limited exercising  HbA1C 10.8 with endocrinology   (was 64)  Urine microalbumin 6/24/2025  ARB valsartan  Statin atorvastatin  Concerns with worsening of incontinence, has call in for possible Botox injection again, done 6 months ago with good results    Patient's complete Health Risk Assessment and screening values have been reviewed and are found in Flowsheets. The following problems were reviewed today and where indicated follow up appointments were made and/or referrals ordered.    Positive Risk Factor Screenings with Interventions:              Inactivity:  On average, how many days per week do you engage in moderate to strenuous exercise (like a brisk walk)?: 2 days (!) Abnormal  On average, how many minutes do you engage in exercise at this level?: 10 min  Interventions:  Patient comments: leg exercises, usually avoids arm exercises    Poor Eating Habits/Diet:  Do you eat balanced/healthy meals regularly?: (!) No  Interventions:  Patient comments: picky eater  Patient declines any further evaluation or treatment       Vision Screen:  Do you have difficulty driving, watching TV, or doing any of your daily activities because of your eyesight?: No  Have you had an eye exam within the past year?: (!) No  Interventions:   Patient encouraged to make appointment with their eye specialist     ADL's:   Patient reports needing

## 2025-06-26 ENCOUNTER — OFFICE VISIT (OUTPATIENT)
Dept: FAMILY MEDICINE CLINIC | Age: 85
End: 2025-06-26
Payer: MEDICARE

## 2025-06-26 ENCOUNTER — PATIENT MESSAGE (OUTPATIENT)
Dept: FAMILY MEDICINE CLINIC | Age: 85
End: 2025-06-26

## 2025-06-26 VITALS
HEIGHT: 63 IN | OXYGEN SATURATION: 97 % | TEMPERATURE: 97.5 F | BODY MASS INDEX: 24.66 KG/M2 | HEART RATE: 92 BPM | RESPIRATION RATE: 16 BRPM | SYSTOLIC BLOOD PRESSURE: 138 MMHG | WEIGHT: 139.2 LBS | DIASTOLIC BLOOD PRESSURE: 72 MMHG

## 2025-06-26 DIAGNOSIS — E11.65 INADEQUATELY CONTROLLED DIABETES MELLITUS (HCC): ICD-10-CM

## 2025-06-26 DIAGNOSIS — Z00.00 MEDICARE ANNUAL WELLNESS VISIT, SUBSEQUENT: Primary | ICD-10-CM

## 2025-06-26 DIAGNOSIS — E11.65 TYPE 2 DIABETES MELLITUS WITH HYPERGLYCEMIA, WITH LONG-TERM CURRENT USE OF INSULIN (HCC): Primary | ICD-10-CM

## 2025-06-26 DIAGNOSIS — E11.65 TYPE 2 DIABETES MELLITUS WITH HYPERGLYCEMIA, WITH LONG-TERM CURRENT USE OF INSULIN (HCC): ICD-10-CM

## 2025-06-26 DIAGNOSIS — E78.5 DYSLIPIDEMIA: ICD-10-CM

## 2025-06-26 DIAGNOSIS — Z79.4 TYPE 2 DIABETES MELLITUS WITH HYPERGLYCEMIA, WITH LONG-TERM CURRENT USE OF INSULIN (HCC): ICD-10-CM

## 2025-06-26 DIAGNOSIS — I69.354 HEMIPARESIS AFFECTING LEFT SIDE AS LATE EFFECT OF CEREBROVASCULAR ACCIDENT (CVA) (HCC): ICD-10-CM

## 2025-06-26 DIAGNOSIS — I10 PRIMARY HYPERTENSION: ICD-10-CM

## 2025-06-26 DIAGNOSIS — Z79.4 TYPE 2 DIABETES MELLITUS WITH HYPERGLYCEMIA, WITH LONG-TERM CURRENT USE OF INSULIN (HCC): Primary | ICD-10-CM

## 2025-06-26 DIAGNOSIS — N39.3 FEMALE STRESS INCONTINENCE: ICD-10-CM

## 2025-06-26 PROCEDURE — 3078F DIAST BP <80 MM HG: CPT | Performed by: FAMILY MEDICINE

## 2025-06-26 PROCEDURE — 3075F SYST BP GE 130 - 139MM HG: CPT | Performed by: FAMILY MEDICINE

## 2025-06-26 PROCEDURE — G0439 PPPS, SUBSEQ VISIT: HCPCS | Performed by: FAMILY MEDICINE

## 2025-06-26 PROCEDURE — 1123F ACP DISCUSS/DSCN MKR DOCD: CPT | Performed by: FAMILY MEDICINE

## 2025-06-26 PROCEDURE — 3046F HEMOGLOBIN A1C LEVEL >9.0%: CPT | Performed by: FAMILY MEDICINE

## 2025-06-26 PROCEDURE — 1159F MED LIST DOCD IN RCRD: CPT | Performed by: FAMILY MEDICINE

## 2025-06-26 RX ORDER — ATORVASTATIN CALCIUM 80 MG/1
80 TABLET, FILM COATED ORAL NIGHTLY
Qty: 90 TABLET | Refills: 1 | Status: SHIPPED | OUTPATIENT
Start: 2025-06-26

## 2025-06-26 RX ORDER — VALSARTAN 40 MG/1
40 TABLET ORAL DAILY
Qty: 90 TABLET | Refills: 1 | Status: SHIPPED | OUTPATIENT
Start: 2025-06-26

## 2025-06-26 ASSESSMENT — PATIENT HEALTH QUESTIONNAIRE - PHQ9
SUM OF ALL RESPONSES TO PHQ QUESTIONS 1-9: 0
SUM OF ALL RESPONSES TO PHQ QUESTIONS 1-9: 0
1. LITTLE INTEREST OR PLEASURE IN DOING THINGS: NOT AT ALL
2. FEELING DOWN, DEPRESSED OR HOPELESS: NOT AT ALL
SUM OF ALL RESPONSES TO PHQ QUESTIONS 1-9: 0
SUM OF ALL RESPONSES TO PHQ QUESTIONS 1-9: 0

## 2025-06-26 ASSESSMENT — LIFESTYLE VARIABLES
HOW MANY STANDARD DRINKS CONTAINING ALCOHOL DO YOU HAVE ON A TYPICAL DAY: 1 OR 2
HOW OFTEN DO YOU HAVE A DRINK CONTAINING ALCOHOL: 2-3 TIMES A WEEK

## 2025-06-26 NOTE — PATIENT INSTRUCTIONS
stop and talk to your doctor.  Where can you learn more?  Go to https://[email or web address].net/patientEd and enter P600 to learn more about \"Learning About Being Active as an Older Adult.\"  Current as of: July 31, 2024  Content Version: 14.5  © 9585-3628 [employer].   Care instructions adapted under license by [employer]. If you have questions about a medical condition or this instruction, always ask your healthcare professional. [employer], disclaims any warranty or liability for your use of this information.         Learning About Vision Tests  What are vision tests?     The four most common vision tests are visual acuity tests, refraction, visual field tests, and color vision tests.  Visual acuity (sharpness) tests  These tests are used:  To see if you need glasses or contact lenses.  To monitor an eye problem.  To check an eye injury.  Visual acuity tests are done as part of routine exams. You may also have this test when you get your 's license or apply for some types of jobs.  Visual field tests  These tests are used:  To check for vision loss in any area of your range of vision.  To screen for certain eye diseases.  To look for nerve damage after a stroke, head injury, or other problem that could reduce blood flow to the brain.  Refraction and color tests  A refraction test is done to find the right prescription for glasses and contact lenses.  A color vision test is done to check for color blindness.  Color vision is often tested as part of a routine exam. You may also have this test when you apply for a job where recognizing different colors is important, such as , electronics, or the .  How are vision tests done?  Visual acuity test   You cover one eye at a time.  You read aloud from a wall chart across the room.  You read aloud from a small card that you hold in your hand.  Refraction   You look into a special device.  The device puts lenses of different

## 2025-08-04 ENCOUNTER — OFFICE VISIT (OUTPATIENT)
Dept: FAMILY MEDICINE CLINIC | Age: 85
End: 2025-08-04

## 2025-08-04 VITALS
BODY MASS INDEX: 24.73 KG/M2 | WEIGHT: 139.6 LBS | SYSTOLIC BLOOD PRESSURE: 142 MMHG | OXYGEN SATURATION: 99 % | HEIGHT: 63 IN | DIASTOLIC BLOOD PRESSURE: 80 MMHG | RESPIRATION RATE: 16 BRPM | HEART RATE: 89 BPM | TEMPERATURE: 97.6 F

## 2025-08-04 DIAGNOSIS — Z85.3 HISTORY OF BREAST CANCER IN FEMALE: ICD-10-CM

## 2025-08-04 DIAGNOSIS — B37.31 VAGINAL CANDIDIASIS: Primary | ICD-10-CM

## 2025-08-04 RX ORDER — TERCONAZOLE 4 MG/G
CREAM VAGINAL
Qty: 45 G | Refills: 1 | Status: SHIPPED | OUTPATIENT
Start: 2025-08-04 | End: 2025-08-11

## 2025-08-04 RX ORDER — FLUCONAZOLE 100 MG/1
100 TABLET ORAL DAILY
Qty: 30 TABLET | Refills: 0 | Status: SHIPPED | OUTPATIENT
Start: 2025-08-04

## 2025-08-04 ASSESSMENT — ENCOUNTER SYMPTOMS: VOMITING: 0

## 2025-09-03 ENCOUNTER — OFFICE VISIT (OUTPATIENT)
Dept: PHYSICAL MEDICINE AND REHAB | Age: 85
End: 2025-09-03

## 2025-09-03 VITALS
HEIGHT: 63 IN | BODY MASS INDEX: 24.73 KG/M2 | WEIGHT: 139.55 LBS | SYSTOLIC BLOOD PRESSURE: 110 MMHG | DIASTOLIC BLOOD PRESSURE: 60 MMHG

## 2025-09-03 DIAGNOSIS — I69.30 HISTORY OF CVA WITH RESIDUAL DEFICIT: Primary | ICD-10-CM

## 2025-09-03 DIAGNOSIS — G81.14 LEFT SPASTIC HEMIPARESIS (HCC): ICD-10-CM

## 2025-09-03 DIAGNOSIS — G81.94 LEFT HEMIPARESIS (HCC): ICD-10-CM
